# Patient Record
Sex: MALE | Race: WHITE | NOT HISPANIC OR LATINO | URBAN - METROPOLITAN AREA
[De-identification: names, ages, dates, MRNs, and addresses within clinical notes are randomized per-mention and may not be internally consistent; named-entity substitution may affect disease eponyms.]

---

## 2017-01-04 ENCOUNTER — OUTPATIENT (OUTPATIENT)
Dept: OUTPATIENT SERVICES | Facility: HOSPITAL | Age: 77
LOS: 1 days | Discharge: HOME | End: 2017-01-04

## 2017-02-03 ENCOUNTER — APPOINTMENT (OUTPATIENT)
Dept: CARDIOLOGY | Facility: CLINIC | Age: 77
End: 2017-02-03

## 2017-02-03 VITALS — BODY MASS INDEX: 27.2 KG/M2 | HEIGHT: 70 IN | WEIGHT: 190 LBS

## 2017-02-03 VITALS — HEART RATE: 59 BPM | DIASTOLIC BLOOD PRESSURE: 80 MMHG | SYSTOLIC BLOOD PRESSURE: 120 MMHG

## 2017-02-10 ENCOUNTER — MEDICATION RENEWAL (OUTPATIENT)
Age: 77
End: 2017-02-10

## 2017-03-06 ENCOUNTER — APPOINTMENT (OUTPATIENT)
Dept: CARDIOLOGY | Facility: CLINIC | Age: 77
End: 2017-03-06

## 2017-05-25 ENCOUNTER — OUTPATIENT (OUTPATIENT)
Dept: OUTPATIENT SERVICES | Facility: HOSPITAL | Age: 77
LOS: 1 days | Discharge: HOME | End: 2017-05-25

## 2017-06-22 ENCOUNTER — OUTPATIENT (OUTPATIENT)
Dept: OUTPATIENT SERVICES | Facility: HOSPITAL | Age: 77
LOS: 1 days | Discharge: HOME | End: 2017-06-22

## 2017-06-22 ENCOUNTER — APPOINTMENT (OUTPATIENT)
Dept: CARDIOLOGY | Facility: CLINIC | Age: 77
End: 2017-06-22

## 2017-06-22 VITALS — SYSTOLIC BLOOD PRESSURE: 140 MMHG | HEART RATE: 54 BPM | DIASTOLIC BLOOD PRESSURE: 80 MMHG

## 2017-06-22 VITALS — WEIGHT: 184 LBS | BODY MASS INDEX: 26.34 KG/M2 | HEIGHT: 70 IN

## 2017-06-28 DIAGNOSIS — Z79.01 LONG TERM (CURRENT) USE OF ANTICOAGULANTS: ICD-10-CM

## 2017-06-28 DIAGNOSIS — Z95.2 PRESENCE OF PROSTHETIC HEART VALVE: ICD-10-CM

## 2017-07-10 ENCOUNTER — MEDICATION RENEWAL (OUTPATIENT)
Age: 77
End: 2017-07-10

## 2017-07-26 ENCOUNTER — APPOINTMENT (OUTPATIENT)
Dept: UROLOGY | Facility: CLINIC | Age: 77
End: 2017-07-26

## 2017-07-26 VITALS
DIASTOLIC BLOOD PRESSURE: 67 MMHG | HEIGHT: 70 IN | BODY MASS INDEX: 26.34 KG/M2 | SYSTOLIC BLOOD PRESSURE: 136 MMHG | HEART RATE: 66 BPM | WEIGHT: 184 LBS

## 2017-07-27 ENCOUNTER — OUTPATIENT (OUTPATIENT)
Dept: OUTPATIENT SERVICES | Facility: HOSPITAL | Age: 77
LOS: 1 days | Discharge: HOME | End: 2017-07-27

## 2017-07-27 DIAGNOSIS — Z95.2 PRESENCE OF PROSTHETIC HEART VALVE: ICD-10-CM

## 2017-07-27 DIAGNOSIS — Z79.01 LONG TERM (CURRENT) USE OF ANTICOAGULANTS: ICD-10-CM

## 2017-08-09 ENCOUNTER — OUTPATIENT (OUTPATIENT)
Dept: OUTPATIENT SERVICES | Facility: HOSPITAL | Age: 77
LOS: 1 days | Discharge: HOME | End: 2017-08-09

## 2017-08-09 DIAGNOSIS — N41.3 PROSTATOCYSTITIS: ICD-10-CM

## 2017-08-10 ENCOUNTER — APPOINTMENT (OUTPATIENT)
Dept: CARDIOLOGY | Facility: CLINIC | Age: 77
End: 2017-08-10

## 2017-08-10 VITALS — WEIGHT: 183 LBS | BODY MASS INDEX: 26.26 KG/M2

## 2017-08-10 VITALS — SYSTOLIC BLOOD PRESSURE: 128 MMHG | DIASTOLIC BLOOD PRESSURE: 60 MMHG

## 2017-08-14 ENCOUNTER — OUTPATIENT (OUTPATIENT)
Dept: OUTPATIENT SERVICES | Facility: HOSPITAL | Age: 77
LOS: 1 days | Discharge: HOME | End: 2017-08-14

## 2017-08-14 DIAGNOSIS — Z95.2 PRESENCE OF PROSTHETIC HEART VALVE: ICD-10-CM

## 2017-08-14 DIAGNOSIS — Z79.01 LONG TERM (CURRENT) USE OF ANTICOAGULANTS: ICD-10-CM

## 2017-08-31 ENCOUNTER — APPOINTMENT (OUTPATIENT)
Dept: UROLOGY | Facility: CLINIC | Age: 77
End: 2017-08-31
Payer: MEDICARE

## 2017-08-31 VITALS
SYSTOLIC BLOOD PRESSURE: 128 MMHG | HEART RATE: 54 BPM | WEIGHT: 183 LBS | BODY MASS INDEX: 26.2 KG/M2 | HEIGHT: 70 IN | DIASTOLIC BLOOD PRESSURE: 68 MMHG

## 2017-08-31 LAB
BILIRUB UR QL STRIP: NORMAL
CLARITY UR: CLEAR
COLLECTION METHOD: NORMAL
GLUCOSE UR-MCNC: NORMAL
HCG UR QL: NORMAL EU/DL
HGB UR QL STRIP.AUTO: NORMAL
KETONES UR-MCNC: NORMAL
LEUKOCYTE ESTERASE UR QL STRIP: NORMAL
NITRITE UR QL STRIP: NORMAL
PH UR STRIP: 7
PROT UR STRIP-MCNC: NORMAL
SP GR UR STRIP: 1.01

## 2017-08-31 PROCEDURE — 99215 OFFICE O/P EST HI 40 MIN: CPT

## 2017-09-06 ENCOUNTER — OUTPATIENT (OUTPATIENT)
Dept: OUTPATIENT SERVICES | Facility: HOSPITAL | Age: 77
LOS: 1 days | Discharge: HOME | End: 2017-09-06

## 2017-09-06 DIAGNOSIS — C61 MALIGNANT NEOPLASM OF PROSTATE: ICD-10-CM

## 2017-09-06 DIAGNOSIS — M81.0 AGE-RELATED OSTEOPOROSIS WITHOUT CURRENT PATHOLOGICAL FRACTURE: ICD-10-CM

## 2017-09-06 DIAGNOSIS — Q78.2 OSTEOPETROSIS: ICD-10-CM

## 2017-09-06 DIAGNOSIS — M54.5 LOW BACK PAIN: ICD-10-CM

## 2017-09-07 DIAGNOSIS — Z79.01 LONG TERM (CURRENT) USE OF ANTICOAGULANTS: ICD-10-CM

## 2017-09-07 DIAGNOSIS — Z95.2 PRESENCE OF PROSTHETIC HEART VALVE: ICD-10-CM

## 2017-09-08 DIAGNOSIS — R97.20 ELEVATED PROSTATE SPECIFIC ANTIGEN [PSA]: ICD-10-CM

## 2017-09-08 DIAGNOSIS — Z13.820 ENCOUNTER FOR SCREENING FOR OSTEOPOROSIS: ICD-10-CM

## 2017-09-08 DIAGNOSIS — Z02.9 ENCOUNTER FOR ADMINISTRATIVE EXAMINATIONS, UNSPECIFIED: ICD-10-CM

## 2017-09-08 DIAGNOSIS — N40.1 BENIGN PROSTATIC HYPERPLASIA WITH LOWER URINARY TRACT SYMPTOMS: ICD-10-CM

## 2017-09-13 ENCOUNTER — OUTPATIENT (OUTPATIENT)
Dept: OUTPATIENT SERVICES | Facility: HOSPITAL | Age: 77
LOS: 1 days | Discharge: HOME | End: 2017-09-13

## 2017-09-13 DIAGNOSIS — Z79.01 LONG TERM (CURRENT) USE OF ANTICOAGULANTS: ICD-10-CM

## 2017-09-13 DIAGNOSIS — N41.3 PROSTATOCYSTITIS: ICD-10-CM

## 2017-09-13 DIAGNOSIS — Z95.2 PRESENCE OF PROSTHETIC HEART VALVE: ICD-10-CM

## 2017-09-14 ENCOUNTER — APPOINTMENT (OUTPATIENT)
Dept: CARDIOLOGY | Facility: CLINIC | Age: 77
End: 2017-09-14

## 2017-09-15 ENCOUNTER — RX RENEWAL (OUTPATIENT)
Age: 77
End: 2017-09-15

## 2017-09-15 ENCOUNTER — MESSAGE (OUTPATIENT)
Age: 77
End: 2017-09-15

## 2017-09-15 DIAGNOSIS — N41.3 PROSTATOCYSTITIS: ICD-10-CM

## 2017-09-15 DIAGNOSIS — Z02.9 ENCOUNTER FOR ADMINISTRATIVE EXAMINATIONS, UNSPECIFIED: ICD-10-CM

## 2017-09-20 ENCOUNTER — RESULT REVIEW (OUTPATIENT)
Age: 77
End: 2017-09-20

## 2017-09-21 ENCOUNTER — RESULT REVIEW (OUTPATIENT)
Age: 77
End: 2017-09-21

## 2017-09-21 ENCOUNTER — MESSAGE (OUTPATIENT)
Age: 77
End: 2017-09-21

## 2017-09-21 ENCOUNTER — APPOINTMENT (OUTPATIENT)
Dept: UROLOGY | Facility: CLINIC | Age: 77
End: 2017-09-21

## 2017-09-21 ENCOUNTER — OUTPATIENT (OUTPATIENT)
Dept: ADMINISTRATIVE | Facility: HOSPITAL | Age: 77
LOS: 1 days | Discharge: HOME | End: 2017-09-21

## 2017-09-25 ENCOUNTER — RESULT REVIEW (OUTPATIENT)
Age: 77
End: 2017-09-25

## 2017-09-26 LAB
APPEARANCE UR: CLEAR
BACTERIA UR CULT: NORMAL
BILIRUB UR QL STRIP: NEGATIVE
COLOR UR: YELLOW
GLUCOSE UR STRIP-MCNC: NEGATIVE MG/DL
HGB UR QL STRIP: ABNORMAL
KETONES UR STRIP-MCNC: NEGATIVE MG/DL
NITRITE UR QL STRIP: NEGATIVE
PH UR STRIP: 6
PROT UR STRIP-MCNC: NEGATIVE MG/DL
RBC #/AREA URNS HPF: ABNORMAL P/HPF
SP GR UR STRIP: 1.01
URINE COMP/EPITH (NORTH): ABNORMAL
UROBILINOGEN UR STRIP-MCNC: 0.2 MG/DL
WBC URNS QL MICRO: ABNORMAL
WBC URNS QL MICRO: ABNORMAL P/HPF

## 2017-10-03 DIAGNOSIS — N40.1 BENIGN PROSTATIC HYPERPLASIA WITH LOWER URINARY TRACT SYMPTOMS: ICD-10-CM

## 2017-10-05 ENCOUNTER — APPOINTMENT (OUTPATIENT)
Dept: UROLOGY | Facility: CLINIC | Age: 77
End: 2017-10-05
Payer: MEDICARE

## 2017-10-05 PROCEDURE — 99213 OFFICE O/P EST LOW 20 MIN: CPT

## 2017-10-12 ENCOUNTER — OUTPATIENT (OUTPATIENT)
Dept: OUTPATIENT SERVICES | Facility: HOSPITAL | Age: 77
LOS: 1 days | Discharge: HOME | End: 2017-10-12

## 2017-10-12 DIAGNOSIS — Z79.01 LONG TERM (CURRENT) USE OF ANTICOAGULANTS: ICD-10-CM

## 2017-10-12 DIAGNOSIS — Z95.2 PRESENCE OF PROSTHETIC HEART VALVE: ICD-10-CM

## 2017-10-17 ENCOUNTER — OUTPATIENT (OUTPATIENT)
Dept: OUTPATIENT SERVICES | Facility: HOSPITAL | Age: 77
LOS: 1 days | Discharge: HOME | End: 2017-10-17

## 2017-10-17 DIAGNOSIS — Z79.01 LONG TERM (CURRENT) USE OF ANTICOAGULANTS: ICD-10-CM

## 2017-10-17 DIAGNOSIS — Z95.2 PRESENCE OF PROSTHETIC HEART VALVE: ICD-10-CM

## 2017-10-18 ENCOUNTER — RX RENEWAL (OUTPATIENT)
Age: 77
End: 2017-10-18

## 2017-11-02 ENCOUNTER — OUTPATIENT (OUTPATIENT)
Dept: OUTPATIENT SERVICES | Facility: HOSPITAL | Age: 77
LOS: 1 days | Discharge: HOME | End: 2017-11-02

## 2017-11-02 DIAGNOSIS — Z95.2 PRESENCE OF PROSTHETIC HEART VALVE: ICD-10-CM

## 2017-11-02 DIAGNOSIS — Z79.01 LONG TERM (CURRENT) USE OF ANTICOAGULANTS: ICD-10-CM

## 2017-11-07 ENCOUNTER — APPOINTMENT (OUTPATIENT)
Dept: UROLOGY | Facility: CLINIC | Age: 77
End: 2017-11-07
Payer: MEDICARE

## 2017-11-07 VITALS — SYSTOLIC BLOOD PRESSURE: 133 MMHG | HEIGHT: 70 IN | DIASTOLIC BLOOD PRESSURE: 78 MMHG | HEART RATE: 52 BPM

## 2017-11-07 DIAGNOSIS — Z80.42 FAMILY HISTORY OF MALIGNANT NEOPLASM OF PROSTATE: ICD-10-CM

## 2017-11-07 LAB
BILIRUB UR QL STRIP: NORMAL
CLARITY UR: CLEAR
COLLECTION METHOD: NORMAL
GLUCOSE UR-MCNC: NORMAL
HCG UR QL: 2 EU/DL
HGB UR QL STRIP.AUTO: NORMAL
KETONES UR-MCNC: NORMAL
LEUKOCYTE ESTERASE UR QL STRIP: 25
NITRITE UR QL STRIP: NORMAL
PH UR STRIP: 6
PROT UR STRIP-MCNC: NORMAL
SP GR UR STRIP: 1.01

## 2017-11-07 PROCEDURE — 96402 CHEMO HORMON ANTINEOPL SQ/IM: CPT

## 2017-11-07 PROCEDURE — 81003 URINALYSIS AUTO W/O SCOPE: CPT | Mod: QW

## 2017-11-07 PROCEDURE — 99213 OFFICE O/P EST LOW 20 MIN: CPT | Mod: 25

## 2017-11-09 ENCOUNTER — APPOINTMENT (OUTPATIENT)
Dept: CARDIOLOGY | Facility: CLINIC | Age: 77
End: 2017-11-09

## 2017-11-09 VITALS — DIASTOLIC BLOOD PRESSURE: 68 MMHG | HEART RATE: 59 BPM | SYSTOLIC BLOOD PRESSURE: 140 MMHG

## 2017-11-09 VITALS — BODY MASS INDEX: 26.92 KG/M2 | HEIGHT: 70 IN | WEIGHT: 188 LBS

## 2017-11-14 ENCOUNTER — RX RENEWAL (OUTPATIENT)
Age: 77
End: 2017-11-14

## 2017-11-30 ENCOUNTER — OUTPATIENT (OUTPATIENT)
Dept: OUTPATIENT SERVICES | Facility: HOSPITAL | Age: 77
LOS: 1 days | Discharge: HOME | End: 2017-11-30

## 2017-11-30 DIAGNOSIS — Z95.2 PRESENCE OF PROSTHETIC HEART VALVE: ICD-10-CM

## 2017-11-30 DIAGNOSIS — Z79.01 LONG TERM (CURRENT) USE OF ANTICOAGULANTS: ICD-10-CM

## 2017-12-06 ENCOUNTER — RX RENEWAL (OUTPATIENT)
Age: 77
End: 2017-12-06

## 2017-12-08 ENCOUNTER — RX RENEWAL (OUTPATIENT)
Age: 77
End: 2017-12-08

## 2017-12-28 ENCOUNTER — OUTPATIENT (OUTPATIENT)
Dept: OUTPATIENT SERVICES | Facility: HOSPITAL | Age: 77
LOS: 1 days | Discharge: HOME | End: 2017-12-28

## 2017-12-28 DIAGNOSIS — Z95.2 PRESENCE OF PROSTHETIC HEART VALVE: ICD-10-CM

## 2017-12-28 DIAGNOSIS — Z79.01 LONG TERM (CURRENT) USE OF ANTICOAGULANTS: ICD-10-CM

## 2018-01-18 ENCOUNTER — OUTPATIENT (OUTPATIENT)
Dept: OUTPATIENT SERVICES | Facility: HOSPITAL | Age: 78
LOS: 1 days | Discharge: HOME | End: 2018-01-18

## 2018-01-18 DIAGNOSIS — Z95.2 PRESENCE OF PROSTHETIC HEART VALVE: ICD-10-CM

## 2018-01-18 DIAGNOSIS — Z79.01 LONG TERM (CURRENT) USE OF ANTICOAGULANTS: ICD-10-CM

## 2018-01-25 ENCOUNTER — OUTPATIENT (OUTPATIENT)
Dept: OUTPATIENT SERVICES | Facility: HOSPITAL | Age: 78
LOS: 1 days | Discharge: HOME | End: 2018-01-25

## 2018-01-25 DIAGNOSIS — Z95.2 PRESENCE OF PROSTHETIC HEART VALVE: ICD-10-CM

## 2018-01-25 DIAGNOSIS — Z79.01 LONG TERM (CURRENT) USE OF ANTICOAGULANTS: ICD-10-CM

## 2018-02-01 ENCOUNTER — OUTPATIENT (OUTPATIENT)
Dept: OUTPATIENT SERVICES | Facility: HOSPITAL | Age: 78
LOS: 1 days | Discharge: HOME | End: 2018-02-01

## 2018-02-01 DIAGNOSIS — Z79.01 LONG TERM (CURRENT) USE OF ANTICOAGULANTS: ICD-10-CM

## 2018-02-01 DIAGNOSIS — I48.91 UNSPECIFIED ATRIAL FIBRILLATION: ICD-10-CM

## 2018-02-20 ENCOUNTER — APPOINTMENT (OUTPATIENT)
Dept: UROLOGY | Facility: CLINIC | Age: 78
End: 2018-02-20
Payer: MEDICARE

## 2018-02-20 ENCOUNTER — RX RENEWAL (OUTPATIENT)
Age: 78
End: 2018-02-20

## 2018-02-20 VITALS
HEIGHT: 70 IN | DIASTOLIC BLOOD PRESSURE: 82 MMHG | HEART RATE: 78 BPM | SYSTOLIC BLOOD PRESSURE: 127 MMHG | WEIGHT: 188 LBS | BODY MASS INDEX: 26.92 KG/M2

## 2018-02-20 PROCEDURE — 99213 OFFICE O/P EST LOW 20 MIN: CPT | Mod: 25

## 2018-02-20 PROCEDURE — 96402 CHEMO HORMON ANTINEOPL SQ/IM: CPT

## 2018-02-21 ENCOUNTER — RX RENEWAL (OUTPATIENT)
Age: 78
End: 2018-02-21

## 2018-03-01 ENCOUNTER — OUTPATIENT (OUTPATIENT)
Dept: OUTPATIENT SERVICES | Facility: HOSPITAL | Age: 78
LOS: 1 days | Discharge: HOME | End: 2018-03-01

## 2018-03-01 DIAGNOSIS — Z95.2 PRESENCE OF PROSTHETIC HEART VALVE: ICD-10-CM

## 2018-03-01 DIAGNOSIS — Z79.01 LONG TERM (CURRENT) USE OF ANTICOAGULANTS: ICD-10-CM

## 2018-03-08 ENCOUNTER — RX RENEWAL (OUTPATIENT)
Age: 78
End: 2018-03-08

## 2018-03-16 ENCOUNTER — APPOINTMENT (OUTPATIENT)
Dept: CARDIOLOGY | Facility: CLINIC | Age: 78
End: 2018-03-16

## 2018-03-16 VITALS — BODY MASS INDEX: 26.92 KG/M2 | WEIGHT: 188 LBS | HEIGHT: 70 IN

## 2018-03-16 VITALS — DIASTOLIC BLOOD PRESSURE: 70 MMHG | SYSTOLIC BLOOD PRESSURE: 110 MMHG | HEART RATE: 97 BPM

## 2018-03-16 RX ORDER — BICALUTAMIDE 50 MG/1
50 TABLET ORAL
Qty: 30 | Refills: 0 | Status: DISCONTINUED | COMMUNITY
Start: 2017-10-18 | End: 2018-03-16

## 2018-03-29 ENCOUNTER — APPOINTMENT (OUTPATIENT)
Dept: CARDIOLOGY | Facility: CLINIC | Age: 78
End: 2018-03-29

## 2018-03-29 ENCOUNTER — OUTPATIENT (OUTPATIENT)
Dept: OUTPATIENT SERVICES | Facility: HOSPITAL | Age: 78
LOS: 1 days | Discharge: HOME | End: 2018-03-29

## 2018-03-29 DIAGNOSIS — Z95.2 PRESENCE OF PROSTHETIC HEART VALVE: ICD-10-CM

## 2018-03-29 DIAGNOSIS — Z79.01 LONG TERM (CURRENT) USE OF ANTICOAGULANTS: ICD-10-CM

## 2018-04-02 ENCOUNTER — OUTPATIENT (OUTPATIENT)
Dept: OUTPATIENT SERVICES | Facility: HOSPITAL | Age: 78
LOS: 1 days | Discharge: HOME | End: 2018-04-02

## 2018-04-02 DIAGNOSIS — Z95.2 PRESENCE OF PROSTHETIC HEART VALVE: ICD-10-CM

## 2018-04-02 DIAGNOSIS — Z79.01 LONG TERM (CURRENT) USE OF ANTICOAGULANTS: ICD-10-CM

## 2018-04-16 ENCOUNTER — OUTPATIENT (OUTPATIENT)
Dept: OUTPATIENT SERVICES | Facility: HOSPITAL | Age: 78
LOS: 1 days | Discharge: HOME | End: 2018-04-16

## 2018-04-16 DIAGNOSIS — Z95.2 PRESENCE OF PROSTHETIC HEART VALVE: ICD-10-CM

## 2018-04-16 DIAGNOSIS — Z79.01 LONG TERM (CURRENT) USE OF ANTICOAGULANTS: ICD-10-CM

## 2018-04-17 ENCOUNTER — RX RENEWAL (OUTPATIENT)
Age: 78
End: 2018-04-17

## 2018-04-23 ENCOUNTER — OUTPATIENT (OUTPATIENT)
Dept: OUTPATIENT SERVICES | Facility: HOSPITAL | Age: 78
LOS: 1 days | Discharge: HOME | End: 2018-04-23

## 2018-04-23 DIAGNOSIS — Z95.3 PRESENCE OF XENOGENIC HEART VALVE: ICD-10-CM

## 2018-04-23 DIAGNOSIS — Z79.01 LONG TERM (CURRENT) USE OF ANTICOAGULANTS: ICD-10-CM

## 2018-05-07 ENCOUNTER — OUTPATIENT (OUTPATIENT)
Dept: OUTPATIENT SERVICES | Facility: HOSPITAL | Age: 78
LOS: 1 days | Discharge: HOME | End: 2018-05-07

## 2018-05-07 DIAGNOSIS — Z79.1 LONG TERM (CURRENT) USE OF NON-STEROIDAL ANTI-INFLAMMATORIES (NSAID): ICD-10-CM

## 2018-05-07 DIAGNOSIS — Z95.2 PRESENCE OF PROSTHETIC HEART VALVE: ICD-10-CM

## 2018-05-08 LAB
BILIRUB UR QL STRIP: NORMAL
CLARITY UR: CLEAR
COLLECTION METHOD: NORMAL
GLUCOSE UR-MCNC: NORMAL
HCG UR QL: 2 EU/DL
HGB UR QL STRIP.AUTO: NORMAL
KETONES UR-MCNC: NORMAL
LEUKOCYTE ESTERASE UR QL STRIP: NORMAL
NITRITE UR QL STRIP: NORMAL
PH UR STRIP: 5
PROT UR STRIP-MCNC: NORMAL
SP GR UR STRIP: 1.01

## 2018-05-09 ENCOUNTER — APPOINTMENT (OUTPATIENT)
Dept: CARDIOLOGY | Facility: CLINIC | Age: 78
End: 2018-05-09

## 2018-05-09 VITALS — DIASTOLIC BLOOD PRESSURE: 80 MMHG | HEART RATE: 56 BPM | SYSTOLIC BLOOD PRESSURE: 134 MMHG

## 2018-05-09 VITALS
HEIGHT: 70 IN | WEIGHT: 181 LBS | HEART RATE: 56 BPM | SYSTOLIC BLOOD PRESSURE: 128 MMHG | DIASTOLIC BLOOD PRESSURE: 70 MMHG | BODY MASS INDEX: 25.91 KG/M2

## 2018-05-15 ENCOUNTER — APPOINTMENT (OUTPATIENT)
Dept: UROLOGY | Facility: CLINIC | Age: 78
End: 2018-05-15
Payer: MEDICARE

## 2018-05-15 VITALS
HEART RATE: 64 BPM | HEIGHT: 70 IN | WEIGHT: 181 LBS | SYSTOLIC BLOOD PRESSURE: 136 MMHG | BODY MASS INDEX: 25.91 KG/M2 | DIASTOLIC BLOOD PRESSURE: 70 MMHG

## 2018-05-15 LAB
BILIRUB UR QL STRIP: NORMAL
CLARITY UR: CLEAR
COLLECTION METHOD: NORMAL
GLUCOSE UR-MCNC: NORMAL
HCG UR QL: NORMAL EU/DL
HGB UR QL STRIP.AUTO: NORMAL
KETONES UR-MCNC: NORMAL
LEUKOCYTE ESTERASE UR QL STRIP: NORMAL
NITRITE UR QL STRIP: NORMAL
PH UR STRIP: 6
PROT UR STRIP-MCNC: NORMAL
SP GR UR STRIP: 1020

## 2018-05-15 PROCEDURE — 81003 URINALYSIS AUTO W/O SCOPE: CPT | Mod: QW

## 2018-05-15 PROCEDURE — 96402 CHEMO HORMON ANTINEOPL SQ/IM: CPT

## 2018-05-15 PROCEDURE — 99213 OFFICE O/P EST LOW 20 MIN: CPT | Mod: 25

## 2018-05-21 ENCOUNTER — OUTPATIENT (OUTPATIENT)
Dept: OUTPATIENT SERVICES | Facility: HOSPITAL | Age: 78
LOS: 1 days | Discharge: HOME | End: 2018-05-21

## 2018-05-21 DIAGNOSIS — Z79.01 LONG TERM (CURRENT) USE OF ANTICOAGULANTS: ICD-10-CM

## 2018-05-21 DIAGNOSIS — Z95.2 PRESENCE OF PROSTHETIC HEART VALVE: ICD-10-CM

## 2018-06-18 ENCOUNTER — OUTPATIENT (OUTPATIENT)
Dept: OUTPATIENT SERVICES | Facility: HOSPITAL | Age: 78
LOS: 1 days | Discharge: HOME | End: 2018-06-18

## 2018-06-18 DIAGNOSIS — Z95.2 PRESENCE OF PROSTHETIC HEART VALVE: ICD-10-CM

## 2018-06-18 DIAGNOSIS — Z79.01 LONG TERM (CURRENT) USE OF ANTICOAGULANTS: ICD-10-CM

## 2018-07-16 ENCOUNTER — OUTPATIENT (OUTPATIENT)
Dept: OUTPATIENT SERVICES | Facility: HOSPITAL | Age: 78
LOS: 1 days | Discharge: HOME | End: 2018-07-16

## 2018-07-16 DIAGNOSIS — I48.92 UNSPECIFIED ATRIAL FLUTTER: ICD-10-CM

## 2018-07-16 DIAGNOSIS — I48.91 UNSPECIFIED ATRIAL FIBRILLATION: ICD-10-CM

## 2018-07-16 DIAGNOSIS — Z79.01 LONG TERM (CURRENT) USE OF ANTICOAGULANTS: ICD-10-CM

## 2018-07-16 DIAGNOSIS — Z95.2 PRESENCE OF PROSTHETIC HEART VALVE: ICD-10-CM

## 2018-07-23 ENCOUNTER — OUTPATIENT (OUTPATIENT)
Dept: OUTPATIENT SERVICES | Facility: HOSPITAL | Age: 78
LOS: 1 days | Discharge: HOME | End: 2018-07-23

## 2018-07-23 DIAGNOSIS — Z02.9 ENCOUNTER FOR ADMINISTRATIVE EXAMINATIONS, UNSPECIFIED: ICD-10-CM

## 2018-07-23 DIAGNOSIS — Z95.2 PRESENCE OF PROSTHETIC HEART VALVE: ICD-10-CM

## 2018-07-23 DIAGNOSIS — Z79.01 LONG TERM (CURRENT) USE OF ANTICOAGULANTS: ICD-10-CM

## 2018-07-23 DIAGNOSIS — I48.92 UNSPECIFIED ATRIAL FLUTTER: ICD-10-CM

## 2018-07-30 DIAGNOSIS — Z13.6 ENCOUNTER FOR SCREENING FOR CARDIOVASCULAR DISORDERS: ICD-10-CM

## 2018-08-06 ENCOUNTER — OUTPATIENT (OUTPATIENT)
Dept: OUTPATIENT SERVICES | Facility: HOSPITAL | Age: 78
LOS: 1 days | Discharge: HOME | End: 2018-08-06

## 2018-08-06 DIAGNOSIS — Z79.01 LONG TERM (CURRENT) USE OF ANTICOAGULANTS: ICD-10-CM

## 2018-08-06 DIAGNOSIS — Z95.2 PRESENCE OF PROSTHETIC HEART VALVE: ICD-10-CM

## 2018-08-07 ENCOUNTER — OUTPATIENT (OUTPATIENT)
Dept: OUTPATIENT SERVICES | Facility: HOSPITAL | Age: 78
LOS: 1 days | Discharge: HOME | End: 2018-08-07

## 2018-08-07 DIAGNOSIS — M54.5 LOW BACK PAIN: ICD-10-CM

## 2018-08-20 ENCOUNTER — OUTPATIENT (OUTPATIENT)
Dept: OUTPATIENT SERVICES | Facility: HOSPITAL | Age: 78
LOS: 1 days | Discharge: HOME | End: 2018-08-20

## 2018-08-20 DIAGNOSIS — Z79.01 LONG TERM (CURRENT) USE OF ANTICOAGULANTS: ICD-10-CM

## 2018-08-20 DIAGNOSIS — Z95.2 PRESENCE OF PROSTHETIC HEART VALVE: ICD-10-CM

## 2018-08-21 ENCOUNTER — RESULT CHARGE (OUTPATIENT)
Age: 78
End: 2018-08-21

## 2018-08-21 ENCOUNTER — APPOINTMENT (OUTPATIENT)
Dept: UROLOGY | Facility: CLINIC | Age: 78
End: 2018-08-21
Payer: MEDICARE

## 2018-08-21 VITALS
WEIGHT: 182 LBS | BODY MASS INDEX: 26.05 KG/M2 | HEART RATE: 54 BPM | SYSTOLIC BLOOD PRESSURE: 146 MMHG | HEIGHT: 70 IN | DIASTOLIC BLOOD PRESSURE: 82 MMHG

## 2018-08-21 LAB
BILIRUB UR QL STRIP: NORMAL
CLARITY UR: CLEAR
COLLECTION METHOD: NORMAL
GLUCOSE UR-MCNC: NORMAL
HCG UR QL: NORMAL EU/DL
HGB UR QL STRIP.AUTO: 50
KETONES UR-MCNC: NORMAL
LEUKOCYTE ESTERASE UR QL STRIP: NORMAL
NITRITE UR QL STRIP: NORMAL
PH UR STRIP: 5
PROT UR STRIP-MCNC: NORMAL
SP GR UR STRIP: 1020

## 2018-08-21 PROCEDURE — 99212 OFFICE O/P EST SF 10 MIN: CPT | Mod: 25

## 2018-08-21 PROCEDURE — 96402 CHEMO HORMON ANTINEOPL SQ/IM: CPT

## 2018-08-27 ENCOUNTER — OUTPATIENT (OUTPATIENT)
Dept: OUTPATIENT SERVICES | Facility: HOSPITAL | Age: 78
LOS: 1 days | Discharge: HOME | End: 2018-08-27

## 2018-08-27 DIAGNOSIS — Z95.2 PRESENCE OF PROSTHETIC HEART VALVE: ICD-10-CM

## 2018-08-27 DIAGNOSIS — Z79.01 LONG TERM (CURRENT) USE OF ANTICOAGULANTS: ICD-10-CM

## 2018-08-27 LAB
POCT INR: 3.6 RATIO — HIGH (ref 0.9–1.2)
POCT PT: 42.8 SEC — HIGH (ref 10–13.4)

## 2018-09-10 ENCOUNTER — OUTPATIENT (OUTPATIENT)
Dept: OUTPATIENT SERVICES | Facility: HOSPITAL | Age: 78
LOS: 1 days | Discharge: HOME | End: 2018-09-10

## 2018-09-10 DIAGNOSIS — Z95.2 PRESENCE OF PROSTHETIC HEART VALVE: ICD-10-CM

## 2018-09-10 DIAGNOSIS — Z79.01 LONG TERM (CURRENT) USE OF ANTICOAGULANTS: ICD-10-CM

## 2018-09-10 LAB
POCT INR: 2.7 RATIO — HIGH (ref 0.9–1.2)
POCT PT: 32.7 SEC — HIGH (ref 10–13.4)

## 2018-09-20 ENCOUNTER — APPOINTMENT (OUTPATIENT)
Dept: UROLOGY | Facility: CLINIC | Age: 78
End: 2018-09-20
Payer: MEDICARE

## 2018-09-20 VITALS
HEART RATE: 58 BPM | HEIGHT: 70 IN | DIASTOLIC BLOOD PRESSURE: 76 MMHG | WEIGHT: 180 LBS | BODY MASS INDEX: 25.77 KG/M2 | SYSTOLIC BLOOD PRESSURE: 136 MMHG

## 2018-09-20 PROCEDURE — 96402 CHEMO HORMON ANTINEOPL SQ/IM: CPT

## 2018-09-20 PROCEDURE — 99213 OFFICE O/P EST LOW 20 MIN: CPT | Mod: 25

## 2018-09-25 ENCOUNTER — APPOINTMENT (OUTPATIENT)
Dept: SURGERY | Facility: CLINIC | Age: 78
End: 2018-09-25
Payer: MEDICARE

## 2018-09-25 VITALS — HEIGHT: 70 IN | BODY MASS INDEX: 25.77 KG/M2 | WEIGHT: 180 LBS

## 2018-09-25 PROCEDURE — 99203 OFFICE O/P NEW LOW 30 MIN: CPT

## 2018-10-01 ENCOUNTER — APPOINTMENT (OUTPATIENT)
Dept: CARDIOLOGY | Facility: CLINIC | Age: 78
End: 2018-10-01

## 2018-10-01 ENCOUNTER — OUTPATIENT (OUTPATIENT)
Dept: OUTPATIENT SERVICES | Facility: HOSPITAL | Age: 78
LOS: 1 days | Discharge: HOME | End: 2018-10-01

## 2018-10-01 VITALS
HEART RATE: 110 BPM | WEIGHT: 182 LBS | SYSTOLIC BLOOD PRESSURE: 104 MMHG | DIASTOLIC BLOOD PRESSURE: 80 MMHG | HEIGHT: 70 IN | BODY MASS INDEX: 26.05 KG/M2

## 2018-10-01 DIAGNOSIS — Z95.2 PRESENCE OF PROSTHETIC HEART VALVE: ICD-10-CM

## 2018-10-01 DIAGNOSIS — Z79.01 LONG TERM (CURRENT) USE OF ANTICOAGULANTS: ICD-10-CM

## 2018-10-01 LAB
POCT INR: 2.9 RATIO — HIGH (ref 0.9–1.2)
POCT PT: 34.6 SEC — HIGH (ref 10–13.4)

## 2018-10-03 ENCOUNTER — APPOINTMENT (OUTPATIENT)
Dept: CARDIOLOGY | Facility: CLINIC | Age: 78
End: 2018-10-03

## 2018-10-03 VITALS — SYSTOLIC BLOOD PRESSURE: 120 MMHG | DIASTOLIC BLOOD PRESSURE: 78 MMHG

## 2018-10-03 DIAGNOSIS — Z87.442 PERSONAL HISTORY OF URINARY CALCULI: ICD-10-CM

## 2018-10-29 ENCOUNTER — OUTPATIENT (OUTPATIENT)
Dept: OUTPATIENT SERVICES | Facility: HOSPITAL | Age: 78
LOS: 1 days | Discharge: HOME | End: 2018-10-29

## 2018-10-29 DIAGNOSIS — Z79.01 LONG TERM (CURRENT) USE OF ANTICOAGULANTS: ICD-10-CM

## 2018-10-29 DIAGNOSIS — Z95.2 PRESENCE OF PROSTHETIC HEART VALVE: ICD-10-CM

## 2018-10-29 LAB
POCT INR: 4.4 RATIO — HIGH (ref 0.9–1.2)
POCT PT: 53.1 SEC — HIGH (ref 10–13.4)

## 2018-11-01 ENCOUNTER — APPOINTMENT (OUTPATIENT)
Dept: HEMATOLOGY ONCOLOGY | Facility: CLINIC | Age: 78
End: 2018-11-01

## 2018-11-01 ENCOUNTER — LABORATORY RESULT (OUTPATIENT)
Age: 78
End: 2018-11-01

## 2018-11-01 VITALS
HEIGHT: 70 IN | RESPIRATION RATE: 14 BRPM | DIASTOLIC BLOOD PRESSURE: 71 MMHG | SYSTOLIC BLOOD PRESSURE: 85 MMHG | WEIGHT: 181 LBS | HEART RATE: 68 BPM | TEMPERATURE: 97.3 F | BODY MASS INDEX: 25.91 KG/M2

## 2018-11-01 NOTE — PHYSICAL EXAM
[Normal] : no peripheral adenopathy appreciated [de-identified] : well-preserved pleasant male in no acute distress. [de-identified] : healed scar  of lymph node biopsy on the left, no residual or  recurrent adenopathy. [de-identified] : prominent aortic valve  click. [de-identified] : no hepatosplenomegaly [de-identified] : no peripheral adenopathy.

## 2018-11-01 NOTE — ASSESSMENT
[FreeTextEntry1] : this is a 76-year-old white man with a remote history of non-Hodgkin's lymphoma, CLL trisomy 12 , diagnosed 2 years ago , lymphocyte doubling time around 2 years , mild anemia in part secondary to androgen deprivation .\par Mild thrombocytopenia ( 94 )  , asymptomatic . possibly immune mediated . There is no indication for CLL directed therapy at this time . He can proceed with cardiac ablation and continue on warfarin and antiplatelets , monitor CBC in 1 to 2 months. Natural history , prognosis and treatment for CLL was discussed at length with the patient and his wife , all their questions were addressed to their satisfaction .

## 2018-11-01 NOTE — HISTORY OF PRESENT ILLNESS
[de-identified] : this is a 76-year-old white man with multiple medical problems including coronary artery disease status post angioplasty valvular heart disease status post metallic aortic valve replacement. He is referred for abnormal hemogram noted recently, WBC is 10.6 hemoglobin 14.2 platelets 131 absolute lymphocytes 5268 chemistry is unremarkable. He denies any constitutional symptoms  specifically no fever ,weight loss ,night sweats, fatigue or  pruritus. he feels fantastic with good energy level. 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 He is scheduled for ablation for atrial fibrillation , he continues on coumadin for mechanical valve. He reports minor bruising on coumadin and plavix. He feels slight fatigue . He denies fever , weight loss or night sweats . He is also on androgen deprivation for elevated PSA , , He had previously on surveillance and had multiple prostate biopsies ( unclear if it showed cancer ) . Bone scan is allegedly negative . Last PSA is less than 1 and patient denies obstructive symptoms.

## 2018-11-08 ENCOUNTER — OUTPATIENT (OUTPATIENT)
Dept: OUTPATIENT SERVICES | Facility: HOSPITAL | Age: 78
LOS: 1 days | Discharge: HOME | End: 2018-11-08

## 2018-11-08 DIAGNOSIS — Z95.2 PRESENCE OF PROSTHETIC HEART VALVE: ICD-10-CM

## 2018-11-08 DIAGNOSIS — Z79.01 LONG TERM (CURRENT) USE OF ANTICOAGULANTS: ICD-10-CM

## 2018-11-08 LAB
POCT INR: 2.8 RATIO — HIGH (ref 0.9–1.2)
POCT PT: 34.1 SEC — HIGH (ref 10–13.4)

## 2018-11-12 ENCOUNTER — LABORATORY RESULT (OUTPATIENT)
Age: 78
End: 2018-11-12

## 2018-11-12 ENCOUNTER — OUTPATIENT (OUTPATIENT)
Dept: OUTPATIENT SERVICES | Facility: HOSPITAL | Age: 78
LOS: 1 days | Discharge: HOME | End: 2018-11-12

## 2018-11-12 ENCOUNTER — CLINICAL ADVICE (OUTPATIENT)
Age: 78
End: 2018-11-12

## 2018-11-12 DIAGNOSIS — I48.91 UNSPECIFIED ATRIAL FIBRILLATION: ICD-10-CM

## 2018-11-12 DIAGNOSIS — I48.92 UNSPECIFIED ATRIAL FLUTTER: ICD-10-CM

## 2018-11-14 LAB
ANION GAP SERPL CALC-SCNC: 12 MMOL/L
BUN SERPL-MCNC: 19 MG/DL
CALCIUM SERPL-MCNC: 9.2 MG/DL
CHLORIDE SERPL-SCNC: 105 MMOL/L
CO2 SERPL-SCNC: 27 MMOL/L
CREAT SERPL-MCNC: 0.9 MG/DL
GLUCOSE SERPL-MCNC: 108 MG/DL
POTASSIUM SERPL-SCNC: 4.3 MMOL/L
SODIUM SERPL-SCNC: 144 MMOL/L

## 2018-12-03 ENCOUNTER — RX RENEWAL (OUTPATIENT)
Age: 78
End: 2018-12-03

## 2018-12-06 ENCOUNTER — OUTPATIENT (OUTPATIENT)
Dept: OUTPATIENT SERVICES | Facility: HOSPITAL | Age: 78
LOS: 1 days | Discharge: HOME | End: 2018-12-06

## 2018-12-06 VITALS
OXYGEN SATURATION: 97 % | RESPIRATION RATE: 16 BRPM | TEMPERATURE: 97 F | HEART RATE: 104 BPM | DIASTOLIC BLOOD PRESSURE: 82 MMHG | SYSTOLIC BLOOD PRESSURE: 119 MMHG | HEIGHT: 70 IN | WEIGHT: 182.1 LBS

## 2018-12-06 DIAGNOSIS — I48.92 UNSPECIFIED ATRIAL FLUTTER: ICD-10-CM

## 2018-12-06 DIAGNOSIS — Z79.01 LONG TERM (CURRENT) USE OF ANTICOAGULANTS: ICD-10-CM

## 2018-12-06 DIAGNOSIS — I48.0 PAROXYSMAL ATRIAL FIBRILLATION: ICD-10-CM

## 2018-12-06 DIAGNOSIS — Z01.818 ENCOUNTER FOR OTHER PREPROCEDURAL EXAMINATION: ICD-10-CM

## 2018-12-06 DIAGNOSIS — Z95.2 PRESENCE OF PROSTHETIC HEART VALVE: Chronic | ICD-10-CM

## 2018-12-06 DIAGNOSIS — Z95.2 PRESENCE OF PROSTHETIC HEART VALVE: ICD-10-CM

## 2018-12-06 LAB
ALBUMIN SERPL ELPH-MCNC: 4.9 G/DL — SIGNIFICANT CHANGE UP (ref 3.5–5.2)
ALP SERPL-CCNC: 60 U/L — SIGNIFICANT CHANGE UP (ref 30–115)
ALT FLD-CCNC: 26 U/L — SIGNIFICANT CHANGE UP (ref 0–41)
ANION GAP SERPL CALC-SCNC: 15 MMOL/L — HIGH (ref 7–14)
APTT BLD: 47.3 SEC — HIGH (ref 27–39.2)
AST SERPL-CCNC: 28 U/L — SIGNIFICANT CHANGE UP (ref 0–41)
BASOPHILS # BLD AUTO: 0.07 K/UL — SIGNIFICANT CHANGE UP (ref 0–0.2)
BASOPHILS NFR BLD AUTO: 0.4 % — SIGNIFICANT CHANGE UP (ref 0–1)
BILIRUB SERPL-MCNC: 0.7 MG/DL — SIGNIFICANT CHANGE UP (ref 0.2–1.2)
BUN SERPL-MCNC: 15 MG/DL — SIGNIFICANT CHANGE UP (ref 10–20)
CALCIUM SERPL-MCNC: 9.9 MG/DL — SIGNIFICANT CHANGE UP (ref 8.5–10.1)
CHLORIDE SERPL-SCNC: 100 MMOL/L — SIGNIFICANT CHANGE UP (ref 98–110)
CO2 SERPL-SCNC: 28 MMOL/L — SIGNIFICANT CHANGE UP (ref 17–32)
CREAT SERPL-MCNC: 1 MG/DL — SIGNIFICANT CHANGE UP (ref 0.7–1.5)
EOSINOPHIL # BLD AUTO: 0.37 K/UL — SIGNIFICANT CHANGE UP (ref 0–0.7)
EOSINOPHIL NFR BLD AUTO: 2 % — SIGNIFICANT CHANGE UP (ref 0–8)
GLUCOSE SERPL-MCNC: 127 MG/DL — HIGH (ref 70–99)
HCT VFR BLD CALC: 38.6 % — LOW (ref 42–52)
HGB BLD-MCNC: 12.6 G/DL — LOW (ref 14–18)
IMM GRANULOCYTES NFR BLD AUTO: 0.2 % — SIGNIFICANT CHANGE UP (ref 0.1–0.3)
INR BLD: 3.37 RATIO — HIGH (ref 0.65–1.3)
LYMPHOCYTES # BLD AUTO: 10.28 K/UL — HIGH (ref 1.2–3.4)
LYMPHOCYTES # BLD AUTO: 55.7 % — HIGH (ref 20.5–51.1)
MCHC RBC-ENTMCNC: 32.6 G/DL — SIGNIFICANT CHANGE UP (ref 32–37)
MCHC RBC-ENTMCNC: 32.6 PG — HIGH (ref 27–31)
MCV RBC AUTO: 99.7 FL — HIGH (ref 80–94)
MONOCYTES # BLD AUTO: 2.08 K/UL — HIGH (ref 0.1–0.6)
MONOCYTES NFR BLD AUTO: 11.3 % — HIGH (ref 1.7–9.3)
NEUTROPHILS # BLD AUTO: 5.61 K/UL — SIGNIFICANT CHANGE UP (ref 1.4–6.5)
NEUTROPHILS NFR BLD AUTO: 30.4 % — LOW (ref 42.2–75.2)
NRBC # BLD: 0 /100 WBCS — SIGNIFICANT CHANGE UP (ref 0–0)
PLATELET # BLD AUTO: 110 K/UL — LOW (ref 130–400)
POCT INR: 3.8 RATIO — HIGH (ref 0.9–1.2)
POCT PT: 45.2 SEC — HIGH (ref 10–13.4)
POTASSIUM SERPL-MCNC: 4.6 MMOL/L — SIGNIFICANT CHANGE UP (ref 3.5–5)
POTASSIUM SERPL-SCNC: 4.6 MMOL/L — SIGNIFICANT CHANGE UP (ref 3.5–5)
PROT SERPL-MCNC: 6.8 G/DL — SIGNIFICANT CHANGE UP (ref 6–8)
PROTHROM AB SERPL-ACNC: 38.3 SEC — HIGH (ref 9.95–12.87)
RBC # BLD: 3.87 M/UL — LOW (ref 4.7–6.1)
RBC # FLD: 13.9 % — SIGNIFICANT CHANGE UP (ref 11.5–14.5)
SODIUM SERPL-SCNC: 143 MMOL/L — SIGNIFICANT CHANGE UP (ref 135–146)
WBC # BLD: 18.45 K/UL — HIGH (ref 4.8–10.8)
WBC # FLD AUTO: 18.45 K/UL — HIGH (ref 4.8–10.8)

## 2018-12-06 NOTE — H&P PST ADULT - REASON FOR ADMISSION
79 yo male presents w/ hx afib, "I am beating too fast, I am feeling a little weak";  denies chest pain, palpitations, shortness of breath, dyspnea, or dysuria. exercise tolerance: 2 blocks/ flights of stairs w/o sob

## 2018-12-06 NOTE — H&P PST ADULT - PMH
Aortic valve stenosis  s/p replacement 2009  BPH (benign prostatic hyperplasia)    CAD (coronary artery disease)  2004 1 stent  Lymphoma  1996, s/p chemo& radiation

## 2018-12-10 DIAGNOSIS — I48.92 UNSPECIFIED ATRIAL FLUTTER: ICD-10-CM

## 2018-12-10 DIAGNOSIS — Z01.818 ENCOUNTER FOR OTHER PREPROCEDURAL EXAMINATION: ICD-10-CM

## 2018-12-10 DIAGNOSIS — I48.0 PAROXYSMAL ATRIAL FIBRILLATION: ICD-10-CM

## 2018-12-10 DIAGNOSIS — Z02.9 ENCOUNTER FOR ADMINISTRATIVE EXAMINATIONS, UNSPECIFIED: ICD-10-CM

## 2018-12-11 LAB
HCT VFR BLD CALC: 38.3 %
HGB BLD-MCNC: 12.7 G/DL
MCHC RBC-ENTMCNC: 32.7 PG
MCHC RBC-ENTMCNC: 33.2 G/DL
MCV RBC AUTO: 98.7 FL
PLATELET # BLD AUTO: 94 K/UL
PMV BLD: 10 FL
RBC # BLD: 3.88 M/UL
RBC # FLD: 13.7 %
WBC # FLD AUTO: 20.29 K/UL

## 2018-12-12 ENCOUNTER — APPOINTMENT (OUTPATIENT)
Dept: CARDIOLOGY | Facility: CLINIC | Age: 78
End: 2018-12-12

## 2018-12-12 VITALS
BODY MASS INDEX: 26.34 KG/M2 | HEART RATE: 105 BPM | SYSTOLIC BLOOD PRESSURE: 102 MMHG | DIASTOLIC BLOOD PRESSURE: 70 MMHG | HEIGHT: 70 IN | WEIGHT: 184 LBS

## 2018-12-12 PROBLEM — C85.90 NON-HODGKIN LYMPHOMA, UNSPECIFIED, UNSPECIFIED SITE: Chronic | Status: ACTIVE | Noted: 2018-12-06

## 2018-12-12 PROBLEM — I25.10 ATHEROSCLEROTIC HEART DISEASE OF NATIVE CORONARY ARTERY WITHOUT ANGINA PECTORIS: Chronic | Status: ACTIVE | Noted: 2018-12-06

## 2018-12-12 PROBLEM — N40.0 BENIGN PROSTATIC HYPERPLASIA WITHOUT LOWER URINARY TRACT SYMPTOMS: Chronic | Status: ACTIVE | Noted: 2018-12-06

## 2018-12-12 PROBLEM — I35.0 NONRHEUMATIC AORTIC (VALVE) STENOSIS: Chronic | Status: ACTIVE | Noted: 2018-12-06

## 2018-12-12 NOTE — REASON FOR VISIT
[Follow-Up - Clinic] : a clinic follow-up of [Aortic Stenosis] : aortic stenosis [Coronary Artery Disease] : coronary artery disease [Hyperlipidemia] : hyperlipidemia [Hypertension] : hypertension

## 2018-12-12 NOTE — PHYSICAL EXAM
[General Appearance - Well Developed] : well developed [Normal Appearance] : normal appearance [Well Groomed] : well groomed [General Appearance - Well Nourished] : well nourished [No Deformities] : no deformities [General Appearance - In No Acute Distress] : no acute distress [Normal Conjunctiva] : the conjunctiva exhibited no abnormalities [Eyelids - No Xanthelasma] : the eyelids demonstrated no xanthelasmas [Normal Oral Mucosa] : normal oral mucosa [No Oral Pallor] : no oral pallor [No Oral Cyanosis] : no oral cyanosis [Respiration, Rhythm And Depth] : normal respiratory rhythm and effort [Exaggerated Use Of Accessory Muscles For Inspiration] : no accessory muscle use [Auscultation Breath Sounds / Voice Sounds] : lungs were clear to auscultation bilaterally [Abdomen Soft] : soft [Abdomen Tenderness] : non-tender [Abdomen Mass (___ Cm)] : no abdominal mass palpated [Abnormal Walk] : normal gait [Gait - Sufficient For Exercise Testing] : the gait was sufficient for exercise testing [Nail Clubbing] : no clubbing of the fingernails [Cyanosis, Localized] : no localized cyanosis [Petechial Hemorrhages (___cm)] : no petechial hemorrhages [Skin Color & Pigmentation] : normal skin color and pigmentation [] : no rash [No Venous Stasis] : no venous stasis [Skin Lesions] : no skin lesions [No Skin Ulcers] : no skin ulcer [No Xanthoma] : no  xanthoma was observed [Irregularly Irregular] : irregularly irregular [Prosthetic Mitral Valve] : prosthetic mitral valve heard [II] : a grade 2 [1+] : left 1+ [No Pitting Edema] : no pitting edema present [FreeTextEntry1] : No JVD  [Rt] : no varicose veins of the right leg

## 2018-12-12 NOTE — HISTORY OF PRESENT ILLNESS
[FreeTextEntry1] : The patient has had AF which is persisent. He is going for AF ablation. No chest pain . He has had increased low back pain. 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

## 2018-12-12 NOTE — ASSESSMENT
[FreeTextEntry1] : The patient has persisent AF . He is going for ablation. He has had back pain. The patient has degenerative disease of the spine in the past. . He also has an iliac artery aneusym noted incidently on a CT scan odered by Urology .

## 2018-12-13 ENCOUNTER — FORM ENCOUNTER (OUTPATIENT)
Age: 78
End: 2018-12-13

## 2018-12-14 ENCOUNTER — OUTPATIENT (OUTPATIENT)
Dept: OUTPATIENT SERVICES | Facility: HOSPITAL | Age: 78
LOS: 1 days | Discharge: HOME | End: 2018-12-14

## 2018-12-14 DIAGNOSIS — Z95.2 PRESENCE OF PROSTHETIC HEART VALVE: Chronic | ICD-10-CM

## 2018-12-14 DIAGNOSIS — Z95.2 PRESENCE OF PROSTHETIC HEART VALVE: ICD-10-CM

## 2018-12-14 DIAGNOSIS — R97.20 ELEVATED PROSTATE SPECIFIC ANTIGEN [PSA]: ICD-10-CM

## 2018-12-14 DIAGNOSIS — Z79.01 LONG TERM (CURRENT) USE OF ANTICOAGULANTS: ICD-10-CM

## 2018-12-14 LAB
POCT INR: 2.9 RATIO — HIGH (ref 0.9–1.2)
POCT PT: 34.5 SEC — HIGH (ref 10–13.4)

## 2018-12-17 ENCOUNTER — INPATIENT (INPATIENT)
Facility: HOSPITAL | Age: 78
LOS: 0 days | Discharge: HOME | End: 2018-12-18
Attending: STUDENT IN AN ORGANIZED HEALTH CARE EDUCATION/TRAINING PROGRAM | Admitting: STUDENT IN AN ORGANIZED HEALTH CARE EDUCATION/TRAINING PROGRAM
Payer: MEDICARE

## 2018-12-17 ENCOUNTER — OUTPATIENT (OUTPATIENT)
Dept: OUTPATIENT SERVICES | Facility: HOSPITAL | Age: 78
LOS: 1 days | Discharge: HOME | End: 2018-12-17

## 2018-12-17 VITALS
HEART RATE: 111 BPM | HEIGHT: 69.69 IN | TEMPERATURE: 98 F | SYSTOLIC BLOOD PRESSURE: 117 MMHG | WEIGHT: 180.78 LBS | DIASTOLIC BLOOD PRESSURE: 80 MMHG | OXYGEN SATURATION: 97 % | RESPIRATION RATE: 14 BRPM

## 2018-12-17 DIAGNOSIS — I48.0 PAROXYSMAL ATRIAL FIBRILLATION: ICD-10-CM

## 2018-12-17 DIAGNOSIS — Z95.2 PRESENCE OF PROSTHETIC HEART VALVE: Chronic | ICD-10-CM

## 2018-12-17 LAB
APTT BLD: 36.2 SEC — SIGNIFICANT CHANGE UP (ref 27–39.2)
INR BLD: 2.41 RATIO — HIGH (ref 0.65–1.3)
PROTHROM AB SERPL-ACNC: 27.5 SEC — HIGH (ref 9.95–12.87)

## 2018-12-17 RX ORDER — WARFARIN SODIUM 2.5 MG/1
3 TABLET ORAL ONCE
Qty: 0 | Refills: 0 | Status: COMPLETED | OUTPATIENT
Start: 2018-12-17 | End: 2018-12-17

## 2018-12-17 RX ORDER — METOPROLOL TARTRATE 50 MG
50 TABLET ORAL
Qty: 0 | Refills: 0 | Status: DISCONTINUED | OUTPATIENT
Start: 2018-12-17 | End: 2018-12-18

## 2018-12-17 RX ORDER — PANTOPRAZOLE SODIUM 20 MG/1
40 TABLET, DELAYED RELEASE ORAL
Qty: 0 | Refills: 0 | Status: DISCONTINUED | OUTPATIENT
Start: 2018-12-17 | End: 2018-12-18

## 2018-12-17 RX ORDER — WARFARIN SODIUM 2.5 MG/1
3 TABLET ORAL DAILY
Qty: 0 | Refills: 0 | Status: DISCONTINUED | OUTPATIENT
Start: 2018-12-17 | End: 2018-12-17

## 2018-12-17 RX ORDER — ACETAMINOPHEN 500 MG
650 TABLET ORAL EVERY 6 HOURS
Qty: 0 | Refills: 0 | Status: DISCONTINUED | OUTPATIENT
Start: 2018-12-17 | End: 2018-12-18

## 2018-12-17 RX ORDER — INFLUENZA VIRUS VACCINE 15; 15; 15; 15 UG/.5ML; UG/.5ML; UG/.5ML; UG/.5ML
0.5 SUSPENSION INTRAMUSCULAR ONCE
Qty: 0 | Refills: 0 | Status: COMPLETED | OUTPATIENT
Start: 2018-12-17 | End: 2018-12-17

## 2018-12-17 RX ORDER — LISINOPRIL 2.5 MG/1
10 TABLET ORAL DAILY
Qty: 0 | Refills: 0 | Status: DISCONTINUED | OUTPATIENT
Start: 2018-12-17 | End: 2018-12-18

## 2018-12-17 RX ORDER — ACETAMINOPHEN 500 MG
500 TABLET ORAL EVERY 6 HOURS
Qty: 0 | Refills: 0 | Status: DISCONTINUED | OUTPATIENT
Start: 2018-12-17 | End: 2018-12-17

## 2018-12-17 RX ORDER — SIMVASTATIN 20 MG/1
10 TABLET, FILM COATED ORAL AT BEDTIME
Qty: 0 | Refills: 0 | Status: DISCONTINUED | OUTPATIENT
Start: 2018-12-17 | End: 2018-12-18

## 2018-12-17 RX ORDER — CLOPIDOGREL BISULFATE 75 MG/1
75 TABLET, FILM COATED ORAL DAILY
Qty: 0 | Refills: 0 | Status: DISCONTINUED | OUTPATIENT
Start: 2018-12-17 | End: 2018-12-18

## 2018-12-17 RX ADMIN — WARFARIN SODIUM 3 MILLIGRAM(S): 2.5 TABLET ORAL at 22:06

## 2018-12-17 RX ADMIN — Medication 50 MILLIGRAM(S): at 18:13

## 2018-12-17 RX ADMIN — Medication 650 MILLIGRAM(S): at 18:41

## 2018-12-17 RX ADMIN — SIMVASTATIN 10 MILLIGRAM(S): 20 TABLET, FILM COATED ORAL at 22:06

## 2018-12-17 NOTE — PROGRESS NOTE ADULT - SUBJECTIVE AND OBJECTIVE BOX
Electrophysiology Brief Post-Op Note      I have personally seen and examined the patient.  I agree with the history and physical which I have reviewed and noted any changes below.  12-17-18 @ 8 AM    PRE-OP DIAGNOSIS: Paroxysmal AFib    POST-OP DIAGNOSIS: Paroxysmal AFib    PROCEDURE:   - CONCHA   - AFib Ablation   - AFlutter Ablation     VASCULAR ACCESS (with ultrasound guidance)   - Right femoral vein: 8.5 Fr, 8.5 Fr   - Left femoral vein: 7 Fr, 11 Fr   - Right femoral artery: none    Physician: Manuela Salazar MD  Assistant: None    ANESTHESIA TYPE:  [ x ]General Anesthesia  [  ] Sedation  [ x ] Local/Regional    ESTIMATED BLOOD LOSS:     20  mL    CONDITION  [  ] Critical  [  ] Serious  [  ]Fair  [ x ]Good      SPECIMENS REMOVED (IF APPLICABLE): N/A    IMPLANTS (IF APPLICABLE): N/A      FINDINGS  PLAN OF CARE  -	Continue warfarin  -	Start protonix 40 mg daily tonight  -	Bed rest till am  -	Admit to telemetry    Manuela Salazar MD  Electrophysiology

## 2018-12-17 NOTE — PRE-ANESTHESIA EVALUATION ADULT - NSANTHOSAYNRD_GEN_A_CORE
No. CONTRERAS screening performed.  STOP BANG Legend: 0-2 = LOW Risk; 3-4 = INTERMEDIATE Risk; 5-8 = HIGH Risk/never tested, see screening tool

## 2018-12-18 ENCOUNTER — TRANSCRIPTION ENCOUNTER (OUTPATIENT)
Age: 78
End: 2018-12-18

## 2018-12-18 VITALS
HEART RATE: 72 BPM | SYSTOLIC BLOOD PRESSURE: 122 MMHG | RESPIRATION RATE: 18 BRPM | TEMPERATURE: 98 F | OXYGEN SATURATION: 92 % | DIASTOLIC BLOOD PRESSURE: 69 MMHG

## 2018-12-18 DIAGNOSIS — Z02.9 ENCOUNTER FOR ADMINISTRATIVE EXAMINATIONS, UNSPECIFIED: ICD-10-CM

## 2018-12-18 LAB
ALBUMIN SERPL ELPH-MCNC: 3.8 G/DL — SIGNIFICANT CHANGE UP (ref 3.5–5.2)
ALP SERPL-CCNC: 44 U/L — SIGNIFICANT CHANGE UP (ref 30–115)
ALT FLD-CCNC: 14 U/L — SIGNIFICANT CHANGE UP (ref 0–41)
ANION GAP SERPL CALC-SCNC: 15 MMOL/L — HIGH (ref 7–14)
APTT BLD: 31.2 SEC — SIGNIFICANT CHANGE UP (ref 27–39.2)
AST SERPL-CCNC: 34 U/L — SIGNIFICANT CHANGE UP (ref 0–41)
BILIRUB SERPL-MCNC: 0.6 MG/DL — SIGNIFICANT CHANGE UP (ref 0.2–1.2)
BUN SERPL-MCNC: 22 MG/DL — HIGH (ref 10–20)
CALCIUM SERPL-MCNC: 8.9 MG/DL — SIGNIFICANT CHANGE UP (ref 8.5–10.1)
CHLORIDE SERPL-SCNC: 106 MMOL/L — SIGNIFICANT CHANGE UP (ref 98–110)
CO2 SERPL-SCNC: 23 MMOL/L — SIGNIFICANT CHANGE UP (ref 17–32)
CREAT SERPL-MCNC: 1 MG/DL — SIGNIFICANT CHANGE UP (ref 0.7–1.5)
GLUCOSE SERPL-MCNC: 108 MG/DL — HIGH (ref 70–99)
HCT VFR BLD CALC: 34.4 % — LOW (ref 42–52)
HGB BLD-MCNC: 11 G/DL — LOW (ref 14–18)
INR BLD: 2.22 RATIO — HIGH (ref 0.65–1.3)
MCHC RBC-ENTMCNC: 32 G/DL — SIGNIFICANT CHANGE UP (ref 32–37)
MCHC RBC-ENTMCNC: 32.1 PG — HIGH (ref 27–31)
MCV RBC AUTO: 100.3 FL — HIGH (ref 80–94)
NRBC # BLD: 0 /100 WBCS — SIGNIFICANT CHANGE UP (ref 0–0)
PLATELET # BLD AUTO: 85 K/UL — LOW (ref 130–400)
POTASSIUM SERPL-MCNC: 4.9 MMOL/L — SIGNIFICANT CHANGE UP (ref 3.5–5)
POTASSIUM SERPL-SCNC: 4.9 MMOL/L — SIGNIFICANT CHANGE UP (ref 3.5–5)
PROT SERPL-MCNC: 5.7 G/DL — LOW (ref 6–8)
PROTHROM AB SERPL-ACNC: 25.3 SEC — HIGH (ref 9.95–12.87)
RBC # BLD: 3.43 M/UL — LOW (ref 4.7–6.1)
RBC # FLD: 14 % — SIGNIFICANT CHANGE UP (ref 11.5–14.5)
SODIUM SERPL-SCNC: 144 MMOL/L — SIGNIFICANT CHANGE UP (ref 135–146)
WBC # BLD: 19.23 K/UL — HIGH (ref 4.8–10.8)
WBC # FLD AUTO: 19.23 K/UL — HIGH (ref 4.8–10.8)

## 2018-12-18 PROCEDURE — 93010 ELECTROCARDIOGRAM REPORT: CPT

## 2018-12-18 RX ORDER — PANTOPRAZOLE SODIUM 20 MG/1
1 TABLET, DELAYED RELEASE ORAL
Qty: 0 | Refills: 0 | COMMUNITY
Start: 2018-12-18

## 2018-12-18 RX ADMIN — Medication 50 MILLIGRAM(S): at 06:11

## 2018-12-18 RX ADMIN — LISINOPRIL 10 MILLIGRAM(S): 2.5 TABLET ORAL at 06:12

## 2018-12-18 RX ADMIN — Medication 650 MILLIGRAM(S): at 04:27

## 2018-12-18 RX ADMIN — PANTOPRAZOLE SODIUM 40 MILLIGRAM(S): 20 TABLET, DELAYED RELEASE ORAL at 06:11

## 2018-12-18 NOTE — DISCHARGE NOTE ADULT - MEDICATION SUMMARY - MEDICATIONS TO TAKE
I will START or STAY ON the medications listed below when I get home from the hospital:    Tylenol 500 mg oral tablet  -- 2 tab(s) by mouth every 6 hours  -- Indication: For Pain medication    lisinopril 10 mg oral tablet  -- 1 tab(s) by mouth once a day  -- Indication: For blood pressure    alfuzosin 10 mg oral tablet, extended release  -- 1 tab(s) by mouth once a day  -- Indication: For I48.0 / CPT 41624 / 01461 / 67743 / 63406 / 88287 / 45617    warfarin 3 mg oral tablet  -- 1 tab(s) by mouth once a day  -- Indication: For blood thinner    simvastatin 10 mg oral tablet  -- 1 tab(s) by mouth once a day (at bedtime)  -- Indication: For cholesterol    clopidogrel 75 mg oral tablet  -- 1 tab(s) by mouth once a day  -- Indication: For blood thinner    Metoprolol Tartrate 50 mg oral tablet  -- 1 tab(s) by mouth 2 times a day  -- Indication: For blood pressure    pantoprazole 40 mg oral delayed release tablet  -- 1 tab(s) by mouth once a day (before a meal)  -- Indication: For acid reflux    Calcium 500+D oral tablet, chewable  -- 1 tab(s) by mouth 2 times a day  -- Indication: For supplement    Vitamin B12 500 mcg oral tablet  -- 1 tab(s) by mouth once a day  -- Indication: For supplement

## 2018-12-18 NOTE — DISCHARGE NOTE ADULT - PATIENT PORTAL LINK FT
You can access the [facility] Patient Portal, offered by [facility], by registering with the following website: [email or web address]

## 2018-12-18 NOTE — PROGRESS NOTE ADULT - ATTENDING COMMENTS
79 yo M with hx of CAD, MVR, and paroxysmal AFib on Coumadin s/p afib and aflutter ablation.    Plan:  - Continue Coumadin at home  - Protonix 40 mg daily for 1 month  - Continue current medical therapy  - Follow up with me in 2-3 weeks  - May shower today  - No heavy lifting or squats for 2 weeks

## 2018-12-18 NOTE — DISCHARGE NOTE ADULT - ADDITIONAL INSTRUCTIONS
Call the office to confirm your appointment on 1/2/19. Continue taking your Coumadin and take Protonix once daily for 1 month.

## 2018-12-18 NOTE — PROGRESS NOTE ADULT - SUBJECTIVE AND OBJECTIVE BOX
INTERVAL HPI/OVERNIGHT EVENTS: no tele events overnight. patient feeling well.    MEDICATIONS  (STANDING):  clopidogrel Tablet 75 milliGRAM(s) Oral daily  influenza   Vaccine 0.5 milliLiter(s) IntraMuscular once  lisinopril 10 milliGRAM(s) Oral daily  metoprolol tartrate 50 milliGRAM(s) Oral two times a day  pantoprazole    Tablet 40 milliGRAM(s) Oral before breakfast  simvastatin 10 milliGRAM(s) Oral at bedtime    MEDICATIONS  (PRN):  acetaminophen   Tablet .. 650 milliGRAM(s) Oral every 6 hours PRN Mild Pain (1 - 3)      Allergies    No Known Allergies    REVIEW OF SYSTEMS: Denies chest pain, palpitations, SOB, dizziness, fever, chills, abdominal pain.    Vital Signs Last 24 Hrs  T(C): 36.9 (18 Dec 2018 06:24), Max: 36.9 (18 Dec 2018 06:24)  T(F): 98.4 (18 Dec 2018 06:24), Max: 98.4 (18 Dec 2018 06:24)  HR: 72 (18 Dec 2018 06:24) (72 - 86)  BP: 122/69 (18 Dec 2018 06:24) (122/69 - 134/68)  BP(mean): --  RR: 18 (18 Dec 2018 06:24) (18 - 18)  SpO2: 92% (18 Dec 2018 06:24) (92% - 100%)      Physical Exam    GENERAL: In no apparent distress, well nourished, and hydrated.  EYES: EOMI, PERRLA, conjunctiva and sclera clear  NECK: Supple and normal thyroid.  No JVD or carotid bruit.  Carotid pulse is 2+ bilaterally.  HEART: Regular rate and rhythm; No murmurs, rubs, or gallops.  PULMONARY: Clear to auscultation and perfusion.  No rales, wheezing, or rhonchi bilaterally.  ABDOMEN: Soft, Nontender, Nondistended; Bowel sounds present  EXTREMITIES:  2+ Peripheral Pulses, No clubbing, cyanosis, or edema. Ecchymosis to BL groins around puncture sites, no sign of infection. No active bleeding. No hematoma.    LABS:                        11.0   19.23 )-----------( 85       ( 18 Dec 2018 06:07 )             34.4     12-18    144  |  106  |  22<H>  ----------------------------<  108<H>  4.9   |  23  |  1.0    Ca    8.9      18 Dec 2018 06:07    TPro  5.7<L>  /  Alb  3.8  /  TBili  0.6  /  DBili  x   /  AST  34  /  ALT  14  /  AlkPhos  44  12-18    PT/INR - ( 18 Dec 2018 06:07 )   PT: 25.30 sec;   INR: 2.22 ratio         PTT - ( 18 Dec 2018 06:07 )  PTT:31.2 sec INTERVAL HPI/OVERNIGHT EVENTS: no tele events overnight. patient feeling well.    MEDICATIONS  (STANDING):  clopidogrel Tablet 75 milliGRAM(s) Oral daily  influenza   Vaccine 0.5 milliLiter(s) IntraMuscular once  lisinopril 10 milliGRAM(s) Oral daily  metoprolol tartrate 50 milliGRAM(s) Oral two times a day  pantoprazole    Tablet 40 milliGRAM(s) Oral before breakfast  simvastatin 10 milliGRAM(s) Oral at bedtime    MEDICATIONS  (PRN):  acetaminophen   Tablet .. 650 milliGRAM(s) Oral every 6 hours PRN Mild Pain (1 - 3)      Allergies    No Known Allergies    REVIEW OF SYSTEMS: Denies chest pain, palpitations, SOB, dizziness, fever, chills, abdominal pain.    Vital Signs Last 24 Hrs  T(C): 36.9 (18 Dec 2018 06:24), Max: 36.9 (18 Dec 2018 06:24)  T(F): 98.4 (18 Dec 2018 06:24), Max: 98.4 (18 Dec 2018 06:24)  HR: 72 (18 Dec 2018 06:24) (72 - 86)  BP: 122/69 (18 Dec 2018 06:24) (122/69 - 134/68)  BP(mean): --  RR: 18 (18 Dec 2018 06:24) (18 - 18)  SpO2: 92% (18 Dec 2018 06:24) (92% - 100%)      Physical Exam    GENERAL: In no apparent distress, well nourished, and hydrated.  EYES: EOMI, PERRLA, conjunctiva and sclera clear  NECK: Supple and normal thyroid.  No JVD or carotid bruit.  Carotid pulse is 2+ bilaterally.  HEART: Regular rate and rhythm; No murmurs, rubs, or gallops.  PULMONARY: Clear to auscultation and perfusion.  No rales, wheezing, or rhonchi bilaterally.  ABDOMEN: Soft, Nontender, Nondistended; Bowel sounds present  EXTREMITIES:  2+ Peripheral Pulses, No clubbing, cyanosis, or edema. Suture in right groin removed.  Ecchymosis on groins around puncture sites, no sign of infection. No active bleeding. No hematoma.    LABS:                        11.0   19.23 )-----------( 85       ( 18 Dec 2018 06:07 )             34.4     12-18    144  |  106  |  22<H>  ----------------------------<  108<H>  4.9   |  23  |  1.0    Ca    8.9      18 Dec 2018 06:07    TPro  5.7<L>  /  Alb  3.8  /  TBili  0.6  /  DBili  x   /  AST  34  /  ALT  14  /  AlkPhos  44  12-18    PT/INR - ( 18 Dec 2018 06:07 )   PT: 25.30 sec;   INR: 2.22 ratio         PTT - ( 18 Dec 2018 06:07 )  PTT:31.2 sec

## 2018-12-18 NOTE — DISCHARGE NOTE ADULT - CARE PLAN
Principal Discharge DX:	Atrial fibrillation  Goal:	Remain asymptomatic  Assessment and plan of treatment:	-Continue current medical therapies  -Follow up in office with Dr Salazar in 2-3 weeks

## 2018-12-18 NOTE — PROGRESS NOTE ADULT - ASSESSMENT
Assessment: 79 yo M with hx of CAD s/p afib ablation.    Plan:  - Continue Coumadin at home  - Protonix 40 mg daily for 1 month  - Continue current medical therapy  - Follow up with Dr Salazar in 2-3 weeks Assessment: 77 yo M with hx of CAD, AVR, and paroxysmal AFib on Coumadin s/p afib ablation.    Plan:  - Continue Coumadin at home  - Protonix 40 mg daily for 1 month  - Continue current medical therapy  - Follow up with Dr Salazar in 2-3 weeks  - May shower today  - No heavy lifting or squats for 2 weeks Assessment: 79 yo M with hx of CAD, MVR, and paroxysmal AFib on Coumadin s/p afib ablation.    Plan:  - Continue Coumadin at home  - Protonix 40 mg daily for 1 month  - Continue current medical therapy  - Follow up with Dr Salazar in 2-3 weeks

## 2018-12-18 NOTE — DISCHARGE NOTE ADULT - PLAN OF CARE
Remain asymptomatic -Continue current medical therapies  -Follow up in office with Dr Salazar in 2-3 weeks

## 2018-12-18 NOTE — DISCHARGE NOTE ADULT - CARE PROVIDER_API CALL
Manuela Salazar), Medicine  Physicians  [address] [place] [address]  Phone: [phone]  Fax: [phone]

## 2018-12-18 NOTE — DISCHARGE NOTE ADULT - HOSPITAL COURSE
Patient with hx of AFib presented for ablation. Procedure went well and patient feeling well today. Will D/C home.

## 2018-12-20 ENCOUNTER — APPOINTMENT (OUTPATIENT)
Dept: UROLOGY | Facility: CLINIC | Age: 78
End: 2018-12-20

## 2018-12-24 ENCOUNTER — OUTPATIENT (OUTPATIENT)
Dept: OUTPATIENT SERVICES | Facility: HOSPITAL | Age: 78
LOS: 1 days | Discharge: HOME | End: 2018-12-24

## 2018-12-24 DIAGNOSIS — Z95.2 PRESENCE OF PROSTHETIC HEART VALVE: ICD-10-CM

## 2018-12-24 DIAGNOSIS — Z95.2 PRESENCE OF PROSTHETIC HEART VALVE: Chronic | ICD-10-CM

## 2018-12-24 DIAGNOSIS — Z79.01 LONG TERM (CURRENT) USE OF ANTICOAGULANTS: ICD-10-CM

## 2018-12-24 LAB
POCT INR: 3.1 RATIO — HIGH (ref 0.9–1.2)
POCT PT: 36.8 SEC — HIGH (ref 10–13.4)

## 2018-12-26 DIAGNOSIS — I25.10 ATHEROSCLEROTIC HEART DISEASE OF NATIVE CORONARY ARTERY WITHOUT ANGINA PECTORIS: ICD-10-CM

## 2018-12-26 DIAGNOSIS — I34.0 NONRHEUMATIC MITRAL (VALVE) INSUFFICIENCY: ICD-10-CM

## 2018-12-31 ENCOUNTER — INBOUND DOCUMENT (OUTPATIENT)
Age: 78
End: 2018-12-31

## 2019-01-02 ENCOUNTER — APPOINTMENT (OUTPATIENT)
Dept: CARDIOLOGY | Facility: CLINIC | Age: 79
End: 2019-01-02

## 2019-01-02 VITALS
HEIGHT: 70 IN | BODY MASS INDEX: 25.77 KG/M2 | WEIGHT: 180 LBS | SYSTOLIC BLOOD PRESSURE: 98 MMHG | DIASTOLIC BLOOD PRESSURE: 67 MMHG | HEART RATE: 105 BPM | RESPIRATION RATE: 85 BRPM

## 2019-01-02 DIAGNOSIS — Z87.898 PERSONAL HISTORY OF OTHER SPECIFIED CONDITIONS: ICD-10-CM

## 2019-01-02 DIAGNOSIS — Z86.39 PERSONAL HISTORY OF OTHER ENDOCRINE, NUTRITIONAL AND METABOLIC DISEASE: ICD-10-CM

## 2019-01-02 DIAGNOSIS — S76.212A STRAIN OF ADDUCTOR MUSCLE, FASCIA AND TENDON OF LEFT THIGH, INITIAL ENCOUNTER: ICD-10-CM

## 2019-01-02 NOTE — PHYSICAL EXAM
[General Appearance - Well Developed] : well developed [Normal Appearance] : normal appearance [Well Groomed] : well groomed [General Appearance - Well Nourished] : well nourished [No Deformities] : no deformities [General Appearance - In No Acute Distress] : no acute distress [Normal Conjunctiva] : the conjunctiva exhibited no abnormalities [Normal Oral Mucosa] : normal oral mucosa [Respiration, Rhythm And Depth] : normal respiratory rhythm and effort [Exaggerated Use Of Accessory Muscles For Inspiration] : no accessory muscle use [Auscultation Breath Sounds / Voice Sounds] : lungs were clear to auscultation bilaterally [Edema] : no peripheral edema present [FreeTextEntry1] : tachycardic, irregularly, irregular, 2/6 systolic murmur [Bowel Sounds] : normal bowel sounds [Abdomen Soft] : soft [Abdomen Tenderness] : non-tender [Abnormal Walk] : normal gait [Nail Clubbing] : no clubbing of the fingernails [Cyanosis, Localized] : no localized cyanosis [Skin Color & Pigmentation] : normal skin color and pigmentation [] : no rash [Oriented To Time, Place, And Person] : oriented to person, place, and time [Impaired Insight] : insight and judgment were intact [Affect] : the affect was normal [Mood] : the mood was normal [No Anxiety] : not feeling anxious

## 2019-01-02 NOTE — REASON FOR VISIT
[Follow-Up - Clinic] : a clinic follow-up of [Spouse] : spouse [FreeTextEntry1] : Atrial fibrillation

## 2019-01-02 NOTE — HISTORY OF PRESENT ILLNESS
[FreeTextEntry1] : Cardiologist: Dr. Torres\par \par 79 yo M with history of CAD, HTN, HL, MVR, paroxysmal atrial fibrillation and paroxysmal atrial flutter on coumadin for 40 years (s/p DCCV x 2 and ablation on 12/17/18 - PVI, CTI) presenting for follow up after his recent ablation. He has been doing well. He denies any cardiovascular complaints. He states he felt great the first 4 days after his ablation and noted on day 5 that his heart rate was beating fast and irregular. He was previously on Amiodarone but did not tolerate it due to bradycardia. He has hesitations about a pacemaker due to his brother having issues with lymphedema post op. \par \par He denies any episodes of chest pain, dyspnea, dizziness, lightheadedness, presyncope or syncope. No bleeding issues on coumadin. His biggest complaint at this time is back pain and he is seeing a pain specialist in the next few weeks.

## 2019-01-02 NOTE — DISCUSSION/SUMMARY
[FreeTextEntry1] : Mr. Balderas presents today for follow up of long standing history of paroxysmal AFib and Atrial flutter on coumadin and BB. He had bradycardia on Amiodarone and is now s/p ablation (RFA - PVI and CTI in Dec). He is currently in atrial fibrillation. I have informed him that we will need to monitor him for the next two months during the blanking period to see if he may require a repeat ablation in the future if he is in AF after the blanking period. The patient is unsure if he can lay on the table for as long a second time around. \par \par We discussed the possibility that he may have sick sinus syndrome and may end up requiring a pacemaker especially if we need to start antiarrhythmics. Due to a complication after a pacemaker in his brother, the patient has reservations about a pacemaker. \par \par In addition, the potential risk of thromboembolic events and assessment of that risk were discussed. Given his CHADS VASc score of 4 (HTN, Age, Vascular disease), I agree with continuing coumadin and have reiterated the importance of strict adherence to coumadin. I answered all questions to their satisfaction.\par \par I have asked the patient to increase his BB to TID for better rate control and I have provided him with a script for an echo to evaluate for cardiomyopathy secondary to AFib. I have also provided a script for pulm referral to eval for sleep apnea as he has never had a sleep study. \par \par I have asked him to continue the remaining meds at this time and to follow up with me in about 2 months. I have also advised the patient to go to the nearest emergency room if he experiences any chest pain, dyspnea, syncope, or has any other compelling symptoms.

## 2019-01-10 ENCOUNTER — APPOINTMENT (OUTPATIENT)
Dept: HEMATOLOGY ONCOLOGY | Facility: CLINIC | Age: 79
End: 2019-01-10

## 2019-01-10 ENCOUNTER — OUTPATIENT (OUTPATIENT)
Dept: OUTPATIENT SERVICES | Facility: HOSPITAL | Age: 79
LOS: 1 days | Discharge: HOME | End: 2019-01-10

## 2019-01-10 ENCOUNTER — LABORATORY RESULT (OUTPATIENT)
Age: 79
End: 2019-01-10

## 2019-01-10 VITALS
WEIGHT: 180 LBS | DIASTOLIC BLOOD PRESSURE: 68 MMHG | RESPIRATION RATE: 14 BRPM | BODY MASS INDEX: 25.77 KG/M2 | HEIGHT: 70 IN | SYSTOLIC BLOOD PRESSURE: 97 MMHG | HEART RATE: 142 BPM | TEMPERATURE: 96.1 F

## 2019-01-10 DIAGNOSIS — D69.6 THROMBOCYTOPENIA, UNSPECIFIED: ICD-10-CM

## 2019-01-10 DIAGNOSIS — Z95.2 PRESENCE OF PROSTHETIC HEART VALVE: Chronic | ICD-10-CM

## 2019-01-10 DIAGNOSIS — Z79.01 LONG TERM (CURRENT) USE OF ANTICOAGULANTS: ICD-10-CM

## 2019-01-10 DIAGNOSIS — Z95.2 PRESENCE OF PROSTHETIC HEART VALVE: ICD-10-CM

## 2019-01-10 LAB
POCT INR: 3.1 RATIO — HIGH (ref 0.9–1.2)
POCT PT: 37.2 SEC — HIGH (ref 10–13.4)

## 2019-01-10 NOTE — ASSESSMENT
[FreeTextEntry1] : this is a 76-year-old white man with a remote history of non-Hodgkin's lymphoma, CLL trisomy 12 , diagnosed 2 years ago , lymphocyte doubling time around 2 years , mild anemia in part secondary to androgen deprivation .\par Mild thrombocytopenia ( 94 )  , asymptomatic . possibly immune mediated . \par CBC today shows slight further decrease in Hb , will check B12 , ferritin . \par \par \par Chronic fatigue likely related to uncontrolled  atrial fibrillation with rapid apical rate ( 140/min) , hypotension .\par Will continue to monitor CBC , follow up with cardiology

## 2019-01-10 NOTE — HISTORY OF PRESENT ILLNESS
[de-identified] : this is a 76-year-old white man with multiple medical problems including coronary artery disease status post angioplasty valvular heart disease status post metallic aortic valve replacement. He is referred for abnormal hemogram noted recently, WBC is 10.6 hemoglobin 14.2 platelets 131 absolute lymphocytes 5268 chemistry is unremarkable. He denies any constitutional symptoms  specifically no fever ,weight loss ,night sweats, fatigue or  pruritus. he feels fantastic with good energy level. 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 He is scheduled for ablation for atrial fibrillation , he continues on coumadin for mechanical valve. He reports minor bruising on coumadin and plavix. He feels slight fatigue . He denies fever , weight loss or night sweats . He is also on androgen deprivation for elevated PSA , , He had previously on surveillance and had multiple prostate biopsies ( unclear if it showed cancer ) . Bone scan is allegedly negative . Last PSA is less than 1 and patient denies obstructive symptoms. \par "\par 01/10/2019 Patient returns for follow up for CLL on watchful waiting , he " feels tired all the time " , He had unsuccessful ablation for atrial fibrillation and was told to increase metoprolol . He denies B symptoms .

## 2019-01-10 NOTE — PHYSICAL EXAM
[Normal] : no peripheral adenopathy appreciated [de-identified] : Pale chrinicall ill in NAD .  [de-identified] : healed scar  of lymph node biopsy on the left, no residual or  recurrent adenopathy. [de-identified] : prominent aortic valve  click. [de-identified] : no hepatosplenomegaly [de-identified] : no peripheral adenopathy.

## 2019-01-11 LAB
ALBUMIN MFR SERPL ELPH: 63 %
ALBUMIN SERPL ELPH-MCNC: 4.4 G/DL
ALBUMIN SERPL-MCNC: 4 G/DL
ALBUMIN/GLOB SERPL: 1.7 RATIO
ALP BLD-CCNC: 54 U/L
ALPHA1 GLOB MFR SERPL ELPH: 5.4 %
ALPHA1 GLOB SERPL ELPH-MCNC: 0.3 G/DL
ALPHA2 GLOB MFR SERPL ELPH: 8.8 %
ALPHA2 GLOB SERPL ELPH-MCNC: 0.6 G/DL
ALT SERPL-CCNC: 21 U/L
ANION GAP SERPL CALC-SCNC: 15 MMOL/L
AST SERPL-CCNC: 22 U/L
B-GLOBULIN MFR SERPL ELPH: 11 %
B-GLOBULIN SERPL ELPH-MCNC: 0.7 G/DL
BILIRUB SERPL-MCNC: 0.7 MG/DL
BUN SERPL-MCNC: 18 MG/DL
CALCIUM SERPL-MCNC: 10.2 MG/DL
CHLORIDE SERPL-SCNC: 104 MMOL/L
CO2 SERPL-SCNC: 25 MMOL/L
CREAT SERPL-MCNC: 1 MG/DL
DIRECT COOMBS: NORMAL
GAMMA GLOB FLD ELPH-MCNC: 0.8 G/DL
GAMMA GLOB MFR SERPL ELPH: 11.8 %
GLUCOSE SERPL-MCNC: 96 MG/DL
HCT VFR BLD CALC: 37.8 %
HGB BLD-MCNC: 11.8 G/DL
INTERPRETATION SERPL IEP-IMP: NORMAL
MCHC RBC-ENTMCNC: 31.2 G/DL
MCHC RBC-ENTMCNC: 32 PG
MCV RBC AUTO: 102.4 FL
PLATELET # BLD AUTO: 100 K/UL
PMV BLD: 9.8 FL
POTASSIUM SERPL-SCNC: 5.8 MMOL/L
PROT SERPL-MCNC: 6.4 G/DL
PROT SERPL-MCNC: 6.4 G/DL
PROT SERPL-MCNC: 6.5 G/DL
RBC # BLD: 3.69 M/UL
RBC # FLD: 13.8 %
RETICS # AUTO: 1.8 %
RETICS AGGREG/RBC NFR: 65.7 K/UL
SODIUM SERPL-SCNC: 144 MMOL/L
VIT B12 SERPL-MCNC: 1907 PG/ML
WBC # FLD AUTO: 16.77 K/UL

## 2019-01-14 LAB
LDH SERPL-CCNC: 338 U/L
LDH1 CFR SERPL ELPH: 32 %
LDH2 CFR SERPL ELPH: 38 %
LDH3 CFR SERPL ELPH: 20 %
LDH4 CFR SERPL ELPH: 5 %
LDH5 CFR SERPL ELPH: 4 %

## 2019-01-15 ENCOUNTER — APPOINTMENT (OUTPATIENT)
Dept: CARDIOLOGY | Facility: CLINIC | Age: 79
End: 2019-01-15

## 2019-01-15 DIAGNOSIS — C91.10 CHRONIC LYMPHOCYTIC LEUKEMIA OF B-CELL TYPE NOT HAVING ACHIEVED REMISSION: ICD-10-CM

## 2019-01-17 ENCOUNTER — INPATIENT (INPATIENT)
Facility: HOSPITAL | Age: 79
LOS: 1 days | Discharge: HOME | End: 2019-01-19
Attending: HOSPITALIST | Admitting: HOSPITALIST

## 2019-01-17 ENCOUNTER — APPOINTMENT (OUTPATIENT)
Dept: CARDIOLOGY | Facility: CLINIC | Age: 79
End: 2019-01-17

## 2019-01-17 VITALS
RESPIRATION RATE: 18 BRPM | DIASTOLIC BLOOD PRESSURE: 84 MMHG | OXYGEN SATURATION: 98 % | HEART RATE: 159 BPM | SYSTOLIC BLOOD PRESSURE: 115 MMHG

## 2019-01-17 VITALS
DIASTOLIC BLOOD PRESSURE: 82 MMHG | HEIGHT: 70 IN | SYSTOLIC BLOOD PRESSURE: 128 MMHG | HEART RATE: 136 BPM | BODY MASS INDEX: 26.05 KG/M2 | WEIGHT: 182 LBS

## 2019-01-17 DIAGNOSIS — Z95.2 PRESENCE OF PROSTHETIC HEART VALVE: Chronic | ICD-10-CM

## 2019-01-17 LAB
ALBUMIN SERPL ELPH-MCNC: 4.9 G/DL — SIGNIFICANT CHANGE UP (ref 3.5–5.2)
ALP SERPL-CCNC: 60 U/L — SIGNIFICANT CHANGE UP (ref 30–115)
ALT FLD-CCNC: 16 U/L — SIGNIFICANT CHANGE UP (ref 0–41)
ANION GAP SERPL CALC-SCNC: 15 MMOL/L — HIGH (ref 7–14)
APTT BLD: 40.7 SEC — HIGH (ref 27–39.2)
AST SERPL-CCNC: 19 U/L — SIGNIFICANT CHANGE UP (ref 0–41)
BASOPHILS # BLD AUTO: 0.06 K/UL — SIGNIFICANT CHANGE UP (ref 0–0.2)
BASOPHILS NFR BLD AUTO: 0.4 % — SIGNIFICANT CHANGE UP (ref 0–1)
BILIRUB SERPL-MCNC: 0.6 MG/DL — SIGNIFICANT CHANGE UP (ref 0.2–1.2)
BUN SERPL-MCNC: 22 MG/DL — HIGH (ref 10–20)
CALCIUM SERPL-MCNC: 10 MG/DL — SIGNIFICANT CHANGE UP (ref 8.5–10.1)
CHLORIDE SERPL-SCNC: 103 MMOL/L — SIGNIFICANT CHANGE UP (ref 98–110)
CK MB CFR SERPL CALC: 1.3 NG/ML — SIGNIFICANT CHANGE UP (ref 0.6–6.3)
CK SERPL-CCNC: 37 U/L — SIGNIFICANT CHANGE UP (ref 0–225)
CO2 SERPL-SCNC: 26 MMOL/L — SIGNIFICANT CHANGE UP (ref 17–32)
CREAT SERPL-MCNC: 1 MG/DL — SIGNIFICANT CHANGE UP (ref 0.7–1.5)
EOSINOPHIL # BLD AUTO: 0.21 K/UL — SIGNIFICANT CHANGE UP (ref 0–0.7)
EOSINOPHIL NFR BLD AUTO: 1.3 % — SIGNIFICANT CHANGE UP (ref 0–8)
GLUCOSE SERPL-MCNC: 100 MG/DL — HIGH (ref 70–99)
HCT VFR BLD CALC: 37.8 % — LOW (ref 42–52)
HGB BLD-MCNC: 11.9 G/DL — LOW (ref 14–18)
IMM GRANULOCYTES NFR BLD AUTO: 0.2 % — SIGNIFICANT CHANGE UP (ref 0.1–0.3)
INR BLD: 3.34 RATIO — HIGH (ref 0.65–1.3)
LYMPHOCYTES # BLD AUTO: 10.09 K/UL — HIGH (ref 1.2–3.4)
LYMPHOCYTES # BLD AUTO: 63.9 % — HIGH (ref 20.5–51.1)
MCHC RBC-ENTMCNC: 31.5 G/DL — LOW (ref 32–37)
MCHC RBC-ENTMCNC: 31.8 PG — HIGH (ref 27–31)
MCV RBC AUTO: 101.1 FL — HIGH (ref 80–94)
MONOCYTES # BLD AUTO: 1.59 K/UL — HIGH (ref 0.1–0.6)
MONOCYTES NFR BLD AUTO: 10.1 % — HIGH (ref 1.7–9.3)
NEUTROPHILS # BLD AUTO: 3.82 K/UL — SIGNIFICANT CHANGE UP (ref 1.4–6.5)
NEUTROPHILS NFR BLD AUTO: 24.1 % — LOW (ref 42.2–75.2)
NT-PROBNP SERPL-SCNC: 7739 PG/ML — HIGH (ref 0–300)
PLATELET # BLD AUTO: 85 K/UL — LOW (ref 130–400)
POTASSIUM SERPL-MCNC: 4 MMOL/L — SIGNIFICANT CHANGE UP (ref 3.5–5)
POTASSIUM SERPL-SCNC: 4 MMOL/L — SIGNIFICANT CHANGE UP (ref 3.5–5)
PROT SERPL-MCNC: 7 G/DL — SIGNIFICANT CHANGE UP (ref 6–8)
PROTHROM AB SERPL-ACNC: 38 SEC — HIGH (ref 9.95–12.87)
RBC # BLD: 3.74 M/UL — LOW (ref 4.7–6.1)
RBC # FLD: 13.7 % — SIGNIFICANT CHANGE UP (ref 11.5–14.5)
SODIUM SERPL-SCNC: 144 MMOL/L — SIGNIFICANT CHANGE UP (ref 135–146)
TROPONIN T SERPL-MCNC: <0.01 NG/ML — SIGNIFICANT CHANGE UP
TROPONIN T SERPL-MCNC: <0.01 NG/ML — SIGNIFICANT CHANGE UP
WBC # BLD: 15.8 K/UL — HIGH (ref 4.8–10.8)
WBC # FLD AUTO: 15.8 K/UL — HIGH (ref 4.8–10.8)

## 2019-01-17 RX ORDER — ACETAMINOPHEN 500 MG
650 TABLET ORAL EVERY 6 HOURS
Qty: 0 | Refills: 0 | Status: DISCONTINUED | OUTPATIENT
Start: 2019-01-17 | End: 2019-01-19

## 2019-01-17 RX ORDER — CLOPIDOGREL BISULFATE 75 MG/1
75 TABLET, FILM COATED ORAL DAILY
Qty: 0 | Refills: 0 | Status: DISCONTINUED | OUTPATIENT
Start: 2019-01-17 | End: 2019-01-19

## 2019-01-17 RX ORDER — PREGABALIN 225 MG/1
1 CAPSULE ORAL
Qty: 0 | Refills: 0 | COMMUNITY

## 2019-01-17 RX ORDER — AMIODARONE HYDROCHLORIDE 400 MG/1
150 TABLET ORAL ONCE
Qty: 0 | Refills: 0 | Status: COMPLETED | OUTPATIENT
Start: 2019-01-17 | End: 2019-01-17

## 2019-01-17 RX ORDER — METOPROLOL TARTRATE 50 MG
50 TABLET ORAL
Qty: 0 | Refills: 0 | Status: DISCONTINUED | OUTPATIENT
Start: 2019-01-17 | End: 2019-01-18

## 2019-01-17 RX ORDER — AMIODARONE HYDROCHLORIDE 400 MG/1
1 TABLET ORAL
Qty: 900 | Refills: 0 | Status: DISCONTINUED | OUTPATIENT
Start: 2019-01-17 | End: 2019-01-19

## 2019-01-17 RX ORDER — PANTOPRAZOLE 40 MG/1
40 TABLET, DELAYED RELEASE ORAL DAILY
Qty: 30 | Refills: 0 | Status: DISCONTINUED | COMMUNITY
Start: 2018-12-19 | End: 2019-01-17

## 2019-01-17 RX ORDER — SIMVASTATIN 20 MG/1
10 TABLET, FILM COATED ORAL AT BEDTIME
Qty: 0 | Refills: 0 | Status: DISCONTINUED | OUTPATIENT
Start: 2019-01-17 | End: 2019-01-19

## 2019-01-17 RX ORDER — LISINOPRIL 2.5 MG/1
10 TABLET ORAL DAILY
Qty: 0 | Refills: 0 | Status: DISCONTINUED | OUTPATIENT
Start: 2019-01-17 | End: 2019-01-19

## 2019-01-17 RX ORDER — AMIODARONE HYDROCHLORIDE 400 MG/1
0.5 TABLET ORAL
Qty: 900 | Refills: 0 | Status: DISCONTINUED | OUTPATIENT
Start: 2019-01-17 | End: 2019-01-19

## 2019-01-17 RX ADMIN — AMIODARONE HYDROCHLORIDE 33.33 MG/MIN: 400 TABLET ORAL at 14:39

## 2019-01-17 RX ADMIN — SIMVASTATIN 10 MILLIGRAM(S): 20 TABLET, FILM COATED ORAL at 22:04

## 2019-01-17 RX ADMIN — Medication 50 MILLIGRAM(S): at 18:24

## 2019-01-17 RX ADMIN — AMIODARONE HYDROCHLORIDE 618 MILLIGRAM(S): 400 TABLET ORAL at 14:09

## 2019-01-17 RX ADMIN — AMIODARONE HYDROCHLORIDE 16.67 MG/MIN: 400 TABLET ORAL at 20:37

## 2019-01-17 NOTE — H&P ADULT - ATTENDING COMMENTS
Patient seen and examined independently. I agree with the resident's note, physical exam, and plan except as below.  discussed with family and Dr Torres   Vital Signs Last 24 Hrs  T(C): 35.6 (18 Jan 2019 08:09), Max: 36 (17 Jan 2019 16:32)  T(F): 96 (18 Jan 2019 08:09), Max: 96.8 (17 Jan 2019 16:32)  HR: 63 (18 Jan 2019 08:09) (63 - 159)  BP: 124/83 (18 Jan 2019 08:09) (106/86 - 125/82)  BP(mean): --  RR: 18 (18 Jan 2019 08:09) (18 - 18)  SpO2: 97% (18 Jan 2019 08:09) (96% - 99%)  PE:  nad  [id number] click  ctabl  soft ntnd+ns  no cce  ambulating well     #P.Afib sp Cv/ablation in past - was planned for CONCHA/CV today but converted to NSR on IV amio  finish IV amio then switch to po 200mg q12 after to titrate down in Dr Sharif office  cont coumadin INR 2-3   monitor LFT and TFT  recent ischemic workup in July    #Cardiomyopathy - low EF on echo in Dr Sharif office - chronic systolic CHF - no decompensation   cont ACEI  likely arrythmia related   repeat echo as outpt    # Mechanical AVR - cont coumadin     #CAD - cont medical therapy - CE negative    possible dc tomorrow if stable Patient seen and examined independently. I agree with the resident's note, physical exam, and plan except as below.  discussed with family and Dr Torres   Vital Signs Last 24 Hrs  T(C): 35.6 (18 Jan 2019 08:09), Max: 36 (17 Jan 2019 16:32)  T(F): 96 (18 Jan 2019 08:09), Max: 96.8 (17 Jan 2019 16:32)  HR: 63 (18 Jan 2019 08:09) (63 - 159)  BP: 124/83 (18 Jan 2019 08:09) (106/86 - 125/82)  BP(mean): --  RR: 18 (18 Jan 2019 08:09) (18 - 18)  SpO2: 97% (18 Jan 2019 08:09) (96% - 99%)  PE:  nad  [id number] click  ctabl  soft ntnd+ns  no cce  ambulating well     #P.Afib sp Cv/ablation in past - was planned for CONCHA/CV today but converted to NSR on IV amio  finish IV amio then switch to po 200mg q12 after to titrate down in Dr Sharif office  cont coumadin INR 2-3   monitor LFT and TFT  recent ischemic workup in July    #Cardiomyopathy - low EF on echo in Dr Sharif office - chronic systolic CHF - no decompensation   cont ACEI  likely arrythmia related   repeat echo as outpt    # Mechanical AVR - cont coumadin     #CAD - cont medical therapy - CE negative    #leukocytosis - CLL hx    possible dc tomorrow if stable

## 2019-01-17 NOTE — H&P ADULT - NSHPLABSRESULTS_GEN_ALL_CORE
11.9   15.80 )-----------( 85       ( 17 Jan 2019 13:47 )             37.8       01-17    144  |  103  |  22<H>  ----------------------------<  100<H>  4.0   |  26  |  1.0    Ca    10.0      17 Jan 2019 13:47    TPro  7.0  /  Alb  4.9  /  TBili  0.6  /  DBili  x   /  AST  19  /  ALT  16  /  AlkPhos  60  01-17                  PT/INR - ( 17 Jan 2019 13:47 )   PT: 38.00 sec;   INR: 3.34 ratio         PTT - ( 17 Jan 2019 13:47 )  PTT:40.7 sec    Lactate Trend      CARDIAC MARKERS ( 17 Jan 2019 13:47 )  x     / <0.01 ng/mL / x     / x     / x            CAPILLARY BLOOD GLUCOSE        < from: Xray Chest 1 View-PORTABLE IMMEDIATE (01.17.19 @ 14:33) >    Elevation of the left hemidiaphragm, similar to prior exam.    No focal consolidation seen.    < end of copied text >    < from: 12 Lead ECG (01.17.19 @ 13:29) >     Atrial fibrillation with rapid ventricular response with premature ventricular  or aberrantly conducted complexes  Left anterior fascicular block  Left ventricular hypertrophy with QRS widening  ST & T wave abnormality, consider lateral ischemia    < end of copied text >

## 2019-01-17 NOTE — ED ADULT NURSE NOTE - NSIMPLEMENTINTERV_GEN_ALL_ED
Implemented All Fall with Harm Risk Interventions:  Golden to call system. Call bell, personal items and telephone within reach. Instruct patient to call for assistance. Room bathroom lighting operational. Non-slip footwear when patient is off stretcher. Physically safe environment: no spills, clutter or unnecessary equipment. Stretcher in lowest position, wheels locked, appropriate side rails in place. Provide visual cue, wrist band, yellow gown, etc. Monitor gait and stability. Monitor for mental status changes and reorient to person, place, and time. Review medications for side effects contributing to fall risk. Reinforce activity limits and safety measures with patient and family. Provide visual clues: red socks.

## 2019-01-17 NOTE — CONSULT NOTE ADULT - SUBJECTIVE AND OBJECTIVE BOX
Patient is a 78y old  Male who presents with a chief complaint of afib with rvr (2019 17:55)    HPI: Pt is a 78 y M with hx of mech avr 10years ago on coumadin, afib, cad s/p stent, cll, bph, back pain was sent in for afib with rvr from Dr Torres's office. Pt had recent echo done and went to his cardiologist for results and was found to have low ef and was in afib with rvr and was sent to ED. He was started on amio as per  in ED. Pt had ablation for AFib one month ago and had been feeling well for several days after that. Now c/o worsening SOB with exertion. Pt denies any palpitations, cp,cough, leg swelling, fever ,chills     PAST MEDICAL & SURGICAL HISTORY:  Afib  BPH (benign prostatic hyperplasia)  Lymphoma: , s/p chemo&amp; radiation  Aortic valve stenosis: s/p replacement   CAD (coronary artery disease):  1 stent  H/O aortic valve replacement    PREVIOUS DIAGNOSTIC TESTING:      ECHO  FINDINGS: No test results      STRESS  FINDINGS:  NM Nuclear Stress Pharmacologic Multiple (. @ 11:30)  EXAM:  NM NUCLEAR STRESS MULTI PHARM            PROCEDURE DATE:  2018        INTERPRETATION:  Clinical History / Reason for exam: REST/STRESS DUAL   ISOTOPE SPECT IMAGING WITH PHARMACOLOGIC STRESS AND GATED SPECT IMAGING  Indication: Chest pain, hypertension, hyperlipidemia, status post aortic   valve replacement, status post angioplasty and stenting of coronary   artery disease, coronary artery disease, hyperlipidemia  Procedure:   Pharmacologic stress testing was performed with adenosine with a dose of   50 mg.  Blood pressure response was appropriate during the infusion of   the adenosine.  The patient tolerated the procedure without any   significant symptoms.  The resting electrocardiogram demonstrated sinus   bradycardia T wave flattening inferolaterally its prominent are wave V2   and did not show ST-segment changes consistent with myocardial ischemia.  Myocardial perfusion imaging was performed at rest (15 minutes following   the injection of 3.5 mCi of Thallium).  At peak pharmacologic effect, the   patient was injected with 30 mCi of Cardiolite.  Gating post-stress   tomographic imaging was performed 45 minutes after stress.    Findings:  The overall quality of the study is fair  Rotational cine display reveals diaphragmatic attenuation and moderate   motion artifact  SPECT images demonstrate a fixed inferior wall defect.    Entire lateral wall anterior wall septum well-preserved.    No pulmonary uptake no dilatation left ventricle.    Of interest the right ventricle appears prominent at both stress and rest.    Bull's-eye imaging similar findings  Gated SPECT imaging demonstrates septal motion consistent with an   interventricular conduction defect with ejection fraction borderline low.    The left ventricular ejection fraction was calculated to be 50  %.  Impression:  1. IV Adenosine Dual Isotope Study which was negative with respect to   symptoms and EKG changes.  2. Myocardial perfusion imaging reveals a fixed inferior wall defect   consistent with diaphragmatic attenuation similar to previous findings     3. Gated imaging reveals septal motion consistent with an   interventricular conduction defect with ejection fraction borderline low   at 50% mildly decreased since previous findings 2016  Recommendation:  Medical therapy.    Risk factor modification.    Compare to previous coronary arteriography as well as operative reports      AKI JUNIOR M.D., ATTENDING CARDIOLOGIST  This document has been electronically signed. 2018  6:09AM    CHEST CT PULMONARY ANGIO with IV Contrast:  FINDINGS:  CT Angio Chest w/ IV Cont (18 @ 12:12)  EXAM:  CT ANGIO CHEST (W)AW IC            PROCEDURE DATE:  2018        INTERPRETATION:  History: Pre-ablation, for evaluation of pulmonary veins.    Technique: Prospectively triggered, test bolus,64-slice CTA of the   pulmonary veins was obtained per departmental protocol.  A full field of   view was also reconstructed.      Contrast:  An 18-Gauge angiocath was placed in the Radiology department.   CTA was performed using 100 cc of OptiRay 320 at 5.5 cc/sec.      Findings:    This study was not tailored for evaluation of the coronary arteries.    Pulmonary venous anatomy as follows:    Right Superior Pulmonary Vein (RSPV) = 22 mm x 17 mm    Right Inferior Pulmonary vein (RIPV) = 23 mm x 23 mm. Right middle   pulmonary vein arises from the right inferior pulmonary vein.    Left superior pulmonary vein (LSPV) = 22 mm x 24 mm    Left inferior pulmonary vein (LIPV) = 17 mm x 14 mm    There is no filling defect within the left atrial appendage to suggest   thrombus. There is slow flow within the left atrial appendage.     ADDITIONAL FINDINGS:     *  Main pulmonary artery measures 3.2 cm, top normal.  *  Status post median sternotomy  *  Biatrial enlargement  *  Status post aortic valve replacement. There is a stent in proximal   left anterior descending artery.  *  Elevated left hemidiaphragm. Left lower lobe linear atelectasis.    IMPRESSION:    Pulmonary Venous Anatomy as mentioned above with measurements.   No evidence of left atrial appendage thrombus.       MARGARET ZAVALA M.D., ATTENDING RADIOLOGIST  This document has been electronically signed. Dec  6 2018  2:50PM    MEDICATIONS  (STANDING):  amiodarone Infusion 1 mG/Min (33.333 mL/Hr) IV Continuous <Continuous>  amiodarone Infusion 0.5 mG/Min (16.667 mL/Hr) IV Continuous <Continuous>  clopidogrel Tablet 75 milliGRAM(s) Oral daily  lisinopril 10 milliGRAM(s) Oral daily  metoprolol tartrate 50 milliGRAM(s) Oral two times a day  simvastatin 10 milliGRAM(s) Oral at bedtime    MEDICATIONS  (PRN):  acetaminophen   Tablet .. 650 milliGRAM(s) Oral every 6 hours PRN Moderate Pain (4 - 6)      FAMILY HISTORY:  No pertinent family history in first degree relatives      SOCIAL HISTORY:    CIGARETTES: No    ALCOHOL: No    Past Surgical History:    Allergies:    No Known Allergies      REVIEW OF SYSTEMS:  CONSTITUTIONAL: No fever, weight loss, chills, shakes, or fatigue  RESPIRATORY: +SOB; No cough, wheezing, hemoptysis  CARDIOVASCULAR: No chest pain, palpitations, dizziness, syncope  GASTROINTESTINAL: No abdominal  or epigastric pain, nausea, vomiting, hematemesis, diarrhea, constipation, melena or bright red blood.  MUSCULOSKELETAL: No joint pain or swelling, muscle, back, or extremity pain      Vital Signs Last 24 Hrs  T(C): 36 (2019 16:32), Max: 36 (2019 16:32)  T(F): 96.8 (2019 16:32), Max: 96.8 (2019 16:32)  HR: 125 (2019 18:05) (109 - 159)  BP: 113/87 (2019 18:05) (106/86 - 125/82)  BP(mean): --  RR: 18 (2019 18:05) (18 - 18)  SpO2: 98% (2019 18:05) (98% - 99%)    PHYSICAL EXAM:  GENERAL: In no apparent distress, well nourished, and hydrated.  HEART: Irregular rate and rhythm  PULMONARY: Clear to auscultation and perfusion.  No rales, wheezing, or rhonchi bilaterally.  ABDOMEN: Soft, Nontender, Nondistended; Bowel sounds present  EXTREMITIES:  2+ Peripheral Pulses, No clubbing, cyanosis, or edema      INTERPRETATION OF TELEMETRY: AFib with RVR    EC Lead ECG (19 @ 13:29)  Ventricular Rate 150 BPM    Atrial Rate 75 BPM    QRS Duration 116 ms    Q-T Interval 330 ms    QTC Calculation(Bezet) 521 ms    R Axis -48 degrees    T Axis 121 degrees    Diagnosis Line Atrial fibrillation with rapid ventricular response with premature ventricular  or aberrantly conducted complexes  Left anterior fascicular block  Left ventricular hypertrophy with QRS widening  ST & T wave abnormality, consider lateral ischemia  Abnormal ECG    Confirmed by Amol Anders ([id number]) on 2019 5:10:35 PM      I&O's Detail      LABS:                        11.9   15.80 )-----------( 85       ( 2019 13:47 )             37.8         144  |  103  |  22<H>  ----------------------------<  100<H>  4.0   |  26  |  1.0    Ca    10.0      2019 13:47    TPro  7.0  /  Alb  4.9  /  TBili  0.6  /  DBili  x   /  AST  19  /  ALT  16  /  AlkPhos  60      CARDIAC MARKERS ( 2019 13:47 )  x     / <0.01 ng/mL / x     / x     / x          PT/INR - ( 2019 13:47 )   PT: 38.00 sec;   INR: 3.34 ratio         PTT - ( 2019 13:47 )  PTT:40.7 sec    BNPSerum Pro-Brain Natriuretic Peptide: 7739 pg/mL ( @ 13:47)    I&O's Detail    Daily     Daily     RADIOLOGY & ADDITIONAL STUDIES:  Xray Chest 1 View-PORTABLE IMMEDIATE (19 @ 14:33)  EXAM:  XR CHEST PORTABLE IMMED 1V            PROCEDURE DATE:  2019            INTERPRETATION:  Clinical History / Reason for exam: Shortness of breath.    Comparison : Chest radiograph 2009.    Technique/Positioning: Satisfactory.    Findings:    Support devices: None.    Cardiac/mediastinum/hilum: Cardiomegaly poststernotomy.    Lung parenchyma/Pleura: There is left hemidiaphragm elevation. No focal   consolidation, pleural effusion or pneumothorax.    Skeleton/soft tissues: Unremarkable.    Impression:      Elevation of the left hemidiaphragm, similar to prior exam.    No focal consolidation seen.      SILVIA JOHNNY M.D., ATTENDING RADIOLOGIST  This document has been electronically signed. 2019  3:00PM

## 2019-01-17 NOTE — H&P ADULT - HISTORY OF PRESENT ILLNESS
78 y m with pmh of [facility] avr 10years ago on coumadin, afib, cad s/p stent, cll, bph, back pain was sent in for afib with rvr he mentions he recently had echo done and went to his cardiologist and found to have low ef and was in afib with rvr and was sent to ed. he denies any palpitations, cp, sob ,cough, leg swelling, fever ,chills but c/o VASQUEZ. he was started on amio as per  in ed and was seen by ep who wants to cardioversion tomorrow

## 2019-01-17 NOTE — CONSULT NOTE ADULT - ATTENDING COMMENTS
77 yo M with AFib s/p ablation in Dec presenting with AFib with RVR and cardiomyopathy    Recommend  NPO after midnight for CONCHA/DCCV  Continue coumadin  Check baseline TSH, LFTs  Continue amio load 77 yo M with AFib s/p ablation in Dec presenting with AFib with RVR and cardiomyopathy    Recommend  NPO after midnight for CONCHA/DCCV. Risks/benefits/alternatives reviewed and discussed with patient and family.  Continue coumadin  Check baseline TSH, LFTs  Continue amio load  Monitor electrolytes and keep K>4 and Mg>2

## 2019-01-17 NOTE — H&P ADULT - NSHPPHYSICALEXAM_GEN_ALL_CORE
T(C): 36 (01-17-19 @ 16:32), Max: 36 (01-17-19 @ 16:32)  HR: 129 (01-17-19 @ 16:32) (109 - 159)  BP: 125/82 (01-17-19 @ 16:32) (106/86 - 125/82)  RR: 18 (01-17-19 @ 16:32) (18 - 18)  SpO2: 99% (01-17-19 @ 16:32) (98% - 99%)    VITAL SIGNS: I have reviewed nursing notes and confirm.  CONSTITUTIONAL: Well-developed; well-nourished; in no acute distress.  SKIN: Skin exam is warm and dry, no acute rash.  HEAD: Normocephalic; atraumatic.  NECK: Supple; non tender.  No lymphadenopathy.  CARD: S1, S2 normal; .irregular rate and rhythm.  RESP: No wheezes, rales or rhonchi.  ABD: Normal bowel sounds; soft; non-distended; non-tender; no hepatosplenomegaly.  EXT: Normal ROM. No clubbing, cyanosis or edema.  NEURO: Alert, oriented. Grossly unremarkable. No focal deficits.  PSYCH: Cooperative, appropriate.

## 2019-01-17 NOTE — ASSESSMENT
[FreeTextEntry1] : The patient has had AFL and AF ablation. Now with AF with RVR . Now with significant SOB Class III symptoms and severe LV dysfunction which is new n echo done this week. . His LV dysfunction may be arrhythmia related.

## 2019-01-17 NOTE — ED PROVIDER NOTE - PHYSICAL EXAMINATION
VITAL SIGNS: noted  CONSTITUTIONAL: Well-developed; well-nourished; in no acute distress  HEAD: Normocephalic; atraumatic  EYES: conjunctiva and sclera clear  ENT: No nasal discharge; airway clear. MMM  NECK: Supple; non tender. No anterior cervical lymphadenopathy noted  CARD: tachycardic, irregular.   RESP: CTAB/L, no wheezes, rales or rhonchi  ABD: Normal bowel sounds; soft; non-distended; non-tender; No CVAT  EXT: Normal ROM. Distal pulses intact  NEURO: Alert, oriented. Grossly unremarkable. No focal deficits  SKIN: Skin exam is warm and dry, no acute rash  MS: No midline spinal tenderness

## 2019-01-17 NOTE — HISTORY OF PRESENT ILLNESS
[FreeTextEntry1] : The patient had AFL and AF ablation. He was noted to be back in NSR . Echo from  this week showed severe LV dysfunction EF 20-25% which his new. Metoprolol ws increased recently by EP

## 2019-01-17 NOTE — ED PROVIDER NOTE - OBJECTIVE STATEMENT
sp avr x 10 years not on a/c, HFrEF, failed ablation for afib, seen at cardiologists office today for palpitations, found to be in afib rvr and referred to ED. Pt seen and evaluated on arrival. Found to be in afib rvr, HD stable. Presented with paperwork form MD recommended amiodarone drip.  Pt asymptomatic on arrival.

## 2019-01-17 NOTE — CONSULT NOTE ADULT - ASSESSMENT
Assessment: Pt is a 78 y M with hx of mech avr 10years ago on coumadin, afib, cad s/p stent, cll, bph, back pain was sent in for afib with rvr from Dr Torres's office. Pt c/o SOB; No CP, palpitations or dizziness.    AFib with RVR  Plan:  - CBC, CMP, coags  - CXR PA/Lateral  - NPO after midnight  - Continue Coumadin and other medical therapy  - Plan for CONCHA/cardioversion tomorrow

## 2019-01-17 NOTE — ED ADULT TRIAGE NOTE - CHIEF COMPLAINT QUOTE
c/o feeling like his heart is racing and sob on exertion. , s/p ablation 12/2018., denies chest pain.

## 2019-01-17 NOTE — ED PROVIDER NOTE - ATTENDING CONTRIBUTION TO CARE
sp avr x 10 years not on a/c, HFrEF, failed ablation for afib, seen at cardiologists office today for palpitations, found to be in afib rvr and referred to ED. Pt seen and evaluated on arrival. Found to be in afib rvr, HD stable. Presented with paperwork form MD recommended amiodarone drip.  Pt asymptomatic on arrival.     VITAL SIGNS: noted  CONSTITUTIONAL: Well-developed; well-nourished; in no acute distress  HEAD: Normocephalic; atraumatic  EYES: conjunctiva and sclera clear  ENT: No nasal discharge; airway clear. MMM  NECK: Supple; non tender. No anterior cervical lymphadenopathy noted  CARD: tachycardic, irregular.   RESP: CTAB/L, no wheezes, rales or rhonchi  ABD: Normal bowel sounds; soft; non-distended; non-tender; No CVAT  EXT: Normal ROM. Distal pulses intact  NEURO: Alert, oriented. Grossly unremarkable. No focal deficits  SKIN: Skin exam is warm and dry, no acute rash  MS: No midline spinal tenderness     #afib rvr with known afib. low suspicion for underlying infection vs acs. inconsistent with PE 1) serial EKGs/CXR/ASA/O2 if hypoxic/cardiac monitor/LABS 2) amiodarone 3) Admit to telemetry

## 2019-01-17 NOTE — H&P ADULT - ASSESSMENT
78 y m with pmh of [facility] avr 10years ago on coumadin, afib, cad s/p stent, cll, bph, back pain was sent in for afib with rvr    1) afib with rvr  c/w metoprolol and amiodarone infusion  npo after MN for cardioversion in morning as per ep  patient took his coumadin today, monitor inr and dose couamdin  get echo results from outside or repeat echo    2) [facility] avr - c/w coumadin, maintatin inr b/w 2.5-3.5    3) cad s/p stent- c/w statin and plavix    4) cll- op f/u    5) bph -c/w meds    6) back pain -c/w tylenol prn    7) dvt ppx on coumadin  diet- dash  dispo- home 78 y m with pmh of [facility] avr 10years ago on coumadin, afib, cad s/p stent, cll, bph, back pain was sent in for afib with rvr    1) afib with rvr  c/w metoprolol and amiodarone infusion  npo after MN for cardioversion in morning as per ep  patient took his coumadin today, monitor inr and dose couamdin  get echo results from outside or repeat echo  keep mag >2 and k >4  check tsh    2) [facility] avr - c/w coumadin, maintatin inr b/w 2.5-3.5    3) cad s/p stent- c/w statin and plavix    4) cll- op f/u    5) bph -c/w meds    6) back pain -c/w tylenol prn    7) dvt ppx on coumadin  diet- dash  dispo- home

## 2019-01-17 NOTE — ED ADULT NURSE NOTE - OBJECTIVE STATEMENT
patient sent in for evaluation of AFIB. recently had an ablation. Echo indicates severe LV dysfunction.

## 2019-01-17 NOTE — H&P ADULT - PMH
Afib    Aortic valve stenosis  s/p replacement 2009  BPH (benign prostatic hyperplasia)    CAD (coronary artery disease)  2004 1 stent  Lymphoma  1996, s/p chemo& radiation

## 2019-01-17 NOTE — ED PROVIDER NOTE - MEDICAL DECISION MAKING DETAILS
presenting for afib. responding to amiodarone. HD stable. trp negative. XR negative for acute congestion.  suitable for admission to telemetry.

## 2019-01-18 ENCOUNTER — TRANSCRIPTION ENCOUNTER (OUTPATIENT)
Age: 79
End: 2019-01-18

## 2019-01-18 LAB
ANION GAP SERPL CALC-SCNC: 16 MMOL/L — HIGH (ref 7–14)
BASOPHILS # BLD AUTO: 0.05 K/UL — SIGNIFICANT CHANGE UP (ref 0–0.2)
BASOPHILS NFR BLD AUTO: 0.4 % — SIGNIFICANT CHANGE UP (ref 0–1)
BUN SERPL-MCNC: 24 MG/DL — HIGH (ref 10–20)
CALCIUM SERPL-MCNC: 9.9 MG/DL — SIGNIFICANT CHANGE UP (ref 8.5–10.1)
CHLORIDE SERPL-SCNC: 105 MMOL/L — SIGNIFICANT CHANGE UP (ref 98–110)
CO2 SERPL-SCNC: 24 MMOL/L — SIGNIFICANT CHANGE UP (ref 17–32)
CREAT SERPL-MCNC: 1.1 MG/DL — SIGNIFICANT CHANGE UP (ref 0.7–1.5)
EOSINOPHIL # BLD AUTO: 0.2 K/UL — SIGNIFICANT CHANGE UP (ref 0–0.7)
EOSINOPHIL NFR BLD AUTO: 1.4 % — SIGNIFICANT CHANGE UP (ref 0–8)
GLUCOSE SERPL-MCNC: 116 MG/DL — HIGH (ref 70–99)
HCT VFR BLD CALC: 36.9 % — LOW (ref 42–52)
HGB BLD-MCNC: 11.3 G/DL — LOW (ref 14–18)
IMM GRANULOCYTES NFR BLD AUTO: 0.2 % — SIGNIFICANT CHANGE UP (ref 0.1–0.3)
INR BLD: 3.51 RATIO — HIGH (ref 0.65–1.3)
LYMPHOCYTES # BLD AUTO: 66.5 % — HIGH (ref 20.5–51.1)
LYMPHOCYTES # BLD AUTO: 9.38 K/UL — HIGH (ref 1.2–3.4)
MAGNESIUM SERPL-MCNC: 2 MG/DL — SIGNIFICANT CHANGE UP (ref 1.8–2.4)
MCHC RBC-ENTMCNC: 30.6 G/DL — LOW (ref 32–37)
MCHC RBC-ENTMCNC: 31.4 PG — HIGH (ref 27–31)
MCV RBC AUTO: 102.5 FL — HIGH (ref 80–94)
MONOCYTES # BLD AUTO: 0.67 K/UL — HIGH (ref 0.1–0.6)
MONOCYTES NFR BLD AUTO: 4.8 % — SIGNIFICANT CHANGE UP (ref 1.7–9.3)
NEUTROPHILS # BLD AUTO: 3.77 K/UL — SIGNIFICANT CHANGE UP (ref 1.4–6.5)
NEUTROPHILS NFR BLD AUTO: 26.7 % — LOW (ref 42.2–75.2)
PLATELET # BLD AUTO: 75 K/UL — LOW (ref 130–400)
POTASSIUM SERPL-MCNC: 4.8 MMOL/L — SIGNIFICANT CHANGE UP (ref 3.5–5)
POTASSIUM SERPL-SCNC: 4.8 MMOL/L — SIGNIFICANT CHANGE UP (ref 3.5–5)
PROTHROM AB SERPL-ACNC: 39.9 SEC — HIGH (ref 9.95–12.87)
RBC # BLD: 3.6 M/UL — LOW (ref 4.7–6.1)
RBC # FLD: 13.8 % — SIGNIFICANT CHANGE UP (ref 11.5–14.5)
SODIUM SERPL-SCNC: 145 MMOL/L — SIGNIFICANT CHANGE UP (ref 135–146)
TSH SERPL-MCNC: 1.94 UIU/ML — SIGNIFICANT CHANGE UP (ref 0.27–4.2)
WBC # BLD: 14.1 K/UL — HIGH (ref 4.8–10.8)
WBC # FLD AUTO: 14.1 K/UL — HIGH (ref 4.8–10.8)

## 2019-01-18 RX ORDER — AMIODARONE HYDROCHLORIDE 400 MG/1
200 TABLET ORAL
Qty: 0 | Refills: 0 | Status: DISCONTINUED | OUTPATIENT
Start: 2019-01-19 | End: 2019-01-19

## 2019-01-18 RX ORDER — METOPROLOL TARTRATE 50 MG
25 TABLET ORAL
Qty: 0 | Refills: 0 | Status: DISCONTINUED | OUTPATIENT
Start: 2019-01-18 | End: 2019-01-19

## 2019-01-18 RX ORDER — METOPROLOL TARTRATE 50 MG
1 TABLET ORAL
Qty: 60 | Refills: 0 | OUTPATIENT
Start: 2019-01-18 | End: 2019-02-16

## 2019-01-18 RX ORDER — METOPROLOL TARTRATE 50 MG
1 TABLET ORAL
Qty: 0 | Refills: 0 | COMMUNITY

## 2019-01-18 RX ORDER — AMIODARONE HYDROCHLORIDE 400 MG/1
200 TABLET ORAL ONCE
Qty: 0 | Refills: 0 | Status: COMPLETED | OUTPATIENT
Start: 2019-01-18 | End: 2019-01-18

## 2019-01-18 RX ADMIN — AMIODARONE HYDROCHLORIDE 200 MILLIGRAM(S): 400 TABLET ORAL at 15:48

## 2019-01-18 RX ADMIN — CLOPIDOGREL BISULFATE 75 MILLIGRAM(S): 75 TABLET, FILM COATED ORAL at 11:35

## 2019-01-18 RX ADMIN — SIMVASTATIN 10 MILLIGRAM(S): 20 TABLET, FILM COATED ORAL at 22:20

## 2019-01-18 RX ADMIN — Medication 25 MILLIGRAM(S): at 18:10

## 2019-01-18 RX ADMIN — Medication 50 MILLIGRAM(S): at 05:43

## 2019-01-18 RX ADMIN — LISINOPRIL 10 MILLIGRAM(S): 2.5 TABLET ORAL at 05:43

## 2019-01-18 NOTE — CONSULT NOTE ADULT - SUBJECTIVE AND OBJECTIVE BOX
Patient is a 78y old  Male who presents with a chief complaint of afib with rvr (17 Jan 2019 18:28)      HPI:  78 y m with pmh of [facility] avr 10years ago on coumadin, afib, cad s/p stent in LAD in 2004 , cll, bph, back pain was sent in for afib with rvr he mentions he recently had echo done and went to his cardiologist and found to have low ef and was in afib with rvr and was sent to ed. He denies any palpitations, cp, sob ,cough, leg swelling, fever ,chills but c/o VASQUEZ. he was started on amio as per  in ed and was seen by ep who wants to cardioversion tomorrow (17 Jan 2019 17:55)      PAST MEDICAL & SURGICAL HISTORY:  Afib  BPH (benign prostatic hyperplasia)  Lymphoma: 1996, s/p chemo&amp; radiation  Aortic valve stenosis: s/p replacement 2009  CAD (coronary artery disease): 2004 1 stent  H/O aortic valve replacement      PREVIOUS DIAGNOSTIC TESTING:      ECHO  FINDINGS:    STRESS  FINDINGS:    CATHETERIZATION  FINDINGS:    MEDICATIONS  (STANDING):  amiodarone Infusion 1 mG/Min (33.333 mL/Hr) IV Continuous <Continuous>  amiodarone Infusion 0.5 mG/Min (16.667 mL/Hr) IV Continuous <Continuous>  clopidogrel Tablet 75 milliGRAM(s) Oral daily  lisinopril 10 milliGRAM(s) Oral daily  metoprolol tartrate 50 milliGRAM(s) Oral two times a day  simvastatin 10 milliGRAM(s) Oral at bedtime    MEDICATIONS  (PRN):  acetaminophen   Tablet .. 650 milliGRAM(s) Oral every 6 hours PRN Moderate Pain (4 - 6)      FAMILY HISTORY:  No pertinent family history in first degree relatives      SOCIAL HISTORY:  CIGARETTES:    ALCOHOL:    Allergies    No Known Allergies    Intolerances        REVIEW OF SYSTEMS:  CONSTITUTIONAL: No fever, weight loss, or fatigue  EYES: No eye pain, visual disturbances, or discharge  ENMT:  No difficulty hearing, tinnitus, vertigo; No sinus or throat pain  NECK: No pain or stiffness  BREASTS: No pain, masses, or nipple discharge  RESPIRATORY: No cough, wheezing, chills or hemoptysis; No shortness of breath  CARDIOVASCULAR: No chest pain, palpitations, dizziness, or leg swelling  GASTROINTESTINAL: No abdominal or epigastric pain. No nausea, vomiting, or hematemesis; No diarrhea or constipation. No melena or hematochezia.  GENITOURINARY: No dysuria, frequency, hematuria, or incontinence  NEUROLOGICAL: No headaches, memory loss, loss of strength, numbness, or tremors  SKIN: No itching, burning, rashes, or lesions   LYMPH NODES: No enlarged glands  ENDOCRINE: No heat or cold intolerance; No hair loss  MUSCULOSKELETAL: No joint pain or swelling; No muscle, back, or extremity pain  PSYCHIATRIC: No depression, anxiety, mood swings, or difficulty sleeping  HEME/LYMPH: No easy bruising, or bleeding gums  ALLERY AND IMMUNOLOGIC: No hives or eczema              < end of copied text >  ), Max: 36 (17 Jan 2019 16:32)  T(F): 96 (18 Jan 2019 08:09), Max: 96.8 (17 Jan 2019 16:32)  HR: 63 (18 Jan 2019 08:09) (63 - 159)  BP: 124/83 (18 Jan 2019 08:09) (106/86 - 125/82)  BP(mean): --  RR: 18 (18 Jan 2019 08:09) (18 - 18)  SpO2: 97% (18 Jan 2019 08:09) (96% - 99%)        PHYSICAL EXAM:  GENERAL: NAD, well-groomed, well-developed  HEAD:  Atraumatic, Normocephalic  NECK: Supple, No JVD, Normal thyroid  NERVOUS SYSTEM:  Alert & Oriented X3  CHEST/LUNG: Clear   HEART: Regular rate and rhythm mechanical HS   ABDOMEN: Soft, Nontender, Nondistended; Bowel sounds present  EXTREMITIES:  No edema   LYMPH: No lymphadenopathy noted  SKIN: No rashes or lesions    INTERPRETATION OF TELEMETRY:    ECG: (18 Jan 2019 08:09< from: 12 Lead ECG (01.17.19 @ 13:36) >  Diagnosis Line Atrial fibrillation with rapid ventricular response with premature ventricular  or aberrantly conducted complexes  Left anterior fascicular block  Moderate voltage criteria for LVH, may be normal variant  ST & T wave abnormality, consider lateral ischemia  Abnormal ECG    I&O's Detail      LABS:                        11.3   14.10 )-----------( 75       ( 18 Jan 2019 07:33 )             36.9     01-18    145  |  105  |  24<H>  ----------------------------<  116<H>  4.8   |  24  |  1.1    Ca    9.9      18 Jan 2019 07:33  Mg     2.0     01-18    TPro  7.0  /  Alb  4.9  /  TBili  0.6  /  DBili  x   /  AST  19  /  ALT  16  /  AlkPhos  60  01-17    CARDIAC MARKERS ( 17 Jan 2019 21:00 )  x     / <0.01 ng/mL / 37 U/L / x     / 1.3 ng/mL  CARDIAC MARKERS ( 17 Jan 2019 13:47 )  x     / <0.01 ng/mL / x     / x     / x          PT/INR - ( 17 Jan 2019 13:47 )   PT: 38.00 sec;   INR: 3.34 ratio         PTT - ( 17 Jan 2019 13:47 )  PTT:40.7 sec    I&O's Summary      RADIOLOGY & ADDITIONAL STUDIES:

## 2019-01-18 NOTE — DISCHARGE NOTE ADULT - CARE PROVIDERS DIRECT ADDRESSES
,[email or web address]

## 2019-01-18 NOTE — PROGRESS NOTE ADULT - SUBJECTIVE AND OBJECTIVE BOX
INTERVAL HPI/OVERNIGHT EVENTS:  Pt converted to NSR on Amiodarone gtt    MEDICATIONS  (STANDING):  amiodarone Infusion 1 mG/Min (33.333 mL/Hr) IV Continuous <Continuous>  amiodarone Infusion 0.5 mG/Min (16.667 mL/Hr) IV Continuous <Continuous>  clopidogrel Tablet 75 milliGRAM(s) Oral daily  lisinopril 10 milliGRAM(s) Oral daily  metoprolol tartrate 25 milliGRAM(s) Oral two times a day  simvastatin 10 milliGRAM(s) Oral at bedtime    MEDICATIONS  (PRN):  acetaminophen   Tablet .. 650 milliGRAM(s) Oral every 6 hours PRN Moderate Pain (4 - 6)      Allergies    No Known Allergies    Intolerances        REVIEW OF SYSTEMS  12 point ROS is negative    Vital Signs Last 24 Hrs  T(C): 35.6 (18 Jan 2019 08:09), Max: 36 (17 Jan 2019 16:32)  T(F): 96 (18 Jan 2019 08:09), Max: 96.8 (17 Jan 2019 16:32)  HR: 63 (18 Jan 2019 08:09) (63 - 159)  BP: 124/83 (18 Jan 2019 08:09) (106/86 - 125/82)  BP(mean): --  RR: 18 (18 Jan 2019 08:09) (18 - 18)  SpO2: 97% (18 Jan 2019 08:09) (96% - 99%)      Physical Exam    GENERAL: In no apparent distress, well nourished, and hydrated.  HEAD:  Atraumatic, Normocephalic  EYES: EOMI, PERRLA, conjunctiva and sclera clear  ENMT: No tonsillar erythema, exudates, or enlargements; ist mucous membranes, Good dentition, No lesions  NECK: Supple and normal thyroid.  No JVD or carotid bruit.  Carotid pulse is 2+ bilaterally.  HEART: Regular rate and rhythm; No murmurs, rubs, or gallops.  PULMONARY: Clear to auscultation and perfusion.  No rales, wheezing, or rhonchi bilaterally.  ABDOMEN: Soft, Nontender, Nondistended; Bowel sounds present  EXTREMITIES:  2+ Peripheral Pulses, No clubbing, cyanosis, or edema  LYMPH: No lymphadenopathy noted  NEUROLOGICAL: Grossly nonfocal    LABS:                        11.3   14.10 )-----------( 75       ( 18 Jan 2019 07:33 )             36.9     01-18    145  |  105  |  24<H>  ----------------------------<  116<H>  4.8   |  24  |  1.1    Ca    9.9      18 Jan 2019 07:33  Mg     2.0     01-18    TPro  7.0  /  Alb  4.9  /  TBili  0.6  /  DBili  x   /  AST  19  /  ALT  16  /  AlkPhos  60  01-17    PT/INR - ( 17 Jan 2019 13:47 )   PT: 38.00 sec;   INR: 3.34 ratio         PTT - ( 17 Jan 2019 13:47 )  PTT:40.7 sec      RADIOLOGY & ADDITIONAL TESTS: INTERVAL HPI/OVERNIGHT EVENTS:  Pt converted to NSR on Amiodarone gtt overnight. He denies any complaints.     MEDICATIONS  (STANDING):  amiodarone Infusion 1 mG/Min (33.333 mL/Hr) IV Continuous <Continuous>  amiodarone Infusion 0.5 mG/Min (16.667 mL/Hr) IV Continuous <Continuous>  clopidogrel Tablet 75 milliGRAM(s) Oral daily  lisinopril 10 milliGRAM(s) Oral daily  metoprolol tartrate 25 milliGRAM(s) Oral two times a day  simvastatin 10 milliGRAM(s) Oral at bedtime    MEDICATIONS  (PRN):  acetaminophen   Tablet .. 650 milliGRAM(s) Oral every 6 hours PRN Moderate Pain (4 - 6)    Allergies  No Known Allergies    Intolerances    REVIEW OF SYSTEMS  12 point ROS is negative    Vital Signs Last 24 Hrs  T(C): 35.6 (18 Jan 2019 08:09), Max: 36 (17 Jan 2019 16:32)  T(F): 96 (18 Jan 2019 08:09), Max: 96.8 (17 Jan 2019 16:32)  HR: 63 (18 Jan 2019 08:09) (63 - 159)  BP: 124/83 (18 Jan 2019 08:09) (106/86 - 125/82)  BP(mean): --  RR: 18 (18 Jan 2019 08:09) (18 - 18)  SpO2: 97% (18 Jan 2019 08:09) (96% - 99%)      Physical Exam  GENERAL: In no apparent distress, well nourished, and hydrated.  HEAD:  Atraumatic, Normocephalic  EYES: EOMI, PERRLA, conjunctiva and sclera clear  ENMT: MMM  NECK: No JVD  HEART: Regular rate and rhythm; No murmurs, rubs, or gallops.  PULMONARY: Clear to auscultation and perfusion.  No rales, wheezing, or rhonchi bilaterally.  ABDOMEN: Soft, Nontender, Nondistended; Bowel sounds present  EXTREMITIES:  2+ Peripheral Pulses, No clubbing, cyanosis, or edema  NEUROLOGICAL: Grossly nonfocal      LABS:                        11.3   14.10 )-----------( 75       ( 18 Jan 2019 07:33 )             36.9     01-18    145  |  105  |  24<H>  ----------------------------<  116<H>  4.8   |  24  |  1.1    Ca    9.9      18 Jan 2019 07:33  Mg     2.0     01-18    TPro  7.0  /  Alb  4.9  /  TBili  0.6  /  DBili  x   /  AST  19  /  ALT  16  /  AlkPhos  60  01-17    PT/INR - ( 17 Jan 2019 13:47 )   PT: 38.00 sec;   INR: 3.34 ratio       PTT - ( 17 Jan 2019 13:47 )  PTT:40.7 sec      RADIOLOGY & ADDITIONAL TESTS:  < from: Xray Chest 1 View-PORTABLE IMMEDIATE (01.17.19 @ 14:33) >  Elevation of the left hemidiaphragm, similar to prior exam.  No focal consolidation seen.    Echo (done in Dr. Torres's office)  EF 25%

## 2019-01-18 NOTE — DISCHARGE NOTE ADULT - PLAN OF CARE
clinical stability with rapid ventricular response  continue amiodarone 200mg twice daily  metoprolol dose is now 25mg twice daily  continue other home medications as previous prescribed  follow up with Dr. Torres within 1-2 weeks with rapid ventricular response  continue amiodarone 200mg twice daily  metoprolol dose is now 25mg twice daily  now back in NSR  continue other home medications as previous prescribed  follow up with Dr. Torres within 1-2 weeks

## 2019-01-18 NOTE — DISCHARGE NOTE ADULT - CARE PLAN
Principal Discharge DX:	Atrial fibrillation  Goal:	clinical stability  Assessment and plan of treatment:	with rapid ventricular response  continue amiodarone 200mg twice daily  metoprolol dose is now 25mg twice daily  continue other home medications as previous prescribed  follow up with Dr. Torres within 1-2 weeks Principal Discharge DX:	Atrial fibrillation  Goal:	clinical stability  Assessment and plan of treatment:	with rapid ventricular response  continue amiodarone 200mg twice daily  metoprolol dose is now 25mg twice daily  now back in NSR  continue other home medications as previous prescribed  follow up with Dr. Torres within 1-2 weeks

## 2019-01-18 NOTE — PROGRESS NOTE ADULT - ASSESSMENT
Pt is a 78 y M with hx of mech avr 10 years ago on coumadin, afib, cad s/p stent, cll, bph, back pain was sent in for afib with rvr     AFib with RVR ->NSR  - Pt converted to NSR on IV amiodarone  - check EKG now that he is in sinus rhythm  - Once IV amio finishes convert to po amiodarone  - await baseline TSH (pending).  LFT's WNL  - INR yesterday was 3.3.  Check INR today.  Adjust coumadin dose to take into account amiodarone effect.

## 2019-01-18 NOTE — PROGRESS NOTE ADULT - ASSESSMENT
78 y m with pmh of [facility] avr 10years ago on coumadin, afib, cad s/p stent, cll, bph, back pain was sent in for afib with rvr    # A Fib with RVR  currently in sinus rhythm  c/w amiodarone infusion, when drip finishes, start oral amiodarone 200mg bid  metoprolol 25mg bid as per Dr. Torres  f/u TSH     # [facility] avr - c/w coumadin, maintatin inr b/w 2.5-3.5  f/u INR and dose warfarin    # cad s/p stent- c/w statin and plavix    # leukocytosis (hx of CLL)    # bph -c/w meds    6) back pain -c/w tylenol prn    7) dvt ppx on coumadin  diet- dash  dispo- home, possible d/c tomorrow 78 y m with pmh of [facility] avr 10years ago on coumadin, afib, cad s/p stent, cll, bph, back pain was sent in for afib with rvr    # A Fib with RVR  was being considered for cardioversion but converted to sinus rhythm with amiodarone drip    c/w amiodarone infusion, when drip finishes, start oral amiodarone 200mg bid  metoprolol 25mg bid as per Dr. Torres  f/u TSH     # [facility] avr - c/w coumadin, maintatin inr b/w 2.5-3.5  f/u INR and dose warfarin    # cad s/p stent- c/w statin and plavix    # leukocytosis (hx of CLL)    # bph -c/w meds    6) back pain -c/w tylenol prn    7) dvt ppx on coumadin  diet- dash  dispo- home, possible d/c tomorrow

## 2019-01-18 NOTE — DISCHARGE NOTE ADULT - CARE PROVIDER_API CALL
Juan Torres), Cardiovascular Disease; Internal Medicine  [address] [place] [address]  Phone: [phone]  Fax: [phone] Juan Torres), Cardiovascular Disease; Internal Medicine  [address] [place] [address]  Phone: [phone]  Fax: [phone]    Ramona Pastrana), Cardiology  [address] [place] [address]  Phone: [phone]  Fax: [phone]

## 2019-01-18 NOTE — DISCHARGE NOTE ADULT - PATIENT PORTAL LINK FT
You can access the [facility] Patient Portal, offered by [facility], by registering with the following website: [email or web address]

## 2019-01-18 NOTE — PROGRESS NOTE ADULT - SUBJECTIVE AND OBJECTIVE BOX
Patient is a 78y old  Male who presents with a chief complaint of afib with rvr (18 Jan 2019 11:19)      PAST MEDICAL & SURGICAL HISTORY:  Afib  BPH (benign prostatic hyperplasia)  Lymphoma: 1996, s/p chemo&amp; radiation  Aortic valve stenosis: s/p replacement 2009  CAD (coronary artery disease): 2004 1 stent  H/O aortic valve replacement      MEDICATIONS  (STANDING):  amiodarone Infusion 1 mG/Min (33.333 mL/Hr) IV Continuous <Continuous>  amiodarone Infusion 0.5 mG/Min (16.667 mL/Hr) IV Continuous <Continuous>  clopidogrel Tablet 75 milliGRAM(s) Oral daily  lisinopril 10 milliGRAM(s) Oral daily  metoprolol tartrate 25 milliGRAM(s) Oral two times a day  simvastatin 10 milliGRAM(s) Oral at bedtime    MEDICATIONS  (PRN):  acetaminophen   Tablet .. 650 milliGRAM(s) Oral every 6 hours PRN Moderate Pain (4 - 6)      Overnight events:    Vital Signs Last 24 Hrs  T(C): 35.6 (18 Jan 2019 08:09), Max: 36 (17 Jan 2019 16:32)  T(F): 96 (18 Jan 2019 08:09), Max: 96.8 (17 Jan 2019 16:32)  HR: 63 (18 Jan 2019 08:09) (63 - 159)  BP: 124/83 (18 Jan 2019 08:09) (106/86 - 125/82)  BP(mean): --  RR: 18 (18 Jan 2019 08:09) (18 - 18)  SpO2: 97% (18 Jan 2019 08:09) (96% - 99%)  CAPILLARY BLOOD GLUCOSE        I&O's Summary      Physical Exam:    GEN: NAD, lying in bed comfortably  HEAD: Normocephalic; atraumatic.  NECK: Supple; non tender.  CARD: S1, S2 + systolic murmur (mechanical heart valve), regular rate this morning  RESP: cta b/l   ABD: soft, NT, +BS  EXT: Normal ROM. No clubbing, cyanosis or edema.  NEURO: Alert, oriented. Grossly unremarkable. No focal deficits.          Labs:                        11.3   14.10 )-----------( 75       ( 18 Jan 2019 07:33 )             36.9             01-18    145  |  105  |  24<H>  ----------------------------<  116<H>  4.8   |  24  |  1.1    Ca    9.9      18 Jan 2019 07:33  Mg     2.0     01-18    TPro  7.0  /  Alb  4.9  /  TBili  0.6  /  DBili  x   /  AST  19  /  ALT  16  /  AlkPhos  60  01-17    LIVER FUNCTIONS - ( 17 Jan 2019 13:47 )  Alb: 4.9 g/dL / Pro: 7.0 g/dL / ALK PHOS: 60 U/L / ALT: 16 U/L / AST: 19 U/L / GGT: x                 PT/INR - ( 17 Jan 2019 13:47 )   PT: 38.00 sec;   INR: 3.34 ratio         PTT - ( 17 Jan 2019 13:47 )  PTT:40.7 sec  CARDIAC MARKERS ( 17 Jan 2019 21:00 )  x     / <0.01 ng/mL / 37 U/L / x     / 1.3 ng/mL  CARDIAC MARKERS ( 17 Jan 2019 13:47 )  x     / <0.01 ng/mL / x     / x     / x

## 2019-01-18 NOTE — DISCHARGE NOTE ADULT - MEDICATION SUMMARY - MEDICATIONS TO TAKE
I will START or STAY ON the medications listed below when I get home from the hospital:    Tylenol 500 mg oral tablet  -- 2 tab(s) by mouth every 6 hours  -- Indication: For Pain    lisinopril 10 mg oral tablet  -- 1 tab(s) by mouth once a day  -- Indication: For Hypertension    alfuzosin 10 mg oral tablet, extended release  -- 1 tab(s) by mouth once a day  -- Indication: For Hypertension    amiodarone 200 mg oral tablet  -- 1 tab(s) by mouth 2 times a day  -- Indication: For AFIB    Coumadin 2 mg oral tablet  -- 1 tab(s) by mouth once a day   -- Do not take this drug if you are pregnant.  It is very important that you take or use this exactly as directed.  Do not skip doses or discontinue unless directed by your doctor.  Obtain medical advice before taking any non-prescription drugs as some may affect the action of this medication.    -- Indication: For AFIB    simvastatin 10 mg oral tablet  -- 1 tab(s) by mouth once a day (at bedtime)  -- Indication: For Hyperlipidemia     clopidogrel 75 mg oral tablet  -- 1 tab(s) by mouth once a day  -- Indication: For CAD    metoprolol tartrate 25 mg oral tablet  -- 1 tab(s) by mouth 2 times a day  -- Indication: For AFIB    Atrovent HFA 17 mcg/inh inhalation aerosol  -- 2 puff(s) by metered dose inhaler 4 times a day, As Needed -for bronchospasm   -- For inhalation only.  It is very important that you take or use this exactly as directed.  Do not skip doses or discontinue unless directed by your doctor.    -- Indication: For Bronchospasm     Calcium 500+D oral tablet, chewable  -- 1 tab(s) by mouth 2 times a day  -- Indication: For Calcium deficiency

## 2019-01-18 NOTE — DISCHARGE NOTE ADULT - HOSPITAL COURSE
77 yo m with pmh of [facility] avr 10years ago on coumadin, afib, cad s/p stent, cll, bph, back pain   He was admitted with palpitations and on EKG was sent in for atrial fibrillation with rapid ventricular response.    He was started on amiodarone drip and was then converted to oral amiodarone.  Initially was being considered for cardioversion but converted to sinus rhythm on drip.    Metoprolol was decreased to 25mg twice daily   Cardiology advised to repeat echo as outpatient

## 2019-01-19 VITALS
RESPIRATION RATE: 18 BRPM | TEMPERATURE: 99 F | HEART RATE: 77 BPM | SYSTOLIC BLOOD PRESSURE: 134 MMHG | DIASTOLIC BLOOD PRESSURE: 75 MMHG

## 2019-01-19 LAB
ALBUMIN SERPL ELPH-MCNC: 4.2 G/DL — SIGNIFICANT CHANGE UP (ref 3.5–5.2)
ALP SERPL-CCNC: 52 U/L — SIGNIFICANT CHANGE UP (ref 30–115)
ALT FLD-CCNC: 17 U/L — SIGNIFICANT CHANGE UP (ref 0–41)
ANION GAP SERPL CALC-SCNC: 15 MMOL/L — HIGH (ref 7–14)
AST SERPL-CCNC: 21 U/L — SIGNIFICANT CHANGE UP (ref 0–41)
BILIRUB SERPL-MCNC: 0.5 MG/DL — SIGNIFICANT CHANGE UP (ref 0.2–1.2)
BUN SERPL-MCNC: 26 MG/DL — HIGH (ref 10–20)
CALCIUM SERPL-MCNC: 9.3 MG/DL — SIGNIFICANT CHANGE UP (ref 8.5–10.1)
CHLORIDE SERPL-SCNC: 106 MMOL/L — SIGNIFICANT CHANGE UP (ref 98–110)
CO2 SERPL-SCNC: 25 MMOL/L — SIGNIFICANT CHANGE UP (ref 17–32)
CREAT SERPL-MCNC: 1 MG/DL — SIGNIFICANT CHANGE UP (ref 0.7–1.5)
FLU A RESULT: NEGATIVE — SIGNIFICANT CHANGE UP
FLU A RESULT: NEGATIVE — SIGNIFICANT CHANGE UP
FLUAV AG NPH QL: NEGATIVE — SIGNIFICANT CHANGE UP
FLUBV AG NPH QL: NEGATIVE — SIGNIFICANT CHANGE UP
GLUCOSE SERPL-MCNC: 115 MG/DL — HIGH (ref 70–99)
HCT VFR BLD CALC: 33.7 % — LOW (ref 42–52)
HGB BLD-MCNC: 10.5 G/DL — LOW (ref 14–18)
INR BLD: 3.17 RATIO — HIGH (ref 0.65–1.3)
MCHC RBC-ENTMCNC: 31.2 G/DL — LOW (ref 32–37)
MCHC RBC-ENTMCNC: 32.2 PG — HIGH (ref 27–31)
MCV RBC AUTO: 103.4 FL — HIGH (ref 80–94)
NRBC # BLD: 0 /100 WBCS — SIGNIFICANT CHANGE UP (ref 0–0)
PLATELET # BLD AUTO: 77 K/UL — LOW (ref 130–400)
POTASSIUM SERPL-MCNC: 4.8 MMOL/L — SIGNIFICANT CHANGE UP (ref 3.5–5)
POTASSIUM SERPL-SCNC: 4.8 MMOL/L — SIGNIFICANT CHANGE UP (ref 3.5–5)
PROT SERPL-MCNC: 6.2 G/DL — SIGNIFICANT CHANGE UP (ref 6–8)
PROTHROM AB SERPL-ACNC: 36 SEC — HIGH (ref 9.95–12.87)
RBC # BLD: 3.26 M/UL — LOW (ref 4.7–6.1)
RBC # FLD: 13.9 % — SIGNIFICANT CHANGE UP (ref 11.5–14.5)
RSV RESULT: NEGATIVE — SIGNIFICANT CHANGE UP
RSV RNA RESP QL NAA+PROBE: NEGATIVE — SIGNIFICANT CHANGE UP
SODIUM SERPL-SCNC: 146 MMOL/L — SIGNIFICANT CHANGE UP (ref 135–146)
WBC # BLD: 12.49 K/UL — HIGH (ref 4.8–10.8)
WBC # FLD AUTO: 12.49 K/UL — HIGH (ref 4.8–10.8)

## 2019-01-19 RX ORDER — IPRATROPIUM BROMIDE 0.2 MG/ML
500 SOLUTION, NON-ORAL INHALATION ONCE
Qty: 0 | Refills: 0 | Status: DISCONTINUED | OUTPATIENT
Start: 2019-01-19 | End: 2019-01-19

## 2019-01-19 RX ORDER — WARFARIN SODIUM 2.5 MG/1
1 TABLET ORAL
Qty: 0 | Refills: 0 | COMMUNITY

## 2019-01-19 RX ORDER — METOPROLOL TARTRATE 50 MG
1 TABLET ORAL
Qty: 60 | Refills: 0
Start: 2019-01-19 | End: 2019-02-17

## 2019-01-19 RX ORDER — AMIODARONE HYDROCHLORIDE 400 MG/1
1 TABLET ORAL
Qty: 0 | Refills: 0 | DISCHARGE
Start: 2019-01-19

## 2019-01-19 RX ORDER — WARFARIN SODIUM 2.5 MG/1
1 TABLET ORAL
Qty: 30 | Refills: 0 | OUTPATIENT
Start: 2019-01-19

## 2019-01-19 RX ORDER — BENZOCAINE AND MENTHOL 5; 1 G/100ML; G/100ML
1 LIQUID ORAL EVERY 4 HOURS
Qty: 0 | Refills: 0 | Status: DISCONTINUED | OUTPATIENT
Start: 2019-01-19 | End: 2019-01-19

## 2019-01-19 RX ORDER — IPRATROPIUM BROMIDE 0.2 MG/ML
2 SOLUTION, NON-ORAL INHALATION
Qty: 1 | Refills: 0 | OUTPATIENT
Start: 2019-01-19

## 2019-01-19 RX ORDER — AMIODARONE HYDROCHLORIDE 400 MG/1
1 TABLET ORAL
Qty: 60 | Refills: 0
Start: 2019-01-19

## 2019-01-19 RX ORDER — IPRATROPIUM BROMIDE 0.2 MG/ML
1 SOLUTION, NON-ORAL INHALATION EVERY 6 HOURS
Qty: 0 | Refills: 0 | Status: DISCONTINUED | OUTPATIENT
Start: 2019-01-19 | End: 2019-01-19

## 2019-01-19 RX ADMIN — CLOPIDOGREL BISULFATE 75 MILLIGRAM(S): 75 TABLET, FILM COATED ORAL at 12:20

## 2019-01-19 RX ADMIN — LISINOPRIL 10 MILLIGRAM(S): 2.5 TABLET ORAL at 05:43

## 2019-01-19 RX ADMIN — BENZOCAINE AND MENTHOL 1 LOZENGE: 5; 1 LIQUID ORAL at 09:36

## 2019-01-19 RX ADMIN — AMIODARONE HYDROCHLORIDE 200 MILLIGRAM(S): 400 TABLET ORAL at 05:44

## 2019-01-19 RX ADMIN — Medication 25 MILLIGRAM(S): at 05:43

## 2019-01-19 NOTE — PROGRESS NOTE ADULT - ATTENDING COMMENTS
AFib with RVR   Macrocytic anemia  Diffuse wheezing and rhonchi    Recommend  - Stat CXR to eval for signs of fluid overload or developing infection. If signs of volume overload, give IV Lasix.   - Monitor electrolytes and keep K>4 and Mg>2  - Continue amiodarone 200 mg BID x 2 weeks and then 200 mg daily until seen by Dr. Torres in the office (should have follow up within 2 weeks)  - Baseline TSH and LFTs normal  - Continue anticoagulation (INR today is 3.17). Decrease Warfarin dose given the fact that Amio interacts with it   - Check B12 and Folate  - Continue BB
Patient seen and examined.     Converted to sinus rhythm overnight on Amio. Pt states dyspnea has improved.     CAD (PCI to LAD in 2004)  paroxysmal AFib with RVR - now in sinus   Mechanical AVR on coumadin (beware interaction with Amio)    Recommend  - Baseline TSH and LFTs. TSH pending  - Amio 200mg BID after IV load has been completed  - Monitor INR  - BB and ACEi for cardiomyopathy (likely 2/2 tachycardia)   - Follow up with me in the office in 1 month (follow up with Dr. Torres in 2 weeks)  - Outpatient repeat echo

## 2019-01-19 NOTE — PROGRESS NOTE ADULT - SUBJECTIVE AND OBJECTIVE BOX
INTERVAL HPI/OVERNIGHT EVENTS: Pt complains of hoarse voice and sore throat. Dry cough. No fevers or chills. No chest pain, palpitations, dyspnea or other cardiovascular complaints.     MEDICATIONS  (STANDING):  amiodarone    Tablet 200 milliGRAM(s) Oral two times a day  amiodarone Infusion 1 mG/Min (33.333 mL/Hr) IV Continuous <Continuous>  amiodarone Infusion 0.5 mG/Min (16.667 mL/Hr) IV Continuous <Continuous>  clopidogrel Tablet 75 milliGRAM(s) Oral daily  lisinopril 10 milliGRAM(s) Oral daily  metoprolol tartrate 25 milliGRAM(s) Oral two times a day  simvastatin 10 milliGRAM(s) Oral at bedtime    MEDICATIONS  (PRN):  acetaminophen   Tablet .. 650 milliGRAM(s) Oral every 6 hours PRN Moderate Pain (4 - 6)  benzocaine 15 mG/menthol 3.6 mG Lozenge 1 Lozenge Oral every 4 hours PRN Sore Throat      Allergies  No Known Allergies    Intolerances    Vital Signs Last 24 Hrs  T(C): 36.1 (19 Jan 2019 05:27), Max: 36.3 (18 Jan 2019 15:11)  T(F): 97 (19 Jan 2019 05:27), Max: 97.3 (18 Jan 2019 15:11)  HR: 67 (19 Jan 2019 05:27) (66 - 78)  BP: 132/62 (19 Jan 2019 05:27) (107/68 - 132/62)  BP(mean): --  RR: 18 (19 Jan 2019 05:27) (18 - 18)  SpO2: 97% (19 Jan 2019 08:55) (97% - 97%)    01-18-19 @ 07:01  -  01-19-19 @ 07:00  --------------------------------------------------------  IN: 240 mL / OUT: 0 mL / NET: 240 mL      TELE: NSR 60-80    Physical Exam    GENERAL: In no apparent distress, well nourished, and hydrated.  HEAD:  Atraumatic, Normocephalic  EYES: EOMI, PERRLA, conjunctiva and sclera clear  ENMT: Moist mucous membranes  NECK: Supple and normal thyroid.  No JVD or carotid bruit.    HEART: Regular rate and rhythm; No murmurs, rubs, or gallops.  PULMONARY: Diffuse bilateral wheezing and rhonchi appreciated anteriorly and posteriorly  ABDOMEN: Normoactive bowel sounds. Soft, Nontender, Nondistended.  EXTREMITIES:  2+ Peripheral Pulses. No clubbing, cyanosis, or edema  NEUROLOGICAL: Grossly nonfocal    LABS:                        10.5   12.49 )-----------( 77       ( 19 Jan 2019 07:20 )             33.7     01-19    146  |  106  |  26<H>  ----------------------------<  115<H>  4.8   |  25  |  1.0    Ca    9.3      19 Jan 2019 07:20  Mg     2.0     01-18    TPro  6.2  /  Alb  4.2  /  TBili  0.5  /  DBili  x   /  AST  21  /  ALT  17  /  AlkPhos  52  01-19    PT/INR - ( 19 Jan 2019 07:20 )   PT: 36.00 sec;   INR: 3.17 ratio    PTT - ( 17 Jan 2019 13:47 )  PTT:40.7 sec      RADIOLOGY & ADDITIONAL TESTS:  < from: Xray Chest 1 View-PORTABLE IMMEDIATE (01.17.19 @ 14:33) >  Impression:    Elevation of the left hemidiaphragm, similar to prior exam.  No focal consolidation seen.  < end of copied text >

## 2019-01-19 NOTE — PROGRESS NOTE ADULT - SUBJECTIVE AND OBJECTIVE BOX
HOSPITALIST ATTENDING NOTE    TERRELL ATKINSON  78y Male  [id number]    INTERVAL HPI/OVERNIGHT EVENTS: overnight developed cough and wheeze    T(C): 36.1 (01-19-19 @ 05:27), Max: 36.3 (01-18-19 @ 15:11)  HR: 67 (01-19-19 @ 05:27) (66 - 78)  BP: 132/62 (01-19-19 @ 05:27) (107/68 - 132/62)  RR: 18 (01-19-19 @ 05:27) (18 - 18)  SpO2: 97% (01-19-19 @ 08:55) (97% - 97%)  Wt(kg): --    01-18-19 @ 07:01  -  01-19-19 @ 07:00  --------------------------------------------------------  IN: 240 mL / OUT: 0 mL / NET: 240 mL        PHYSICAL EXAM:  GENERAL: NAD  HEAD:  Atraumatic, Normocephalic  EYES: EOMI, PERRLA, conjunctiva and sclera clear  ENMT: No tonsillar erythema, exudates, or enlargement  NECK: Supple, No JVD, Normal thyroid  NERVOUS SYSTEM:  Alert & Oriented X3, Good concentration; Non-focal- Motor Strength 5/5 B/L upper and lower extremities;  CHEST/LUNG: bilateral mild exp wheeze   HEART: Regular rate and rhythm;+ click   ABDOMEN: Soft, Nontender, Nondistended; Bowel sounds present  EXTREMITIES: No clubbing, cyanosis, or edema  LYMPH: No lymphadenopathy noted  SKIN: No rashes or lesions  PSYCH: No suicidal/homicial ideation/hallucinations    Consultant(s) Notes Reviewed:  [x ] YES  [ ] NO  Care Discussed with Consultants/Other Providers/ Housestaff [ x] YES  [ ] NO    LABS:                        10.5   12.49 )-----------( 77       ( 19 Jan 2019 07:20 )             33.7     01-19    146  |  106  |  26<H>  ----------------------------<  115<H>  4.8   |  25  |  1.0    Ca    9.3      19 Jan 2019 07:20  Mg     2.0     01-18    TPro  6.2  /  Alb  4.2  /  TBili  0.5  /  DBili  x   /  AST  21  /  ALT  17  /  AlkPhos  52  01-19          RADIOLOGY & ADDITIONAL TESTS:    Imaging or report Personally Reviewed:  [ ] YES  [ ] NO    Case discussed with resident    Care discussed with pt/family      HEALTH ISSUES - PROBLEM Dx:

## 2019-01-23 PROBLEM — I48.91 UNSPECIFIED ATRIAL FIBRILLATION: Chronic | Status: ACTIVE | Noted: 2019-01-17

## 2019-01-24 DIAGNOSIS — I44.4 LEFT ANTERIOR FASCICULAR BLOCK: ICD-10-CM

## 2019-01-24 DIAGNOSIS — D72.829 ELEVATED WHITE BLOOD CELL COUNT, UNSPECIFIED: ICD-10-CM

## 2019-01-24 DIAGNOSIS — Z92.3 PERSONAL HISTORY OF IRRADIATION: ICD-10-CM

## 2019-01-24 DIAGNOSIS — Z79.01 LONG TERM (CURRENT) USE OF ANTICOAGULANTS: ICD-10-CM

## 2019-01-24 DIAGNOSIS — Z92.21 PERSONAL HISTORY OF ANTINEOPLASTIC CHEMOTHERAPY: ICD-10-CM

## 2019-01-24 DIAGNOSIS — Z95.5 PRESENCE OF CORONARY ANGIOPLASTY IMPLANT AND GRAFT: ICD-10-CM

## 2019-01-24 DIAGNOSIS — N40.0 BENIGN PROSTATIC HYPERPLASIA WITHOUT LOWER URINARY TRACT SYMPTOMS: ICD-10-CM

## 2019-01-24 DIAGNOSIS — D53.9 NUTRITIONAL ANEMIA, UNSPECIFIED: ICD-10-CM

## 2019-01-24 DIAGNOSIS — R00.2 PALPITATIONS: ICD-10-CM

## 2019-01-24 DIAGNOSIS — I48.91 UNSPECIFIED ATRIAL FIBRILLATION: ICD-10-CM

## 2019-01-24 DIAGNOSIS — Z87.891 PERSONAL HISTORY OF NICOTINE DEPENDENCE: ICD-10-CM

## 2019-01-24 DIAGNOSIS — I25.10 ATHEROSCLEROTIC HEART DISEASE OF NATIVE CORONARY ARTERY WITHOUT ANGINA PECTORIS: ICD-10-CM

## 2019-01-24 DIAGNOSIS — I42.0 DILATED CARDIOMYOPATHY: ICD-10-CM

## 2019-01-24 DIAGNOSIS — I11.0 HYPERTENSIVE HEART DISEASE WITH HEART FAILURE: ICD-10-CM

## 2019-01-24 DIAGNOSIS — Z95.2 PRESENCE OF PROSTHETIC HEART VALVE: ICD-10-CM

## 2019-01-24 DIAGNOSIS — C91.11 CHRONIC LYMPHOCYTIC LEUKEMIA OF B-CELL TYPE IN REMISSION: ICD-10-CM

## 2019-01-24 DIAGNOSIS — I50.22 CHRONIC SYSTOLIC (CONGESTIVE) HEART FAILURE: ICD-10-CM

## 2019-01-24 DIAGNOSIS — J06.9 ACUTE UPPER RESPIRATORY INFECTION, UNSPECIFIED: ICD-10-CM

## 2019-01-24 DIAGNOSIS — Z79.02 LONG TERM (CURRENT) USE OF ANTITHROMBOTICS/ANTIPLATELETS: ICD-10-CM

## 2019-01-28 ENCOUNTER — OUTPATIENT (OUTPATIENT)
Dept: OUTPATIENT SERVICES | Facility: HOSPITAL | Age: 79
LOS: 1 days | Discharge: HOME | End: 2019-01-28

## 2019-01-28 DIAGNOSIS — Z95.2 PRESENCE OF PROSTHETIC HEART VALVE: ICD-10-CM

## 2019-01-28 DIAGNOSIS — Z95.2 PRESENCE OF PROSTHETIC HEART VALVE: Chronic | ICD-10-CM

## 2019-01-28 DIAGNOSIS — Z79.01 LONG TERM (CURRENT) USE OF ANTICOAGULANTS: ICD-10-CM

## 2019-01-28 LAB
POCT INR: 3.8 RATIO — HIGH (ref 0.9–1.2)
POCT PT: 45.3 SEC — HIGH (ref 10–13.4)

## 2019-01-30 ENCOUNTER — APPOINTMENT (OUTPATIENT)
Dept: CARDIOLOGY | Facility: CLINIC | Age: 79
End: 2019-01-30

## 2019-01-30 ENCOUNTER — INBOUND DOCUMENT (OUTPATIENT)
Age: 79
End: 2019-01-30

## 2019-01-30 VITALS — BODY MASS INDEX: 25.77 KG/M2 | HEIGHT: 70 IN | WEIGHT: 180 LBS

## 2019-01-30 VITALS — DIASTOLIC BLOOD PRESSURE: 70 MMHG | HEART RATE: 55 BPM | SYSTOLIC BLOOD PRESSURE: 120 MMHG | HEIGHT: 70 IN

## 2019-01-30 RX ORDER — MENTHOL 5.8 MG/1
2000 LOZENGE ORAL
Refills: 0 | Status: DISCONTINUED | COMMUNITY
End: 2019-01-30

## 2019-01-30 NOTE — HISTORY OF PRESENT ILLNESS
[FreeTextEntry1] : The patient was admitted to [facility] with AF with RVR . The pateint was started on IV Amiodarone and he had converted to NSR . He had developed bronchitis and URI . This is improving. . SOB has improved and his energy is back to normal. EF decreased which was thought to be secondary to his atrial arrythmias.

## 2019-01-30 NOTE — ASSESSMENT
[FreeTextEntry1] : The patient has AF with RVR . He was loaded with Amio and converted to NSR . He has remained in NSR since that time. He has had no SOB since discharge. He has had a URI and bronchitis which is improving. . He has had a recent AF AFL ablation .

## 2019-01-30 NOTE — PHYSICAL EXAM
[General Appearance - Well Developed] : well developed [Normal Appearance] : normal appearance [Well Groomed] : well groomed [General Appearance - Well Nourished] : well nourished [No Deformities] : no deformities [General Appearance - In No Acute Distress] : no acute distress [Normal Conjunctiva] : the conjunctiva exhibited no abnormalities [Eyelids - No Xanthelasma] : the eyelids demonstrated no xanthelasmas [Normal Oral Mucosa] : normal oral mucosa [No Oral Pallor] : no oral pallor [No Oral Cyanosis] : no oral cyanosis [Respiration, Rhythm And Depth] : normal respiratory rhythm and effort [Exaggerated Use Of Accessory Muscles For Inspiration] : no accessory muscle use [Abdomen Soft] : soft [Abdomen Tenderness] : non-tender [Abdomen Mass (___ Cm)] : no abdominal mass palpated [Abnormal Walk] : normal gait [Gait - Sufficient For Exercise Testing] : the gait was sufficient for exercise testing [Nail Clubbing] : no clubbing of the fingernails [Cyanosis, Localized] : no localized cyanosis [Petechial Hemorrhages (___cm)] : no petechial hemorrhages [Skin Color & Pigmentation] : normal skin color and pigmentation [] : no rash [No Venous Stasis] : no venous stasis [Skin Lesions] : no skin lesions [No Skin Ulcers] : no skin ulcer [No Xanthoma] : no  xanthoma was observed [Irregularly Irregular] : irregularly irregular [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  [Rt] : no varicose veins of the right leg

## 2019-02-04 ENCOUNTER — OUTPATIENT (OUTPATIENT)
Dept: OUTPATIENT SERVICES | Facility: HOSPITAL | Age: 79
LOS: 1 days | Discharge: HOME | End: 2019-02-04

## 2019-02-04 DIAGNOSIS — Z95.2 PRESENCE OF PROSTHETIC HEART VALVE: Chronic | ICD-10-CM

## 2019-02-04 DIAGNOSIS — Z95.2 PRESENCE OF PROSTHETIC HEART VALVE: ICD-10-CM

## 2019-02-04 DIAGNOSIS — Z79.01 LONG TERM (CURRENT) USE OF ANTICOAGULANTS: ICD-10-CM

## 2019-02-04 LAB
POCT INR: 2.2 RATIO — HIGH (ref 0.9–1.2)
POCT PT: 26.1 SEC — HIGH (ref 10–13.4)

## 2019-02-11 ENCOUNTER — OUTPATIENT (OUTPATIENT)
Dept: OUTPATIENT SERVICES | Facility: HOSPITAL | Age: 79
LOS: 1 days | Discharge: HOME | End: 2019-02-11

## 2019-02-11 DIAGNOSIS — Z95.2 PRESENCE OF PROSTHETIC HEART VALVE: ICD-10-CM

## 2019-02-11 DIAGNOSIS — Z95.2 PRESENCE OF PROSTHETIC HEART VALVE: Chronic | ICD-10-CM

## 2019-02-11 DIAGNOSIS — Z79.01 LONG TERM (CURRENT) USE OF ANTICOAGULANTS: ICD-10-CM

## 2019-02-11 LAB
POCT INR: 2.6 RATIO — HIGH (ref 0.9–1.2)
POCT PT: 31.1 SEC — HIGH (ref 10–13.4)

## 2019-02-14 ENCOUNTER — APPOINTMENT (OUTPATIENT)
Dept: CARDIOLOGY | Facility: CLINIC | Age: 79
End: 2019-02-14

## 2019-02-18 ENCOUNTER — RX RENEWAL (OUTPATIENT)
Age: 79
End: 2019-02-18

## 2019-02-19 DIAGNOSIS — Z79.01 LONG TERM (CURRENT) USE OF ANTICOAGULANTS: ICD-10-CM

## 2019-02-19 DIAGNOSIS — I25.10 ATHEROSCLEROTIC HEART DISEASE OF NATIVE CORONARY ARTERY WITHOUT ANGINA PECTORIS: ICD-10-CM

## 2019-02-19 DIAGNOSIS — I48.91 UNSPECIFIED ATRIAL FIBRILLATION: ICD-10-CM

## 2019-02-19 DIAGNOSIS — Z87.891 PERSONAL HISTORY OF NICOTINE DEPENDENCE: ICD-10-CM

## 2019-02-21 ENCOUNTER — OUTPATIENT (OUTPATIENT)
Dept: OUTPATIENT SERVICES | Facility: HOSPITAL | Age: 79
LOS: 1 days | Discharge: HOME | End: 2019-02-21

## 2019-02-21 DIAGNOSIS — Z95.2 PRESENCE OF PROSTHETIC HEART VALVE: Chronic | ICD-10-CM

## 2019-02-21 DIAGNOSIS — Z95.2 PRESENCE OF PROSTHETIC HEART VALVE: ICD-10-CM

## 2019-02-21 DIAGNOSIS — Z79.01 LONG TERM (CURRENT) USE OF ANTICOAGULANTS: ICD-10-CM

## 2019-02-21 LAB
POCT INR: 2.2 RATIO — HIGH (ref 0.9–1.2)
POCT PT: 27 SEC — HIGH (ref 10–13.4)

## 2019-02-28 ENCOUNTER — OUTPATIENT (OUTPATIENT)
Dept: OUTPATIENT SERVICES | Facility: HOSPITAL | Age: 79
LOS: 1 days | Discharge: HOME | End: 2019-02-28

## 2019-02-28 DIAGNOSIS — Z95.2 PRESENCE OF PROSTHETIC HEART VALVE: ICD-10-CM

## 2019-02-28 DIAGNOSIS — Z95.2 PRESENCE OF PROSTHETIC HEART VALVE: Chronic | ICD-10-CM

## 2019-02-28 DIAGNOSIS — Z79.01 LONG TERM (CURRENT) USE OF ANTICOAGULANTS: ICD-10-CM

## 2019-02-28 LAB
POCT INR: 2.6 RATIO — HIGH (ref 0.9–1.2)
POCT PT: 31.6 SEC — HIGH (ref 10–13.4)

## 2019-03-03 ENCOUNTER — RX RENEWAL (OUTPATIENT)
Age: 79
End: 2019-03-03

## 2019-03-05 ENCOUNTER — RX RENEWAL (OUTPATIENT)
Age: 79
End: 2019-03-05

## 2019-03-06 ENCOUNTER — APPOINTMENT (OUTPATIENT)
Dept: CARDIOLOGY | Facility: CLINIC | Age: 79
End: 2019-03-06

## 2019-03-06 VITALS
SYSTOLIC BLOOD PRESSURE: 149 MMHG | HEIGHT: 70 IN | WEIGHT: 180 LBS | BODY MASS INDEX: 25.77 KG/M2 | HEART RATE: 59 BPM | DIASTOLIC BLOOD PRESSURE: 83 MMHG | OXYGEN SATURATION: 96 %

## 2019-03-06 DIAGNOSIS — Z87.891 PERSONAL HISTORY OF NICOTINE DEPENDENCE: ICD-10-CM

## 2019-03-06 DIAGNOSIS — Z95.2 PRESENCE OF PROSTHETIC HEART VALVE: ICD-10-CM

## 2019-03-07 ENCOUNTER — APPOINTMENT (OUTPATIENT)
Dept: HEMATOLOGY ONCOLOGY | Facility: CLINIC | Age: 79
End: 2019-03-07

## 2019-03-07 ENCOUNTER — OUTPATIENT (OUTPATIENT)
Dept: OUTPATIENT SERVICES | Facility: HOSPITAL | Age: 79
LOS: 1 days | Discharge: HOME | End: 2019-03-07

## 2019-03-07 ENCOUNTER — LABORATORY RESULT (OUTPATIENT)
Age: 79
End: 2019-03-07

## 2019-03-07 VITALS
SYSTOLIC BLOOD PRESSURE: 143 MMHG | RESPIRATION RATE: 14 BRPM | BODY MASS INDEX: 26.48 KG/M2 | DIASTOLIC BLOOD PRESSURE: 72 MMHG | HEART RATE: 66 BPM | WEIGHT: 185 LBS | TEMPERATURE: 95.1 F | HEIGHT: 70 IN

## 2019-03-07 DIAGNOSIS — Z95.2 PRESENCE OF PROSTHETIC HEART VALVE: Chronic | ICD-10-CM

## 2019-03-07 DIAGNOSIS — Z79.01 LONG TERM (CURRENT) USE OF ANTICOAGULANTS: ICD-10-CM

## 2019-03-07 DIAGNOSIS — Z95.2 PRESENCE OF PROSTHETIC HEART VALVE: ICD-10-CM

## 2019-03-07 PROBLEM — Z87.891 FORMER SMOKER: Status: ACTIVE | Noted: 2019-03-06

## 2019-03-07 LAB
HCT VFR BLD CALC: 36.6 %
HGB BLD-MCNC: 11.7 G/DL
MCHC RBC-ENTMCNC: 31.5 PG
MCHC RBC-ENTMCNC: 32 G/DL
MCV RBC AUTO: 98.7 FL
PLATELET # BLD AUTO: 86 K/UL
PMV BLD: 9.5 FL
POCT INR: 2.7 RATIO — HIGH (ref 0.9–1.2)
POCT PT: 32.3 SEC — HIGH (ref 10–13.4)
RBC # BLD: 3.71 M/UL
RBC # FLD: 14 %
WBC # FLD AUTO: 19.99 K/UL

## 2019-03-07 NOTE — PHYSICAL EXAM
[General Appearance - Well Developed] : well developed [Normal Appearance] : normal appearance [Well Groomed] : well groomed [General Appearance - Well Nourished] : well nourished [No Deformities] : no deformities [General Appearance - In No Acute Distress] : no acute distress [Normal Conjunctiva] : the conjunctiva exhibited no abnormalities [Normal Oral Mucosa] : normal oral mucosa [Respiration, Rhythm And Depth] : normal respiratory rhythm and effort [Exaggerated Use Of Accessory Muscles For Inspiration] : no accessory muscle use [Auscultation Breath Sounds / Voice Sounds] : lungs were clear to auscultation bilaterally [Edema] : no peripheral edema present [Bowel Sounds] : normal bowel sounds [Abdomen Soft] : soft [Abdomen Tenderness] : non-tender [Abnormal Walk] : normal gait [Nail Clubbing] : no clubbing of the fingernails [Cyanosis, Localized] : no localized cyanosis [Skin Color & Pigmentation] : normal skin color and pigmentation [] : no rash [Oriented To Time, Place, And Person] : oriented to person, place, and time [Impaired Insight] : insight and judgment were intact [Affect] : the affect was normal [Mood] : the mood was normal [No Anxiety] : not feeling anxious [Heart Rate And Rhythm] : heart rate and rhythm were normal [Heart Sounds] : normal S1 and S2 [FreeTextEntry1] : 2/6 systolic murmur

## 2019-03-07 NOTE — HISTORY OF PRESENT ILLNESS
[de-identified] : this is a 76-year-old white man with multiple medical problems including coronary artery disease status post angioplasty valvular heart disease status post metallic aortic valve replacement. He is referred for abnormal hemogram noted recently, WBC is 10.6 hemoglobin 14.2 platelets 131 absolute lymphocytes 5268 chemistry is unremarkable. He denies any constitutional symptoms  specifically no fever ,weight loss ,night sweats, fatigue or  pruritus. he feels fantastic with good energy level. 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 He is scheduled for ablation for atrial fibrillation , he continues on coumadin for mechanical valve. He reports minor bruising on coumadin and plavix. He feels slight fatigue . He denies fever , weight loss or night sweats . He is also on androgen deprivation for elevated PSA , , He had previously on surveillance and had multiple prostate biopsies ( unclear if it showed cancer ) . Bone scan is allegedly negative . Last PSA is less than 1 and patient denies obstructive symptoms. \par "\par 01/10/2019 Patient returns for follow up for CLL on watchful waiting , he " feels tired all the time " , He had unsuccessful ablation for atrial fibrillation and was told to increase metoprolol . He denies B symptoms . \par \par 02/07/2019 Patient returns for follow up , he had unsuccessful ablation for paroxysmal atrial fibrillation and was placed on amiodarone , he continues on coumadin without ASA and denies abnormal bleeding , he reports occasional LUQ pain . no  B symptoms.

## 2019-03-07 NOTE — REASON FOR VISIT
[Follow-Up - Clinic] : a clinic follow-up of [Spouse] : spouse [FreeTextEntry1] : Cardiologist: Dr. Torres

## 2019-03-07 NOTE — ASSESSMENT
[FreeTextEntry1] : This is a 76-year-old white man with a remote history of non-Hodgkin's lymphoma, CLL trisomy 12 , diagnosed 2 years ago , lymphocyte doubling time around 2 years , mild anemia in part secondary to androgen deprivation .\par Mild thrombocytopenia   , asymptomatic . possibly immune mediated . \par CBC today shows slight further decrease in plat . asymptomatic . \par paroxysmal atrial fibrillation on amiodarone .\par continue to monitor cbc every 2 months . \par LUQ pain ? consider repeat CT scan .

## 2019-03-07 NOTE — DISCUSSION/SUMMARY
[FreeTextEntry1] : Mr. Balderas presents today for follow up of long standing history of paroxysmal AFib and Atrial flutter on coumadin and BB. He is s/p ablation (RFA 12/17/18 - PVI and CTI) and feels great from a cardiovascular standpoint. He is currently in NSR with PACs. Given his CHADS VASc score of 4 (HTN, Age, CAD), I agree with continuing coumadin and have reiterated the importance of strict adherence to coumadin. He denies any bleeding issues. \par \par Patient's more recent echo shows mod to severely impaired LVSF (EF 30-35%). It is possible that this is all secondary to tachycardia induced AFib. I have discussed this with Dr. Torres and we will repeat the echo in a few months to see if his cardiomyopathy improves now that he is in sinus rhythm. \par \par The patient saw Dr. Murguia pulmonologist, but there was no sleep study done (pulmonologist did not feel patient had sleep apnea). PFTs were done and showed mild COPD (2/25/19).\par \par I have asked the patient to continue his present medication regimen and follow up with me in 2 months. I have also advised the patient to go to the nearest emergency room if he experiences any chest pain, dyspnea, syncope, or has any other compelling symptoms.

## 2019-03-07 NOTE — REVIEW OF SYSTEMS
[see HPI] : see HPI [Negative] : Heme/Lymph [Fever] : no fever [Headache] : no headache [Shortness Of Breath] : no shortness of breath [Dyspnea on exertion] : not dyspnea during exertion [Chest  Pressure] : no chest pressure [Chest Pain] : no chest pain [Cough] : no cough [Abdominal Pain] : no abdominal pain [Urinary Frequency] : no change in urinary frequency [Joint Pain] : no joint pain [Skin: A Rash] : no rash: [Dizziness] : no dizziness [Memory Lapses Or Loss] : no memory lapses or loss [Excessive Thirst] : no polydipsia [Easy Bleeding] : no tendency for easy bleeding [Easy Bruising] : no tendency for easy bruising

## 2019-03-07 NOTE — PHYSICAL EXAM
[Normal] : no peripheral adenopathy appreciated [de-identified] : Pale chrinicall ill in NAD .  [de-identified] : healed scar  of lymph node biopsy on the left, no residual or  recurrent adenopathy. [de-identified] : prominent aortic valve  click. [de-identified] : no hepatosplenomegaly [de-identified] : no peripheral adenopathy.

## 2019-03-07 NOTE — HISTORY OF PRESENT ILLNESS
[FreeTextEntry1] : 78 yo M with history of CAD, HTN, HL, MVR, paroxysmal atrial fibrillation and flutter on coumadin for 40 years (s/p DCCV x 2 and ablation on 12/17/18 - PVI, CTI) presenting for follow up after his recent ablation. He has been doing well. He report feeling great and has more energy since the ablation. He states that prior to the ablation he could only climb 3 steps but now he can climb 33 steps.\par \par He denies any episodes of chest pain, dyspnea, dizziness, lightheadedness, presyncope or syncope. No bleeding issues with coumadin.

## 2019-03-11 ENCOUNTER — RX RENEWAL (OUTPATIENT)
Age: 79
End: 2019-03-11

## 2019-03-12 ENCOUNTER — APPOINTMENT (OUTPATIENT)
Dept: CARDIOLOGY | Facility: CLINIC | Age: 79
End: 2019-03-12

## 2019-03-12 VITALS
DIASTOLIC BLOOD PRESSURE: 80 MMHG | WEIGHT: 182 LBS | BODY MASS INDEX: 26.05 KG/M2 | SYSTOLIC BLOOD PRESSURE: 144 MMHG | HEIGHT: 70 IN | HEART RATE: 57 BPM

## 2019-03-12 NOTE — ASSESSMENT
[FreeTextEntry1] : The patient has been felling very well. No chest pain . His VASQUEZ has signficantly improved TSH is increased with normal Free T4 .  The patient has a cardiomyopathy which still may be arrythmias induced. . If no improvement  in NSR after 3 months , he may need upgrade to ICD .

## 2019-03-12 NOTE — HISTORY OF PRESENT ILLNESS
[FreeTextEntry1] : The patient has been feeling well.  He is able to walk up a light of stairs without issues and feels he can do more. .He had seen pulmonary . No chest pain. .

## 2019-03-21 ENCOUNTER — OUTPATIENT (OUTPATIENT)
Dept: OUTPATIENT SERVICES | Facility: HOSPITAL | Age: 79
LOS: 1 days | Discharge: HOME | End: 2019-03-21

## 2019-03-21 DIAGNOSIS — Z95.2 PRESENCE OF PROSTHETIC HEART VALVE: ICD-10-CM

## 2019-03-21 DIAGNOSIS — Z79.01 LONG TERM (CURRENT) USE OF ANTICOAGULANTS: ICD-10-CM

## 2019-03-21 DIAGNOSIS — Z95.2 PRESENCE OF PROSTHETIC HEART VALVE: Chronic | ICD-10-CM

## 2019-03-21 LAB
POCT INR: 3.3 RATIO — HIGH (ref 0.9–1.2)
POCT PT: 40.1 SEC — HIGH (ref 10–13.4)

## 2019-04-11 ENCOUNTER — OUTPATIENT (OUTPATIENT)
Dept: OUTPATIENT SERVICES | Facility: HOSPITAL | Age: 79
LOS: 1 days | Discharge: HOME | End: 2019-04-11

## 2019-04-11 DIAGNOSIS — Z95.2 PRESENCE OF PROSTHETIC HEART VALVE: ICD-10-CM

## 2019-04-11 DIAGNOSIS — Z79.01 LONG TERM (CURRENT) USE OF ANTICOAGULANTS: ICD-10-CM

## 2019-04-11 DIAGNOSIS — Z95.2 PRESENCE OF PROSTHETIC HEART VALVE: Chronic | ICD-10-CM

## 2019-04-11 LAB
POCT INR: 4 RATIO — HIGH (ref 0.9–1.2)
POCT PT: 47.5 SEC — HIGH (ref 10–13.4)

## 2019-04-14 ENCOUNTER — INPATIENT (INPATIENT)
Facility: HOSPITAL | Age: 79
LOS: 1 days | Discharge: LEFT BEFORE DISCHARGE | End: 2019-04-16
Attending: HOSPITALIST | Admitting: HOSPITALIST
Payer: MEDICARE

## 2019-04-14 VITALS
HEART RATE: 57 BPM | SYSTOLIC BLOOD PRESSURE: 174 MMHG | DIASTOLIC BLOOD PRESSURE: 79 MMHG | RESPIRATION RATE: 18 BRPM | OXYGEN SATURATION: 99 % | TEMPERATURE: 96 F

## 2019-04-14 DIAGNOSIS — Z95.2 PRESENCE OF PROSTHETIC HEART VALVE: Chronic | ICD-10-CM

## 2019-04-14 LAB
ALBUMIN SERPL ELPH-MCNC: 4.3 G/DL — SIGNIFICANT CHANGE UP (ref 3.5–5.2)
ALP SERPL-CCNC: 75 U/L — SIGNIFICANT CHANGE UP (ref 30–115)
ALT FLD-CCNC: 16 U/L — SIGNIFICANT CHANGE UP (ref 0–41)
ANION GAP SERPL CALC-SCNC: 11 MMOL/L — SIGNIFICANT CHANGE UP (ref 7–14)
APPEARANCE UR: CLEAR — SIGNIFICANT CHANGE UP
APTT BLD: 41.7 SEC — HIGH (ref 27–39.2)
AST SERPL-CCNC: 25 U/L — SIGNIFICANT CHANGE UP (ref 0–41)
BILIRUB SERPL-MCNC: 0.5 MG/DL — SIGNIFICANT CHANGE UP (ref 0.2–1.2)
BILIRUB UR-MCNC: NEGATIVE — SIGNIFICANT CHANGE UP
BUN SERPL-MCNC: 17 MG/DL — SIGNIFICANT CHANGE UP (ref 10–20)
CALCIUM SERPL-MCNC: 9.3 MG/DL — SIGNIFICANT CHANGE UP (ref 8.5–10.1)
CHLORIDE SERPL-SCNC: 105 MMOL/L — SIGNIFICANT CHANGE UP (ref 98–110)
CO2 SERPL-SCNC: 28 MMOL/L — SIGNIFICANT CHANGE UP (ref 17–32)
COLOR SPEC: YELLOW — SIGNIFICANT CHANGE UP
CREAT SERPL-MCNC: 1 MG/DL — SIGNIFICANT CHANGE UP (ref 0.7–1.5)
DIFF PNL FLD: ABNORMAL
GLUCOSE SERPL-MCNC: 122 MG/DL — HIGH (ref 70–99)
GLUCOSE UR QL: NEGATIVE MG/DL — SIGNIFICANT CHANGE UP
HCT VFR BLD CALC: 37.3 % — LOW (ref 42–52)
HGB BLD-MCNC: 12 G/DL — LOW (ref 14–18)
INR BLD: 2.85 RATIO — HIGH (ref 0.65–1.3)
KETONES UR-MCNC: NEGATIVE — SIGNIFICANT CHANGE UP
LEUKOCYTE ESTERASE UR-ACNC: NEGATIVE — SIGNIFICANT CHANGE UP
MCHC RBC-ENTMCNC: 31.9 PG — HIGH (ref 27–31)
MCHC RBC-ENTMCNC: 32.2 G/DL — SIGNIFICANT CHANGE UP (ref 32–37)
MCV RBC AUTO: 99.2 FL — HIGH (ref 80–94)
NITRITE UR-MCNC: NEGATIVE — SIGNIFICANT CHANGE UP
NRBC # BLD: 0 /100 WBCS — SIGNIFICANT CHANGE UP (ref 0–0)
PH UR: 6 — SIGNIFICANT CHANGE UP (ref 5–8)
PLATELET # BLD AUTO: 96 K/UL — LOW (ref 130–400)
POTASSIUM SERPL-MCNC: 4.7 MMOL/L — SIGNIFICANT CHANGE UP (ref 3.5–5)
POTASSIUM SERPL-SCNC: 4.7 MMOL/L — SIGNIFICANT CHANGE UP (ref 3.5–5)
PROT SERPL-MCNC: 6.7 G/DL — SIGNIFICANT CHANGE UP (ref 6–8)
PROT UR-MCNC: ABNORMAL MG/DL
PROTHROM AB SERPL-ACNC: 32.4 SEC — HIGH (ref 9.95–12.87)
RBC # BLD: 3.76 M/UL — LOW (ref 4.7–6.1)
RBC # FLD: 14.6 % — HIGH (ref 11.5–14.5)
RBC CASTS # UR COMP ASSIST: ABNORMAL /HPF
SODIUM SERPL-SCNC: 144 MMOL/L — SIGNIFICANT CHANGE UP (ref 135–146)
SP GR SPEC: 1.02 — SIGNIFICANT CHANGE UP (ref 1.01–1.03)
TROPONIN T SERPL-MCNC: <0.01 NG/ML — SIGNIFICANT CHANGE UP
UROBILINOGEN FLD QL: 0.2 MG/DL — SIGNIFICANT CHANGE UP (ref 0.2–0.2)
WBC # BLD: 24.99 K/UL — HIGH (ref 4.8–10.8)
WBC # FLD AUTO: 24.99 K/UL — HIGH (ref 4.8–10.8)

## 2019-04-14 PROCEDURE — 99285 EMERGENCY DEPT VISIT HI MDM: CPT

## 2019-04-14 PROCEDURE — 93010 ELECTROCARDIOGRAM REPORT: CPT

## 2019-04-14 PROCEDURE — 71045 X-RAY EXAM CHEST 1 VIEW: CPT | Mod: 26

## 2019-04-14 PROCEDURE — 70450 CT HEAD/BRAIN W/O DYE: CPT | Mod: 26

## 2019-04-14 RX ORDER — LISINOPRIL 2.5 MG/1
20 TABLET ORAL AT BEDTIME
Qty: 0 | Refills: 0 | Status: DISCONTINUED | OUTPATIENT
Start: 2019-04-14 | End: 2019-04-16

## 2019-04-14 RX ORDER — CHLORHEXIDINE GLUCONATE 213 G/1000ML
1 SOLUTION TOPICAL
Qty: 0 | Refills: 0 | Status: DISCONTINUED | OUTPATIENT
Start: 2019-04-14 | End: 2019-04-16

## 2019-04-14 RX ORDER — MECLIZINE HCL 12.5 MG
25 TABLET ORAL ONCE
Qty: 0 | Refills: 0 | Status: COMPLETED | OUTPATIENT
Start: 2019-04-14 | End: 2019-04-14

## 2019-04-14 RX ORDER — WARFARIN SODIUM 2.5 MG/1
3 TABLET ORAL AT BEDTIME
Qty: 0 | Refills: 0 | Status: COMPLETED | OUTPATIENT
Start: 2019-04-14 | End: 2019-04-14

## 2019-04-14 RX ORDER — SODIUM CHLORIDE 9 MG/ML
1000 INJECTION INTRAMUSCULAR; INTRAVENOUS; SUBCUTANEOUS ONCE
Qty: 0 | Refills: 0 | Status: COMPLETED | OUTPATIENT
Start: 2019-04-14 | End: 2019-04-14

## 2019-04-14 RX ORDER — LISINOPRIL 2.5 MG/1
10 TABLET ORAL AT BEDTIME
Qty: 0 | Refills: 0 | Status: DISCONTINUED | OUTPATIENT
Start: 2019-04-14 | End: 2019-04-14

## 2019-04-14 RX ORDER — METOPROLOL TARTRATE 50 MG
25 TABLET ORAL
Qty: 0 | Refills: 0 | Status: DISCONTINUED | OUTPATIENT
Start: 2019-04-14 | End: 2019-04-16

## 2019-04-14 RX ORDER — ACETAMINOPHEN 500 MG
2 TABLET ORAL
Qty: 0 | Refills: 0 | COMMUNITY

## 2019-04-14 RX ORDER — LISINOPRIL 2.5 MG/1
1 TABLET ORAL
Qty: 0 | Refills: 0 | COMMUNITY

## 2019-04-14 RX ORDER — AMIODARONE HYDROCHLORIDE 400 MG/1
200 TABLET ORAL DAILY
Qty: 0 | Refills: 0 | Status: DISCONTINUED | OUTPATIENT
Start: 2019-04-14 | End: 2019-04-16

## 2019-04-14 RX ORDER — SIMVASTATIN 20 MG/1
10 TABLET, FILM COATED ORAL AT BEDTIME
Qty: 0 | Refills: 0 | Status: DISCONTINUED | OUTPATIENT
Start: 2019-04-14 | End: 2019-04-16

## 2019-04-14 RX ORDER — ONDANSETRON 8 MG/1
4 TABLET, FILM COATED ORAL ONCE
Qty: 0 | Refills: 0 | Status: COMPLETED | OUTPATIENT
Start: 2019-04-14 | End: 2019-04-14

## 2019-04-14 RX ORDER — DOXAZOSIN MESYLATE 4 MG
1 TABLET ORAL AT BEDTIME
Qty: 0 | Refills: 0 | Status: DISCONTINUED | OUTPATIENT
Start: 2019-04-14 | End: 2019-04-16

## 2019-04-14 RX ORDER — CLOPIDOGREL BISULFATE 75 MG/1
75 TABLET, FILM COATED ORAL DAILY
Qty: 0 | Refills: 0 | Status: DISCONTINUED | OUTPATIENT
Start: 2019-04-14 | End: 2019-04-16

## 2019-04-14 RX ADMIN — Medication 25 MILLIGRAM(S): at 17:35

## 2019-04-14 RX ADMIN — SODIUM CHLORIDE 2400 MILLILITER(S): 9 INJECTION INTRAMUSCULAR; INTRAVENOUS; SUBCUTANEOUS at 17:14

## 2019-04-14 RX ADMIN — LISINOPRIL 20 MILLIGRAM(S): 2.5 TABLET ORAL at 22:39

## 2019-04-14 RX ADMIN — WARFARIN SODIUM 3 MILLIGRAM(S): 2.5 TABLET ORAL at 22:39

## 2019-04-14 RX ADMIN — Medication 1 MILLIGRAM(S): at 22:39

## 2019-04-14 RX ADMIN — SIMVASTATIN 10 MILLIGRAM(S): 20 TABLET, FILM COATED ORAL at 22:38

## 2019-04-14 RX ADMIN — ONDANSETRON 4 MILLIGRAM(S): 8 TABLET, FILM COATED ORAL at 17:19

## 2019-04-14 NOTE — H&P ADULT - ATTENDING COMMENTS
Patient seen and examined independently. I agree with the resident's note, physical exam, and plan except as below.  pt seen in ED 16 earlier today at 324pm  Vital Signs Last 24 Hrs  T(C): 36.3 (15 Apr 2019 16:13), Max: 36.3 (15 Apr 2019 16:13)  T(F): 97.4 (15 Apr 2019 16:13), Max: 97.4 (15 Apr 2019 16:13)  HR: 68 (15 Apr 2019 16:13) (56 - 68)  BP: 142/66 (15 Apr 2019 18:25) (119/75 - 142/66)  BP(mean): --  RR: 18 (15 Apr 2019 16:13) (18 - 18)  SpO2: 95% (15 Apr 2019 16:13) (95% - 99%)  PEl  nad  aaox3  nonfocal  [id number]  ctabl  soft ntn+bs  nocce  no nystagmus,sandra    #dizziness - likely peripheral vertigo - needs outpt ENT eval - meclizine prn /zoffran prn  CTH negative  no signs of cardiac etiology 0 seen by dr man     #hx of CLL followed by dr olivo- baseline WBC 12 - now 30 with blast cells - call HEmonc eval Patient seen and examined independently. I agree with the resident's note, physical exam, and plan except as below.  pt seen in ED 16 earlier today at 324pm  Vital Signs Last 24 Hrs  T(C): 36.3 (15 Apr 2019 16:13), Max: 36.3 (15 Apr 2019 16:13)  T(F): 97.4 (15 Apr 2019 16:13), Max: 97.4 (15 Apr 2019 16:13)  HR: 68 (15 Apr 2019 16:13) (56 - 68)  BP: 142/66 (15 Apr 2019 18:25) (119/75 - 142/66)  BP(mean): --  RR: 18 (15 Apr 2019 16:13) (18 - 18)  SpO2: 95% (15 Apr 2019 16:13) (95% - 99%)  PEl  nad  aaox3  nonfocal  p2n0ftg  ctabl  soft ntn+bs  nocce  no nystagmus,sandra    #dizziness - likely peripheral vertigo - needs outpt ENT eval - meclizine prn /zoffran prn  CTH negative  no signs of cardiac etiology 0 seen by dr man     #hx of CLL followed by dr olivo- baseline WBC 12 - now 30 with blast cells - call HEmonc eval    #afib/mvr - cont coumadin and dose daily

## 2019-04-14 NOTE — H&P ADULT - HISTORY OF PRESENT ILLNESS
The patient is a 79-year-old male with a PMH of CAD s/p PCI, HFrEF, mechanical AVR (on Coumadin), paroxysmal A-Fib, CLL, DLD, and BPH who presented with. The patient is a 79-year-old male with a PMH of HTN, CAD s/p PCI, HFrEF, mechanical AVR (on Coumadin), paroxysmal A-Fib, CLL, DLD, and BPH who presented with dizziness.  He reports doing some work around the house when he developed dizziness described as a room spinning sensation.  He also experienced sweating and nausea with episodes of non-bloody vomiting.  He has been experiencing dizziness for several months which lasted for several hours before resolving on its own.  However this current episode lasting longer which alarmed him to come to the ED.  During the episode he also experienced some chest discomfort but states that it was very brief.  Otherwise, he denied having fever, palpitations, bowel or urinary symptoms.

## 2019-04-14 NOTE — ED PROVIDER NOTE - NS ED ROS FT
Constitutional: See HPI. No fever/chills.  Eyes: No visual changes, eye pain or discharge.  ENT: No hearing changes, pain, discharge or infections.  Neck: No neck pain or stiffness.  Cardiac: No chest pain, SOB or edema. No chest pain with exertion.  Respiratory: No cough or respiratory distress. No hemoptysis.   GI: No vomiting, diarrhea or abdominal pain (+) nausea   : No dysuria, frequency or burning.   MS: No myalgia, muscle weakness, joint pain or back pain.  Neuro: No headache or weakness. No LOC. (+) dizziness   Skin: No rash.   Except as documented in the HPI, all other systems are negative.

## 2019-04-14 NOTE — H&P ADULT - NSHPPHYSICALEXAM_GEN_ALL_CORE
Vital Signs Last 24 Hrs  T(C): 36.2 (14 Apr 2019 16:44), Max: 36.2 (14 Apr 2019 16:44)  T(F): 97.2 (14 Apr 2019 16:44), Max: 97.2 (14 Apr 2019 16:44)  HR: 61 (14 Apr 2019 16:44) (57 - 61)  BP: 189/84 (14 Apr 2019 16:44) (174/79 - 189/84)  BP(mean): --  RR: 18 (14 Apr 2019 16:44) (18 - 18)  SpO2: 95% (14 Apr 2019 16:44) (95% - 99%)    Physical Exam:    -     General :     -      HEENT:    -      Cardiac:    -      Pulm:    -      GI:    -      Musculoskeletal:    -      Neuro: Vital Signs Last 24 Hrs  T(C): 36.2 (14 Apr 2019 16:44), Max: 36.2 (14 Apr 2019 16:44)  T(F): 97.2 (14 Apr 2019 16:44), Max: 97.2 (14 Apr 2019 16:44)  HR: 61 (14 Apr 2019 16:44) (57 - 61)  BP: 189/84 (14 Apr 2019 16:44) (174/79 - 189/84)  BP(mean): --  RR: 18 (14 Apr 2019 16:44) (18 - 18)  SpO2: 95% (14 Apr 2019 16:44) (95% - 99%)    Physical Exam:    -     General : Alert, awake and in no acute distress    -      HEENT: normocephalic    -      Cardiac: RRR    -      Pulm: CTA B/L    -      GI: abdomen soft    -      Musculoskeletal: no lower extremity edema    -      Neuro: AAO x3

## 2019-04-14 NOTE — ED ADULT NURSE NOTE - OBJECTIVE STATEMENT
Patient present to ED with complains of dizziness at work with nausea, denies syncope, did not fall, no LOC, denies fever, chills, diarrhea, chest pain, and SOB.

## 2019-04-14 NOTE — ED ADULT TRIAGE NOTE - CHIEF COMPLAINT QUOTE
Pt presents with c/o dizziness and chest pain that began approx a half hour PTA, chest pain has since resolved Pt presents with c/o dizziness, nausea, vomiting and chest pain that began approx a half hour PTA, chest pain has since resolved

## 2019-04-14 NOTE — H&P ADULT - NSICDXPASTMEDICALHX_GEN_ALL_CORE_FT
PAST MEDICAL HISTORY:  Afib     Aortic valve stenosis s/p replacement 2009    BPH (benign prostatic hyperplasia)     CAD (coronary artery disease) 2004 1 stent    Lymphoma 1996, s/p chemo& radiation

## 2019-04-14 NOTE — H&P ADULT - NSHPLABSRESULTS_GEN_ALL_CORE
Labs:                        12.0   24.99 )-----------( 96       ( 14 Apr 2019 14:54 )             37.3             04-14    144  |  105  |  17  ----------------------------<  122<H>  4.7   |  28  |  1.0    Ca    9.3      14 Apr 2019 14:54    TPro  6.7  /  Alb  4.3  /  TBili  0.5  /  DBili  x   /  AST  25  /  ALT  16  /  AlkPhos  75  04-14    LIVER FUNCTIONS - ( 14 Apr 2019 14:54 )  Alb: 4.3 g/dL / Pro: 6.7 g/dL / ALK PHOS: 75 U/L / ALT: 16 U/L / AST: 25 U/L / GGT: x         PT/INR - ( 14 Apr 2019 14:54 )   PT: 32.40 sec;   INR: 2.85 ratio    PTT - ( 14 Apr 2019 14:54 )  PTT:41.7 sec  CARDIAC MARKERS ( 14 Apr 2019 14:54 )  x     / <0.01 ng/mL / x     / x     / x

## 2019-04-14 NOTE — ED PROVIDER NOTE - CLINICAL SUMMARY MEDICAL DECISION MAKING FREE TEXT BOX
mult comborbidities with dizzyness and chest pain. ct head and cardiac workup negative. leukocytosis noted. pt has hx of cancer admit for further eval

## 2019-04-14 NOTE — H&P ADULT - ASSESSMENT
The patient is a 79-year-old male with a PMH of HTN, CAD s/p PCI, HFrEF, mechanical AVR (on Coumadin), paroxysmal A-Fib, CLL, DLD, and BPH who presented with dizziness and diaphoresis.    1. Dizziness  - DDx includes BPPV vs. posterior circulation pathology vs. cardiogenic etiology  - troponin <0.01  - CT head: no evidence of acute intracranial pathology  - cardiology consult pending  - consider ENT consult    2. HTN  - uncontrolled  - increase Lisinopril to 20 mg at bedtime  - continue Metoprolol tartrate 25 mg BID    3. CAD s/p PCI  - continue Plavix, statin, beta-blocker, and ACEI  - nuclear stress test from 7/2018: fixed inferior wall defect consistent with diaphragmatic attenuation    4. History of paroxysmal A-Fib and mechanical AVR  - continue Coumadin (patient reports taking 3 mg on Sundays; please obtain daily coumadin schedule)  - monitor INR (goal 2-3)  - continue rate control with Metoprolol tartrate    5. Chronic HFrEF  - cardiology consult pending    6. BPH  - continue alpha-1 blocker    7. History of CLL  - leukocytosis and thrombocytopenia noted  - f/u with oncology as outpatient    8. DVT prophylaxis  - Coumadin    9. Disposition  - anticipate discharge home once medically stable The patient is a 79-year-old male with a PMH of HTN, CAD s/p PCI, HFrEF, mechanical AVR (on Coumadin), paroxysmal A-Fib, CLL, DLD, and BPH who presented with dizziness and diaphoresis.    1. Dizziness  - DDx includes BPPV vs. posterior circulation pathology vs. cardiogenic etiology  - troponin <0.01  - CT head: no evidence of acute intracranial pathology  - cardiology consult pending  - consider ENT consult    2. HTN  - uncontrolled  - increase Lisinopril to 20 mg at bedtime  - continue Metoprolol tartrate 25 mg BID    3. CAD s/p PCI  - continue Plavix, statin, beta-blocker, and ACEI  - nuclear stress test from 7/2018: fixed inferior wall defect consistent with diaphragmatic attenuation    4. History of paroxysmal A-Fib and mechanical AVR  - continue Coumadin (patient reports taking 3 mg on Sundays; please obtain daily coumadin schedule)  - monitor INR (goal 2-3)  - continue rate control with Metoprolol tartrate    5. Chronic HFrEF  - last EF was reportedly 30-35%  - cardiology consult pending  - check 2D echo    6. BPH  - continue alpha-1 blocker    7. History of CLL  - leukocytosis and thrombocytopenia noted  - f/u with oncology as outpatient    8. DVT prophylaxis  - Coumadin    9. Disposition  - anticipate discharge home once medically stable

## 2019-04-14 NOTE — ED PROVIDER NOTE - PHYSICAL EXAMINATION
Exam: CON: ao x 3, HENMT: clear oropharynx,  neck supple,  CV: rrr, equal pulses b/l, RESP: cta b/l, GI:  soft, nontender, no rebound, no guarding, SKIN: no rash, MSK: no deformities, NEURO: awake alert, sitting up in bed smiling upon approach, neck supple, perrla eomi, cn 2-12 intact, motor 5/5 X4, sensation grossly intact. normal finger to nose, normal rapid alt mvt, pt did not want to walk due to feeling off balance. Psychiatric: appropriate mood, appropriate affect.

## 2019-04-14 NOTE — ED ADULT NURSE NOTE - CHIEF COMPLAINT QUOTE
Pt presents with c/o dizziness, nausea, vomiting and chest pain that began approx a half hour PTA, chest pain has since resolved

## 2019-04-15 ENCOUNTER — TRANSCRIPTION ENCOUNTER (OUTPATIENT)
Age: 79
End: 2019-04-15

## 2019-04-15 ENCOUNTER — RX RENEWAL (OUTPATIENT)
Age: 79
End: 2019-04-15

## 2019-04-15 DIAGNOSIS — Z02.9 ENCOUNTER FOR ADMINISTRATIVE EXAMINATIONS, UNSPECIFIED: ICD-10-CM

## 2019-04-15 LAB
ANION GAP SERPL CALC-SCNC: 13 MMOL/L — SIGNIFICANT CHANGE UP (ref 7–14)
BASOPHILS # BLD AUTO: 2.49 K/UL — HIGH (ref 0–0.2)
BASOPHILS NFR BLD AUTO: 8 % — HIGH (ref 0–1)
BLASTS # FLD: 3 % — CRITICAL HIGH (ref 0–0)
BUN SERPL-MCNC: 16 MG/DL — SIGNIFICANT CHANGE UP (ref 10–20)
CALCIUM SERPL-MCNC: 9.3 MG/DL — SIGNIFICANT CHANGE UP (ref 8.5–10.1)
CHLORIDE SERPL-SCNC: 106 MMOL/L — SIGNIFICANT CHANGE UP (ref 98–110)
CO2 SERPL-SCNC: 27 MMOL/L — SIGNIFICANT CHANGE UP (ref 17–32)
CREAT SERPL-MCNC: 0.9 MG/DL — SIGNIFICANT CHANGE UP (ref 0.7–1.5)
EOSINOPHIL # BLD AUTO: 0 K/UL — SIGNIFICANT CHANGE UP (ref 0–0.7)
EOSINOPHIL NFR BLD AUTO: 0 % — SIGNIFICANT CHANGE UP (ref 0–8)
GLUCOSE SERPL-MCNC: 89 MG/DL — SIGNIFICANT CHANGE UP (ref 70–99)
HCT VFR BLD CALC: 35.4 % — LOW (ref 42–52)
HCT VFR BLD CALC: 36 % — LOW (ref 42–52)
HGB BLD-MCNC: 11.3 G/DL — LOW (ref 14–18)
HGB BLD-MCNC: 11.7 G/DL — LOW (ref 14–18)
LYMPHOCYTES # BLD AUTO: 30 % — SIGNIFICANT CHANGE UP (ref 20.5–51.1)
LYMPHOCYTES # BLD AUTO: 9.35 K/UL — HIGH (ref 1.2–3.4)
MAGNESIUM SERPL-MCNC: 2.1 MG/DL — SIGNIFICANT CHANGE UP (ref 1.8–2.4)
MANUAL SMEAR VERIFICATION: SIGNIFICANT CHANGE UP
MCHC RBC-ENTMCNC: 31.9 G/DL — LOW (ref 32–37)
MCHC RBC-ENTMCNC: 32.3 PG — HIGH (ref 27–31)
MCHC RBC-ENTMCNC: 32.4 PG — HIGH (ref 27–31)
MCHC RBC-ENTMCNC: 32.5 G/DL — SIGNIFICANT CHANGE UP (ref 32–37)
MCV RBC AUTO: 101.1 FL — HIGH (ref 80–94)
MCV RBC AUTO: 99.7 FL — HIGH (ref 80–94)
MONOCYTES # BLD AUTO: 0.94 K/UL — HIGH (ref 0.1–0.6)
MONOCYTES NFR BLD AUTO: 3 % — SIGNIFICANT CHANGE UP (ref 1.7–9.3)
NEUTROPHILS # BLD AUTO: 11.22 K/UL — HIGH (ref 1.4–6.5)
NEUTROPHILS NFR BLD AUTO: 36 % — LOW (ref 42.2–75.2)
NRBC # BLD: 0 /100 WBCS — SIGNIFICANT CHANGE UP (ref 0–0)
NRBC # BLD: 0 /100 — SIGNIFICANT CHANGE UP (ref 0–0)
NRBC # BLD: SIGNIFICANT CHANGE UP /100 WBCS (ref 0–0)
PLAT MORPH BLD: NORMAL — SIGNIFICANT CHANGE UP
PLATELET # BLD AUTO: 105 K/UL — LOW (ref 130–400)
PLATELET # BLD AUTO: 93 K/UL — LOW (ref 130–400)
POTASSIUM SERPL-MCNC: 4.9 MMOL/L — SIGNIFICANT CHANGE UP (ref 3.5–5)
POTASSIUM SERPL-SCNC: 4.9 MMOL/L — SIGNIFICANT CHANGE UP (ref 3.5–5)
RBC # BLD: 3.5 M/UL — LOW (ref 4.7–6.1)
RBC # BLD: 3.61 M/UL — LOW (ref 4.7–6.1)
RBC # FLD: 14.6 % — HIGH (ref 11.5–14.5)
RBC # FLD: 14.7 % — HIGH (ref 11.5–14.5)
RBC BLD AUTO: NORMAL — SIGNIFICANT CHANGE UP
SODIUM SERPL-SCNC: 146 MMOL/L — SIGNIFICANT CHANGE UP (ref 135–146)
TROPONIN T SERPL-MCNC: <0.01 NG/ML — SIGNIFICANT CHANGE UP
VARIANT LYMPHS # BLD: 20 % — HIGH (ref 0–5)
WBC # BLD: 29.09 K/UL — HIGH (ref 4.8–10.8)
WBC # BLD: 31.17 K/UL — HIGH (ref 4.8–10.8)
WBC # FLD AUTO: 29.09 K/UL — HIGH (ref 4.8–10.8)
WBC # FLD AUTO: 31.17 K/UL — HIGH (ref 4.8–10.8)

## 2019-04-15 PROCEDURE — 99223 1ST HOSP IP/OBS HIGH 75: CPT

## 2019-04-15 RX ORDER — LISINOPRIL 2.5 MG/1
0.5 TABLET ORAL
Qty: 0 | Refills: 3 | DISCHARGE
Start: 2019-04-15 | End: 2019-08-12

## 2019-04-15 RX ORDER — WARFARIN SODIUM 2.5 MG/1
3 TABLET ORAL ONCE
Qty: 0 | Refills: 0 | Status: COMPLETED | OUTPATIENT
Start: 2019-04-15 | End: 2019-04-15

## 2019-04-15 RX ORDER — LISINOPRIL 2.5 MG/1
1 TABLET ORAL
Qty: 0 | Refills: 0 | COMMUNITY

## 2019-04-15 RX ORDER — LISINOPRIL 2.5 MG/1
1 TABLET ORAL
Qty: 30 | Refills: 3
Start: 2019-04-15 | End: 2019-08-12

## 2019-04-15 RX ADMIN — LISINOPRIL 20 MILLIGRAM(S): 2.5 TABLET ORAL at 22:12

## 2019-04-15 RX ADMIN — Medication 1 MILLIGRAM(S): at 22:12

## 2019-04-15 RX ADMIN — SIMVASTATIN 10 MILLIGRAM(S): 20 TABLET, FILM COATED ORAL at 22:12

## 2019-04-15 RX ADMIN — Medication 25 MILLIGRAM(S): at 06:07

## 2019-04-15 RX ADMIN — Medication 25 MILLIGRAM(S): at 18:29

## 2019-04-15 RX ADMIN — CLOPIDOGREL BISULFATE 75 MILLIGRAM(S): 75 TABLET, FILM COATED ORAL at 12:41

## 2019-04-15 RX ADMIN — AMIODARONE HYDROCHLORIDE 200 MILLIGRAM(S): 400 TABLET ORAL at 06:08

## 2019-04-15 RX ADMIN — WARFARIN SODIUM 3 MILLIGRAM(S): 2.5 TABLET ORAL at 22:12

## 2019-04-15 NOTE — DISCHARGE NOTE PROVIDER - CARE PROVIDER_API CALL
Mahad Carey)  Internal Medicine; Medical Oncology  [address] [place] [address]  Phone: [phone]  Fax: [phone]  Follow Up Time:     Juan Torres)  Cardiovascular Disease; Internal Medicine  [address] [place] [address]  Phone: [phone]  Fax: [phone]  Follow Up Time:

## 2019-04-15 NOTE — DISCHARGE NOTE PROVIDER - CARE PROVIDERS DIRECT ADDRESSES
[email or web address]

## 2019-04-15 NOTE — DISCHARGE NOTE PROVIDER - HOSPITAL COURSE
The patient is a 79-year-old male with a PMH of HTN, CAD s/p PCI, HFrEF, mechanical AVR (on Coumadin), paroxysmal A-Fib, CLL, DLD, and BPH who presented with dizziness. The patient is a 79-year-old male with a PMH of HTN, CAD s/p PCI, HFrEF, mechanical AVR (on Coumadin), paroxysmal A-Fib, CLL, DLD, and BPH who presented with dizziness. He was admitted to medicine for further management. He was put on tele monitoring with no recorded events. His symptoms resolved with supportive management. His stay was complication with  Leukocytosis with 3% blast cells. He was seen by oncology who recommended outpt follow up. On discharge, patient's vitals were stable, no complaints. tolerating PO diet and self ambulating. He was advised to follow up with oncology and take his meds regularly.

## 2019-04-15 NOTE — PROGRESS NOTE ADULT - ASSESSMENT
79-year-old male with a PMH of HTN, CAD s/p PCI, HFrEF, mechanical AVR (on Coumadin), paroxysmal A-Fib, CLL, DLD, and BPH who presented with dizziness and diaphoresis.    # Dizziness likely secondary to BPPV  - resolved  - troponin <0.01  - CT head: no evidence of acute intracranial pathology  - cardiology consult appreciated: cardiac cause unlikely     # History of CLL  - leukocytosis and thrombocytopenia noted  - oncology consult placed     # CAD s/p PCI  - continue Plavix, statin, beta-blocker, and ACEI  - nuclear stress test from 7/2018: fixed inferior wall defect consistent with diaphragmatic attenuation    # History of paroxysmal A-Fib and mechanical AVR  - rate controlled   - c/w metoprolol and coumadin   - monitor INR (goal 2-3)    # Chronic HFrEF  - stable   - c/w metoprolol, lisinopril and atorvastatin   - last EF was reportedly 30-35%  - check 2D echo    #  HTN  - stable   - c/w Lisinopril and Metoprolol     # BPH  - c/w doxazosin     # DVT prophylaxis  - Coumadin    # Disposition  - anticipate discharge home once medically stable    # Full code 79-year-old male with a PMH of HTN, CAD s/p PCI, HFrEF, mechanical AVR (on Coumadin), paroxysmal A-Fib, CLL, DLD, and BPH who presented with dizziness and diaphoresis.    # Dizziness likely secondary to BPPV  - resolved  - troponin <0.01  - CT head: no evidence of acute intracranial pathology  - cardiology consult appreciated: cardiac cause unlikely     # History of CLL  - leukocytosis and thrombocytopenia noted  - oncology consult placed     # CAD s/p PCI  - continue Plavix, statin, beta-blocker, and ACEI  - nuclear stress test from 7/2018: fixed inferior wall defect consistent with diaphragmatic attenuation    # History of paroxysmal A-Fib and mechanical AVR  - rate controlled   - c/w metoprolol and coumadin   - monitor INR (goal 2-3)    # Chronic HFrEF  - stable   - c/w metoprolol, lisinopril and atorvastatin   - last EF was reportedly 30-35%  - check 2D echo    #  HTN  - stable   - c/w Lisinopril and Metoprolol     # BPH  - c/w doxazosin     # DVT prophylaxis  - Coumadin    # Disposition  - will anticipate for discharge to home     # Full code 79-year-old male with a PMH of HTN, CAD s/p PCI, HFrEF, mechanical AVR (on Coumadin), paroxysmal A-Fib, CLL, DLD, and BPH who presented with dizziness and diaphoresis.    # Dizziness likely secondary to BPPV  - resolved  - troponin <0.01  - CT head: no evidence of acute intracranial pathology  - cardiology consult appreciated: cardiac cause unlikely     # History of CLL  - leukocytosis and thrombocytopenia noted  - will place oncology consult (Dr. Carey)    # CAD s/p PCI  - continue Plavix, statin, beta-blocker, and ACEI  - nuclear stress test from 7/2018: fixed inferior wall defect consistent with diaphragmatic attenuation    # History of paroxysmal A-Fib and mechanical AVR  - rate controlled   - c/w metoprolol and coumadin (3mg 3 days/wk and 1.5mg 4 days/wk)  - monitor INR (goal 2-3)    # Chronic HFrEF  - stable   - c/w metoprolol, lisinopril and atorvastatin   - last EF was reportedly 30-35%  - check 2D echo    #  HTN  - stable   - c/w Lisinopril and Metoprolol     # BPH  - c/w doxazosin     # DVT prophylaxis  - Coumadin    # Disposition  - will anticipate for discharge to home     # Full code

## 2019-04-15 NOTE — CONSULT NOTE ADULT - ASSESSMENT
Pt with mechanical aortic valve, PAF on coumadin and ASA, has h/o CAD PCI to LAD in 2004, LV dysfunction here with c/o dizzness, vomitting had ear wax removed few days ago, hemodynamically stable, EKG no acute ST/T changes, denies any chest pain, SOB    -Dizziness, likely non cardiac dizzi at rest, neuro eval, likely peripheral cause  -h/o mechanical AVR, PAF mainatain coumadin with INR 2-3, and amiodarone, in SR currently  -h/o CAD PCI to LAD, c/w ASA, statin, ACEI  -h/o lymphoma now with leukocytosis, obtain differential cbc, may need heam/onc eval  will d/w attending Pt with mechanical aortic valve, PAF on coumadin and ASA, has h/o CAD PCI to LAD in 2004, LV dysfunction here with c/o dizzness, vomitting had ear wax removed few days ago, hemodynamically stable, EKG no acute ST/T changes, denies any chest pain, SOB    -Dizziness, likely non cardiac in origin .  at rest, neuro eval, likely peripheral cause, re acute vertigo. Pateint was dizzy with normal BP and HR .   -h/o mechanical AVR, PAF maintain coumadin with INR 2-3, and amiodarone, in SR currently  -h/o CAD PCI to LAD, c/w ASA, statin, ACEI  -h/o lymphoma now with leukocytosis, obtain differential cbc, may need heme/onc eval

## 2019-04-15 NOTE — CONSULT NOTE ADULT - SUBJECTIVE AND OBJECTIVE BOX
Chief complaint:  Dizziness    HPI:  The patient is a 79-year-old male with a PMH of HTN, CAD s/p PCI, HFrEF, mechanical AVR (on Coumadin), paroxysmal A-Fib, CLL, DLD, and BPH who presented with dizziness.  He reports doing some work around the house when he developed dizziness described as a room spinning sensation.  He also experienced sweating and nausea with episodes of non-bloody vomiting.  He has been experiencing dizziness for several months which lasted for several hours before resolving on its own.  However this current episode lasting longer which alarmed him to come to the ED.  During the episode he also experienced some chest discomfort but states that it was very brief.  Otherwise, he denied having fever, palpitations, bowel or urinary symptoms. (14 Apr 2019 20:48)      ROS:  Constitutional: No fever, chill, sweats  Eye: No recent visual problem  ENMT: No ear pain, nasal congestion, throat pain  Respiratoty: No SOB, cough  Cardiovascular: No chest pain, palpitaion, syncope  Gastrointestinal: No nausea, vomitting, diarhea  Genitourinary: No dysuria, hematuria  Heam/Lymp: No brusing tendency, no swollen glands  Endocrine: Negative for excessive hunger, thirst  Musculoskeletal: No neck pain, back pain, joint pain  Intergumentory: No rash, skin lesions  Neurologic: alert and oriented    PAST MEDICAL & SURGICAL HISTORY  Afib  BPH (benign prostatic hyperplasia)  Lymphoma: 1996, s/p chemo&amp; radiation  Aortic valve stenosis: s/p replacement 2009  CAD (coronary artery disease): 2004 1 stent  H/O aortic valve replacement      FAMILY HISTORY:  FAMILY HISTORY:  No pertinent family history in first degree relatives      SOCIAL HISTORY:  Denies smoking, alcohol    ALLERGIES:  No Known Allergies      MEDICATIONS:  MEDICATIONS  (STANDING):  amiodarone    Tablet 200 milliGRAM(s) Oral daily  chlorhexidine 4% Liquid 1 Application(s) Topical <User Schedule>  clopidogrel Tablet 75 milliGRAM(s) Oral daily  doxazosin 1 milliGRAM(s) Oral at bedtime  lisinopril 20 milliGRAM(s) Oral at bedtime  metoprolol tartrate 25 milliGRAM(s) Oral two times a day  simvastatin 10 milliGRAM(s) Oral at bedtime    MEDICATIONS  (PRN):      HOME MEDICATIONS:  Home Medications:  alfuzosin 10 mg oral tablet, extended release: 1 tab(s) orally once a day (14 Apr 2019 21:17)  amiodarone 200 mg oral tablet: 1 tab(s) orally once a day (14 Apr 2019 21:17)  Calcium 500+D oral tablet, chewable: 1 tab(s) orally 2 times a day (14 Apr 2019 21:17)  clopidogrel 75 mg oral tablet: 1 tab(s) orally once a day (14 Apr 2019 21:17)  Coumadin:  (14 Apr 2019 21:17)  lisinopril 10 mg oral tablet: 1 tab(s) orally once a day (at bedtime) (14 Apr 2019 21:17)  simvastatin 10 mg oral tablet: 1 tab(s) orally once a day (at bedtime) (14 Apr 2019 21:17)      VITALS:   T(F): 96.3 (04-14 @ 23:46), Max: 97.2 (04-14 @ 16:44)  HR: 59 (04-14 @ 23:46) (57 - 61)  BP: 131/62 (04-14 @ 23:46) (131/62 - 189/84)  BP(mean): --  RR: 18 (04-14 @ 23:46) (18 - 18)  SpO2: 98% (04-14 @ 23:46) (95% - 99%)    I&O's Summary      PHYSICAL EXAM:  GEN: Alert and oriented X 3, Well nourished, No acute distress  NECK: Supple, non tender, NO JVD, No carotid bruit,   LUNGS: Clear to auscultation bilaterally, non labored respiration  CARDIOVASCULAR: S1/S2 present, RRR , no murmus or rubs, + PP bilaterally  ABD: Soft, non-tender, non-distended,   EXT: No Lower extreimity edema, no tenderness  NEURO: Non focal  SKIN: Intact    LABS:                        12.0   24.99 )-----------( 96       ( 14 Apr 2019 14:54 )             37.3     04-14    144  |  105  |  17  ----------------------------<  122<H>  4.7   |  28  |  1.0    Ca    9.3      14 Apr 2019 14:54    TPro  6.7  /  Alb  4.3  /  TBili  0.5  /  DBili  x   /  AST  25  /  ALT  16  /  AlkPhos  75  04-14    PT/INR - ( 14 Apr 2019 14:54 )   PT: 32.40 sec;   INR: 2.85 ratio         PTT - ( 14 Apr 2019 14:54 )  PTT:41.7 sec  Troponin T, Serum: <0.01 ng/mL (04-14-19 @ 14:54)    CARDIAC MARKERS ( 14 Apr 2019 14:54 )  x     / <0.01 ng/mL / x     / x     / x          RADIOLOGY:  -CXR:    < from: Xray Chest 1 View AP/PA (04.14.19 @ 15:43) >  Impression:      Stable left hemidiaphragm elevation. No focal consolidation.    < end of copied text >    Stress Test: 3-16-16 Adenosine Thallium No ischemia. 7-16-18 Adenosine THallium Fixed inferior defect.   Echo: 10-1-15 EF 50-55 % AVR mechanical Mild AI RVSP was 39 mmhg Trace PI 3-6-17 EF 50% Mechanical AVR peak gradient of 13 mmhg Mild TR RVSP <35 mmhg  3-29-18 EF 50% AV is mechanical there is a peak and mean gradient of 13 mmhg and 8 mmhg mild MR mild TR RVSP was <35 mmhg .  2-14-19 EF 30-35%   Stent: 2004 PCI LAD     ECG:  NSR, LVH, LAD, T wave inversion anterolateral leads

## 2019-04-15 NOTE — DISCHARGE NOTE PROVIDER - NSDCCPCAREPLAN_GEN_ALL_CORE_FT
PRINCIPAL DISCHARGE DIAGNOSIS  Diagnosis: BPPV (benign paroxysmal positional vertigo)  Assessment and Plan of Treatment: resolved      SECONDARY DISCHARGE DIAGNOSES  Diagnosis: Chronic HFrEF (heart failure with reduced ejection fraction)  Assessment and Plan of Treatment: - stable   - c/w metoprolol, lisinopril and atorvastatin   - last EF was reportedly 30-35%      Diagnosis: S/P AVR  Assessment and Plan of Treatment: - c/w coumadin (3mg 3 days/wk and 1.5mg 4 days/wk)  - monitor INR (goal 2-3)    Diagnosis: Afib  Assessment and Plan of Treatment: - rate controlled   - c/w metoprolol and coumadin (3mg 3 days/wk and 1.5mg 4 days/wk)  - monitor INR (goal 2-3)    Diagnosis: CAD S/P percutaneous coronary angioplasty  Assessment and Plan of Treatment: - continue Plavix, statin, beta-blocker, and ACEI  - nuclear stress test from 7/2018: fixed inferior wall defect consistent with diaphragmatic attenuation    Diagnosis: HTN (hypertension)  Assessment and Plan of Treatment: c/w lisinopril    Diagnosis: Leukocytosis  Assessment and Plan of Treatment: secondary to CLL  follow up with outpt oncology (dr. nunez)

## 2019-04-16 VITALS
TEMPERATURE: 96 F | RESPIRATION RATE: 18 BRPM | HEART RATE: 58 BPM | SYSTOLIC BLOOD PRESSURE: 124 MMHG | DIASTOLIC BLOOD PRESSURE: 58 MMHG | OXYGEN SATURATION: 97 %

## 2019-04-16 LAB
ANION GAP SERPL CALC-SCNC: 11 MMOL/L — SIGNIFICANT CHANGE UP (ref 7–14)
APTT BLD: 39.1 SEC — SIGNIFICANT CHANGE UP (ref 27–39.2)
BASOPHILS # BLD AUTO: 0.11 K/UL — SIGNIFICANT CHANGE UP (ref 0–0.2)
BASOPHILS NFR BLD AUTO: 0.3 % — SIGNIFICANT CHANGE UP (ref 0–1)
BUN SERPL-MCNC: 19 MG/DL — SIGNIFICANT CHANGE UP (ref 10–20)
CALCIUM SERPL-MCNC: 9 MG/DL — SIGNIFICANT CHANGE UP (ref 8.5–10.1)
CHLORIDE SERPL-SCNC: 103 MMOL/L — SIGNIFICANT CHANGE UP (ref 98–110)
CO2 SERPL-SCNC: 29 MMOL/L — SIGNIFICANT CHANGE UP (ref 17–32)
CREAT SERPL-MCNC: 1 MG/DL — SIGNIFICANT CHANGE UP (ref 0.7–1.5)
CULTURE RESULTS: SIGNIFICANT CHANGE UP
EOSINOPHIL # BLD AUTO: 0.26 K/UL — SIGNIFICANT CHANGE UP (ref 0–0.7)
EOSINOPHIL NFR BLD AUTO: 0.8 % — SIGNIFICANT CHANGE UP (ref 0–8)
GLUCOSE SERPL-MCNC: 92 MG/DL — SIGNIFICANT CHANGE UP (ref 70–99)
HCT VFR BLD CALC: 39.5 % — LOW (ref 42–52)
HGB BLD-MCNC: 12.4 G/DL — LOW (ref 14–18)
IMM GRANULOCYTES NFR BLD AUTO: 0.3 % — SIGNIFICANT CHANGE UP (ref 0.1–0.3)
INR BLD: 3.06 RATIO — HIGH (ref 0.65–1.3)
LYMPHOCYTES # BLD AUTO: 21.64 K/UL — HIGH (ref 1.2–3.4)
LYMPHOCYTES # BLD AUTO: 68.3 % — HIGH (ref 20.5–51.1)
MCHC RBC-ENTMCNC: 31.4 G/DL — LOW (ref 32–37)
MCHC RBC-ENTMCNC: 31.6 PG — HIGH (ref 27–31)
MCV RBC AUTO: 100.5 FL — HIGH (ref 80–94)
MONOCYTES # BLD AUTO: 4.35 K/UL — HIGH (ref 0.1–0.6)
MONOCYTES NFR BLD AUTO: 13.7 % — HIGH (ref 1.7–9.3)
NEUTROPHILS # BLD AUTO: 5.21 K/UL — SIGNIFICANT CHANGE UP (ref 1.4–6.5)
NEUTROPHILS NFR BLD AUTO: 16.6 % — LOW (ref 42.2–75.2)
NRBC # BLD: 0 /100 WBCS — SIGNIFICANT CHANGE UP (ref 0–0)
PLATELET # BLD AUTO: 94 K/UL — LOW (ref 130–400)
POTASSIUM SERPL-MCNC: 4.5 MMOL/L — SIGNIFICANT CHANGE UP (ref 3.5–5)
POTASSIUM SERPL-SCNC: 4.5 MMOL/L — SIGNIFICANT CHANGE UP (ref 3.5–5)
PROTHROM AB SERPL-ACNC: 34.8 SEC — HIGH (ref 9.95–12.87)
RBC # BLD: 3.93 M/UL — LOW (ref 4.7–6.1)
RBC # FLD: 14.5 % — SIGNIFICANT CHANGE UP (ref 11.5–14.5)
SODIUM SERPL-SCNC: 143 MMOL/L — SIGNIFICANT CHANGE UP (ref 135–146)
SPECIMEN SOURCE: SIGNIFICANT CHANGE UP
WBC # BLD: 31.67 K/UL — HIGH (ref 4.8–10.8)
WBC # FLD AUTO: 31.67 K/UL — HIGH (ref 4.8–10.8)

## 2019-04-16 RX ADMIN — Medication 25 MILLIGRAM(S): at 05:15

## 2019-04-16 RX ADMIN — CLOPIDOGREL BISULFATE 75 MILLIGRAM(S): 75 TABLET, FILM COATED ORAL at 11:09

## 2019-04-16 RX ADMIN — AMIODARONE HYDROCHLORIDE 200 MILLIGRAM(S): 400 TABLET ORAL at 05:15

## 2019-04-16 NOTE — PROGRESS NOTE ADULT - ASSESSMENT
79-year-old male with a PMH of HTN, CAD s/p PCI, HFrEF, mechanical AVR (on Coumadin), paroxysmal A-Fib, CLL, DLD, and BPH who presented with dizziness and diaphoresis.    # History of CLL  - leukocytosis with blast cells   - oncology consult placed (Dr. Carey)    # Dizziness likely secondary to BPPV  - resolved  - troponin <0.01  - CT head: no evidence of acute intracranial pathology  - ardiology consult appreciated: cardiac cause unlikely --> tele d/leyda    # CAD s/p PCI  - continue Plavix, statin, beta-blocker, and ACEI  - nuclear stress test from 7/2018: fixed inferior wall defect consistent with diaphragmatic attenuation    # History of paroxysmal A-Fib and mechanical AVR  - rate controlled   - c/w metoprolol and coumadin (3mg 3 days/wk and 1.5mg 4 days/wk)  - monitor INR (goal 2-3)    # Chronic HFrEF  - stable   - c/w metoprolol, lisinopril and atorvastatin   - last EF was reportedly 30-35%  - check 2D echo    #  HTN  - stable   - c/w Lisinopril and Metoprolol     # BPH  - c/w doxazosin     # DVT prophylaxis  - Coumadin    # Disposition  - will anticipate for discharge to home     # Full code 79-year-old male with a PMH of HTN, CAD s/p PCI, HFrEF, mechanical AVR (on Coumadin), paroxysmal A-Fib, CLL, DLD, and BPH who presented with dizziness and diaphoresis.    # History of CLL  - leukocytosis with blast cells   - oncology consult placed (Dr. Carey)    # Dizziness likely secondary to BPPV  - resolved  - troponin <0.01  - CT head: no evidence of acute intracranial pathology  - ardiology consult appreciated: cardiac cause unlikely --> tele d/leyda    # CAD s/p PCI  - continue Plavix, statin, beta-blocker, and ACEI  - nuclear stress test from 7/2018: fixed inferior wall defect consistent with diaphragmatic attenuation    # History of paroxysmal A-Fib and mechanical AVR  - rate controlled   - c/w metoprolol and coumadin (3mg 3 days/wk and 1.5mg 4 days/wk)  - monitor INR (goal 2-3)    # Chronic HFrEF  - stable   - c/w metoprolol, lisinopril and atorvastatin   - last EF was reportedly 30-35%  - check 2D echo    #  HTN  - stable   - c/w Lisinopril and Metoprolol     # BPH  - c/w doxazosin     # DVT prophylaxis  - Coumadin    # Disposition  - discharge to home once cleared by oncology     # Full code

## 2019-04-16 NOTE — PROGRESS NOTE ADULT - ATTENDING COMMENTS
Patient seen and examined independently. Agree with resident note/ history / physical exam and plan of care with following exceptions/additions/updates. Case discussed with house-staff, nursing and patient/pt decision maker.     pt stated that his dizziness is better, advised him to stay and see ENT, but he decided to leave ama, did not want to wait anymore.  wife at the bedside. she wanted him to go home and neither one wanted to stay.     pt left AMA. he and his wife both understand the risks and benefits.   he is going to follow up with Dr Carey and Dr Pisano. and he is seeing ENT as outpt  time spent 35 min

## 2019-04-16 NOTE — CONSULT NOTE ADULT - ASSESSMENT
79-year-old male with a PMH of Hodgkin's lymphoma, CLL trisomy 21 ( diagnosed 2 yrs ago), HTN, CAD s/p PCI, HFrEF, mechanical AVR (on Coumadin), paroxysmal A-Fib who presented with dizziness and diaphoresis. Hematology called for an elevated WBC of 31 with 3% blasts reported on automated peripheral smear    # Trisomy 21 CLL, with lymphocyte doubling time around 2 yrs, mild anemia in par to androgen deprivation , and asymptomatic mild thrombocytopenia   - Labs reviewed from 3/7/2019 , his wbc was 19.9 with hb 11.7 and PLT 86.    Now his wbc is 31K with hb 12.7 and plt 96.    WBC increasing slowly since admission , no doubling since 3/7/2019 - hemoglobin and platelets stable -   - We will review peripheral smear to evaluate for blasts    Doubt acute leukemia, could be progression of his CLL   Patient has an appointment with Dr Carey on 5/10/2019 at 1:45 pm    # Dizziness  - Likely non cardiac in origin .  at rest, neuro eval, likely peripheral cause, re acute vertigo. Pateint was dizzy with normal BP and HR .     # Mechanical aortic valve, PAF on coumadin and ASA / CAD PCI to LAD in 2004 - on coumadin , and amio 79-year-old male with a PMH of Hodgkin's lymphoma, CLL trisomy 21 ( diagnosed 2 yrs ago), HTN, CAD s/p PCI, HFrEF, mechanical AVR (on Coumadin), paroxysmal A-Fib who presented with dizziness and diaphoresis. Hematology called for an elevated WBC of 31 with 3% blasts reported on automated peripheral smear    # Trisomy 21 CLL, with lymphocyte doubling time around 2 yrs, mild anemia in part to androgen deprivation , and asymptomatic mild thrombocytopenia   - Labs reviewed from 3/7/2019 , his wbc was 19.9 with hb 11.7 and PLT 86.    Now his wbc is 31K with hb 12.7 and plt 96.    WBC increasing slowly since admission , no doubling since 3/7/2019 - hemoglobin and platelets stable -   - We will review peripheral smear to evaluate for blasts    Doubt acute leukemia, could be progression of his CLL    His last CT scan from 12/2018 showed mild splenomegaly of 13.3 cm     Will follow with Dr Carey  Patient has an appointment with Dr Carey on 5/10/2019 at 1:45 pm    # Dizziness  - Likely non cardiac in origin .  at rest, neuro eval, likely peripheral cause, re acute vertigo. Pateint was dizzy with normal BP and HR .     # Mechanical aortic valve, PAF on coumadin and ASA / CAD PCI to LAD in 2004 - on coumadin , and amio 79-year-old male with a PMH of Hodgkin's lymphoma, CLL trisomy 21 ( diagnosed 2 yrs ago), HTN, CAD s/p PCI, HFrEF, mechanical AVR (on Coumadin), paroxysmal A-Fib who presented with dizziness and diaphoresis. Hematology called for an elevated WBC of 31 with 3% blasts reported on automated peripheral smear    # Trisomy 21 CLL, with lymphocyte doubling time around 2 yrs, mild anemia in part to androgen deprivation , and asymptomatic mild thrombocytopenia   - Labs reviewed from 3/7/2019 , his wbc was 19.9 with hb 11.7 and PLT 86.    Now his wbc is 31K with hb 12.7 and plt 96.    WBC increasing slowly since admission , no doubling since 3/7/2019 - hemoglobin and platelets stable -   - Peripheral smear reviewed : No blasts , + few prolymphocytes , small lymphocytes    No evidence of progression of CLL , no concerns of russell transformation    His last CT scan from 12/2018 showed mild splenomegaly of 13.3 cm        # Dizziness  - Likely non cardiac in origin .  at rest, neuro eval, likely peripheral cause, re acute vertigo. Pateint was dizzy with normal BP and HR .     # Mechanical aortic valve, PAF on coumadin and ASA / CAD PCI to LAD in 2004 - on coumadin , and amio    Case discussed with Dr nunez  Patient can be discharged from hematology standspoint  Follow up as OP on 5/10/2019 at 1:45 pm

## 2019-04-16 NOTE — CONSULT NOTE ADULT - SUBJECTIVE AND OBJECTIVE BOX
Patient is a 79y old  Male who presents with a chief complaint of Chest pain (2019 09:07)      HPI:  The patient is a 79-year-old male with a PMH of HTN, CAD s/p PCI, HFrEF, mechanical AVR (on Coumadin), paroxysmal A-Fib, CLL, DLD, and BPH who presented with dizziness.  He reports doing some work around the house when he developed dizziness described as a room spinning sensation.  He also experienced sweating and nausea with episodes of non-bloody vomiting.  He has been experiencing dizziness for several months which lasted for several hours before resolving on its own.  However this current episode lasting longer which alarmed him to come to the ED.  During the episode he also experienced some chest discomfort but states that it was very brief.  Otherwise, he denied having fever, palpitations, bowel or urinary symptoms. (2019 20:48)       Heme/onc:  76-year-old white man with multiple medical problems including coronary artery disease status post angioplasty valvular heart disease status post metallic aortic valve replacement, he was diagnosed 2 years ago with CLL ,trisomy 12 on watchful waiting .  He is also on androgen deprivation for elevated PSA (He had previously on surveillance and had multiple prostate biopsies - unclear if it showed cancer ). Bone scan is allegedly negative.       PAST MEDICAL & SURGICAL HISTORY:  Afib  BPH (benign prostatic hyperplasia)  Lymphoma: , s/p chemo&amp; radiation  Aortic valve stenosis: s/p replacement   CAD (coronary artery disease):  1 stent  H/O aortic valve replacement        FAMILY HISTORY:  No pertinent family history in first degree relatives    Allergies    No Known Allergies    Intolerances    HOME MEDICATIONS:  alfuzosin 10 mg oral tablet, extended release: 1 tab(s) orally once a day (2019 21:17)  amiodarone 200 mg oral tablet: 1 tab(s) orally once a day (2019 21:17)  Calcium 500+D oral tablet, chewable: 1 tab(s) orally 2 times a day (2019 21:17)  clopidogrel 75 mg oral tablet: 1 tab(s) orally once a day (2019 21:17)  Coumadin:  (2019 21:17)  simvastatin 10 mg oral tablet: 1 tab(s) orally once a day (at bedtime) (2019 21:17)      Vital Signs Last 24 Hrs  T(C): 35.8 (2019 07:15), Max: 36.3 (15 Apr 2019 16:13)  T(F): 96.5 (2019 07:15), Max: 97.4 (15 Apr 2019 16:13)  HR: 58 (2019 07:15) (55 - 68)  BP: 124/58 (2019 07:15) (119/75 - 142/66)  BP(mean): --  RR: 18 (2019 07:15) (18 - 18)  SpO2: 97% (2019 07:15) (95% - 97%)    PHYSICAL EXAM  General: adult in NAD  HEENT: clear oropharynx, anicteric sclera, pink conjunctiva  Neck: supple  CV: normal S1/S2 with no murmur rubs or gallops  Lungs: positive air movement b/l ant lungs,clear to auscultation, no wheezes, no rales  Abdomen: soft non-tender non-distended, no hepatosplenomegaly  Ext: no clubbing cyanosis or edema  Skin: no rashes and no petechiae  Neuro: alert and oriented X 4, no focal deficits    MEDICATIONS  (STANDING):  amiodarone    Tablet 200 milliGRAM(s) Oral daily  chlorhexidine 4% Liquid 1 Application(s) Topical <User Schedule>  clopidogrel Tablet 75 milliGRAM(s) Oral daily  doxazosin 1 milliGRAM(s) Oral at bedtime  lisinopril 20 milliGRAM(s) Oral at bedtime  metoprolol tartrate 25 milliGRAM(s) Oral two times a day  simvastatin 10 milliGRAM(s) Oral at bedtime    MEDICATIONS  (PRN):      LABS:                          12.4   31.67 )-----------( 94       ( 2019 08:48 )             39.5         Mean Cell Volume : 100.5 fL  Mean Cell Hemoglobin : 31.6 pg  Mean Cell Hemoglobin Concentration : 31.4 g/dL  Auto Neutrophil # : 5.21 K/uL  Auto Lymphocyte # : 21.64 K/uL  Auto Monocyte # : 4.35 K/uL  Auto Eosinophil # : 0.26 K/uL  Auto Basophil # : 0.11 K/uL  Auto Neutrophil % : 16.6 %  Auto Lymphocyte % : 68.3 %  Auto Monocyte % : 13.7 %  Auto Eosinophil % : 0.8 %  Auto Basophil % : 0.3 %      Serial CBC's   @ 08:48  Hct-39.5 / Hgb-12.4 / Plat-94 / RBC-3.93 / WBC-31.67  Serial CBC's  04-15 @ 14:10  Hct-36.0 / Hgb-11.7 / Plat-105 / RBC-3.61 / WBC-31.17  Serial CBC's  04-15 @ 08:01  Hct-35.4 / Hgb-11.3 / Plat-93 / RBC-3.50 / WBC-29.09  Serial CBC's   @ 14:54  Hct-37.3 / Hgb-12.0 / Plat-96 / RBC-3.76 / WBC-24.99          143  |  103  |  19  ----------------------------<  92  4.5   |  29  |  1.0    Ca    9.0      2019 08:48  Mg     2.1     04-15    TPro  6.7  /  Alb  4.3  /  TBili  0.5  /  DBili  x   /  AST  25  /  ALT  16  /  AlkPhos  75        PT/INR - ( 2019 08:48 )   PT: 34.80 sec;   INR: 3.06 ratio         PTT - ( 2019 08:48 )  PTT:39.1 sec                Urinalysis Basic - ( 2019 22:30 )    Color: Yellow / Appearance: Clear / S.025 / pH: x  Gluc: x / Ketone: Negative  / Bili: Negative / Urobili: 0.2 mg/dL   Blood: x / Protein: Trace mg/dL / Nitrite: Negative   Leuk Esterase: Negative / RBC: 3-5 /HPF / WBC x   Sq Epi: x / Non Sq Epi: x / Bacteria: x          Culture - Urine (collected 2019 22:30)  Source: .Urine Clean Catch (Midstream)  Final Report (2019 00:23):    <10,000 CFU/mL Normal Urogenital Mercedez    RADIOLOGY & ADDITIONAL STUDIES:  < from: CT Head No Cont (19 @ 16:18) >  IMPRESSION:    No CT evidence for acute intracranial pathology.        < end of copied text >    < from: Xray Chest 1 View AP/PA (19 @ 15:43) >  Impression:      Stable left hemidiaphragm elevation. No focal consolidation.    < end of copied text > Patient is a 79y old  Male who presents with a chief complaint of Chest pain (2019 09:07)      HPI:  The patient is a 79-year-old male with a PMH of HTN, CAD s/p PCI, HFrEF, mechanical AVR (on Coumadin), paroxysmal A-Fib, CLL, DLD, and BPH who presented with dizziness.  He reports doing some work around the house when he developed dizziness described as a room spinning sensation.  He also experienced sweating and nausea with episodes of non-bloody vomiting.  He has been experiencing dizziness for several months which lasted for several hours before resolving on its own.  However this current episode lasting longer which alarmed him to come to the ED.  During the episode he also experienced some chest discomfort but states that it was very brief.  Otherwise, he denied having fever, palpitations, bowel or urinary symptoms. (2019 20:48)       Heme/onc:  76-year-old white man with multiple medical problems including coronary artery disease status post angioplasty, valvular heart disease status post metallic aortic valve replacement, he was diagnosed 2 years ago with CLL ,trisomy 12 on watchful waiting .  He is also on androgen deprivation for elevated PSA (He had previously on surveillance and had multiple prostate biopsies - unclear if it showed cancer ). He is admitted for vertigo at minimal exertion , which is less likely cardiac in origin, believed to be secondary to medications vs BPPV. He denies recurrent infections,  no easy bruising or bleeding , now weight loss , no poor appetite      PAST MEDICAL & SURGICAL HISTORY:  Afib  BPH (benign prostatic hyperplasia)  Lymphoma: , s/p chemo&amp; radiation  Aortic valve stenosis: s/p replacement   CAD (coronary artery disease):  1 stent  H/O aortic valve replacement        FAMILY HISTORY:  No pertinent family history in first degree relatives    Allergies    No Known Allergies    Intolerances    HOME MEDICATIONS:  alfuzosin 10 mg oral tablet, extended release: 1 tab(s) orally once a day (2019 21:17)  amiodarone 200 mg oral tablet: 1 tab(s) orally once a day (2019 21:17)  Calcium 500+D oral tablet, chewable: 1 tab(s) orally 2 times a day (2019 21:17)  clopidogrel 75 mg oral tablet: 1 tab(s) orally once a day (2019 21:17)  Coumadin:  (2019 21:17)  simvastatin 10 mg oral tablet: 1 tab(s) orally once a day (at bedtime) (2019 21:17)      Vital Signs Last 24 Hrs  T(C): 35.8 (2019 07:15), Max: 36.3 (15 Apr 2019 16:13)  T(F): 96.5 (2019 07:15), Max: 97.4 (15 Apr 2019 16:13)  HR: 58 (2019 07:15) (55 - 68)  BP: 124/58 (2019 07:15) (119/75 - 142/66)  BP(mean): --  RR: 18 (2019 07:15) (18 - 18)  SpO2: 97% (2019 07:15) (95% - 97%)    PHYSICAL EXAM  General: adult in NAD  HEENT: clear oropharynx, anicteric sclera, pink conjunctiva  Neck: supple, no palpable LN  CV: irregular rate and rhythm   Lungs: positive air movement b/l   Abdomen: soft non-tender non-distended, no palpable HSM  Ext: no clubbing cyanosis or edema  Skin: no rashes and no petechiae  Neuro: alert and oriented X 4, no focal deficits    MEDICATIONS  (STANDING):  amiodarone    Tablet 200 milliGRAM(s) Oral daily  chlorhexidine 4% Liquid 1 Application(s) Topical <User Schedule>  clopidogrel Tablet 75 milliGRAM(s) Oral daily  doxazosin 1 milliGRAM(s) Oral at bedtime  lisinopril 20 milliGRAM(s) Oral at bedtime  metoprolol tartrate 25 milliGRAM(s) Oral two times a day  simvastatin 10 milliGRAM(s) Oral at bedtime    MEDICATIONS  (PRN):      LABS:                          12.4   31.67 )-----------( 94       ( 2019 08:48 )             39.5         Mean Cell Volume : 100.5 fL  Mean Cell Hemoglobin : 31.6 pg  Mean Cell Hemoglobin Concentration : 31.4 g/dL  Auto Neutrophil # : 5.21 K/uL  Auto Lymphocyte # : 21.64 K/uL  Auto Monocyte # : 4.35 K/uL  Auto Eosinophil # : 0.26 K/uL  Auto Basophil # : 0.11 K/uL  Auto Neutrophil % : 16.6 %  Auto Lymphocyte % : 68.3 %  Auto Monocyte % : 13.7 %  Auto Eosinophil % : 0.8 %  Auto Basophil % : 0.3 %      Serial CBC's   @ 08:48  Hct-39.5 / Hgb-12.4 / Plat-94 / RBC-3.93 / WBC-31.67  Serial CBC's  04-15 @ 14:10  Hct-36.0 / Hgb-11.7 / Plat-105 / RBC-3.61 / WBC-31.17  Serial CBC's  04-15 @ 08:01  Hct-35.4 / Hgb-11.3 / Plat-93 / RBC-3.50 / WBC-29.09  Serial CBC's   @ 14:54  Hct-37.3 / Hgb-12.0 / Plat-96 / RBC-3.76 / WBC-24.99          143  |  103  |  19  ----------------------------<  92  4.5   |  29  |  1.0    Ca    9.0      2019 08:48  Mg     2.1     04-15    TPro  6.7  /  Alb  4.3  /  TBili  0.5  /  DBili  x   /  AST  25  /  ALT  16  /  AlkPhos  75        PT/INR - ( 2019 08:48 )   PT: 34.80 sec;   INR: 3.06 ratio         PTT - ( 2019 08:48 )  PTT:39.1 sec                Urinalysis Basic - ( 2019 22:30 )    Color: Yellow / Appearance: Clear / S.025 / pH: x  Gluc: x / Ketone: Negative  / Bili: Negative / Urobili: 0.2 mg/dL   Blood: x / Protein: Trace mg/dL / Nitrite: Negative   Leuk Esterase: Negative / RBC: 3-5 /HPF / WBC x   Sq Epi: x / Non Sq Epi: x / Bacteria: x          Culture - Urine (collected 2019 22:30)  Source: .Urine Clean Catch (Midstream)  Final Report (2019 00:23):    <10,000 CFU/mL Normal Urogenital Mercedez    RADIOLOGY & ADDITIONAL STUDIES:  < from: CT Head No Cont (19 @ 16:18) >  IMPRESSION:    No CT evidence for acute intracranial pathology.        < end of copied text >    < from: Xray Chest 1 View AP/PA (19 @ 15:43) >  Impression:      Stable left hemidiaphragm elevation. No focal consolidation.    < end of copied text > Patient is a 79y old  Male who presents with a chief complaint of Chest pain (2019 09:07)      HPI:  The patient is a 79-year-old male with a PMH of HTN, CAD s/p PCI, HFrEF, mechanical AVR (on Coumadin), paroxysmal A-Fib, CLL, DLD, and BPH who presented with dizziness.  He reports doing some work around the house when he developed dizziness described as a room spinning sensation.  He also experienced sweating and nausea with episodes of non-bloody vomiting.  He has been experiencing dizziness for several months which lasted for several hours before resolving on its own.  However this current episode lasting longer which alarmed him to come to the ED.  During the episode he also experienced some chest discomfort but states that it was very brief.  Otherwise, he denied having fever, palpitations, bowel or urinary symptoms. (2019 20:48)       Heme/onc:  76-year-old white man with multiple medical problems including coronary artery disease status post angioplasty, valvular heart disease status post metallic aortic valve replacement, he was diagnosed 2 years ago with CLL ,trisomy 12 on watchful waiting .  He is also on androgen deprivation for elevated PSA (He had previously on surveillance and had multiple prostate biopsies - unclear if it showed cancer ). He is admitted for vertigo at minimal exertion , which is less likely cardiac in origin, believed to be secondary to medications vs BPPV. He denies recurrent infections,  no easy bruising or bleeding , now weight loss , no poor appetite      PAST MEDICAL & SURGICAL HISTORY:  Afib  BPH (benign prostatic hyperplasia)  Lymphoma: , s/p chemo&amp; radiation  Aortic valve stenosis: s/p replacement   CAD (coronary artery disease):  1 stent  H/O aortic valve replacement        FAMILY HISTORY:  No pertinent family history in first degree relatives    Allergies    No Known Allergies    Intolerances    HOME MEDICATIONS:  alfuzosin 10 mg oral tablet, extended release: 1 tab(s) orally once a day (2019 21:17)  amiodarone 200 mg oral tablet: 1 tab(s) orally once a day (2019 21:17)  Calcium 500+D oral tablet, chewable: 1 tab(s) orally 2 times a day (2019 21:17)  clopidogrel 75 mg oral tablet: 1 tab(s) orally once a day (2019 21:17)  Coumadin:  (2019 21:17)  simvastatin 10 mg oral tablet: 1 tab(s) orally once a day (at bedtime) (2019 21:17)      Vital Signs Last 24 Hrs  T(C): 35.8 (2019 07:15), Max: 36.3 (15 Apr 2019 16:13)  T(F): 96.5 (2019 07:15), Max: 97.4 (15 Apr 2019 16:13)  HR: 58 (2019 07:15) (55 - 68)  BP: 124/58 (2019 07:15) (119/75 - 142/66)  BP(mean): --  RR: 18 (2019 07:15) (18 - 18)  SpO2: 97% (2019 07:15) (95% - 97%)    PHYSICAL EXAM  General: adult in NAD  HEENT: clear oropharynx, anicteric sclera, pink conjunctiva  Neck: supple, no palpable LN  CV: irregular rate and rhythm   Lungs: positive air movement b/l   Abdomen: soft non-tender non-distended, no palpable HSM  Ext: no clubbing cyanosis or edema  Skin: no rashes and no petechiae  Neuro: alert and oriented X 4, no focal deficits    MEDICATIONS  (STANDING):  amiodarone    Tablet 200 milliGRAM(s) Oral daily  chlorhexidine 4% Liquid 1 Application(s) Topical <User Schedule>  clopidogrel Tablet 75 milliGRAM(s) Oral daily  doxazosin 1 milliGRAM(s) Oral at bedtime  lisinopril 20 milliGRAM(s) Oral at bedtime  metoprolol tartrate 25 milliGRAM(s) Oral two times a day  simvastatin 10 milliGRAM(s) Oral at bedtime    MEDICATIONS  (PRN):      LABS:                          12.4   31.67 )-----------( 94       ( 2019 08:48 )             39.5         Mean Cell Volume : 100.5 fL  Mean Cell Hemoglobin : 31.6 pg  Mean Cell Hemoglobin Concentration : 31.4 g/dL  Auto Neutrophil # : 5.21 K/uL  Auto Lymphocyte # : 21.64 K/uL  Auto Monocyte # : 4.35 K/uL  Auto Eosinophil # : 0.26 K/uL  Auto Basophil # : 0.11 K/uL  Auto Neutrophil % : 16.6 %  Auto Lymphocyte % : 68.3 %  Auto Monocyte % : 13.7 %  Auto Eosinophil % : 0.8 %  Auto Basophil % : 0.3 %      Serial CBC's   @ 08:48  Hct-39.5 / Hgb-12.4 / Plat-94 / RBC-3.93 / WBC-31.67  Serial CBC's  04-15 @ 14:10  Hct-36.0 / Hgb-11.7 / Plat-105 / RBC-3.61 / WBC-31.17  Serial CBC's  04-15 @ 08:01  Hct-35.4 / Hgb-11.3 / Plat-93 / RBC-3.50 / WBC-29.09  Serial CBC's   @ 14:54  Hct-37.3 / Hgb-12.0 / Plat-96 / RBC-3.76 / WBC-24.99          143  |  103  |  19  ----------------------------<  92  4.5   |  29  |  1.0    Ca    9.0      2019 08:48  Mg     2.1     04-15    TPro  6.7  /  Alb  4.3  /  TBili  0.5  /  DBili  x   /  AST  25  /  ALT  16  /  AlkPhos  75        PT/INR - ( 2019 08:48 )   PT: 34.80 sec;   INR: 3.06 ratio         PTT - ( 2019 08:48 )  PTT:39.1 sec                Urinalysis Basic - ( 2019 22:30 )    Color: Yellow / Appearance: Clear / S.025 / pH: x  Gluc: x / Ketone: Negative  / Bili: Negative / Urobili: 0.2 mg/dL   Blood: x / Protein: Trace mg/dL / Nitrite: Negative   Leuk Esterase: Negative / RBC: 3-5 /HPF / WBC x   Sq Epi: x / Non Sq Epi: x / Bacteria: x          Culture - Urine (collected 2019 22:30)  Source: .Urine Clean Catch (Midstream)  Final Report (2019 00:23):    <10,000 CFU/mL Normal Urogenital Mercedez    RADIOLOGY & ADDITIONAL STUDIES:  < from: CT Head No Cont (19 @ 16:18) >  IMPRESSION:    No CT evidence for acute intracranial pathology.        < end of copied text >    < from: Xray Chest 1 View AP/PA (19 @ 15:43) >  Impression:      Stable left hemidiaphragm elevation. No focal consolidation.    < end of copied text >    < from: Transthoracic Echocardiogram (04.15.19 @ 17:08) >  Summary:   1. Left ventricular ejection fraction, by visual estimation, is 40 to   45%.   2. Technically good study.   3. Mildly decreased global left ventricular systolic function.   4. LV Ejection Fraction by Nation's Method with a biplane EF of 46 %.   5. Normal left ventricular internal cavity size.   6. Mild mitral annular calcification.   7. Structurally normal mitral valve, with normal leaflet excursion.   8. Mild-moderate tricuspidregurgitation.    < end of copied text >

## 2019-04-18 DIAGNOSIS — I11.0 HYPERTENSIVE HEART DISEASE WITH HEART FAILURE: ICD-10-CM

## 2019-04-18 DIAGNOSIS — Z79.01 LONG TERM (CURRENT) USE OF ANTICOAGULANTS: ICD-10-CM

## 2019-04-18 DIAGNOSIS — R07.9 CHEST PAIN, UNSPECIFIED: ICD-10-CM

## 2019-04-18 DIAGNOSIS — I48.0 PAROXYSMAL ATRIAL FIBRILLATION: ICD-10-CM

## 2019-04-18 DIAGNOSIS — I50.22 CHRONIC SYSTOLIC (CONGESTIVE) HEART FAILURE: ICD-10-CM

## 2019-04-18 DIAGNOSIS — E78.5 HYPERLIPIDEMIA, UNSPECIFIED: ICD-10-CM

## 2019-04-18 DIAGNOSIS — D64.9 ANEMIA, UNSPECIFIED: ICD-10-CM

## 2019-04-18 DIAGNOSIS — Z95.5 PRESENCE OF CORONARY ANGIOPLASTY IMPLANT AND GRAFT: ICD-10-CM

## 2019-04-18 DIAGNOSIS — Z95.4 PRESENCE OF OTHER HEART-VALVE REPLACEMENT: ICD-10-CM

## 2019-04-18 DIAGNOSIS — H81.10 BENIGN PAROXYSMAL VERTIGO, UNSPECIFIED EAR: ICD-10-CM

## 2019-04-18 DIAGNOSIS — I25.10 ATHEROSCLEROTIC HEART DISEASE OF NATIVE CORONARY ARTERY WITHOUT ANGINA PECTORIS: ICD-10-CM

## 2019-04-18 DIAGNOSIS — N40.0 BENIGN PROSTATIC HYPERPLASIA WITHOUT LOWER URINARY TRACT SYMPTOMS: ICD-10-CM

## 2019-04-18 DIAGNOSIS — C91.10 CHRONIC LYMPHOCYTIC LEUKEMIA OF B-CELL TYPE NOT HAVING ACHIEVED REMISSION: ICD-10-CM

## 2019-04-18 DIAGNOSIS — Z53.21 PROCEDURE AND TREATMENT NOT CARRIED OUT DUE TO PATIENT LEAVING PRIOR TO BEING SEEN BY HEALTH CARE PROVIDER: ICD-10-CM

## 2019-04-18 DIAGNOSIS — D69.6 THROMBOCYTOPENIA, UNSPECIFIED: ICD-10-CM

## 2019-04-24 ENCOUNTER — APPOINTMENT (OUTPATIENT)
Dept: CARDIOLOGY | Facility: CLINIC | Age: 79
End: 2019-04-24

## 2019-04-24 ENCOUNTER — OUTPATIENT (OUTPATIENT)
Dept: OUTPATIENT SERVICES | Facility: HOSPITAL | Age: 79
LOS: 1 days | Discharge: HOME | End: 2019-04-24

## 2019-04-24 DIAGNOSIS — Z95.2 PRESENCE OF PROSTHETIC HEART VALVE: ICD-10-CM

## 2019-04-24 DIAGNOSIS — Z95.2 PRESENCE OF PROSTHETIC HEART VALVE: Chronic | ICD-10-CM

## 2019-04-24 DIAGNOSIS — Z79.01 LONG TERM (CURRENT) USE OF ANTICOAGULANTS: ICD-10-CM

## 2019-04-24 LAB
POCT INR: 3 RATIO — HIGH (ref 0.9–1.2)
POCT PT: 35.8 SEC — HIGH (ref 10–13.4)

## 2019-05-01 ENCOUNTER — APPOINTMENT (OUTPATIENT)
Dept: CARDIOLOGY | Facility: CLINIC | Age: 79
End: 2019-05-01
Payer: MEDICARE

## 2019-05-01 ENCOUNTER — OUTPATIENT (OUTPATIENT)
Dept: OUTPATIENT SERVICES | Facility: HOSPITAL | Age: 79
LOS: 1 days | Discharge: HOME | End: 2019-05-01

## 2019-05-01 VITALS
HEART RATE: 63 BPM | WEIGHT: 182 LBS | SYSTOLIC BLOOD PRESSURE: 118 MMHG | BODY MASS INDEX: 26.05 KG/M2 | HEIGHT: 70 IN | DIASTOLIC BLOOD PRESSURE: 74 MMHG | OXYGEN SATURATION: 97 %

## 2019-05-01 DIAGNOSIS — Z95.2 PRESENCE OF PROSTHETIC HEART VALVE: ICD-10-CM

## 2019-05-01 DIAGNOSIS — Z95.2 PRESENCE OF PROSTHETIC HEART VALVE: Chronic | ICD-10-CM

## 2019-05-01 DIAGNOSIS — Z85.46 PERSONAL HISTORY OF MALIGNANT NEOPLASM OF PROSTATE: ICD-10-CM

## 2019-05-01 DIAGNOSIS — Z79.01 LONG TERM (CURRENT) USE OF ANTICOAGULANTS: ICD-10-CM

## 2019-05-01 DIAGNOSIS — Z87.39 PERSONAL HISTORY OF OTHER DISEASES OF THE MUSCULOSKELETAL SYSTEM AND CONNECTIVE TISSUE: ICD-10-CM

## 2019-05-01 LAB
POCT INR: 3.7 RATIO — HIGH (ref 0.9–1.2)
POCT PT: 44.5 SEC — HIGH (ref 10–13.4)

## 2019-05-01 PROCEDURE — 93228 REMOTE 30 DAY ECG REV/REPORT: CPT

## 2019-05-01 PROCEDURE — 99215 OFFICE O/P EST HI 40 MIN: CPT

## 2019-05-01 PROCEDURE — 93000 ELECTROCARDIOGRAM COMPLETE: CPT | Mod: 59

## 2019-05-01 RX ORDER — AMIODARONE HYDROCHLORIDE 200 MG/1
200 TABLET ORAL TWICE DAILY
Qty: 90 | Refills: 3 | Status: DISCONTINUED | COMMUNITY
Start: 2019-03-03 | End: 2019-05-01

## 2019-05-01 NOTE — END OF VISIT
[FreeTextEntry3] : I was present with the nurse practitioner during the history and exam of the patient. I discussed patient's management with the NP in detail. I reviewed the NP’s note and agree with the documented findings and plan of care. I would like to take this opportunity to thank you for involving me in this patient’s care. Please do not hesitate to contact me if you have any further questions at [phone].\par

## 2019-05-01 NOTE — DISCUSSION/SUMMARY
[FreeTextEntry1] : Mr. Balderas presents today for follow up of paroxysmal AFib, AFlutter on coumadin. He is s/p ablation (RFA 12/17/18 - PVI and CTI) of Paroxysmal AFib and Atrial flutter.  Since he is in sinus rhythm more than 3 months after ablation and he had elevated TSH and describes a feeling of fatigue, we will discontinue his amiodarone and see how he feels. \par \par He is on Coumadin , hx of AVR / mechanical valve , INRs monitored by the Coumadin clinic.  No s/s bleeding reported .  \par \par Patient's  recent echo (4/15/2019)  showed improved LVF from 30-35% to 40-45% .\par He reports c/w prescribed meds .  \par \par Patient was seen and examined with Dr. Salazar today, we discussed possible etiologies of recurrent episodes of  dizziness,  he has no known  or documented reoccurrence of  Afib /Aflutter since the Afib/Aflutter ablation. we recommended to stop Amiodarone and patient was equipped with a 30 day event monitor to r/o abdirizak/tachy arrhythmias as a possible etiology of his dizziness. He was instructed to activate the monitor if recurrent dizziness noted. \par \par He will continue Metoprolol 25 mg BID, Warfarin  and his present medications as prescribed and return for follow up in 6 weeks for re-assessment /discuss the event monitor results . Endocrinology referral given for follow up, recent lab work with elevated TSH and normal T4.  \par \par We have also advised the patient to go to the nearest emergency room if he experiences any chest pain, dyspnea, syncope, or has any other compelling symptoms.

## 2019-05-01 NOTE — REVIEW OF SYSTEMS
[Dizziness] : dizziness [Negative] : Cardiovascular [Feeling Fatigued] : feeling fatigued [Headache] : no headache [Fever] : no fever [Dyspnea on exertion] : not dyspnea during exertion [Shortness Of Breath] : no shortness of breath [Cough] : no cough [Chest Pain] : no chest pain [Chest  Pressure] : no chest pressure [Urinary Frequency] : no change in urinary frequency [Abdominal Pain] : no abdominal pain [Joint Pain] : no joint pain [Memory Lapses Or Loss] : no memory lapses or loss [Skin: A Rash] : no rash: [Excessive Thirst] : no polydipsia [Easy Bleeding] : no tendency for easy bleeding [Easy Bruising] : no tendency for easy bruising

## 2019-05-01 NOTE — PHYSICAL EXAM
[Normal Appearance] : normal appearance [Well Groomed] : well groomed [General Appearance - Well Developed] : well developed [General Appearance - Well Nourished] : well nourished [No Deformities] : no deformities [General Appearance - In No Acute Distress] : no acute distress [Normal Oral Mucosa] : normal oral mucosa [Normal Conjunctiva] : the conjunctiva exhibited no abnormalities [Exaggerated Use Of Accessory Muscles For Inspiration] : no accessory muscle use [Respiration, Rhythm And Depth] : normal respiratory rhythm and effort [Auscultation Breath Sounds / Voice Sounds] : lungs were clear to auscultation bilaterally [Edema] : no peripheral edema present [Heart Rate And Rhythm] : heart rate and rhythm were normal [Heart Sounds] : normal S1 and S2 [Abdomen Soft] : soft [Bowel Sounds] : normal bowel sounds [Abdomen Tenderness] : non-tender [Abnormal Walk] : normal gait [Skin Color & Pigmentation] : normal skin color and pigmentation [Nail Clubbing] : no clubbing of the fingernails [Cyanosis, Localized] : no localized cyanosis [Oriented To Time, Place, And Person] : oriented to person, place, and time [] : no rash [Mood] : the mood was normal [Impaired Insight] : insight and judgment were intact [Affect] : the affect was normal [No Anxiety] : not feeling anxious [FreeTextEntry1] : 2/6 systolic murmur

## 2019-05-01 NOTE — HISTORY OF PRESENT ILLNESS
[FreeTextEntry1] : 80 yo M with history of CAD, HTN, HL, S/P AVR ( mechanical)  , Paroxysmal atrial fibrillation and flutter on Coumadin for 40 years (s/p DCCV x 2 and ablation on 12/17/18 - PVI, CTI) presenting for follow up. \par \par Reports 3 episodes of intermittent  dizziness for the past 2-3 weeks . 2 episodes occurred upon exertion followed by nausea and vomiting  and 1 episode as he was driving his car . He denies syncope or falls . Reports feeling "tired" all the time . No CP, SOB or palpitations .   \par \par ECG ( 5/1/2019)- SR at 69 bpm, APC,  LAFB, LVH, QTc 425 ms , no changes c/w prior \par

## 2019-05-03 ENCOUNTER — APPOINTMENT (OUTPATIENT)
Dept: CARDIOLOGY | Facility: CLINIC | Age: 79
End: 2019-05-03
Payer: MEDICARE

## 2019-05-03 VITALS
HEART RATE: 65 BPM | HEIGHT: 70 IN | WEIGHT: 179 LBS | DIASTOLIC BLOOD PRESSURE: 76 MMHG | SYSTOLIC BLOOD PRESSURE: 130 MMHG | BODY MASS INDEX: 25.62 KG/M2

## 2019-05-03 PROCEDURE — 99214 OFFICE O/P EST MOD 30 MIN: CPT

## 2019-05-03 PROCEDURE — 93000 ELECTROCARDIOGRAM COMPLETE: CPT

## 2019-05-03 NOTE — ASSESSMENT
[FreeTextEntry1] : The patient has had unsteadiness which comes as paroxysms  . Etiology is uncertain  possible ataxia from Amio . It does not appear to be secondary to rhythm issues although he has an event monitor now . Possible psoterior circulation issues .

## 2019-05-03 NOTE — HISTORY OF PRESENT ILLNESS
[FreeTextEntry1] : The patient has been having issues with intermittent dizziness . The patiet has had paroxysmal episodes of dizziness. He was seen in the ER for this and his heart rhythm was normal. The patient has had a long term event monitor placed.

## 2019-05-03 NOTE — PHYSICAL EXAM
[General Appearance - Well Developed] : well developed [Normal Appearance] : normal appearance [Well Groomed] : well groomed [General Appearance - Well Nourished] : well nourished [No Deformities] : no deformities [Normal Conjunctiva] : the conjunctiva exhibited no abnormalities [General Appearance - In No Acute Distress] : no acute distress [No Oral Pallor] : no oral pallor [Eyelids - No Xanthelasma] : the eyelids demonstrated no xanthelasmas [Normal Oral Mucosa] : normal oral mucosa [No Oral Cyanosis] : no oral cyanosis [Respiration, Rhythm And Depth] : normal respiratory rhythm and effort [Exaggerated Use Of Accessory Muscles For Inspiration] : no accessory muscle use [Abdomen Soft] : soft [Abdomen Tenderness] : non-tender [Abdomen Mass (___ Cm)] : no abdominal mass palpated [Gait - Sufficient For Exercise Testing] : the gait was sufficient for exercise testing [Abnormal Walk] : normal gait [Cyanosis, Localized] : no localized cyanosis [Nail Clubbing] : no clubbing of the fingernails [Petechial Hemorrhages (___cm)] : no petechial hemorrhages [Skin Color & Pigmentation] : normal skin color and pigmentation [Skin Lesions] : no skin lesions [] : no rash [No Venous Stasis] : no venous stasis [No Xanthoma] : no  xanthoma was observed [No Skin Ulcers] : no skin ulcer [Irregularly Irregular] : irregularly irregular [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  [Rt] : no varicose veins of the right leg

## 2019-05-03 NOTE — REASON FOR VISIT
[Aortic Stenosis] : aortic stenosis [Follow-Up - Clinic] : a clinic follow-up of [Coronary Artery Disease] : coronary artery disease [Hyperlipidemia] : hyperlipidemia [Hypertension] : hypertension

## 2019-05-08 ENCOUNTER — OUTPATIENT (OUTPATIENT)
Dept: OUTPATIENT SERVICES | Facility: HOSPITAL | Age: 79
LOS: 1 days | Discharge: HOME | End: 2019-05-08

## 2019-05-08 DIAGNOSIS — Z79.01 LONG TERM (CURRENT) USE OF ANTICOAGULANTS: ICD-10-CM

## 2019-05-08 DIAGNOSIS — Z95.2 PRESENCE OF PROSTHETIC HEART VALVE: Chronic | ICD-10-CM

## 2019-05-08 DIAGNOSIS — Z95.2 PRESENCE OF PROSTHETIC HEART VALVE: ICD-10-CM

## 2019-05-08 LAB
POCT INR: 3.2 RATIO — HIGH (ref 0.9–1.2)
POCT PT: 38.6 SEC — HIGH (ref 10–13.4)

## 2019-05-14 ENCOUNTER — APPOINTMENT (OUTPATIENT)
Dept: CARDIOLOGY | Facility: CLINIC | Age: 79
End: 2019-05-14

## 2019-05-15 ENCOUNTER — OUTPATIENT (OUTPATIENT)
Dept: OUTPATIENT SERVICES | Facility: HOSPITAL | Age: 79
LOS: 1 days | Discharge: HOME | End: 2019-05-15

## 2019-05-15 DIAGNOSIS — Z95.2 PRESENCE OF PROSTHETIC HEART VALVE: Chronic | ICD-10-CM

## 2019-05-15 DIAGNOSIS — Z95.2 PRESENCE OF PROSTHETIC HEART VALVE: ICD-10-CM

## 2019-05-15 DIAGNOSIS — Z79.01 LONG TERM (CURRENT) USE OF ANTICOAGULANTS: ICD-10-CM

## 2019-05-15 LAB
POCT INR: 2.2 RATIO — HIGH (ref 0.9–1.2)
POCT PT: 26.5 SEC — HIGH (ref 10–13.4)

## 2019-05-16 ENCOUNTER — LABORATORY RESULT (OUTPATIENT)
Age: 79
End: 2019-05-16

## 2019-05-16 ENCOUNTER — APPOINTMENT (OUTPATIENT)
Dept: HEMATOLOGY ONCOLOGY | Facility: CLINIC | Age: 79
End: 2019-05-16

## 2019-05-16 VITALS
HEIGHT: 70 IN | SYSTOLIC BLOOD PRESSURE: 126 MMHG | RESPIRATION RATE: 14 BRPM | WEIGHT: 180 LBS | BODY MASS INDEX: 25.77 KG/M2 | DIASTOLIC BLOOD PRESSURE: 58 MMHG | HEART RATE: 70 BPM | TEMPERATURE: 97.6 F

## 2019-05-17 LAB
HCT VFR BLD CALC: 34.8 %
HGB BLD-MCNC: 11.3 G/DL
MCHC RBC-ENTMCNC: 32.5 G/DL
MCHC RBC-ENTMCNC: 32.7 PG
MCV RBC AUTO: 100.6 FL
PLATELET # BLD AUTO: 85 K/UL
PMV BLD: 9.2 FL
RBC # BLD: 3.46 M/UL
RBC # FLD: 14.7 %
WBC # FLD AUTO: 29.62 K/UL

## 2019-05-17 NOTE — PHYSICAL EXAM
[Normal] : no peripheral adenopathy appreciated [de-identified] : Pale chrinicall ill in NAD .  [de-identified] : prominent aortic valve  click. [de-identified] : healed scar  of lymph node biopsy on the left, no residual or  recurrent adenopathy. [de-identified] : no peripheral adenopathy. [de-identified] : spleen palpable ?

## 2019-05-17 NOTE — ASSESSMENT
[FreeTextEntry1] : This is a 76-year-old white man with a remote history of non-Hodgkin's lymphoma, CLL trisomy 12 , diagnosed 2 years ago , lymphocyte doubling time around 2 years , mild anemia in part secondary to androgen deprivation .\par Mild thrombocytopenia   , asymptomatic . possibly immune mediated v/s secondary to splenomegaly  ( LUQ pain ) \par CBC today shows slight further decrease in plat and Hb  asymptomatic . \par History of paroxysmal atrial fibrillation , recurrent dizziness , awaiting cardiac work up , amiodarone discontinued .\par will check abdominal sonogram , follow up in 1 month .

## 2019-05-17 NOTE — HISTORY OF PRESENT ILLNESS
[de-identified] : this is a 76-year-old white man with multiple medical problems including coronary artery disease status post angioplasty valvular heart disease status post metallic aortic valve replacement. He is referred for abnormal hemogram noted recently, WBC is 10.6 hemoglobin 14.2 platelets 131 absolute lymphocytes 5268 chemistry is unremarkable. He denies any constitutional symptoms  specifically no fever ,weight loss ,night sweats, fatigue or  pruritus. he feels fantastic with good energy level. 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 He is scheduled for ablation for atrial fibrillation , he continues on coumadin for mechanical valve. He reports minor bruising on coumadin and plavix. He feels slight fatigue . He denies fever , weight loss or night sweats . He is also on androgen deprivation for elevated PSA , , He had previously on surveillance and had multiple prostate biopsies ( unclear if it showed cancer ) . Bone scan is allegedly negative . Last PSA is less than 1 and patient denies obstructive symptoms. \par "\par 01/10/2019 Patient returns for follow up for CLL on watchful waiting , he " feels tired all the time " , He had unsuccessful ablation for atrial fibrillation and was told to increase metoprolol . He denies B symptoms . \par \par 02/07/2019 Patient returns for follow up , he had unsuccessful ablation for paroxysmal atrial fibrillation and was placed on amiodarone , he continues on coumadin without ASA and denies abnormal bleeding , he reports occasional LUQ pain . no  B symptoms.\par \par 05/16/2019 Patient returns for follow up for CLL/SLL he reports few episodes of dizziness associated with nausea lasting for 1 to 2 hours . CT head ( non-contrast ) was negative , he had negative ENT evaluation and had long term event monitor placed 2 weeks ago without recurrence ( also discontinued amiodarone ) , CBC shows slight decrease in Hb and platelets from baseline . He continues on coumadin and plavix and denies abnormal bleeding . He continues with mild LUQ and back pain .

## 2019-05-21 ENCOUNTER — FORM ENCOUNTER (OUTPATIENT)
Age: 79
End: 2019-05-21

## 2019-05-22 ENCOUNTER — OUTPATIENT (OUTPATIENT)
Dept: OUTPATIENT SERVICES | Facility: HOSPITAL | Age: 79
LOS: 1 days | Discharge: HOME | End: 2019-05-22

## 2019-05-22 ENCOUNTER — OUTPATIENT (OUTPATIENT)
Dept: OUTPATIENT SERVICES | Facility: HOSPITAL | Age: 79
LOS: 1 days | Discharge: HOME | End: 2019-05-22
Payer: COMMERCIAL

## 2019-05-22 DIAGNOSIS — Z95.2 PRESENCE OF PROSTHETIC HEART VALVE: Chronic | ICD-10-CM

## 2019-05-22 DIAGNOSIS — Z79.01 LONG TERM (CURRENT) USE OF ANTICOAGULANTS: ICD-10-CM

## 2019-05-22 DIAGNOSIS — Z95.2 PRESENCE OF PROSTHETIC HEART VALVE: ICD-10-CM

## 2019-05-22 DIAGNOSIS — C91.90 LYMPHOID LEUKEMIA, UNSPECIFIED NOT HAVING ACHIEVED REMISSION: ICD-10-CM

## 2019-05-22 LAB
POCT INR: 2.9 RATIO — HIGH (ref 0.9–1.2)
POCT PT: 35.2 SEC — HIGH (ref 10–13.4)

## 2019-05-22 PROCEDURE — 76700 US EXAM ABDOM COMPLETE: CPT | Mod: 26

## 2019-05-29 DIAGNOSIS — Z02.9 ENCOUNTER FOR ADMINISTRATIVE EXAMINATIONS, UNSPECIFIED: ICD-10-CM

## 2019-05-29 DIAGNOSIS — C91.90 LYMPHOID LEUKEMIA, UNSPECIFIED NOT HAVING ACHIEVED REMISSION: ICD-10-CM

## 2019-05-30 ENCOUNTER — OUTPATIENT (OUTPATIENT)
Dept: OUTPATIENT SERVICES | Facility: HOSPITAL | Age: 79
LOS: 1 days | Discharge: HOME | End: 2019-05-30

## 2019-05-30 DIAGNOSIS — Z79.01 LONG TERM (CURRENT) USE OF ANTICOAGULANTS: ICD-10-CM

## 2019-05-30 DIAGNOSIS — Z95.2 PRESENCE OF PROSTHETIC HEART VALVE: Chronic | ICD-10-CM

## 2019-05-30 DIAGNOSIS — Z95.2 PRESENCE OF PROSTHETIC HEART VALVE: ICD-10-CM

## 2019-05-30 LAB
POCT INR: 3.8 RATIO — HIGH (ref 0.9–1.2)
POCT PT: 45.9 SEC — HIGH (ref 10–13.4)

## 2019-06-12 ENCOUNTER — APPOINTMENT (OUTPATIENT)
Dept: CARDIOLOGY | Facility: CLINIC | Age: 79
End: 2019-06-12
Payer: MEDICARE

## 2019-06-12 ENCOUNTER — LABORATORY RESULT (OUTPATIENT)
Age: 79
End: 2019-06-12

## 2019-06-12 ENCOUNTER — OUTPATIENT (OUTPATIENT)
Dept: OUTPATIENT SERVICES | Facility: HOSPITAL | Age: 79
LOS: 1 days | Discharge: HOME | End: 2019-06-12

## 2019-06-12 VITALS
HEART RATE: 66 BPM | DIASTOLIC BLOOD PRESSURE: 94 MMHG | SYSTOLIC BLOOD PRESSURE: 153 MMHG | HEIGHT: 70 IN | WEIGHT: 180 LBS | BODY MASS INDEX: 25.77 KG/M2

## 2019-06-12 DIAGNOSIS — Z98.890 OTHER SPECIFIED POSTPROCEDURAL STATES: ICD-10-CM

## 2019-06-12 DIAGNOSIS — Z87.898 PERSONAL HISTORY OF OTHER SPECIFIED CONDITIONS: ICD-10-CM

## 2019-06-12 DIAGNOSIS — Z95.2 PRESENCE OF PROSTHETIC HEART VALVE: Chronic | ICD-10-CM

## 2019-06-12 PROCEDURE — 93000 ELECTROCARDIOGRAM COMPLETE: CPT

## 2019-06-12 PROCEDURE — 99213 OFFICE O/P EST LOW 20 MIN: CPT

## 2019-06-12 NOTE — DISCUSSION/SUMMARY
[FreeTextEntry1] : Mr. Balderas presents today for follow up of paroxysmal AFib, AFlutter s/p ablation (RFA 12/17/18 with PVI and CTI) on coumadin. We discontinued his amiodarone about 6 weeks ago due to elevated TSH levels and complains of fatigue. He has been doing well since then and has no cardiovascular complaints. He did not follow up with endocrinology referral. I have given him a script to recheck his TSH and free T4 today. \par \par He is on coumadin for mechanical AVR with INRs being monitored by the Coumadin clinic. No s/s bleeding reported. \par \par We have also advised the patient to go to the nearest emergency room if he experiences any chest pain, dyspnea, syncope, or has any other compelling symptoms. \par \par Follow up in 6 months. \par

## 2019-06-12 NOTE — HISTORY OF PRESENT ILLNESS
[FreeTextEntry1] : \par 78 yo M with history of CAD, HTN, HL, mechanical AVR (on coumadin) paroxysmal atrial fibrillation and flutter (s/p DCCV x 2 and ablation on 12/17/18 - PVI, CTI) presenting for routine follow up. During his last visit he mentioned intermittent dizziness. He had a 4 week event monitor that showed no significant arrhythmias but occasional PACs. The patient states that he actually feels better now and even the dizziness has resolved. He denies any other cardiovascular complaints including chest pain, dyspnea, palpitations, lightheadedness, presyncope or syncope.\par \par He fell off the bed a few nights ago when he was dreaming and he hurt his left eye. Large ecchymotic area around his left eye socket. \par \par Of note, pt states he did not yet see endocrinology as we had referred him due to elevated TSH levels. He has now been off of Amio for almost 6 weeks. He also complains of back pain and is awaiting surgery next week.

## 2019-06-12 NOTE — PHYSICAL EXAM
[General Appearance - Well Developed] : well developed [Normal Appearance] : normal appearance [Well Groomed] : well groomed [General Appearance - Well Nourished] : well nourished [No Deformities] : no deformities [Normal Oral Mucosa] : normal oral mucosa [General Appearance - In No Acute Distress] : no acute distress [Normal Conjunctiva] : the conjunctiva exhibited no abnormalities [Respiration, Rhythm And Depth] : normal respiratory rhythm and effort [Exaggerated Use Of Accessory Muscles For Inspiration] : no accessory muscle use [Auscultation Breath Sounds / Voice Sounds] : lungs were clear to auscultation bilaterally [Heart Rate And Rhythm] : heart rate and rhythm were normal [Edema] : no peripheral edema present [Abdomen Tenderness] : non-tender [Abdomen Soft] : soft [Bowel Sounds] : normal bowel sounds [Nail Clubbing] : no clubbing of the fingernails [Abnormal Walk] : normal gait [Cyanosis, Localized] : no localized cyanosis [Skin Color & Pigmentation] : normal skin color and pigmentation [FreeTextEntry1] : ecchymosis around left eye [] : no rash [Oriented To Time, Place, And Person] : oriented to person, place, and time [Impaired Insight] : insight and judgment were intact [Affect] : the affect was normal [Mood] : the mood was normal [No Anxiety] : not feeling anxious

## 2019-06-12 NOTE — REASON FOR VISIT
[Follow-Up - Clinic] : a clinic follow-up of [FreeTextEntry2] : paroxysmal AFib s/p ablation [FreeTextEntry1] : \par Cardiologist: Dr. Torres

## 2019-06-13 ENCOUNTER — APPOINTMENT (OUTPATIENT)
Dept: CARDIOLOGY | Facility: CLINIC | Age: 79
End: 2019-06-13
Payer: MEDICARE

## 2019-06-13 ENCOUNTER — LABORATORY RESULT (OUTPATIENT)
Age: 79
End: 2019-06-13

## 2019-06-13 ENCOUNTER — OUTPATIENT (OUTPATIENT)
Dept: OUTPATIENT SERVICES | Facility: HOSPITAL | Age: 79
LOS: 1 days | Discharge: HOME | End: 2019-06-13

## 2019-06-13 ENCOUNTER — APPOINTMENT (OUTPATIENT)
Dept: HEMATOLOGY ONCOLOGY | Facility: CLINIC | Age: 79
End: 2019-06-13
Payer: MEDICARE

## 2019-06-13 VITALS
RESPIRATION RATE: 14 BRPM | DIASTOLIC BLOOD PRESSURE: 67 MMHG | WEIGHT: 181 LBS | SYSTOLIC BLOOD PRESSURE: 138 MMHG | HEART RATE: 70 BPM | BODY MASS INDEX: 25.91 KG/M2 | HEIGHT: 70 IN | TEMPERATURE: 98.7 F

## 2019-06-13 VITALS
HEART RATE: 69 BPM | SYSTOLIC BLOOD PRESSURE: 140 MMHG | HEIGHT: 70 IN | WEIGHT: 181 LBS | BODY MASS INDEX: 25.91 KG/M2 | DIASTOLIC BLOOD PRESSURE: 60 MMHG

## 2019-06-13 DIAGNOSIS — Z95.2 PRESENCE OF PROSTHETIC HEART VALVE: Chronic | ICD-10-CM

## 2019-06-13 DIAGNOSIS — C91.10 CHRONIC LYMPHOCYTIC LEUKEMIA OF B-CELL TYPE NOT HAVING ACHIEVED REMISSION: ICD-10-CM

## 2019-06-13 DIAGNOSIS — Z79.01 LONG TERM (CURRENT) USE OF ANTICOAGULANTS: ICD-10-CM

## 2019-06-13 DIAGNOSIS — Z95.2 PRESENCE OF PROSTHETIC HEART VALVE: ICD-10-CM

## 2019-06-13 LAB
HCT VFR BLD CALC: 34.6 %
HGB BLD-MCNC: 11.1 G/DL
MCHC RBC-ENTMCNC: 32.1 G/DL
MCHC RBC-ENTMCNC: 33.2 PG
MCV RBC AUTO: 103.6 FL
PLATELET # BLD AUTO: 75 K/UL
PMV BLD: 9.1 FL
POCT INR: 2.3 RATIO — HIGH (ref 0.9–1.2)
POCT PT: 27.4 SEC — HIGH (ref 10–13.4)
RBC # BLD: 3.34 M/UL
RBC # FLD: 14.9 %
WBC # FLD AUTO: 24.03 K/UL

## 2019-06-13 PROCEDURE — 99214 OFFICE O/P EST MOD 30 MIN: CPT

## 2019-06-13 PROCEDURE — 93000 ELECTROCARDIOGRAM COMPLETE: CPT

## 2019-06-13 NOTE — HISTORY OF PRESENT ILLNESS
[de-identified] : this is a 76-year-old white man with multiple medical problems including coronary artery disease status post angioplasty valvular heart disease status post metallic aortic valve replacement. He is referred for abnormal hemogram noted recently, WBC is 10.6 hemoglobin 14.2 platelets 131 absolute lymphocytes 5268 chemistry is unremarkable. He denies any constitutional symptoms  specifically no fever ,weight loss ,night sweats, fatigue or  pruritus. he feels fantastic with good energy level. 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 He is scheduled for ablation for atrial fibrillation , he continues on coumadin for mechanical valve. He reports minor bruising on coumadin and plavix. He feels slight fatigue . He denies fever , weight loss or night sweats . He is also on androgen deprivation for elevated PSA , , He had previously on surveillance and had multiple prostate biopsies ( unclear if it showed cancer ) . Bone scan is allegedly negative . Last PSA is less than 1 and patient denies obstructive symptoms. \par "\par 01/10/2019 Patient returns for follow up for CLL on watchful waiting , he " feels tired all the time " , He had unsuccessful ablation for atrial fibrillation and was told to increase metoprolol . He denies B symptoms . \par \par 02/07/2019 Patient returns for follow up , he had unsuccessful ablation for paroxysmal atrial fibrillation and was placed on amiodarone , he continues on coumadin without ASA and denies abnormal bleeding , he reports occasional LUQ pain . no  B symptoms.\par \par 05/16/2019 Patient returns for follow up for CLL/SLL he reports few episodes of dizziness associated with nausea lasting for 1 to 2 hours . CT head ( non-contrast ) was negative , he had negative ENT evaluation and had long term event monitor placed 2 weeks ago without recurrence ( also discontinued amiodarone ) , CBC shows slight decrease in Hb and platelets from baseline . He continues on coumadin and plavix and denies abnormal bleeding . He continues with mild LUQ and back pain . \par \par 06/13/2019 Patient returns for follow up for SLL/CLL with anemia, thrombocytopenia and marked splenomegaly ( 19cm ) . He complains of left flank and back pain . he had a trial of epidural anethesia and is scheduled for nerve block/ ablation / epidural injection . he continues on coumadin and denies any abnormal bleeding .

## 2019-06-13 NOTE — PHYSICAL EXAM
[Normal] : no peripheral adenopathy appreciated [de-identified] : Pale chronically ill in NAD .  [de-identified] : healed scar  of lymph node biopsy on the left, no residual or  recurrent adenopathy. [de-identified] : prominent aortic valve  click. [de-identified] : spleen palpable , slightly tender.  [de-identified] : no peripheral adenopathy.

## 2019-06-13 NOTE — ASSESSMENT
[FreeTextEntry1] : This is a 76-year-old white man with a remote history of non-Hodgkin's lymphoma, CLL trisomy 12 , diagnosed 2 years ago , lymphocyte doubling time around 2 years , mild anemia in part secondary to androgen deprivation .\par Mild thrombocytopenia   likely  secondary to massive splenomegaly  \par CBC today shows slight further decrease in plat and Hb  asymptomatic . \par History of paroxysmal atrial fibrillation , recurrent dizziness , awaiting cardiac work up , amiodarone discontinued .\par Mild hypothyroidism , followed by cardiology ( amiodarone induced )  \par He will return in one month . discussed CLL therapy with progressive cytopenia  possibly with leukeran given his significant comorbidities , not candidate for ibrutinib or chemo-immunotherapy .

## 2019-06-13 NOTE — ASSESSMENT
[FreeTextEntry1] : The patient is going for epidural injections for low back pain  . The patient is an intermediate cardiac risk undergoing a minor risk procedure. He has had a remote stent in his LAD in the past. . He has had a mechanical AVR . He is on Coumadin . the patent is stable from the cardaic standpoint for this procedure. If possible would do the procedure on Couadin . It there is an unacceptable bleeding risk with Coumadin  he would then need to be bridged with LMWH. He also has a platelet count of 85,000.Please take this in mind with this procedure .   He has been referred to neurology for unsteadiness .

## 2019-06-13 NOTE — PHYSICAL EXAM
[Normal Appearance] : normal appearance [General Appearance - Well Developed] : well developed [General Appearance - Well Nourished] : well nourished [Well Groomed] : well groomed [No Deformities] : no deformities [Normal Conjunctiva] : the conjunctiva exhibited no abnormalities [General Appearance - In No Acute Distress] : no acute distress [Normal Oral Mucosa] : normal oral mucosa [Eyelids - No Xanthelasma] : the eyelids demonstrated no xanthelasmas [No Oral Cyanosis] : no oral cyanosis [No Oral Pallor] : no oral pallor [Respiration, Rhythm And Depth] : normal respiratory rhythm and effort [Exaggerated Use Of Accessory Muscles For Inspiration] : no accessory muscle use [Abdomen Tenderness] : non-tender [Abdomen Soft] : soft [Abnormal Walk] : normal gait [Abdomen Mass (___ Cm)] : no abdominal mass palpated [Gait - Sufficient For Exercise Testing] : the gait was sufficient for exercise testing [Nail Clubbing] : no clubbing of the fingernails [Cyanosis, Localized] : no localized cyanosis [Petechial Hemorrhages (___cm)] : no petechial hemorrhages [] : no rash [Skin Color & Pigmentation] : normal skin color and pigmentation [Skin Lesions] : no skin lesions [No Venous Stasis] : no venous stasis [No Skin Ulcers] : no skin ulcer [Irregularly Irregular] : irregularly irregular [No Xanthoma] : no  xanthoma was observed [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  [Rt] : no varicose veins of the right leg

## 2019-06-13 NOTE — HISTORY OF PRESENT ILLNESS
[FreeTextEntry1] : The patient has had no further dizziness. He is going for low back injections. The patien thas nothad SOB . He has maintain NSR .

## 2019-06-20 ENCOUNTER — OUTPATIENT (OUTPATIENT)
Dept: OUTPATIENT SERVICES | Facility: HOSPITAL | Age: 79
LOS: 1 days | Discharge: HOME | End: 2019-06-20

## 2019-06-20 DIAGNOSIS — Z95.2 PRESENCE OF PROSTHETIC HEART VALVE: Chronic | ICD-10-CM

## 2019-06-20 DIAGNOSIS — Z95.2 PRESENCE OF PROSTHETIC HEART VALVE: ICD-10-CM

## 2019-06-20 DIAGNOSIS — Z79.01 LONG TERM (CURRENT) USE OF ANTICOAGULANTS: ICD-10-CM

## 2019-06-20 LAB
BASOPHILS # BLD AUTO: 0.2 K/UL
BASOPHILS NFR BLD AUTO: 1 %
EOSINOPHIL # BLD AUTO: 0.61 K/UL
EOSINOPHIL NFR BLD AUTO: 3 %
HCT VFR BLD CALC: 39.8 %
HGB BLD-MCNC: 12.8 G/DL
LYMPHOCYTES # BLD AUTO: 12.9 K/UL
LYMPHOCYTES NFR BLD AUTO: 63 %
MAN DIFF?: NORMAL
MCHC RBC-ENTMCNC: 32.2 G/DL
MCHC RBC-ENTMCNC: 33.2 PG
MCV RBC AUTO: 103.1 FL
MONOCYTES # BLD AUTO: 0.2 K/UL
MONOCYTES NFR BLD AUTO: 1 %
NEUTROPHILS # BLD AUTO: 5.12 K/UL
NEUTROPHILS NFR BLD AUTO: 25 %
PLATELET # BLD AUTO: 116 K/UL
POCT INR: 2.1 RATIO — HIGH (ref 0.9–1.2)
POCT PT: 25 SEC — HIGH (ref 10–13.4)
RBC # BLD: 3.86 M/UL
RBC # FLD: 14 %
WBC # FLD AUTO: 20.47 K/UL

## 2019-06-26 ENCOUNTER — OUTPATIENT (OUTPATIENT)
Dept: OUTPATIENT SERVICES | Facility: HOSPITAL | Age: 79
LOS: 1 days | Discharge: HOME | End: 2019-06-26

## 2019-06-26 DIAGNOSIS — Z79.01 LONG TERM (CURRENT) USE OF ANTICOAGULANTS: ICD-10-CM

## 2019-06-26 DIAGNOSIS — Z95.2 PRESENCE OF PROSTHETIC HEART VALVE: ICD-10-CM

## 2019-06-26 DIAGNOSIS — Z95.2 PRESENCE OF PROSTHETIC HEART VALVE: Chronic | ICD-10-CM

## 2019-06-26 LAB
POCT INR: 3 RATIO — HIGH (ref 0.9–1.2)
POCT PT: 35.8 SEC — HIGH (ref 10–13.4)

## 2019-07-11 ENCOUNTER — APPOINTMENT (OUTPATIENT)
Dept: HEMATOLOGY ONCOLOGY | Facility: CLINIC | Age: 79
End: 2019-07-11
Payer: MEDICARE

## 2019-07-11 ENCOUNTER — OUTPATIENT (OUTPATIENT)
Dept: OUTPATIENT SERVICES | Facility: HOSPITAL | Age: 79
LOS: 1 days | Discharge: HOME | End: 2019-07-11

## 2019-07-11 ENCOUNTER — LABORATORY RESULT (OUTPATIENT)
Age: 79
End: 2019-07-11

## 2019-07-11 DIAGNOSIS — Z79.01 LONG TERM (CURRENT) USE OF ANTICOAGULANTS: ICD-10-CM

## 2019-07-11 DIAGNOSIS — Z95.2 PRESENCE OF PROSTHETIC HEART VALVE: ICD-10-CM

## 2019-07-11 DIAGNOSIS — Z95.2 PRESENCE OF PROSTHETIC HEART VALVE: Chronic | ICD-10-CM

## 2019-07-11 LAB
POCT INR: 3.3 RATIO — HIGH (ref 0.9–1.2)
POCT PT: 39.5 SEC — HIGH (ref 10–13.4)

## 2019-07-11 PROCEDURE — 99214 OFFICE O/P EST MOD 30 MIN: CPT

## 2019-07-11 NOTE — PHYSICAL EXAM
[Normal] : no peripheral adenopathy appreciated [de-identified] : Pale chronically ill in NAD .  [de-identified] : healed scar  of lymph node biopsy on the left, no residual or  recurrent adenopathy. [de-identified] : prominent aortic valve  click. [de-identified] : spleen palpable , slightly tender.  [de-identified] : no peripheral adenopathy.

## 2019-07-11 NOTE — HISTORY OF PRESENT ILLNESS
[de-identified] : this is a 76-year-old white man with multiple medical problems including coronary artery disease status post angioplasty valvular heart disease status post metallic aortic valve replacement. He is referred for abnormal hemogram noted recently, WBC is 10.6 hemoglobin 14.2 platelets 131 absolute lymphocytes 5268 chemistry is unremarkable. He denies any constitutional symptoms  specifically no fever ,weight loss ,night sweats, fatigue or  pruritus. he feels fantastic with good energy level. 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 He is scheduled for ablation for atrial fibrillation , he continues on coumadin for mechanical valve. He reports minor bruising on coumadin and plavix. He feels slight fatigue . He denies fever , weight loss or night sweats . He is also on androgen deprivation for elevated PSA , , He had previously on surveillance and had multiple prostate biopsies ( unclear if it showed cancer ) . Bone scan is allegedly negative . Last PSA is less than 1 and patient denies obstructive symptoms. \par "\par 01/10/2019 Patient returns for follow up for CLL on watchful waiting , he " feels tired all the time " , He had unsuccessful ablation for atrial fibrillation and was told to increase metoprolol . He denies B symptoms . \par \par 02/07/2019 Patient returns for follow up , he had unsuccessful ablation for paroxysmal atrial fibrillation and was placed on amiodarone , he continues on coumadin without ASA and denies abnormal bleeding , he reports occasional LUQ pain . no  B symptoms.\par \par 05/16/2019 Patient returns for follow up for CLL/SLL he reports few episodes of dizziness associated with nausea lasting for 1 to 2 hours . CT head ( non-contrast ) was negative , he had negative ENT evaluation and had long term event monitor placed 2 weeks ago without recurrence ( also discontinued amiodarone ) , CBC shows slight decrease in Hb and platelets from baseline . He continues on coumadin and plavix and denies abnormal bleeding . He continues with mild LUQ and back pain . \par \par 06/13/2019 Patient returns for follow up for SLL/CLL with anemia, thrombocytopenia and marked splenomegaly ( 19cm ) . He complains of left flank and back pain . he had a trial of epidural anethesia and is scheduled for nerve block/ ablation / epidural injection . he continues on coumadin and denies any abnormal bleeding . \par \par 07/11/2019 Patient returns with persistent LUQ discomfort , back pain despite epidural injections . no bleeding , fever or night sweats .

## 2019-07-11 NOTE — ASSESSMENT
[FreeTextEntry1] : This is a 76-year-old white man with a remote history of non-Hodgkin's lymphoma, CLL trisomy 12 ,\par  massive splenomegaly  \par progressive anemia and thrombocytopenia .\par History of paroxysmal atrial fibrillation , recurrent dizziness , awaiting cardiac work up , amiodarone discontinued .\par Mild hypothyroidism , followed by cardiology ( amiodarone induced )  \par \par plan : repeat FISH , IGVH , start treatment ASAP with venetoclax or leukeran +/- antibody ,

## 2019-07-15 LAB
ALBUMIN SERPL ELPH-MCNC: 4 G/DL
ALP BLD-CCNC: 59 U/L
ALT SERPL-CCNC: 11 U/L
ANION GAP SERPL CALC-SCNC: 11 MMOL/L
AST SERPL-CCNC: 22 U/L
BILIRUB SERPL-MCNC: 0.4 MG/DL
BUN SERPL-MCNC: 15 MG/DL
CALCIUM SERPL-MCNC: 9.5 MG/DL
CHLORIDE SERPL-SCNC: 107 MMOL/L
CO2 SERPL-SCNC: 26 MMOL/L
CREAT SERPL-MCNC: 0.9 MG/DL
GLUCOSE SERPL-MCNC: 88 MG/DL
HCT VFR BLD CALC: 33.7 %
HGB BLD-MCNC: 10.7 G/DL
LDH SERPL-CCNC: 474 U/L
MCHC RBC-ENTMCNC: 31.8 G/DL
MCHC RBC-ENTMCNC: 33.8 PG
MCV RBC AUTO: 106.3 FL
PLATELET # BLD AUTO: 61 K/UL
PMV BLD: 9.1 FL
POTASSIUM SERPL-SCNC: 4.5 MMOL/L
PROT SERPL-MCNC: 6.6 G/DL
RBC # BLD: 3.17 M/UL
RBC # FLD: 14.4 %
SODIUM SERPL-SCNC: 144 MMOL/L
TSH SERPL-ACNC: 7.42 UIU/ML
WBC # FLD AUTO: 21.66 K/UL

## 2019-07-19 ENCOUNTER — APPOINTMENT (OUTPATIENT)
Dept: HEMATOLOGY ONCOLOGY | Facility: CLINIC | Age: 79
End: 2019-07-19
Payer: MEDICARE

## 2019-07-19 VITALS
WEIGHT: 181 LBS | BODY MASS INDEX: 25.91 KG/M2 | HEART RATE: 67 BPM | DIASTOLIC BLOOD PRESSURE: 69 MMHG | HEIGHT: 70 IN | RESPIRATION RATE: 14 BRPM | SYSTOLIC BLOOD PRESSURE: 160 MMHG | TEMPERATURE: 97.1 F

## 2019-07-19 PROCEDURE — 99215 OFFICE O/P EST HI 40 MIN: CPT

## 2019-07-22 NOTE — ASSESSMENT
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 \par He is recommended to start on obinotuzumab and venetoclax . We discussed the regimen in detail , potential adverse effects including tumor lysis ( low risk ) , bleeding , infusion reaction , infections due to B cell depletion , bleeding due to low platelets , coumadin and plavix . \par Antibody loading dose on day 1,2  8 and 15 followed by monthly infusion 1000mg X 6 . venetoclax ramp up starting on day 22 . monitor for tumor lysis , adequate po hydration . allopurinol. \par close INR monitoring on coumadin.\par \par major drug interaction include : AZOLES ( fluconazole...) seizure med , cipro , amoxil , erythromycin , clarithromycin ,quinidine , amiodarone ,diltiazem, omeprazole , dexamethasone , warfarin .\par

## 2019-07-22 NOTE — HISTORY OF PRESENT ILLNESS
[de-identified] : this is a 76-year-old white man with multiple medical problems including coronary artery disease status post angioplasty valvular heart disease status post metallic aortic valve replacement. He is referred for abnormal hemogram noted recently, WBC is 10.6 hemoglobin 14.2 platelets 131 absolute lymphocytes 5268 chemistry is unremarkable. He denies any constitutional symptoms  specifically no fever ,weight loss ,night sweats, fatigue or  pruritus. he feels fantastic with good energy level. 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 He is scheduled for ablation for atrial fibrillation , he continues on coumadin for mechanical valve. He reports minor bruising on coumadin and plavix. He feels slight fatigue . He denies fever , weight loss or night sweats . He is also on androgen deprivation for elevated PSA , , He had previously on surveillance and had multiple prostate biopsies ( unclear if it showed cancer ) . Bone scan is allegedly negative . Last PSA is less than 1 and patient denies obstructive symptoms. \par "\par 01/10/2019 Patient returns for follow up for CLL on watchful waiting , he " feels tired all the time " , He had unsuccessful ablation for atrial fibrillation and was told to increase metoprolol . He denies B symptoms . \par \par 02/07/2019 Patient returns for follow up , he had unsuccessful ablation for paroxysmal atrial fibrillation and was placed on amiodarone , he continues on coumadin without ASA and denies abnormal bleeding , he reports occasional LUQ pain . no  B symptoms.\par \par 05/16/2019 Patient returns for follow up for CLL/SLL he reports few episodes of dizziness associated with nausea lasting for 1 to 2 hours . CT head ( non-contrast ) was negative , he had negative ENT evaluation and had long term event monitor placed 2 weeks ago without recurrence ( also discontinued amiodarone ) , CBC shows slight decrease in Hb and platelets from baseline . He continues on coumadin and plavix and denies abnormal bleeding . He continues with mild LUQ and back pain . \par \par 06/13/2019 Patient returns for follow up for SLL/CLL with anemia, thrombocytopenia and marked splenomegaly ( 19cm ) . He complains of left flank and back pain . he had a trial of epidural anethesia and is scheduled for nerve block/ ablation / epidural injection . he continues on coumadin and denies any abnormal bleeding . \par \par 07/11/2019 Patient returns with persistent LUQ discomfort , back pain despite epidural injections . 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 He is relatively asymptomatic except for mild left flank pain . no significant bleeding on coumadin and plavix . Given his age , multiple co-morbidities and high risk features , the regimen of choice is combination of venetoclax and obinotuzumab .

## 2019-07-22 NOTE — PHYSICAL EXAM
[Normal] : no peripheral adenopathy appreciated [de-identified] : Pale chronically ill in NAD .  [de-identified] : healed scar  of lymph node biopsy on the left, no residual or  recurrent adenopathy. [de-identified] : spleen palpable , slightly tender.  [de-identified] : prominent aortic valve  click. [de-identified] : no peripheral adenopathy.

## 2019-07-23 ENCOUNTER — RX RENEWAL (OUTPATIENT)
Age: 79
End: 2019-07-23

## 2019-07-23 ENCOUNTER — APPOINTMENT (OUTPATIENT)
Dept: HEMATOLOGY ONCOLOGY | Facility: CLINIC | Age: 79
End: 2019-07-23

## 2019-07-23 ENCOUNTER — LABORATORY RESULT (OUTPATIENT)
Age: 79
End: 2019-07-23

## 2019-07-23 LAB
ALBUMIN SERPL ELPH-MCNC: 4.2 G/DL
ALP BLD-CCNC: 67 U/L
ALT SERPL-CCNC: 10 U/L
ANION GAP SERPL CALC-SCNC: 11 MMOL/L
AST SERPL-CCNC: 23 U/L
BILIRUB DIRECT SERPL-MCNC: 0.2 MG/DL
BILIRUB INDIRECT SERPL-MCNC: 0.3 MG/DL
BILIRUB SERPL-MCNC: 0.5 MG/DL
BUN SERPL-MCNC: 18 MG/DL
CALCIUM SERPL-MCNC: 9.8 MG/DL
CHLORIDE SERPL-SCNC: 104 MMOL/L
CO2 SERPL-SCNC: 29 MMOL/L
CREAT SERPL-MCNC: 1.2 MG/DL
GLUCOSE SERPL-MCNC: 127 MG/DL
HCT VFR BLD CALC: 36.5 %
HGB BLD-MCNC: 11.8 G/DL
MCHC RBC-ENTMCNC: 32.3 G/DL
MCHC RBC-ENTMCNC: 33.8 PG
MCV RBC AUTO: 104.6 FL
PLATELET # BLD AUTO: 75 K/UL
PMV BLD: 8.8 FL
POTASSIUM SERPL-SCNC: 4.6 MMOL/L
PROT SERPL-MCNC: 7 G/DL
RBC # BLD: 3.49 M/UL
RBC # FLD: 14.3 %
SODIUM SERPL-SCNC: 144 MMOL/L
WBC # FLD AUTO: 26.07 K/UL

## 2019-07-25 ENCOUNTER — LABORATORY RESULT (OUTPATIENT)
Age: 79
End: 2019-07-25

## 2019-07-25 ENCOUNTER — APPOINTMENT (OUTPATIENT)
Dept: INFUSION THERAPY | Facility: CLINIC | Age: 79
End: 2019-07-25

## 2019-07-25 LAB
ANION GAP SERPL CALC-SCNC: 13 MMOL/L
BUN SERPL-MCNC: 19 MG/DL
CALCIUM SERPL-MCNC: 9.3 MG/DL
CHLORIDE SERPL-SCNC: 102 MMOL/L
CO2 SERPL-SCNC: 22 MMOL/L
CREAT SERPL-MCNC: 1.1 MG/DL
GLUCOSE SERPL-MCNC: 149 MG/DL
HCT VFR BLD CALC: 36.5 %
HGB BLD-MCNC: 11.4 G/DL
IGVH MUTATION ANALYSIS: POSITIVE
MCHC RBC-ENTMCNC: 31.2 G/DL
MCHC RBC-ENTMCNC: 32.8 PG
MCV RBC AUTO: 104.9 FL
PLATELET # BLD AUTO: 37 K/UL
PMV BLD: 9.7 FL
POTASSIUM SERPL-SCNC: 4.5 MMOL/L
RBC # BLD: 3.48 M/UL
RBC # FLD: 13.9 %
SODIUM SERPL-SCNC: 137 MMOL/L
URATE SERPL-MCNC: 5.7 MG/DL
WBC # FLD AUTO: 2 K/UL

## 2019-07-25 RX ORDER — ACETAMINOPHEN 500 MG
650 TABLET ORAL ONCE
Refills: 0 | Status: COMPLETED | OUTPATIENT
Start: 2019-07-25 | End: 2019-07-25

## 2019-07-25 RX ORDER — OBINUTUZUMAB 1000 MG/40ML
100 INJECTION, SOLUTION, CONCENTRATE INTRAVENOUS ONCE
Refills: 0 | Status: COMPLETED | OUTPATIENT
Start: 2019-07-25 | End: 2019-07-25

## 2019-07-25 RX ORDER — DEXAMETHASONE 0.5 MG/5ML
20 ELIXIR ORAL ONCE
Refills: 0 | Status: COMPLETED | OUTPATIENT
Start: 2019-07-25 | End: 2019-07-25

## 2019-07-25 RX ORDER — ALLOPURINOL 300 MG
300 TABLET ORAL ONCE
Refills: 0 | Status: COMPLETED | OUTPATIENT
Start: 2019-07-25 | End: 2019-07-25

## 2019-07-25 RX ORDER — DIPHENHYDRAMINE HCL 50 MG
50 CAPSULE ORAL ONCE
Refills: 0 | Status: COMPLETED | OUTPATIENT
Start: 2019-07-25 | End: 2019-07-25

## 2019-07-25 RX ADMIN — OBINUTUZUMAB 100 MILLIGRAM(S): 1000 INJECTION, SOLUTION, CONCENTRATE INTRAVENOUS at 16:30

## 2019-07-25 RX ADMIN — Medication 650 MILLIGRAM(S): at 09:52

## 2019-07-25 RX ADMIN — OBINUTUZUMAB 26 MILLIGRAM(S): 1000 INJECTION, SOLUTION, CONCENTRATE INTRAVENOUS at 11:27

## 2019-07-25 RX ADMIN — Medication 153 MILLIGRAM(S): at 10:08

## 2019-07-25 RX ADMIN — Medication 50 MILLIGRAM(S): at 10:25

## 2019-07-25 RX ADMIN — Medication 165 MILLIGRAM(S): at 09:50

## 2019-07-25 RX ADMIN — Medication 20 MILLIGRAM(S): at 10:20

## 2019-07-25 RX ADMIN — Medication 300 MILLIGRAM(S): at 09:56

## 2019-07-25 RX ADMIN — Medication 650 MILLIGRAM(S): at 09:50

## 2019-07-26 ENCOUNTER — LABORATORY RESULT (OUTPATIENT)
Age: 79
End: 2019-07-26

## 2019-07-26 ENCOUNTER — APPOINTMENT (OUTPATIENT)
Dept: INFUSION THERAPY | Facility: CLINIC | Age: 79
End: 2019-07-26

## 2019-07-26 RX ORDER — DEXAMETHASONE 0.5 MG/5ML
20 ELIXIR ORAL ONCE
Refills: 0 | Status: COMPLETED | OUTPATIENT
Start: 2019-07-26 | End: 2019-07-26

## 2019-07-26 RX ORDER — ONDANSETRON 8 MG/1
16 TABLET, FILM COATED ORAL ONCE
Refills: 0 | Status: COMPLETED | OUTPATIENT
Start: 2019-07-26 | End: 2019-07-26

## 2019-07-26 RX ORDER — OBINUTUZUMAB 1000 MG/40ML
900 INJECTION, SOLUTION, CONCENTRATE INTRAVENOUS ONCE
Refills: 0 | Status: COMPLETED | OUTPATIENT
Start: 2019-07-26 | End: 2019-07-26

## 2019-07-26 RX ORDER — ACETAMINOPHEN 500 MG
650 TABLET ORAL ONCE
Refills: 0 | Status: COMPLETED | OUTPATIENT
Start: 2019-07-26 | End: 2019-07-26

## 2019-07-26 RX ORDER — DIPHENHYDRAMINE HCL 50 MG
50 CAPSULE ORAL ONCE
Refills: 0 | Status: COMPLETED | OUTPATIENT
Start: 2019-07-26 | End: 2019-07-26

## 2019-07-26 RX ADMIN — OBINUTUZUMAB 47.67 MILLIGRAM(S): 1000 INJECTION, SOLUTION, CONCENTRATE INTRAVENOUS at 12:23

## 2019-07-26 RX ADMIN — Medication 165 MILLIGRAM(S): at 10:18

## 2019-07-26 RX ADMIN — Medication 153 MILLIGRAM(S): at 11:35

## 2019-07-26 RX ADMIN — Medication 20 MILLIGRAM(S): at 10:45

## 2019-07-26 RX ADMIN — Medication 650 MILLIGRAM(S): at 11:35

## 2019-07-26 RX ADMIN — ONDANSETRON 116 MILLIGRAM(S): 8 TABLET, FILM COATED ORAL at 15:07

## 2019-07-26 RX ADMIN — Medication 50 MILLIGRAM(S): at 11:50

## 2019-07-26 RX ADMIN — Medication 650 MILLIGRAM(S): at 11:36

## 2019-07-26 RX ADMIN — Medication 50 MILLIGRAM(S): at 15:08

## 2019-07-26 RX ADMIN — ONDANSETRON 16 MILLIGRAM(S): 8 TABLET, FILM COATED ORAL at 15:20

## 2019-07-27 LAB
ANION GAP SERPL CALC-SCNC: 16 MMOL/L
BUN SERPL-MCNC: 22 MG/DL
CALCIUM SERPL-MCNC: 9.4 MG/DL
CHLORIDE SERPL-SCNC: 103 MMOL/L
CO2 SERPL-SCNC: 22 MMOL/L
CREAT SERPL-MCNC: 0.9 MG/DL
GLUCOSE SERPL-MCNC: 145 MG/DL
HBV CORE IGM SER QL: NONREACTIVE
HBV SURFACE AB SER QL: REACTIVE
HBV SURFACE AG SER QL: NONREACTIVE
HCT VFR BLD CALC: 33 %
HGB BLD-MCNC: 10.8 G/DL
MCHC RBC-ENTMCNC: 32.7 G/DL
MCHC RBC-ENTMCNC: 33.2 PG
MCV RBC AUTO: 101.5 FL
PLATELET # BLD AUTO: 58 K/UL
PMV BLD: 10.1 FL
POTASSIUM SERPL-SCNC: 4.2 MMOL/L
RBC # BLD: 3.25 M/UL
RBC # FLD: 13.7 %
SODIUM SERPL-SCNC: 141 MMOL/L
URATE SERPL-MCNC: 5.6 MG/DL
WBC # FLD AUTO: 17.65 K/UL

## 2019-08-01 ENCOUNTER — APPOINTMENT (OUTPATIENT)
Dept: INFUSION THERAPY | Facility: CLINIC | Age: 79
End: 2019-08-01

## 2019-08-01 ENCOUNTER — LABORATORY RESULT (OUTPATIENT)
Age: 79
End: 2019-08-01

## 2019-08-01 VITALS
SYSTOLIC BLOOD PRESSURE: 134 MMHG | RESPIRATION RATE: 18 BRPM | TEMPERATURE: 96.8 F | DIASTOLIC BLOOD PRESSURE: 70 MMHG | HEART RATE: 64 BPM

## 2019-08-01 LAB
HCT VFR BLD CALC: 39.5 %
HCV RNA SERPL NAA DL=5-ACNC: NOT DETECTED IU/ML
HCV RNA SERPL NAA+PROBE-LOG IU: NOT DETECTED LOGIU/ML
HGB BLD-MCNC: 12.8 G/DL
MCHC RBC-ENTMCNC: 32.4 G/DL
MCHC RBC-ENTMCNC: 33.1 PG
MCV RBC AUTO: 102.1 FL
PLATELET # BLD AUTO: 51 K/UL
PMV BLD: 10 FL
RBC # BLD: 3.87 M/UL
RBC # FLD: 13.4 %
WBC # FLD AUTO: 3.69 K/UL

## 2019-08-01 RX ORDER — DIPHENHYDRAMINE HCL 50 MG
50 CAPSULE ORAL ONCE
Refills: 0 | Status: COMPLETED | OUTPATIENT
Start: 2019-08-01 | End: 2019-08-01

## 2019-08-01 RX ORDER — OBINUTUZUMAB 1000 MG/40ML
500 INJECTION, SOLUTION, CONCENTRATE INTRAVENOUS ONCE
Refills: 0 | Status: COMPLETED | OUTPATIENT
Start: 2019-08-01 | End: 2019-08-01

## 2019-08-01 RX ORDER — DEXAMETHASONE 0.5 MG/5ML
20 ELIXIR ORAL ONCE
Refills: 0 | Status: COMPLETED | OUTPATIENT
Start: 2019-08-01 | End: 2019-08-01

## 2019-08-01 RX ORDER — ACETAMINOPHEN 500 MG
650 TABLET ORAL ONCE
Refills: 0 | Status: COMPLETED | OUTPATIENT
Start: 2019-08-01 | End: 2019-08-01

## 2019-08-01 RX ADMIN — Medication 20 MILLIGRAM(S): at 11:35

## 2019-08-01 RX ADMIN — OBINUTUZUMAB 500 MILLIGRAM(S): 1000 INJECTION, SOLUTION, CONCENTRATE INTRAVENOUS at 17:05

## 2019-08-01 RX ADMIN — Medication 650 MILLIGRAM(S): at 10:56

## 2019-08-01 RX ADMIN — OBINUTUZUMAB 50 MILLIGRAM(S): 1000 INJECTION, SOLUTION, CONCENTRATE INTRAVENOUS at 12:22

## 2019-08-01 RX ADMIN — Medication 165 MILLIGRAM(S): at 10:56

## 2019-08-01 RX ADMIN — Medication 153 MILLIGRAM(S): at 10:56

## 2019-08-01 RX ADMIN — Medication 650 MILLIGRAM(S): at 10:55

## 2019-08-01 RX ADMIN — Medication 50 MILLIGRAM(S): at 12:25

## 2019-08-02 ENCOUNTER — OUTPATIENT (OUTPATIENT)
Dept: OUTPATIENT SERVICES | Facility: HOSPITAL | Age: 79
LOS: 1 days | Discharge: HOME | End: 2019-08-02

## 2019-08-02 ENCOUNTER — APPOINTMENT (OUTPATIENT)
Dept: INFUSION THERAPY | Facility: CLINIC | Age: 79
End: 2019-08-02

## 2019-08-02 VITALS
SYSTOLIC BLOOD PRESSURE: 129 MMHG | HEART RATE: 64 BPM | DIASTOLIC BLOOD PRESSURE: 83 MMHG | RESPIRATION RATE: 18 BRPM | TEMPERATURE: 97.1 F

## 2019-08-02 DIAGNOSIS — Z79.01 LONG TERM (CURRENT) USE OF ANTICOAGULANTS: ICD-10-CM

## 2019-08-02 DIAGNOSIS — Z95.2 PRESENCE OF PROSTHETIC HEART VALVE: ICD-10-CM

## 2019-08-02 DIAGNOSIS — Z95.2 PRESENCE OF PROSTHETIC HEART VALVE: Chronic | ICD-10-CM

## 2019-08-02 LAB
POCT INR: 1.6 RATIO — HIGH (ref 0.9–1.2)
POCT PT: 19.6 SEC — HIGH (ref 10–13.4)

## 2019-08-02 RX ORDER — DEXAMETHASONE 0.5 MG/5ML
20 ELIXIR ORAL ONCE
Refills: 0 | Status: COMPLETED | OUTPATIENT
Start: 2019-08-02 | End: 2019-08-02

## 2019-08-02 RX ORDER — OBINUTUZUMAB 1000 MG/40ML
500 INJECTION, SOLUTION, CONCENTRATE INTRAVENOUS ONCE
Refills: 0 | Status: COMPLETED | OUTPATIENT
Start: 2019-08-02 | End: 2019-08-02

## 2019-08-02 RX ORDER — ACETAMINOPHEN 500 MG
650 TABLET ORAL ONCE
Refills: 0 | Status: COMPLETED | OUTPATIENT
Start: 2019-08-02 | End: 2019-08-02

## 2019-08-02 RX ORDER — DIPHENHYDRAMINE HCL 50 MG
50 CAPSULE ORAL ONCE
Refills: 0 | Status: COMPLETED | OUTPATIENT
Start: 2019-08-02 | End: 2019-08-02

## 2019-08-02 RX ADMIN — OBINUTUZUMAB 500 MILLIGRAM(S): 1000 INJECTION, SOLUTION, CONCENTRATE INTRAVENOUS at 16:15

## 2019-08-02 RX ADMIN — Medication 650 MILLIGRAM(S): at 11:02

## 2019-08-02 RX ADMIN — Medication 50 MILLIGRAM(S): at 12:00

## 2019-08-02 RX ADMIN — Medication 20 MILLIGRAM(S): at 11:11

## 2019-08-02 RX ADMIN — OBINUTUZUMAB 50 MILLIGRAM(S): 1000 INJECTION, SOLUTION, CONCENTRATE INTRAVENOUS at 12:31

## 2019-08-02 RX ADMIN — Medication 102 MILLIGRAM(S): at 11:02

## 2019-08-02 RX ADMIN — Medication 165 MILLIGRAM(S): at 11:02

## 2019-08-03 ENCOUNTER — OUTPATIENT (OUTPATIENT)
Dept: OUTPATIENT SERVICES | Facility: HOSPITAL | Age: 79
LOS: 1 days | Discharge: HOME | End: 2019-08-03

## 2019-08-03 DIAGNOSIS — Z79.01 LONG TERM (CURRENT) USE OF ANTICOAGULANTS: ICD-10-CM

## 2019-08-03 DIAGNOSIS — Z95.2 PRESENCE OF PROSTHETIC HEART VALVE: ICD-10-CM

## 2019-08-03 DIAGNOSIS — Z95.2 PRESENCE OF PROSTHETIC HEART VALVE: Chronic | ICD-10-CM

## 2019-08-03 LAB
POCT INR: 2 RATIO — HIGH (ref 0.9–1.2)
POCT PT: 23.5 SEC — HIGH (ref 10–13.4)

## 2019-08-08 ENCOUNTER — LABORATORY RESULT (OUTPATIENT)
Age: 79
End: 2019-08-08

## 2019-08-08 ENCOUNTER — APPOINTMENT (OUTPATIENT)
Dept: INFUSION THERAPY | Facility: CLINIC | Age: 79
End: 2019-08-08

## 2019-08-08 ENCOUNTER — OUTPATIENT (OUTPATIENT)
Dept: OUTPATIENT SERVICES | Facility: HOSPITAL | Age: 79
LOS: 1 days | Discharge: HOME | End: 2019-08-08

## 2019-08-08 DIAGNOSIS — Z79.01 LONG TERM (CURRENT) USE OF ANTICOAGULANTS: ICD-10-CM

## 2019-08-08 DIAGNOSIS — Z95.2 PRESENCE OF PROSTHETIC HEART VALVE: ICD-10-CM

## 2019-08-08 DIAGNOSIS — Z95.2 PRESENCE OF PROSTHETIC HEART VALVE: Chronic | ICD-10-CM

## 2019-08-08 LAB
POCT INR: 2.2 RATIO — HIGH (ref 0.9–1.2)
POCT PT: 26.3 SEC — HIGH (ref 10–13.4)

## 2019-08-08 RX ORDER — OBINUTUZUMAB 1000 MG/40ML
500 INJECTION, SOLUTION, CONCENTRATE INTRAVENOUS ONCE
Refills: 0 | Status: COMPLETED | OUTPATIENT
Start: 2019-08-08 | End: 2019-08-08

## 2019-08-08 RX ORDER — DEXAMETHASONE 0.5 MG/5ML
20 ELIXIR ORAL ONCE
Refills: 0 | Status: COMPLETED | OUTPATIENT
Start: 2019-08-08 | End: 2019-08-08

## 2019-08-08 RX ORDER — ACETAMINOPHEN 500 MG
650 TABLET ORAL ONCE
Refills: 0 | Status: COMPLETED | OUTPATIENT
Start: 2019-08-08 | End: 2019-08-08

## 2019-08-08 RX ORDER — DIPHENHYDRAMINE HCL 50 MG
50 CAPSULE ORAL ONCE
Refills: 0 | Status: COMPLETED | OUTPATIENT
Start: 2019-08-08 | End: 2019-08-08

## 2019-08-08 RX ADMIN — Medication 650 MILLIGRAM(S): at 10:09

## 2019-08-08 RX ADMIN — Medication 102 MILLIGRAM(S): at 10:09

## 2019-08-08 RX ADMIN — OBINUTUZUMAB 50 MILLIGRAM(S): 1000 INJECTION, SOLUTION, CONCENTRATE INTRAVENOUS at 11:08

## 2019-08-08 RX ADMIN — Medication 165 MILLIGRAM(S): at 10:09

## 2019-08-09 ENCOUNTER — APPOINTMENT (OUTPATIENT)
Dept: INFUSION THERAPY | Facility: CLINIC | Age: 79
End: 2019-08-09

## 2019-08-09 RX ORDER — DIPHENHYDRAMINE HCL 50 MG
50 CAPSULE ORAL ONCE
Refills: 0 | Status: COMPLETED | OUTPATIENT
Start: 2019-08-09 | End: 2019-08-09

## 2019-08-09 RX ORDER — DEXAMETHASONE 0.5 MG/5ML
20 ELIXIR ORAL ONCE
Refills: 0 | Status: COMPLETED | OUTPATIENT
Start: 2019-08-09 | End: 2019-08-09

## 2019-08-09 RX ORDER — OBINUTUZUMAB 1000 MG/40ML
500 INJECTION, SOLUTION, CONCENTRATE INTRAVENOUS ONCE
Refills: 0 | Status: COMPLETED | OUTPATIENT
Start: 2019-08-09 | End: 2019-08-09

## 2019-08-09 RX ORDER — ACETAMINOPHEN 500 MG
650 TABLET ORAL ONCE
Refills: 0 | Status: COMPLETED | OUTPATIENT
Start: 2019-08-09 | End: 2019-08-09

## 2019-08-09 RX ADMIN — Medication 50 MILLIGRAM(S): at 09:50

## 2019-08-09 RX ADMIN — OBINUTUZUMAB 50 MILLIGRAM(S): 1000 INJECTION, SOLUTION, CONCENTRATE INTRAVENOUS at 11:10

## 2019-08-09 RX ADMIN — Medication 165 MILLIGRAM(S): at 09:50

## 2019-08-09 RX ADMIN — Medication 102 MILLIGRAM(S): at 09:26

## 2019-08-09 RX ADMIN — Medication 20 MILLIGRAM(S): at 10:10

## 2019-08-09 RX ADMIN — Medication 650 MILLIGRAM(S): at 09:47

## 2019-08-09 RX ADMIN — OBINUTUZUMAB 500 MILLIGRAM(S): 1000 INJECTION, SOLUTION, CONCENTRATE INTRAVENOUS at 14:10

## 2019-08-09 RX ADMIN — Medication 650 MILLIGRAM(S): at 09:27

## 2019-08-12 LAB
HCT VFR BLD CALC: 37.7 %
HGB BLD-MCNC: 12.6 G/DL
MCHC RBC-ENTMCNC: 33.1 PG
MCHC RBC-ENTMCNC: 33.4 G/DL
MCV RBC AUTO: 99 FL
PLATELET # BLD AUTO: 91 K/UL
PMV BLD: 8.8 FL
RBC # BLD: 3.81 M/UL
RBC # FLD: 13.5 %
WBC # FLD AUTO: 8.89 K/UL

## 2019-08-15 ENCOUNTER — OUTPATIENT (OUTPATIENT)
Dept: OUTPATIENT SERVICES | Facility: HOSPITAL | Age: 79
LOS: 1 days | Discharge: HOME | End: 2019-08-15

## 2019-08-15 DIAGNOSIS — Z95.2 PRESENCE OF PROSTHETIC HEART VALVE: Chronic | ICD-10-CM

## 2019-08-15 DIAGNOSIS — Z95.2 PRESENCE OF PROSTHETIC HEART VALVE: ICD-10-CM

## 2019-08-15 DIAGNOSIS — Z79.01 LONG TERM (CURRENT) USE OF ANTICOAGULANTS: ICD-10-CM

## 2019-08-15 LAB
POCT INR: 2.2 RATIO — HIGH (ref 0.9–1.2)
POCT PT: 26.5 SEC — HIGH (ref 10–13.4)

## 2019-08-22 ENCOUNTER — APPOINTMENT (OUTPATIENT)
Dept: HEMATOLOGY ONCOLOGY | Facility: CLINIC | Age: 79
End: 2019-08-22
Payer: MEDICARE

## 2019-08-22 ENCOUNTER — OUTPATIENT (OUTPATIENT)
Dept: OUTPATIENT SERVICES | Facility: HOSPITAL | Age: 79
LOS: 1 days | Discharge: HOME | End: 2019-08-22

## 2019-08-22 ENCOUNTER — LABORATORY RESULT (OUTPATIENT)
Age: 79
End: 2019-08-22

## 2019-08-22 VITALS
DIASTOLIC BLOOD PRESSURE: 80 MMHG | HEART RATE: 86 BPM | HEIGHT: 70 IN | SYSTOLIC BLOOD PRESSURE: 125 MMHG | WEIGHT: 172 LBS | TEMPERATURE: 97.2 F | BODY MASS INDEX: 24.62 KG/M2

## 2019-08-22 DIAGNOSIS — Z79.01 LONG TERM (CURRENT) USE OF ANTICOAGULANTS: ICD-10-CM

## 2019-08-22 DIAGNOSIS — Z95.2 PRESENCE OF PROSTHETIC HEART VALVE: ICD-10-CM

## 2019-08-22 DIAGNOSIS — Z95.2 PRESENCE OF PROSTHETIC HEART VALVE: Chronic | ICD-10-CM

## 2019-08-22 LAB
POCT INR: 2.2 RATIO — HIGH (ref 0.9–1.2)
POCT PT: 26.2 SEC — HIGH (ref 10–13.4)

## 2019-08-22 PROCEDURE — 99214 OFFICE O/P EST MOD 30 MIN: CPT

## 2019-08-23 LAB
ALBUMIN SERPL ELPH-MCNC: 4.5 G/DL
ALP BLD-CCNC: 56 U/L
ALT SERPL-CCNC: 15 U/L
ANION GAP SERPL CALC-SCNC: 13 MMOL/L
AST SERPL-CCNC: 19 U/L
BILIRUB SERPL-MCNC: 0.5 MG/DL
BUN SERPL-MCNC: 18 MG/DL
CALCIUM SERPL-MCNC: 9.5 MG/DL
CHLORIDE SERPL-SCNC: 105 MMOL/L
CO2 SERPL-SCNC: 24 MMOL/L
CREAT SERPL-MCNC: 0.8 MG/DL
GLUCOSE SERPL-MCNC: 87 MG/DL
HCT VFR BLD CALC: 36 %
HGB BLD-MCNC: 11.9 G/DL
MCHC RBC-ENTMCNC: 33.1 G/DL
MCHC RBC-ENTMCNC: 33.5 PG
MCV RBC AUTO: 101.4 FL
PLATELET # BLD AUTO: 74 K/UL
PMV BLD: 9.1 FL
POTASSIUM SERPL-SCNC: 4.2 MMOL/L
PROT SERPL-MCNC: 6.7 G/DL
RBC # BLD: 3.55 M/UL
RBC # FLD: 13.9 %
SODIUM SERPL-SCNC: 142 MMOL/L
WBC # FLD AUTO: 5.75 K/UL

## 2019-08-28 ENCOUNTER — LABORATORY RESULT (OUTPATIENT)
Age: 79
End: 2019-08-28

## 2019-08-28 ENCOUNTER — APPOINTMENT (OUTPATIENT)
Dept: HEMATOLOGY ONCOLOGY | Facility: CLINIC | Age: 79
End: 2019-08-28

## 2019-08-28 ENCOUNTER — RX RENEWAL (OUTPATIENT)
Age: 79
End: 2019-08-28

## 2019-08-28 LAB
HCT VFR BLD CALC: 33.4 %
HGB BLD-MCNC: 11.4 G/DL
MCHC RBC-ENTMCNC: 33.4 PG
MCHC RBC-ENTMCNC: 34.1 G/DL
MCV RBC AUTO: 97.9 FL
PLATELET # BLD AUTO: 85 K/UL
PMV BLD: 9.1 FL
RBC # BLD: 3.41 M/UL
RBC # FLD: 13.8 %
WBC # FLD AUTO: 4.36 K/UL

## 2019-08-29 ENCOUNTER — APPOINTMENT (OUTPATIENT)
Dept: INFUSION THERAPY | Facility: CLINIC | Age: 79
End: 2019-08-29

## 2019-08-29 ENCOUNTER — OUTPATIENT (OUTPATIENT)
Dept: OUTPATIENT SERVICES | Facility: HOSPITAL | Age: 79
LOS: 1 days | Discharge: HOME | End: 2019-08-29

## 2019-08-29 VITALS
RESPIRATION RATE: 18 BRPM | SYSTOLIC BLOOD PRESSURE: 115 MMHG | DIASTOLIC BLOOD PRESSURE: 68 MMHG | TEMPERATURE: 98.8 F | HEART RATE: 68 BPM

## 2019-08-29 DIAGNOSIS — Z79.01 LONG TERM (CURRENT) USE OF ANTICOAGULANTS: ICD-10-CM

## 2019-08-29 DIAGNOSIS — I48.91 UNSPECIFIED ATRIAL FIBRILLATION: ICD-10-CM

## 2019-08-29 DIAGNOSIS — Z95.2 PRESENCE OF PROSTHETIC HEART VALVE: Chronic | ICD-10-CM

## 2019-08-29 LAB
ANION GAP SERPL CALC-SCNC: 10 MMOL/L
BUN SERPL-MCNC: 18 MG/DL
CALCIUM SERPL-MCNC: 9.6 MG/DL
CHLORIDE SERPL-SCNC: 106 MMOL/L
CO2 SERPL-SCNC: 28 MMOL/L
CREAT SERPL-MCNC: 0.9 MG/DL
GLUCOSE SERPL-MCNC: 93 MG/DL
POCT INR: 2.6 RATIO — HIGH (ref 0.9–1.2)
POCT PT: 30.9 SEC — HIGH (ref 10–13.4)
POTASSIUM SERPL-SCNC: 4.6 MMOL/L
SODIUM SERPL-SCNC: 144 MMOL/L
TSH SERPL-ACNC: 4.14 UIU/ML

## 2019-08-29 RX ORDER — OBINUTUZUMAB 1000 MG/40ML
1000 INJECTION, SOLUTION, CONCENTRATE INTRAVENOUS ONCE
Refills: 0 | Status: COMPLETED | OUTPATIENT
Start: 2019-08-29 | End: 2019-08-29

## 2019-08-29 RX ORDER — ACETAMINOPHEN 500 MG
650 TABLET ORAL ONCE
Refills: 0 | Status: COMPLETED | OUTPATIENT
Start: 2019-08-29 | End: 2019-08-29

## 2019-08-29 RX ORDER — DEXAMETHASONE 0.5 MG/5ML
20 ELIXIR ORAL ONCE
Refills: 0 | Status: COMPLETED | OUTPATIENT
Start: 2019-08-29 | End: 2019-08-29

## 2019-08-29 RX ORDER — DIPHENHYDRAMINE HCL 50 MG
50 CAPSULE ORAL ONCE
Refills: 0 | Status: COMPLETED | OUTPATIENT
Start: 2019-08-29 | End: 2019-08-29

## 2019-08-29 RX ADMIN — Medication 650 MILLIGRAM(S): at 08:56

## 2019-08-29 RX ADMIN — OBINUTUZUMAB 50 MILLIGRAM(S): 1000 INJECTION, SOLUTION, CONCENTRATE INTRAVENOUS at 09:48

## 2019-08-29 RX ADMIN — Medication 650 MILLIGRAM(S): at 08:55

## 2019-08-29 RX ADMIN — Medication 102 MILLIGRAM(S): at 09:15

## 2019-08-29 RX ADMIN — Medication 20 MILLIGRAM(S): at 09:15

## 2019-08-29 RX ADMIN — Medication 165 MILLIGRAM(S): at 08:55

## 2019-08-29 RX ADMIN — OBINUTUZUMAB 1000 MILLIGRAM(S): 1000 INJECTION, SOLUTION, CONCENTRATE INTRAVENOUS at 13:35

## 2019-08-29 RX ADMIN — Medication 50 MILLIGRAM(S): at 09:45

## 2019-08-30 ENCOUNTER — APPOINTMENT (OUTPATIENT)
Dept: INFUSION THERAPY | Facility: CLINIC | Age: 79
End: 2019-08-30

## 2019-09-04 ENCOUNTER — LABORATORY RESULT (OUTPATIENT)
Age: 79
End: 2019-09-04

## 2019-09-04 ENCOUNTER — OTHER (OUTPATIENT)
Age: 79
End: 2019-09-04

## 2019-09-04 ENCOUNTER — APPOINTMENT (OUTPATIENT)
Dept: HEMATOLOGY ONCOLOGY | Facility: CLINIC | Age: 79
End: 2019-09-04

## 2019-09-04 LAB
HCT VFR BLD CALC: 35.4 %
HGB BLD-MCNC: 11.8 G/DL
MCHC RBC-ENTMCNC: 32.9 PG
MCHC RBC-ENTMCNC: 33.3 G/DL
MCV RBC AUTO: 98.6 FL
PLATELET # BLD AUTO: 98 K/UL
PMV BLD: 9.2 FL
RBC # BLD: 3.59 M/UL
RBC # FLD: 13.6 %
WBC # FLD AUTO: 5.59 K/UL

## 2019-09-05 ENCOUNTER — APPOINTMENT (OUTPATIENT)
Dept: HEMATOLOGY ONCOLOGY | Facility: CLINIC | Age: 79
End: 2019-09-05

## 2019-09-05 ENCOUNTER — RX RENEWAL (OUTPATIENT)
Age: 79
End: 2019-09-05

## 2019-09-05 ENCOUNTER — OUTPATIENT (OUTPATIENT)
Dept: OUTPATIENT SERVICES | Facility: HOSPITAL | Age: 79
LOS: 1 days | Discharge: HOME | End: 2019-09-05

## 2019-09-05 DIAGNOSIS — Z95.2 PRESENCE OF PROSTHETIC HEART VALVE: Chronic | ICD-10-CM

## 2019-09-05 DIAGNOSIS — I48.91 UNSPECIFIED ATRIAL FIBRILLATION: ICD-10-CM

## 2019-09-05 DIAGNOSIS — Z79.01 LONG TERM (CURRENT) USE OF ANTICOAGULANTS: ICD-10-CM

## 2019-09-05 LAB
ALBUMIN SERPL ELPH-MCNC: 4.7 G/DL
ALP BLD-CCNC: 43 U/L
ALT SERPL-CCNC: 15 U/L
ANION GAP SERPL CALC-SCNC: 13 MMOL/L
AST SERPL-CCNC: 22 U/L
BILIRUB SERPL-MCNC: 0.6 MG/DL
BUN SERPL-MCNC: 15 MG/DL
CALCIUM SERPL-MCNC: 9.7 MG/DL
CHLORIDE SERPL-SCNC: 101 MMOL/L
CO2 SERPL-SCNC: 27 MMOL/L
CREAT SERPL-MCNC: 0.9 MG/DL
GLUCOSE SERPL-MCNC: 81 MG/DL
POCT INR: 2.5 RATIO — HIGH (ref 0.9–1.2)
POCT PT: 30.3 SEC — HIGH (ref 10–13.4)
POTASSIUM SERPL-SCNC: 4.5 MMOL/L
PROT SERPL-MCNC: 6.3 G/DL
SODIUM SERPL-SCNC: 141 MMOL/L

## 2019-09-06 ENCOUNTER — APPOINTMENT (OUTPATIENT)
Dept: INFUSION THERAPY | Facility: CLINIC | Age: 79
End: 2019-09-06

## 2019-09-06 ENCOUNTER — APPOINTMENT (OUTPATIENT)
Dept: CARDIOLOGY | Facility: CLINIC | Age: 79
End: 2019-09-06
Payer: MEDICARE

## 2019-09-06 VITALS — HEIGHT: 70 IN | HEART RATE: 74 BPM | BODY MASS INDEX: 24.48 KG/M2 | WEIGHT: 171 LBS

## 2019-09-06 VITALS — DIASTOLIC BLOOD PRESSURE: 80 MMHG | SYSTOLIC BLOOD PRESSURE: 130 MMHG

## 2019-09-06 LAB
ALBUMIN SERPL ELPH-MCNC: 4.4 G/DL
ALP BLD-CCNC: 50 U/L
ALT SERPL-CCNC: 15 U/L
ANION GAP SERPL CALC-SCNC: 16 MMOL/L
AST SERPL-CCNC: 20 U/L
BILIRUB SERPL-MCNC: 0.8 MG/DL
BUN SERPL-MCNC: 21 MG/DL
CALCIUM SERPL-MCNC: 9.8 MG/DL
CHLORIDE SERPL-SCNC: 98 MMOL/L
CO2 SERPL-SCNC: 26 MMOL/L
CREAT SERPL-MCNC: 0.9 MG/DL
GLUCOSE SERPL-MCNC: 114 MG/DL
POTASSIUM SERPL-SCNC: 4 MMOL/L
PROT SERPL-MCNC: 6.4 G/DL
SODIUM SERPL-SCNC: 140 MMOL/L

## 2019-09-06 PROCEDURE — 93000 ELECTROCARDIOGRAM COMPLETE: CPT

## 2019-09-06 PROCEDURE — 99214 OFFICE O/P EST MOD 30 MIN: CPT

## 2019-09-06 NOTE — PHYSICAL EXAM
[General Appearance - Well Developed] : well developed [Normal Appearance] : normal appearance [Well Groomed] : well groomed [General Appearance - Well Nourished] : well nourished [No Deformities] : no deformities [General Appearance - In No Acute Distress] : no acute distress [Normal Conjunctiva] : the conjunctiva exhibited no abnormalities [Eyelids - No Xanthelasma] : the eyelids demonstrated no xanthelasmas [Normal Oral Mucosa] : normal oral mucosa [No Oral Cyanosis] : no oral cyanosis [No Oral Pallor] : no oral pallor [Respiration, Rhythm And Depth] : normal respiratory rhythm and effort [Exaggerated Use Of Accessory Muscles For Inspiration] : no accessory muscle use [Abdomen Tenderness] : non-tender [Abdomen Soft] : soft [Abdomen Mass (___ Cm)] : no abdominal mass palpated [Abnormal Walk] : normal gait [Gait - Sufficient For Exercise Testing] : the gait was sufficient for exercise testing [Nail Clubbing] : no clubbing of the fingernails [Petechial Hemorrhages (___cm)] : no petechial hemorrhages [Cyanosis, Localized] : no localized cyanosis [Skin Color & Pigmentation] : normal skin color and pigmentation [] : no rash [No Venous Stasis] : no venous stasis [Skin Lesions] : no skin lesions [No Skin Ulcers] : no skin ulcer [No Xanthoma] : no  xanthoma was observed [Irregularly Irregular] : irregularly irregular [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  [Rt] : no varicose veins of the right leg

## 2019-09-06 NOTE — ASSESSMENT
[FreeTextEntry1] : The patient has been getting chemotherapy and is now on oral medication for his CLL . The pateint has had no chest pain . SOB improved. PAin management did not hep his pain . He was told of a large spleen but this was not seenon exam . Known hx of LAD stent- remote and AVR - mechanical which has been functioning well. He remains in NSR

## 2019-09-15 ENCOUNTER — OTHER (OUTPATIENT)
Age: 79
End: 2019-09-15

## 2019-09-15 NOTE — HISTORY OF PRESENT ILLNESS
[de-identified] : this is a 76-year-old white man with multiple medical problems including coronary artery disease status post angioplasty valvular heart disease status post metallic aortic valve replacement. He is referred for abnormal hemogram noted recently, WBC is 10.6 hemoglobin 14.2 platelets 131 absolute lymphocytes 5268 chemistry is unremarkable. He denies any constitutional symptoms  specifically no fever ,weight loss ,night sweats, fatigue or  pruritus. he feels fantastic with good energy level. 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 He is scheduled for ablation for atrial fibrillation , he continues on coumadin for mechanical valve. He reports minor bruising on coumadin and plavix. He feels slight fatigue . He denies fever , weight loss or night sweats . He is also on androgen deprivation for elevated PSA , , He had previously on surveillance and had multiple prostate biopsies ( unclear if it showed cancer ) . Bone scan is allegedly negative . Last PSA is less than 1 and patient denies obstructive symptoms. \par "\par 01/10/2019 Patient returns for follow up for CLL on watchful waiting , he " feels tired all the time " , He had unsuccessful ablation for atrial fibrillation and was told to increase metoprolol . He denies B symptoms . \par \par 02/07/2019 Patient returns for follow up , he had unsuccessful ablation for paroxysmal atrial fibrillation and was placed on amiodarone , he continues on coumadin without ASA and denies abnormal bleeding , he reports occasional LUQ pain . no  B symptoms.\par \par 05/16/2019 Patient returns for follow up for CLL/SLL he reports few episodes of dizziness associated with nausea lasting for 1 to 2 hours . CT head ( non-contrast ) was negative , he had negative ENT evaluation and had long term event monitor placed 2 weeks ago without recurrence ( also discontinued amiodarone ) , CBC shows slight decrease in Hb and platelets from baseline . He continues on coumadin and plavix and denies abnormal bleeding . He continues with mild LUQ and back pain . \par \par 06/13/2019 Patient returns for follow up for SLL/CLL with anemia, thrombocytopenia and marked splenomegaly ( 19cm ) . He complains of left flank and back pain . he had a trial of epidural anethesia and is scheduled for nerve block/ ablation / epidural injection . he continues on coumadin and denies any abnormal bleeding . \par \par 07/11/2019 Patient returns with persistent LUQ discomfort , back pain despite epidural injections . 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 He is relatively asymptomatic except for mild left flank pain . no significant bleeding on coumadin and plavix . Given his age , multiple co-morbidities and high risk features , the regimen of choice is combination of venetoclax and obinotuzumab . \par \par 08/22/2019 Patient returns for follow up after starting on obinotuzumab , he had mild reaction during the split first dose ( nausea ) . He tolerated day 8 and day 15 very well ( also given in split dose ) . He lost 10 lbs despite good po intake . He denies fever . he continues with mild back pain .

## 2019-09-15 NOTE — ASSESSMENT
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 \par  on obinotuzumab , to start venetoclax next week . CBC improved except for thrombocytopenia . spleen no longer palpable .  \par Monitor for tumor lysis . \par \par

## 2019-09-15 NOTE — PHYSICAL EXAM
[Normal] : no JVD, no calf tenderness, venous stasis changes, varices [de-identified] : Pale chronically ill in NAD .  [de-identified] : healed scar  of lymph node biopsy on the left, no residual or  recurrent adenopathy. [de-identified] : prominent aortic valve  click. [de-identified] : spleen no longer palpable .  [de-identified] : no peripheral adenopathy.

## 2019-09-16 ENCOUNTER — RX RENEWAL (OUTPATIENT)
Age: 79
End: 2019-09-16

## 2019-09-19 ENCOUNTER — OUTPATIENT (OUTPATIENT)
Dept: OUTPATIENT SERVICES | Facility: HOSPITAL | Age: 79
LOS: 1 days | Discharge: HOME | End: 2019-09-19

## 2019-09-19 DIAGNOSIS — Z79.01 LONG TERM (CURRENT) USE OF ANTICOAGULANTS: ICD-10-CM

## 2019-09-19 DIAGNOSIS — Z95.2 PRESENCE OF PROSTHETIC HEART VALVE: Chronic | ICD-10-CM

## 2019-09-19 DIAGNOSIS — I48.91 UNSPECIFIED ATRIAL FIBRILLATION: ICD-10-CM

## 2019-09-19 LAB
POCT INR: 2.7 RATIO — HIGH (ref 0.9–1.2)
POCT PT: 32.3 SEC — HIGH (ref 10–13.4)

## 2019-09-24 ENCOUNTER — APPOINTMENT (OUTPATIENT)
Dept: HEMATOLOGY ONCOLOGY | Facility: CLINIC | Age: 79
End: 2019-09-24
Payer: MEDICARE

## 2019-09-24 ENCOUNTER — LABORATORY RESULT (OUTPATIENT)
Age: 79
End: 2019-09-24

## 2019-09-24 VITALS
HEART RATE: 72 BPM | RESPIRATION RATE: 18 BRPM | BODY MASS INDEX: 24.62 KG/M2 | TEMPERATURE: 97.5 F | HEIGHT: 70 IN | SYSTOLIC BLOOD PRESSURE: 128 MMHG | WEIGHT: 172 LBS | DIASTOLIC BLOOD PRESSURE: 60 MMHG

## 2019-09-24 LAB
ALBUMIN SERPL ELPH-MCNC: 4.5 G/DL
ALP BLD-CCNC: 50 U/L
ALT SERPL-CCNC: 12 U/L
ANION GAP SERPL CALC-SCNC: 12 MMOL/L
AST SERPL-CCNC: 20 U/L
BILIRUB SERPL-MCNC: 0.6 MG/DL
BUN SERPL-MCNC: 12 MG/DL
CALCIUM SERPL-MCNC: 9.4 MG/DL
CHLORIDE SERPL-SCNC: 105 MMOL/L
CO2 SERPL-SCNC: 26 MMOL/L
CREAT SERPL-MCNC: 0.8 MG/DL
GLUCOSE SERPL-MCNC: 94 MG/DL
HCT VFR BLD CALC: 37.6 %
HGB BLD-MCNC: 12.2 G/DL
MCHC RBC-ENTMCNC: 32.4 G/DL
MCHC RBC-ENTMCNC: 33.5 PG
MCV RBC AUTO: 103.3 FL
PLATELET # BLD AUTO: 101 K/UL
PMV BLD: 9.6 FL
POTASSIUM SERPL-SCNC: 4.1 MMOL/L
PROT SERPL-MCNC: 6.5 G/DL
RBC # BLD: 3.64 M/UL
RBC # FLD: 13.7 %
SODIUM SERPL-SCNC: 143 MMOL/L
WBC # FLD AUTO: 6.38 K/UL

## 2019-09-24 PROCEDURE — 99214 OFFICE O/P EST MOD 30 MIN: CPT

## 2019-09-25 NOTE — HISTORY OF PRESENT ILLNESS
[de-identified] : this is a 76-year-old white man with multiple medical problems including coronary artery disease status post angioplasty valvular heart disease status post metallic aortic valve replacement. He is referred for abnormal hemogram noted recently, WBC is 10.6 hemoglobin 14.2 platelets 131 absolute lymphocytes 5268 chemistry is unremarkable. He denies any constitutional symptoms  specifically no fever ,weight loss ,night sweats, fatigue or  pruritus. he feels fantastic with good energy level. 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 He is scheduled for ablation for atrial fibrillation , he continues on coumadin for mechanical valve. He reports minor bruising on coumadin and plavix. He feels slight fatigue . He denies fever , weight loss or night sweats . He is also on androgen deprivation for elevated PSA , , He had previously on surveillance and had multiple prostate biopsies ( unclear if it showed cancer ) . Bone scan is allegedly negative . Last PSA is less than 1 and patient denies obstructive symptoms. \par "\par 01/10/2019 Patient returns for follow up for CLL on watchful waiting , he " feels tired all the time " , He had unsuccessful ablation for atrial fibrillation and was told to increase metoprolol . He denies B symptoms . \par \par 02/07/2019 Patient returns for follow up , he had unsuccessful ablation for paroxysmal atrial fibrillation and was placed on amiodarone , he continues on coumadin without ASA and denies abnormal bleeding , he reports occasional LUQ pain . no  B symptoms.\par \par 05/16/2019 Patient returns for follow up for CLL/SLL he reports few episodes of dizziness associated with nausea lasting for 1 to 2 hours . CT head ( non-contrast ) was negative , he had negative ENT evaluation and had long term event monitor placed 2 weeks ago without recurrence ( also discontinued amiodarone ) , CBC shows slight decrease in Hb and platelets from baseline . He continues on coumadin and plavix and denies abnormal bleeding . He continues with mild LUQ and back pain . \par \par 06/13/2019 Patient returns for follow up for SLL/CLL with anemia, thrombocytopenia and marked splenomegaly ( 19cm ) . He complains of left flank and back pain . he had a trial of epidural anethesia and is scheduled for nerve block/ ablation / epidural injection . he continues on coumadin and denies any abnormal bleeding . \par \par 07/11/2019 Patient returns with persistent LUQ discomfort , back pain despite epidural injections . 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 He is relatively asymptomatic except for mild left flank pain . no significant bleeding on coumadin and plavix . Given his age , multiple co-morbidities and high risk features , the regimen of choice is combination of venetoclax and obinotuzumab . \par \par 08/22/2019 Patient returns for follow up after starting on obinotuzumab , he had mild reaction during the split first dose ( nausea ) . He tolerated day 8 and day 15 very well ( also given in split dose ) . He lost 10 lbs despite good po intake . He denies fever . he continues with mild back pain . \par \par 9/24/19: Pt returns for a f/u visit. He noticed an increase in the size of his right sided inguinal hernia. No other complaints. Has been tolerating obinotuzumab and venetoclax fairly well. He is presently on 100 mg venetoclax and will go up to 200 mg on 9/26/19. CBC showed significant improvement: WBC 6.38, Hb 12.2, Plts 101.

## 2019-09-25 NOTE — PHYSICAL EXAM
[Restricted in physically strenuous activity but ambulatory and able to carry out work of a light or sedentary nature] : Status 1- Restricted in physically strenuous activity but ambulatory and able to carry out work of a light or sedentary nature, e.g., light house work, office work [Normal] : no peripheral adenopathy appreciated [de-identified] : Pale chronically ill in NAD .  [de-identified] : healed scar  of lymph node biopsy on the left, no residual or  recurrent adenopathy. [de-identified] : prominent aortic valve  click. [de-identified] : spleen no longer palpable, right sided inguinal hernia [de-identified] : no peripheral adenopathy.

## 2019-09-25 NOTE — ASSESSMENT
[FreeTextEntry1] : This is a 79-year-old white man with a remote history of non-Hodgkin's lymphoma, High risk CLL with symptomatic splenomegaly , anemia, thrombocytopenia . \par Has been tolerating obinotuzumab and venetoclax fairly well. He is presently on 100 mg venetoclax and will go up to 200 mg on 9/26/19. CBC showed significant improvement: WBC 6.38, Hb 12.2, Plts 101. Will proceed with 3rd cycle of obinotuzumab\par Spleen no longer palpable.  \par \par Monitor for tumor lysis . C/W allopurinol\par \par RTO in 1 month\par \par

## 2019-09-26 ENCOUNTER — APPOINTMENT (OUTPATIENT)
Dept: INFUSION THERAPY | Facility: CLINIC | Age: 79
End: 2019-09-26

## 2019-09-26 LAB
LDH SERPL-CCNC: 320 U/L
URATE SERPL-MCNC: 3.1 MG/DL

## 2019-09-26 RX ORDER — OBINUTUZUMAB 1000 MG/40ML
1000 INJECTION, SOLUTION, CONCENTRATE INTRAVENOUS ONCE
Refills: 0 | Status: COMPLETED | OUTPATIENT
Start: 2019-09-26 | End: 2019-09-26

## 2019-09-26 RX ORDER — DIPHENHYDRAMINE HCL 50 MG
50 CAPSULE ORAL ONCE
Refills: 0 | Status: COMPLETED | OUTPATIENT
Start: 2019-09-26 | End: 2019-09-26

## 2019-09-26 RX ORDER — ACETAMINOPHEN 500 MG
650 TABLET ORAL ONCE
Refills: 0 | Status: COMPLETED | OUTPATIENT
Start: 2019-09-26 | End: 2019-09-26

## 2019-09-26 RX ORDER — DEXAMETHASONE 0.5 MG/5ML
20 ELIXIR ORAL ONCE
Refills: 0 | Status: COMPLETED | OUTPATIENT
Start: 2019-09-26 | End: 2019-09-26

## 2019-09-26 RX ADMIN — Medication 650 MILLIGRAM(S): at 09:24

## 2019-09-26 RX ADMIN — Medication 165 MILLIGRAM(S): at 09:22

## 2019-09-26 RX ADMIN — Medication 102 MILLIGRAM(S): at 09:40

## 2019-09-26 RX ADMIN — Medication 20 MILLIGRAM(S): at 09:44

## 2019-09-26 RX ADMIN — OBINUTUZUMAB 50 MILLIGRAM(S): 1000 INJECTION, SOLUTION, CONCENTRATE INTRAVENOUS at 09:28

## 2019-09-26 RX ADMIN — Medication 650 MILLIGRAM(S): at 09:22

## 2019-10-10 ENCOUNTER — RX RENEWAL (OUTPATIENT)
Age: 79
End: 2019-10-10

## 2019-10-10 ENCOUNTER — OUTPATIENT (OUTPATIENT)
Dept: OUTPATIENT SERVICES | Facility: HOSPITAL | Age: 79
LOS: 1 days | Discharge: HOME | End: 2019-10-10

## 2019-10-10 DIAGNOSIS — I48.91 UNSPECIFIED ATRIAL FIBRILLATION: ICD-10-CM

## 2019-10-10 DIAGNOSIS — Z95.2 PRESENCE OF PROSTHETIC HEART VALVE: Chronic | ICD-10-CM

## 2019-10-10 DIAGNOSIS — Z79.01 LONG TERM (CURRENT) USE OF ANTICOAGULANTS: ICD-10-CM

## 2019-10-10 LAB
POCT INR: 3.4 RATIO — HIGH (ref 0.9–1.2)
POCT PT: 41 SEC — HIGH (ref 10–13.4)

## 2019-10-22 ENCOUNTER — LABORATORY RESULT (OUTPATIENT)
Age: 79
End: 2019-10-22

## 2019-10-22 ENCOUNTER — APPOINTMENT (OUTPATIENT)
Dept: HEMATOLOGY ONCOLOGY | Facility: CLINIC | Age: 79
End: 2019-10-22
Payer: MEDICARE

## 2019-10-22 VITALS
TEMPERATURE: 96.4 F | BODY MASS INDEX: 24.91 KG/M2 | WEIGHT: 174 LBS | DIASTOLIC BLOOD PRESSURE: 61 MMHG | SYSTOLIC BLOOD PRESSURE: 134 MMHG | HEART RATE: 67 BPM | HEIGHT: 70 IN | RESPIRATION RATE: 14 BRPM

## 2019-10-22 LAB
HCT VFR BLD CALC: 32.5 %
HGB BLD-MCNC: 11.6 G/DL
MCHC RBC-ENTMCNC: 33.9 PG
MCHC RBC-ENTMCNC: 35.7 G/DL
MCV RBC AUTO: 95 FL
PLATELET # BLD AUTO: 100 K/UL
PMV BLD: 9.5 FL
RBC # BLD: 3.42 M/UL
RBC # FLD: 14 %
WBC # FLD AUTO: 2.28 K/UL

## 2019-10-22 PROCEDURE — 99214 OFFICE O/P EST MOD 30 MIN: CPT

## 2019-10-22 NOTE — HISTORY OF PRESENT ILLNESS
[de-identified] : this is a 76-year-old white man with multiple medical problems including coronary artery disease status post angioplasty valvular heart disease status post metallic aortic valve replacement. He is referred for abnormal hemogram noted recently, WBC is 10.6 hemoglobin 14.2 platelets 131 absolute lymphocytes 5268 chemistry is unremarkable. He denies any constitutional symptoms  specifically no fever ,weight loss ,night sweats, fatigue or  pruritus. he feels fantastic with good energy level. 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 He is scheduled for ablation for atrial fibrillation , he continues on coumadin for mechanical valve. He reports minor bruising on coumadin and plavix. He feels slight fatigue . He denies fever , weight loss or night sweats . He is also on androgen deprivation for elevated PSA , , He had previously on surveillance and had multiple prostate biopsies ( unclear if it showed cancer ) . Bone scan is allegedly negative . Last PSA is less than 1 and patient denies obstructive symptoms. \par "\par 01/10/2019 Patient returns for follow up for CLL on watchful waiting , he " feels tired all the time " , He had unsuccessful ablation for atrial fibrillation and was told to increase metoprolol . He denies B symptoms . \par \par 02/07/2019 Patient returns for follow up , he had unsuccessful ablation for paroxysmal atrial fibrillation and was placed on amiodarone , he continues on coumadin without ASA and denies abnormal bleeding , he reports occasional LUQ pain . no  B symptoms.\par \par 05/16/2019 Patient returns for follow up for CLL/SLL he reports few episodes of dizziness associated with nausea lasting for 1 to 2 hours . CT head ( non-contrast ) was negative , he had negative ENT evaluation and had long term event monitor placed 2 weeks ago without recurrence ( also discontinued amiodarone ) , CBC shows slight decrease in Hb and platelets from baseline . He continues on coumadin and plavix and denies abnormal bleeding . He continues with mild LUQ and back pain . \par \par 06/13/2019 Patient returns for follow up for SLL/CLL with anemia, thrombocytopenia and marked splenomegaly ( 19cm ) . He complains of left flank and back pain . he had a trial of epidural anethesia and is scheduled for nerve block/ ablation / epidural injection . he continues on coumadin and denies any abnormal bleeding . \par \par 07/11/2019 Patient returns with persistent LUQ discomfort , back pain despite epidural injections . 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 He is relatively asymptomatic except for mild left flank pain . no significant bleeding on coumadin and plavix . Given his age , multiple co-morbidities and high risk features , the regimen of choice is combination of venetoclax and obinotuzumab . \par \par 08/22/2019 Patient returns for follow up after starting on obinotuzumab , he had mild reaction during the split first dose ( nausea ) . He tolerated day 8 and day 15 very well ( also given in split dose ) . He lost 10 lbs despite good po intake . He denies fever . he continues with mild back pain . \par \par 9/24/19: Pt returns for a f/u visit. He noticed an increase in the size of his right sided inguinal hernia. No other complaints. Has been tolerating obinotuzumab and venetoclax fairly well. He is presently on 100 mg venetoclax and will go up to 200 mg on 9/26/19. CBC showed significant improvement: WBC 6.38, Hb 12.2, Plts 101. \par \par 10/22/19: Pt returns for a f/u visit and 3rd cycle of obinotuzumab. He has now been on 400 mg venetoclax for the last 2 weeks. he c/o increased size of right inguinal hernia for which he will be seeing surgeon Dr Ponce next week. Denies having severe pain, irreducibility, constipation, vomiting. CBC showed significant drop in WBC 2.28 and ANC 1.00. Plts 100 and Hb 11.6.

## 2019-10-22 NOTE — ASSESSMENT
05/30/18 1338   Behavioral Health   Hallucinations denies / not responding to hallucinations   Thinking distractable   Orientation time: oriented;date: oriented;place: oriented;person: oriented   Memory baseline memory   Insight poor   Judgement impaired   Eye Contact at examiner   Affect full range affect   Mood mood is calm   Physical Appearance/Attire appears stated age   Hygiene well groomed   Suicidality other (see comments)  (Denies)   1. Wish to be Dead No   2. Non-Specific Active Suicidal Thoughts  No   Self Injury other (see comment)  (Denies)   Activity other (see comment)  (Social with others)   Speech coherent;clear   Psychomotor / Gait steady;balanced   Psycho Education   Type of Intervention 1:1 intervention   Response participates, initiates socially appropriate   Hours 0.5   Activities of Daily Living   Hygiene/Grooming independent   Oral Hygiene independent   Dress street clothes   Laundry with supervision   Room Organization independent   Activity   Activity Assistance Provided independent   Pt spent his morning in room sleeping and he comes out for meals or making requests. He denies suicidal ideation or hearing voices/SIB. He appears social on approach with peers or staff. Pt waiting for placement.   [FreeTextEntry1] : This is a 79-year-old white male with a remote history of non-Hodgkin's lymphoma, High risk CLL with symptomatic splenomegaly , anemia, thrombocytopenia . \par \par Has been tolerating obinotuzumab and venetoclax fairly well. CBC showed significant drop in WBC 2.28 and ANC 1.00. Plts 100 and Hb 11.6. Will repeat CBC in a week. May require dose reduction if there is further drop. \par Proceed with 3rd cycle of obinotuzumab and c/w venetoclax 400 mg for now\par \par Spleen no longer palpable.  \par \par Monitor for tumor lysis . Decrease allopurinol to 100 mg. \par \par F/U with Dr Ponce for inguinal hernia\par \par RTO in 1 month\par \par

## 2019-10-22 NOTE — PHYSICAL EXAM
[Restricted in physically strenuous activity but ambulatory and able to carry out work of a light or sedentary nature] : Status 1- Restricted in physically strenuous activity but ambulatory and able to carry out work of a light or sedentary nature, e.g., light house work, office work [Normal] : no peripheral adenopathy appreciated [de-identified] : Pale chronically ill in NAD .  [de-identified] : healed scar  of lymph node biopsy on the left, no residual or  recurrent adenopathy. [de-identified] : prominent aortic valve  click. [de-identified] : spleen no longer palpable, right sided inguinal hernia [de-identified] : no peripheral adenopathy.

## 2019-10-24 ENCOUNTER — OUTPATIENT (OUTPATIENT)
Dept: OUTPATIENT SERVICES | Facility: HOSPITAL | Age: 79
LOS: 1 days | Discharge: HOME | End: 2019-10-24

## 2019-10-24 ENCOUNTER — APPOINTMENT (OUTPATIENT)
Dept: INFUSION THERAPY | Facility: CLINIC | Age: 79
End: 2019-10-24

## 2019-10-24 ENCOUNTER — RX RENEWAL (OUTPATIENT)
Age: 79
End: 2019-10-24

## 2019-10-24 DIAGNOSIS — C91.90 LYMPHOID LEUKEMIA, UNSPECIFIED NOT HAVING ACHIEVED REMISSION: ICD-10-CM

## 2019-10-24 DIAGNOSIS — Z95.2 PRESENCE OF PROSTHETIC HEART VALVE: Chronic | ICD-10-CM

## 2019-10-24 RX ORDER — OBINUTUZUMAB 1000 MG/40ML
1000 INJECTION, SOLUTION, CONCENTRATE INTRAVENOUS ONCE
Refills: 0 | Status: COMPLETED | OUTPATIENT
Start: 2019-10-24 | End: 2019-10-24

## 2019-10-24 RX ORDER — ACETAMINOPHEN 500 MG
650 TABLET ORAL ONCE
Refills: 0 | Status: COMPLETED | OUTPATIENT
Start: 2019-10-24 | End: 2019-10-24

## 2019-10-24 RX ORDER — ALLOPURINOL 300 MG/1
300 TABLET ORAL
Qty: 30 | Refills: 2 | Status: DISCONTINUED | COMMUNITY
Start: 2019-07-15 | End: 2019-10-24

## 2019-10-24 RX ORDER — DEXAMETHASONE 0.5 MG/5ML
20 ELIXIR ORAL ONCE
Refills: 0 | Status: COMPLETED | OUTPATIENT
Start: 2019-10-24 | End: 2019-10-24

## 2019-10-24 RX ORDER — DIPHENHYDRAMINE HCL 50 MG
50 CAPSULE ORAL ONCE
Refills: 0 | Status: COMPLETED | OUTPATIENT
Start: 2019-10-24 | End: 2019-10-24

## 2019-10-24 RX ADMIN — Medication 50 MILLIGRAM(S): at 10:30

## 2019-10-24 RX ADMIN — Medication 102 MILLIGRAM(S): at 10:00

## 2019-10-24 RX ADMIN — Medication 165 MILLIGRAM(S): at 09:30

## 2019-10-24 RX ADMIN — OBINUTUZUMAB 50 MILLIGRAM(S): 1000 INJECTION, SOLUTION, CONCENTRATE INTRAVENOUS at 10:35

## 2019-10-24 RX ADMIN — OBINUTUZUMAB 1000 MILLIGRAM(S): 1000 INJECTION, SOLUTION, CONCENTRATE INTRAVENOUS at 14:00

## 2019-10-24 RX ADMIN — Medication 650 MILLIGRAM(S): at 09:45

## 2019-10-24 RX ADMIN — Medication 650 MILLIGRAM(S): at 09:32

## 2019-10-24 RX ADMIN — Medication 20 MILLIGRAM(S): at 10:00

## 2019-10-28 LAB
LDH SERPL-CCNC: 444 U/L
URATE SERPL-MCNC: 3.3 MG/DL

## 2019-10-29 ENCOUNTER — LABORATORY RESULT (OUTPATIENT)
Age: 79
End: 2019-10-29

## 2019-10-29 ENCOUNTER — APPOINTMENT (OUTPATIENT)
Dept: HEMATOLOGY ONCOLOGY | Facility: CLINIC | Age: 79
End: 2019-10-29

## 2019-10-29 ENCOUNTER — APPOINTMENT (OUTPATIENT)
Dept: SURGERY | Facility: CLINIC | Age: 79
End: 2019-10-29
Payer: MEDICARE

## 2019-10-29 VITALS — HEIGHT: 70 IN | WEIGHT: 176 LBS | BODY MASS INDEX: 25.2 KG/M2

## 2019-10-29 PROCEDURE — 99214 OFFICE O/P EST MOD 30 MIN: CPT

## 2019-10-29 NOTE — PHYSICAL EXAM
[Normal Breath Sounds] : Normal breath sounds [No Rash or Lesion] : No rash or lesion [Alert] : alert [Calm] : calm [JVD] : no jugular venous distention  [de-identified] : healthy [de-identified] : normal [de-identified] : soft and flat abdomen [de-identified] : normal testicles [de-identified] : large bulging right inguinal hernia

## 2019-10-29 NOTE — CONSULT LETTER
[Dear  ___] : Dear  [unfilled], [Courtesy Letter:] : I had the pleasure of seeing your patient, [unfilled], in my office today. [Please see my note below.] : Please see my note below. [Consult Closing:] : Thank you very much for allowing me to participate in the care of this patient.  If you have any questions, please do not hesitate to contact me. [DrJennifer  ___] : Dr. YE [FreeTextEntry3] : Respectfully,\par \par Shar Ponce M.D., FACS\par

## 2019-10-29 NOTE — ASSESSMENT
[FreeTextEntry1] : Brodie is a pleasant 79-year-old gentleman with a past medical history significant for hypertension, coronary artery disease status post coronary artery stent in 2004 on Plavix, mechanical aortic valve replacement 2009 on warfarin, BPH, prostate cancer currently on surveillance (on hormonal therapy) and non-Hodgkin's lymphoma/high risk CLL originally diagnosed in 1996 with splenomegaly, anemia and thrombocytopenia who presents back to the office a little over one year from his initial visit with me now complaining of pain and swelling in the right groin suspicious for a hernia.\par \par Physical examination does indeed demonstrate a plum-sized bulge in the right groin which is tender to palpation and reducible with a moderate degree of difficulty consistent with a large symptomatic and bulging right inguinal hernia warranting elective surgical repair. There is no evidence of incarceration or strangulation, and the patient denies any symptoms of obstruction.  Examination of his left groin continues to demonstrate moderate degree of weakness but no obvious hernia. Both testicles are normal. His umbilical examination is unremarkable.\par \par I explained the pros and cons of surgery, as well as all risks, benefits, indications and alternatives of the procedure and the patient understood and agreed. He would very much like this repaired in light of its symptomatic state. He will talk this over with his oncologist and his cardiologist and once he is "cleared", he will call us to schedule his procedure. He is aware that the repair of his right inguinal hernia with mesh will be done under local with IV sedation at the [facility] with presurgical testing preceding this date. The patient was encouraged to avoid heavy lifting and strenuous activity in the interim, of course. He would hold his warfarin 3 days prior to surgery and will likely need bridging therapy, but he may remain on his Plavix throughout the perioperative period. I gave him a prescription for an inguinal truss which I strongly recommend he wear daily in the interim.

## 2019-10-30 LAB
HCT VFR BLD CALC: 37.4 %
HGB BLD-MCNC: 12.9 G/DL
MCHC RBC-ENTMCNC: 33.2 PG
MCHC RBC-ENTMCNC: 34.5 G/DL
MCV RBC AUTO: 96.1 FL
PLATELET # BLD AUTO: 82 K/UL
PMV BLD: 10 FL
RBC # BLD: 3.89 M/UL
RBC # FLD: 14 %
WBC # FLD AUTO: 4.3 K/UL

## 2019-10-31 ENCOUNTER — OUTPATIENT (OUTPATIENT)
Dept: OUTPATIENT SERVICES | Facility: HOSPITAL | Age: 79
LOS: 1 days | Discharge: HOME | End: 2019-10-31

## 2019-10-31 ENCOUNTER — APPOINTMENT (OUTPATIENT)
Dept: HEMATOLOGY ONCOLOGY | Facility: CLINIC | Age: 79
End: 2019-10-31

## 2019-10-31 ENCOUNTER — LABORATORY RESULT (OUTPATIENT)
Age: 79
End: 2019-10-31

## 2019-10-31 DIAGNOSIS — Z95.2 PRESENCE OF PROSTHETIC HEART VALVE: Chronic | ICD-10-CM

## 2019-10-31 DIAGNOSIS — Z79.01 LONG TERM (CURRENT) USE OF ANTICOAGULANTS: ICD-10-CM

## 2019-10-31 DIAGNOSIS — I48.91 UNSPECIFIED ATRIAL FIBRILLATION: ICD-10-CM

## 2019-10-31 LAB
POCT INR: 4.3 RATIO — HIGH (ref 0.9–1.2)
POCT PT: 51.6 SEC — HIGH (ref 10–13.4)

## 2019-11-01 ENCOUNTER — RX RENEWAL (OUTPATIENT)
Age: 79
End: 2019-11-01

## 2019-11-01 LAB
HCT VFR BLD CALC: 38.1 %
HGB BLD-MCNC: 13.3 G/DL
MCHC RBC-ENTMCNC: 33.8 PG
MCHC RBC-ENTMCNC: 34.9 G/DL
MCV RBC AUTO: 96.9 FL
PLATELET # BLD AUTO: 79 K/UL
PMV BLD: 10.4 FL
RBC # BLD: 3.93 M/UL
RBC # FLD: 13.8 %
WBC # FLD AUTO: 5.04 K/UL

## 2019-11-19 ENCOUNTER — OTHER (OUTPATIENT)
Age: 79
End: 2019-11-19

## 2019-11-19 ENCOUNTER — APPOINTMENT (OUTPATIENT)
Dept: HEMATOLOGY ONCOLOGY | Facility: CLINIC | Age: 79
End: 2019-11-19
Payer: MEDICARE

## 2019-11-19 ENCOUNTER — RX RENEWAL (OUTPATIENT)
Age: 79
End: 2019-11-19

## 2019-11-19 ENCOUNTER — LABORATORY RESULT (OUTPATIENT)
Age: 79
End: 2019-11-19

## 2019-11-19 VITALS
HEIGHT: 70 IN | HEART RATE: 63 BPM | DIASTOLIC BLOOD PRESSURE: 81 MMHG | SYSTOLIC BLOOD PRESSURE: 133 MMHG | BODY MASS INDEX: 25.48 KG/M2 | TEMPERATURE: 96.7 F | WEIGHT: 178 LBS

## 2019-11-19 PROCEDURE — 99213 OFFICE O/P EST LOW 20 MIN: CPT

## 2019-11-19 NOTE — PHYSICAL EXAM
[Normal] : no JVD, no calf tenderness, venous stasis changes, varices [de-identified] : Pale chronically ill in NAD .  [de-identified] : healed scar  of lymph node biopsy on the left, no residual or  recurrent adenopathy. [de-identified] : no peripheral adenopathy. [de-identified] : spleen no longer palpable .  [de-identified] : prominent aortic valve  click.

## 2019-11-19 NOTE — ASSESSMENT
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 \par  on obinotuzumab and venetoclax with good hematologic and prem response . \par continue same , return in one month . \par Back pain , etiology ? \par \par

## 2019-11-19 NOTE — HISTORY OF PRESENT ILLNESS
[de-identified] : this is a 76-year-old white man with multiple medical problems including coronary artery disease status post angioplasty valvular heart disease status post metallic aortic valve replacement. He is referred for abnormal hemogram noted recently, WBC is 10.6 hemoglobin 14.2 platelets 131 absolute lymphocytes 5268 chemistry is unremarkable. He denies any constitutional symptoms  specifically no fever ,weight loss ,night sweats, fatigue or  pruritus. he feels fantastic with good energy level. 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 He is scheduled for ablation for atrial fibrillation , he continues on coumadin for mechanical valve. He reports minor bruising on coumadin and plavix. He feels slight fatigue . He denies fever , weight loss or night sweats . He is also on androgen deprivation for elevated PSA , , He had previously on surveillance and had multiple prostate biopsies ( unclear if it showed cancer ) . Bone scan is allegedly negative . Last PSA is less than 1 and patient denies obstructive symptoms. \par "\par 01/10/2019 Patient returns for follow up for CLL on watchful waiting , he " feels tired all the time " , He had unsuccessful ablation for atrial fibrillation and was told to increase metoprolol . He denies B symptoms . \par \par 02/07/2019 Patient returns for follow up , he had unsuccessful ablation for paroxysmal atrial fibrillation and was placed on amiodarone , he continues on coumadin without ASA and denies abnormal bleeding , he reports occasional LUQ pain . no  B symptoms.\par \par 05/16/2019 Patient returns for follow up for CLL/SLL he reports few episodes of dizziness associated with nausea lasting for 1 to 2 hours . CT head ( non-contrast ) was negative , he had negative ENT evaluation and had long term event monitor placed 2 weeks ago without recurrence ( also discontinued amiodarone ) , CBC shows slight decrease in Hb and platelets from baseline . He continues on coumadin and plavix and denies abnormal bleeding . He continues with mild LUQ and back pain . \par \par 06/13/2019 Patient returns for follow up for SLL/CLL with anemia, thrombocytopenia and marked splenomegaly ( 19cm ) . He complains of left flank and back pain . he had a trial of epidural anethesia and is scheduled for nerve block/ ablation / epidural injection . he continues on coumadin and denies any abnormal bleeding . \par \par 07/11/2019 Patient returns with persistent LUQ discomfort , back pain despite epidural injections . 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 He is relatively asymptomatic except for mild left flank pain . no significant bleeding on coumadin and plavix . Given his age , multiple co-morbidities and high risk features , the regimen of choice is combination of venetoclax and obinotuzumab . \par \par 08/22/2019 Patient returns for follow up after starting on obinotuzumab , he had mild reaction during the split first dose ( nausea ) . He tolerated day 8 and day 15 very well ( also given in split dose ) . He lost 10 lbs despite good po intake . He denies fever . he continues with mild back pain . \par \par 11/19/2019 patient returns for follow up , he continues on venetoclax/obino and feels well except for persistent back pain , MRI done elsewhere  was allegedly normal , spleen is no longer palpable , Hb is 13 , wbc is normal except for lymphopenia , platelets  : 111 .  He reports occasional episodes of abdominal cramping with nausea. no weight loss.

## 2019-11-20 LAB
HCT VFR BLD CALC: 37.3 %
HGB BLD-MCNC: 12.9 G/DL
MCHC RBC-ENTMCNC: 33.8 PG
MCHC RBC-ENTMCNC: 34.6 G/DL
MCV RBC AUTO: 97.6 FL
PLATELET # BLD AUTO: 111 K/UL
PMV BLD: 9.7 FL
RBC # BLD: 3.82 M/UL
RBC # FLD: 13.8 %
WBC # FLD AUTO: 4.76 K/UL

## 2019-11-21 ENCOUNTER — OUTPATIENT (OUTPATIENT)
Dept: OUTPATIENT SERVICES | Facility: HOSPITAL | Age: 79
LOS: 1 days | Discharge: HOME | End: 2019-11-21

## 2019-11-21 ENCOUNTER — APPOINTMENT (OUTPATIENT)
Dept: INFUSION THERAPY | Facility: CLINIC | Age: 79
End: 2019-11-21

## 2019-11-21 DIAGNOSIS — Z95.2 PRESENCE OF PROSTHETIC HEART VALVE: Chronic | ICD-10-CM

## 2019-11-21 DIAGNOSIS — I48.91 UNSPECIFIED ATRIAL FIBRILLATION: ICD-10-CM

## 2019-11-21 DIAGNOSIS — Z79.01 LONG TERM (CURRENT) USE OF ANTICOAGULANTS: ICD-10-CM

## 2019-11-21 LAB
POCT INR: 3.5 RATIO — HIGH (ref 0.9–1.2)
POCT PT: 42.3 SEC — HIGH (ref 10–13.4)

## 2019-11-21 RX ORDER — DIPHENHYDRAMINE HCL 50 MG
50 CAPSULE ORAL ONCE
Refills: 0 | Status: COMPLETED | OUTPATIENT
Start: 2019-11-21 | End: 2019-11-21

## 2019-11-21 RX ORDER — DEXAMETHASONE 0.5 MG/5ML
20 ELIXIR ORAL ONCE
Refills: 0 | Status: COMPLETED | OUTPATIENT
Start: 2019-11-21 | End: 2019-11-21

## 2019-11-21 RX ORDER — OBINUTUZUMAB 1000 MG/40ML
1000 INJECTION, SOLUTION, CONCENTRATE INTRAVENOUS ONCE
Refills: 0 | Status: COMPLETED | OUTPATIENT
Start: 2019-11-21 | End: 2019-11-21

## 2019-11-21 RX ORDER — ACETAMINOPHEN 500 MG
650 TABLET ORAL ONCE
Refills: 0 | Status: COMPLETED | OUTPATIENT
Start: 2019-11-21 | End: 2019-11-21

## 2019-11-21 RX ADMIN — OBINUTUZUMAB 50 MILLIGRAM(S): 1000 INJECTION, SOLUTION, CONCENTRATE INTRAVENOUS at 10:52

## 2019-11-21 RX ADMIN — Medication 650 MILLIGRAM(S): at 10:15

## 2019-11-21 RX ADMIN — OBINUTUZUMAB 1000 MILLIGRAM(S): 1000 INJECTION, SOLUTION, CONCENTRATE INTRAVENOUS at 14:40

## 2019-11-21 RX ADMIN — Medication 50 MILLIGRAM(S): at 10:45

## 2019-11-21 RX ADMIN — Medication 165 MILLIGRAM(S): at 09:52

## 2019-11-21 RX ADMIN — Medication 20 MILLIGRAM(S): at 10:12

## 2019-11-21 RX ADMIN — Medication 102 MILLIGRAM(S): at 10:15

## 2019-11-22 LAB
ALBUMIN SERPL ELPH-MCNC: 4.8 G/DL
ALP BLD-CCNC: 48 U/L
ALT SERPL-CCNC: 12 U/L
ANION GAP SERPL CALC-SCNC: 15 MMOL/L
AST SERPL-CCNC: 25 U/L
BILIRUB SERPL-MCNC: 0.6 MG/DL
BUN SERPL-MCNC: 22 MG/DL
CALCIUM SERPL-MCNC: 9.8 MG/DL
CHLORIDE SERPL-SCNC: 105 MMOL/L
CO2 SERPL-SCNC: 24 MMOL/L
CREAT SERPL-MCNC: 0.8 MG/DL
GLUCOSE SERPL-MCNC: 182 MG/DL
LDH SERPL-CCNC: 400 U/L
POTASSIUM SERPL-SCNC: 4.5 MMOL/L
PROT SERPL-MCNC: 6.7 G/DL
SODIUM SERPL-SCNC: 144 MMOL/L
URATE SERPL-MCNC: 4.6 MG/DL

## 2019-12-13 ENCOUNTER — APPOINTMENT (OUTPATIENT)
Dept: NEUROLOGY | Facility: CLINIC | Age: 79
End: 2019-12-13
Payer: MEDICARE

## 2019-12-13 VITALS
SYSTOLIC BLOOD PRESSURE: 128 MMHG | HEIGHT: 70 IN | WEIGHT: 170 LBS | DIASTOLIC BLOOD PRESSURE: 72 MMHG | BODY MASS INDEX: 24.34 KG/M2 | HEART RATE: 74 BPM

## 2019-12-13 PROCEDURE — 99202 OFFICE O/P NEW SF 15 MIN: CPT

## 2019-12-13 NOTE — HISTORY OF PRESENT ILLNESS
[FreeTextEntry1] : 80 y/o right handed man with hx Afib, s/p artificial AVR (on Warfarin), CLL w/ thrombocytopenia, CAD w/ Stent (on Plavix), hyperlipidemia, HTN, hypothyroid presenting s/p ER visit in [place] for TIA.\par He was repairing a ceiling leak in his home and suddenly experienced expressive aphasia in the context of producing speech which was inappropriate for the context of the situation. He was alone but his wife incidentally called him as witnessed the sx over the phone. The whole episode lasted approx one hour.\par He had a negative CTH aside from white matter changes, US carotids (+) for plaque in b/l bulbs but no significant stenosis, Echo EF 46-50%.\par LDL 70\par Plts were 75\par

## 2019-12-13 NOTE — REVIEW OF SYSTEMS
[Chills] : no chills [Fever] : no fever [Depression] : no depression [Anxiety] : no anxiety [Leg Weakness] : no leg weakness [Arm Weakness] : no arm weakness [Tingling] : no tingling [Numbness] : no numbness [Palpitations] : no palpitations [Chest Pain] : no chest pain [Eyesight Problems] : no eyesight problems [Shortness Of Breath] : no shortness of breath [Skin Lesions] : no skin lesions

## 2019-12-13 NOTE — ASSESSMENT
[FreeTextEntry1] : 78 y/o man with hx Afib, s/p artificial AVR (on Warfarin), CLL w/ thrombocytopenia, CAD w/ Stent (on Plavix), hyperlipidemia, HTN, hypothyroid s/p episode of aphasia x 1 hour. Likely TIA, had evaluation in ED in [place]. Will need further w/u.\par \par Plan:\par -MRI brain without and with latha\par -MRA neck with latha\par -MRA head without latha\par -Pt on Plavix and AC per cardiology. Would monitor closely given thrombocytopenia and bleeding risk\par -Cardiology f/u for monitoring of AC and Antiplatelets and for new TIA\par -Hematology f/u\par -May do EEG after above\par The patient has MRI with his valve in the past.\par \par D/w Dr. Puentes

## 2019-12-13 NOTE — PHYSICAL EXAM
[FreeTextEntry1] : NIHSS:\par LOC 0\par Facial 0\par Gaze 0\par Visual 0\par Motor Arm 0\par Motor Leg 0\par Ataxia 0\par Sensory 0\par Language 0\par Dysarthria 0\par Extinction 0\par Total: 0\par \par MRS:0\par \par

## 2019-12-16 ENCOUNTER — LABORATORY RESULT (OUTPATIENT)
Age: 79
End: 2019-12-16

## 2019-12-16 ENCOUNTER — APPOINTMENT (OUTPATIENT)
Dept: HEMATOLOGY ONCOLOGY | Facility: CLINIC | Age: 79
End: 2019-12-16
Payer: MEDICARE

## 2019-12-16 VITALS
BODY MASS INDEX: 23.91 KG/M2 | WEIGHT: 167 LBS | TEMPERATURE: 96.7 F | DIASTOLIC BLOOD PRESSURE: 73 MMHG | RESPIRATION RATE: 14 BRPM | SYSTOLIC BLOOD PRESSURE: 104 MMHG | HEART RATE: 69 BPM | HEIGHT: 70 IN

## 2019-12-16 LAB
HCT VFR BLD CALC: 37.8 %
HGB BLD-MCNC: 13.3 G/DL
MCHC RBC-ENTMCNC: 33.5 PG
MCHC RBC-ENTMCNC: 35.2 G/DL
MCV RBC AUTO: 95.2 FL
PLATELET # BLD AUTO: 105 K/UL
PMV BLD: 10 FL
RBC # BLD: 3.97 M/UL
RBC # FLD: 13.2 %
WBC # FLD AUTO: 8.03 K/UL

## 2019-12-16 PROCEDURE — 99214 OFFICE O/P EST MOD 30 MIN: CPT

## 2019-12-17 NOTE — PHYSICAL EXAM
[Normal] : no peripheral adenopathy appreciated [Restricted in physically strenuous activity but ambulatory and able to carry out work of a light or sedentary nature] : Status 1- Restricted in physically strenuous activity but ambulatory and able to carry out work of a light or sedentary nature, e.g., light house work, office work [de-identified] : Pale chronically ill in NAD .  [de-identified] : healed scar  of lymph node biopsy on the left, no residual or  recurrent adenopathy. [de-identified] : prominent aortic valve  click. [de-identified] : spleen no longer palpable .  [de-identified] : no peripheral adenopathy.

## 2019-12-17 NOTE — HISTORY OF PRESENT ILLNESS
[de-identified] : this is a 76-year-old white man with multiple medical problems including coronary artery disease status post angioplasty valvular heart disease status post metallic aortic valve replacement. He is referred for abnormal hemogram noted recently, WBC is 10.6 hemoglobin 14.2 platelets 131 absolute lymphocytes 5268 chemistry is unremarkable. He denies any constitutional symptoms  specifically no fever ,weight loss ,night sweats, fatigue or  pruritus. he feels fantastic with good energy level. 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 He is scheduled for ablation for atrial fibrillation , he continues on coumadin for mechanical valve. He reports minor bruising on coumadin and plavix. He feels slight fatigue . He denies fever , weight loss or night sweats . He is also on androgen deprivation for elevated PSA , , He had previously on surveillance and had multiple prostate biopsies ( unclear if it showed cancer ) . Bone scan is allegedly negative . Last PSA is less than 1 and patient denies obstructive symptoms. \par "\par 01/10/2019 Patient returns for follow up for CLL on watchful waiting , he " feels tired all the time " , He had unsuccessful ablation for atrial fibrillation and was told to increase metoprolol . He denies B symptoms . \par \par 02/07/2019 Patient returns for follow up , he had unsuccessful ablation for paroxysmal atrial fibrillation and was placed on amiodarone , he continues on coumadin without ASA and denies abnormal bleeding , he reports occasional LUQ pain . no  B symptoms.\par \par 05/16/2019 Patient returns for follow up for CLL/SLL he reports few episodes of dizziness associated with nausea lasting for 1 to 2 hours . CT head ( non-contrast ) was negative , he had negative ENT evaluation and had long term event monitor placed 2 weeks ago without recurrence ( also discontinued amiodarone ) , CBC shows slight decrease in Hb and platelets from baseline . He continues on coumadin and plavix and denies abnormal bleeding . He continues with mild LUQ and back pain . \par \par 06/13/2019 Patient returns for follow up for SLL/CLL with anemia, thrombocytopenia and marked splenomegaly ( 19cm ) . He complains of left flank and back pain . he had a trial of epidural anethesia and is scheduled for nerve block/ ablation / epidural injection . he continues on coumadin and denies any abnormal bleeding . \par \par 07/11/2019 Patient returns with persistent LUQ discomfort , back pain despite epidural injections . 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 He is relatively asymptomatic except for mild left flank pain . no significant bleeding on coumadin and plavix . Given his age , multiple co-morbidities and high risk features , the regimen of choice is combination of venetoclax and obinotuzumab . \par \par 08/22/2019 Patient returns for follow up after starting on obinotuzumab , he had mild reaction during the split first dose ( nausea ) . He tolerated day 8 and day 15 very well ( also given in split dose ) . He lost 10 lbs despite good po intake . He denies fever . he continues with mild back pain . \par \par 11/19/2019 patient returns for follow up , he continues on venetoclax/obino and feels well except for persistent back pain , MRI done elsewhere  was allegedly normal , spleen is no longer palpable , Hb is 13 , wbc is normal except for lymphopenia , platelets  : 111 .  He reports occasional episodes of abdominal cramping with nausea. no weight loss. \par \par 12/16/19: Pt returns for a f/u visit. He has been c/o nausea with decreased appetite. However, he has not been taking antiemetics as previously prescribed. Has lost 11 lbs in the last month. Also had a recent TIA manifested as aphasia that lasted for about 45 mins. He was treated in an ED in [place]. CTH was unremarkable. Echo showed EF 45-50%. No clots seen. US carotids showed plaques but no significant stenosis. He is being seen by neurology in [place] and will have MRI/MRA. He was already on plavix and coumadin before having TIA. His INR at the time of ER visit was 4.3.

## 2019-12-17 NOTE — ASSESSMENT
[FreeTextEntry1] : This is a 79-year-old white male with a remote history of non-Hodgkin's lymphoma, High risk CLL with symptomatic splenomegaly , anemia, thrombocytopenia . \par S/P TIA ? \par Has been c/o nausea with decreased appetite. However, he has not been taking antiemetics as previously prescribed. Has lost 11 lbs in the last month. CBC acceptable. \par Proceed with 5th cycle of obinotuzumab. Will decrease venetoclax to 300 mg daily, given the abovementioned complaints. \par Spleen no longer palpable.  \par On allopurinol to 100 mg. No e/o TLS.\par He was told to take zofran as needed for nausea. \par \par RTO in 1 month\par \par

## 2019-12-18 ENCOUNTER — OUTPATIENT (OUTPATIENT)
Dept: OUTPATIENT SERVICES | Facility: HOSPITAL | Age: 79
LOS: 1 days | Discharge: HOME | End: 2019-12-18
Payer: MEDICARE

## 2019-12-18 ENCOUNTER — APPOINTMENT (OUTPATIENT)
Dept: CARDIOLOGY | Facility: CLINIC | Age: 79
End: 2019-12-18

## 2019-12-18 DIAGNOSIS — G45.9 TRANSIENT CEREBRAL ISCHEMIC ATTACK, UNSPECIFIED: ICD-10-CM

## 2019-12-18 DIAGNOSIS — Z95.2 PRESENCE OF PROSTHETIC HEART VALVE: Chronic | ICD-10-CM

## 2019-12-18 PROCEDURE — 70548 MR ANGIOGRAPHY NECK W/DYE: CPT | Mod: 26

## 2019-12-19 ENCOUNTER — OUTPATIENT (OUTPATIENT)
Dept: OUTPATIENT SERVICES | Facility: HOSPITAL | Age: 79
LOS: 1 days | Discharge: HOME | End: 2019-12-19

## 2019-12-19 ENCOUNTER — APPOINTMENT (OUTPATIENT)
Dept: INFUSION THERAPY | Facility: CLINIC | Age: 79
End: 2019-12-19

## 2019-12-19 DIAGNOSIS — Z95.2 PRESENCE OF PROSTHETIC HEART VALVE: Chronic | ICD-10-CM

## 2019-12-19 DIAGNOSIS — Z79.01 LONG TERM (CURRENT) USE OF ANTICOAGULANTS: ICD-10-CM

## 2019-12-19 DIAGNOSIS — I48.91 UNSPECIFIED ATRIAL FIBRILLATION: ICD-10-CM

## 2019-12-19 LAB
ALBUMIN SERPL ELPH-MCNC: 4.6 G/DL
ALP BLD-CCNC: 61 U/L
ALT SERPL-CCNC: 7 U/L
ANION GAP SERPL CALC-SCNC: 13 MMOL/L
AST SERPL-CCNC: 15 U/L
BILIRUB SERPL-MCNC: 0.5 MG/DL
BUN SERPL-MCNC: 18 MG/DL
CALCIUM SERPL-MCNC: 9.8 MG/DL
CHLORIDE SERPL-SCNC: 106 MMOL/L
CO2 SERPL-SCNC: 27 MMOL/L
CREAT SERPL-MCNC: 0.9 MG/DL
GLUCOSE SERPL-MCNC: 92 MG/DL
LDH SERPL-CCNC: 261 U/L
POCT INR: 2.6 RATIO — HIGH (ref 0.9–1.2)
POCT PT: 31.3 SEC — HIGH (ref 10–13.4)
POTASSIUM SERPL-SCNC: 4.7 MMOL/L
PROT SERPL-MCNC: 6.2 G/DL
SODIUM SERPL-SCNC: 146 MMOL/L

## 2019-12-19 RX ORDER — ACETAMINOPHEN 500 MG
650 TABLET ORAL ONCE
Refills: 0 | Status: COMPLETED | OUTPATIENT
Start: 2019-12-19 | End: 2019-12-19

## 2019-12-19 RX ORDER — OBINUTUZUMAB 1000 MG/40ML
1000 INJECTION, SOLUTION, CONCENTRATE INTRAVENOUS ONCE
Refills: 0 | Status: COMPLETED | OUTPATIENT
Start: 2019-12-19 | End: 2019-12-19

## 2019-12-19 RX ORDER — DIPHENHYDRAMINE HCL 50 MG
50 CAPSULE ORAL ONCE
Refills: 0 | Status: COMPLETED | OUTPATIENT
Start: 2019-12-19 | End: 2019-12-19

## 2019-12-19 RX ORDER — DEXAMETHASONE 0.5 MG/5ML
20 ELIXIR ORAL ONCE
Refills: 0 | Status: COMPLETED | OUTPATIENT
Start: 2019-12-19 | End: 2019-12-19

## 2019-12-19 RX ADMIN — Medication 165 MILLIGRAM(S): at 09:55

## 2019-12-19 RX ADMIN — Medication 650 MILLIGRAM(S): at 11:37

## 2019-12-19 RX ADMIN — Medication 102 MILLIGRAM(S): at 10:59

## 2019-12-19 RX ADMIN — Medication 20 MILLIGRAM(S): at 10:59

## 2019-12-19 RX ADMIN — Medication 650 MILLIGRAM(S): at 10:59

## 2019-12-19 RX ADMIN — OBINUTUZUMAB 50 MILLIGRAM(S): 1000 INJECTION, SOLUTION, CONCENTRATE INTRAVENOUS at 11:37

## 2019-12-23 ENCOUNTER — OUTPATIENT (OUTPATIENT)
Dept: OUTPATIENT SERVICES | Facility: HOSPITAL | Age: 79
LOS: 1 days | Discharge: HOME | End: 2019-12-23
Payer: MEDICARE

## 2019-12-23 DIAGNOSIS — G45.9 TRANSIENT CEREBRAL ISCHEMIC ATTACK, UNSPECIFIED: ICD-10-CM

## 2019-12-23 DIAGNOSIS — Z95.2 PRESENCE OF PROSTHETIC HEART VALVE: Chronic | ICD-10-CM

## 2019-12-23 PROCEDURE — 70544 MR ANGIOGRAPHY HEAD W/O DYE: CPT | Mod: 26,59

## 2019-12-23 PROCEDURE — 70553 MRI BRAIN STEM W/O & W/DYE: CPT | Mod: 26

## 2019-12-27 ENCOUNTER — RX RENEWAL (OUTPATIENT)
Age: 79
End: 2019-12-27

## 2020-01-02 ENCOUNTER — OUTPATIENT (OUTPATIENT)
Dept: OUTPATIENT SERVICES | Facility: HOSPITAL | Age: 80
LOS: 1 days | Discharge: HOME | End: 2020-01-02

## 2020-01-02 DIAGNOSIS — I48.91 UNSPECIFIED ATRIAL FIBRILLATION: ICD-10-CM

## 2020-01-02 DIAGNOSIS — Z95.2 PRESENCE OF PROSTHETIC HEART VALVE: Chronic | ICD-10-CM

## 2020-01-02 DIAGNOSIS — Z79.01 LONG TERM (CURRENT) USE OF ANTICOAGULANTS: ICD-10-CM

## 2020-01-02 LAB
POCT INR: 4.4 RATIO — HIGH (ref 0.9–1.2)
POCT PT: 52.9 SEC — HIGH (ref 10–13.4)

## 2020-01-13 ENCOUNTER — APPOINTMENT (OUTPATIENT)
Dept: HEMATOLOGY ONCOLOGY | Facility: CLINIC | Age: 80
End: 2020-01-13
Payer: MEDICARE

## 2020-01-13 ENCOUNTER — LABORATORY RESULT (OUTPATIENT)
Age: 80
End: 2020-01-13

## 2020-01-13 ENCOUNTER — OUTPATIENT (OUTPATIENT)
Dept: OUTPATIENT SERVICES | Facility: HOSPITAL | Age: 80
LOS: 1 days | Discharge: HOME | End: 2020-01-13

## 2020-01-13 VITALS
SYSTOLIC BLOOD PRESSURE: 127 MMHG | TEMPERATURE: 97 F | DIASTOLIC BLOOD PRESSURE: 69 MMHG | HEIGHT: 70 IN | WEIGHT: 169 LBS | HEART RATE: 80 BPM | BODY MASS INDEX: 24.2 KG/M2

## 2020-01-13 DIAGNOSIS — Z95.2 PRESENCE OF PROSTHETIC HEART VALVE: Chronic | ICD-10-CM

## 2020-01-13 DIAGNOSIS — I48.91 UNSPECIFIED ATRIAL FIBRILLATION: ICD-10-CM

## 2020-01-13 DIAGNOSIS — Z79.01 LONG TERM (CURRENT) USE OF ANTICOAGULANTS: ICD-10-CM

## 2020-01-13 LAB
HCT VFR BLD CALC: 35.2 %
HGB BLD-MCNC: 12.6 G/DL
INR PPP: 3.3 RATIO
MCHC RBC-ENTMCNC: 33.7 PG
MCHC RBC-ENTMCNC: 35.8 G/DL
MCV RBC AUTO: 94.1 FL
PLATELET # BLD AUTO: 95 K/UL
PMV BLD: 9.2 FL
POCT INR: 3.3 RATIO — HIGH (ref 0.9–1.2)
POCT PT: 39.2 SEC — HIGH (ref 10–13.4)
POCT-PROTHROMBIN TIME: 39.2 SECS
RBC # BLD: 3.74 M/UL
RBC # FLD: 13.5 %
WBC # FLD AUTO: 3.09 K/UL

## 2020-01-13 PROCEDURE — 99214 OFFICE O/P EST MOD 30 MIN: CPT

## 2020-01-14 LAB
ALBUMIN SERPL ELPH-MCNC: 4.5 G/DL
ALP BLD-CCNC: 50 U/L
ALT SERPL-CCNC: 10 U/L
ANION GAP SERPL CALC-SCNC: 10 MMOL/L
AST SERPL-CCNC: 18 U/L
BILIRUB SERPL-MCNC: 0.6 MG/DL
BUN SERPL-MCNC: 13 MG/DL
CALCIUM SERPL-MCNC: 9.5 MG/DL
CHLORIDE SERPL-SCNC: 107 MMOL/L
CO2 SERPL-SCNC: 24 MMOL/L
CREAT SERPL-MCNC: 0.8 MG/DL
GLUCOSE SERPL-MCNC: 172 MG/DL
LDH SERPL-CCNC: 318 U/L
POTASSIUM SERPL-SCNC: 4 MMOL/L
PROT SERPL-MCNC: 6.3 G/DL
SODIUM SERPL-SCNC: 141 MMOL/L

## 2020-01-14 NOTE — PHYSICAL EXAM
[Restricted in physically strenuous activity but ambulatory and able to carry out work of a light or sedentary nature] : Status 1- Restricted in physically strenuous activity but ambulatory and able to carry out work of a light or sedentary nature, e.g., light house work, office work [Normal] : no peripheral adenopathy appreciated [de-identified] : healed scar  of lymph node biopsy on the left, no residual or  recurrent adenopathy. [de-identified] : Pale chronically ill in NAD .  [de-identified] : prominent aortic valve  click. [de-identified] : spleen no longer palpable .  [de-identified] : no peripheral adenopathy.

## 2020-01-14 NOTE — ASSESSMENT
[FreeTextEntry1] : This is a 79-year-old white male with a remote history of non-Hodgkin's lymphoma, High risk CLL with symptomatic splenomegaly , anemia, thrombocytopenia . \par S/P TIA ? 12/2019. Continues to complain of back pain , nausea and abdominal pain especially after having heavy meal.He has not been taking antiemetics as previously prescribed. His weight stable when compared to last months\par -Spleen no longer palpable\par \par Plan:\par - CBC reviewed today and is  acceptable for treatment. . \par Proceed with 6th cycle of obinotuzumab. Will hold  venetoclax for 2 weeks, given the abovementioned complaints and see if there is any improvement \par On allopurinol to 100 mg. No e/o TLS.\par He was told to take zofran as needed for nausea. \par Will order imaging in his next visit\par \par RTO in 2 weeks. \par Patient seen and examined with Dr. Carey\par Patient and\par \par

## 2020-01-15 ENCOUNTER — APPOINTMENT (OUTPATIENT)
Dept: NEUROLOGY | Facility: CLINIC | Age: 80
End: 2020-01-15

## 2020-01-16 ENCOUNTER — APPOINTMENT (OUTPATIENT)
Dept: INFUSION THERAPY | Facility: CLINIC | Age: 80
End: 2020-01-16

## 2020-01-16 RX ORDER — DIPHENHYDRAMINE HCL 50 MG
50 CAPSULE ORAL ONCE
Refills: 0 | Status: COMPLETED | OUTPATIENT
Start: 2020-01-16 | End: 2020-01-16

## 2020-01-16 RX ORDER — DEXAMETHASONE 0.5 MG/5ML
20 ELIXIR ORAL ONCE
Refills: 0 | Status: COMPLETED | OUTPATIENT
Start: 2020-01-16 | End: 2020-01-16

## 2020-01-16 RX ORDER — OBINUTUZUMAB 1000 MG/40ML
1000 INJECTION, SOLUTION, CONCENTRATE INTRAVENOUS ONCE
Refills: 0 | Status: COMPLETED | OUTPATIENT
Start: 2020-01-16 | End: 2020-01-16

## 2020-01-16 RX ORDER — ACETAMINOPHEN 500 MG
650 TABLET ORAL ONCE
Refills: 0 | Status: COMPLETED | OUTPATIENT
Start: 2020-01-16 | End: 2020-01-16

## 2020-01-16 RX ADMIN — OBINUTUZUMAB 1000 MILLIGRAM(S): 1000 INJECTION, SOLUTION, CONCENTRATE INTRAVENOUS at 13:10

## 2020-01-16 RX ADMIN — Medication 165 MILLIGRAM(S): at 09:49

## 2020-01-16 RX ADMIN — Medication 650 MILLIGRAM(S): at 09:49

## 2020-01-16 RX ADMIN — Medication 20 MILLIGRAM(S): at 10:25

## 2020-01-16 RX ADMIN — Medication 102 MILLIGRAM(S): at 10:25

## 2020-01-16 RX ADMIN — Medication 650 MILLIGRAM(S): at 14:15

## 2020-01-16 RX ADMIN — Medication 50 MILLIGRAM(S): at 10:55

## 2020-01-16 RX ADMIN — OBINUTUZUMAB 50 MILLIGRAM(S): 1000 INJECTION, SOLUTION, CONCENTRATE INTRAVENOUS at 11:03

## 2020-01-27 ENCOUNTER — APPOINTMENT (OUTPATIENT)
Dept: HEMATOLOGY ONCOLOGY | Facility: CLINIC | Age: 80
End: 2020-01-27
Payer: MEDICARE

## 2020-01-27 ENCOUNTER — OUTPATIENT (OUTPATIENT)
Dept: OUTPATIENT SERVICES | Facility: HOSPITAL | Age: 80
LOS: 1 days | Discharge: HOME | End: 2020-01-27

## 2020-01-27 ENCOUNTER — LABORATORY RESULT (OUTPATIENT)
Age: 80
End: 2020-01-27

## 2020-01-27 VITALS
DIASTOLIC BLOOD PRESSURE: 80 MMHG | BODY MASS INDEX: 23.77 KG/M2 | SYSTOLIC BLOOD PRESSURE: 144 MMHG | HEART RATE: 62 BPM | RESPIRATION RATE: 14 BRPM | WEIGHT: 166 LBS | HEIGHT: 70 IN | TEMPERATURE: 96.4 F

## 2020-01-27 DIAGNOSIS — Z95.2 PRESENCE OF PROSTHETIC HEART VALVE: Chronic | ICD-10-CM

## 2020-01-27 DIAGNOSIS — Z79.01 LONG TERM (CURRENT) USE OF ANTICOAGULANTS: ICD-10-CM

## 2020-01-27 DIAGNOSIS — I48.91 UNSPECIFIED ATRIAL FIBRILLATION: ICD-10-CM

## 2020-01-27 LAB
HCT VFR BLD CALC: 37.9 %
HGB BLD-MCNC: 13.3 G/DL
INR PPP: 3.8 RATIO
MCHC RBC-ENTMCNC: 33.6 PG
MCHC RBC-ENTMCNC: 35.1 G/DL
MCV RBC AUTO: 95.7 FL
PLATELET # BLD AUTO: 85 K/UL
PMV BLD: 10.1 FL
POCT INR: 3.8 RATIO — HIGH (ref 0.9–1.2)
POCT PT: 45.7 SEC — HIGH (ref 10–13.4)
POCT-PROTHROMBIN TIME: 45.7 SECS
RBC # BLD: 3.96 M/UL
RBC # FLD: 13.7 %
WBC # FLD AUTO: 6.69 K/UL

## 2020-01-27 PROCEDURE — 99214 OFFICE O/P EST MOD 30 MIN: CPT

## 2020-01-28 NOTE — PHYSICAL EXAM
[Restricted in physically strenuous activity but ambulatory and able to carry out work of a light or sedentary nature] : Status 1- Restricted in physically strenuous activity but ambulatory and able to carry out work of a light or sedentary nature, e.g., light house work, office work [Normal] : no peripheral adenopathy appreciated [de-identified] : Pale chronically ill in NAD .  [de-identified] : prominent aortic valve  click. [de-identified] : healed scar  of lymph node biopsy on the left, no residual or  recurrent adenopathy. [de-identified] : spleen no longer palpable .  [de-identified] : no peripheral adenopathy.

## 2020-01-28 NOTE — HISTORY OF PRESENT ILLNESS
[de-identified] : this is a 76-year-old white man with multiple medical problems including coronary artery disease status post angioplasty valvular heart disease status post metallic aortic valve replacement. He is referred for abnormal hemogram noted recently, WBC is 10.6 hemoglobin 14.2 platelets 131 absolute lymphocytes 5268 chemistry is unremarkable. He denies any constitutional symptoms  specifically no fever ,weight loss ,night sweats, fatigue or  pruritus. he feels fantastic with good energy level. 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 He is scheduled for ablation for atrial fibrillation , he continues on coumadin for mechanical valve. He reports minor bruising on coumadin and plavix. He feels slight fatigue . He denies fever , weight loss or night sweats . He is also on androgen deprivation for elevated PSA , , He had previously on surveillance and had multiple prostate biopsies ( unclear if it showed cancer ) . Bone scan is allegedly negative . Last PSA is less than 1 and patient denies obstructive symptoms. \par "\par 01/10/2019 Patient returns for follow up for CLL on watchful waiting , he " feels tired all the time " , He had unsuccessful ablation for atrial fibrillation and was told to increase metoprolol . He denies B symptoms . \par \par 02/07/2019 Patient returns for follow up , he had unsuccessful ablation for paroxysmal atrial fibrillation and was placed on amiodarone , he continues on coumadin without ASA and denies abnormal bleeding , he reports occasional LUQ pain . no  B symptoms.\par \par 05/16/2019 Patient returns for follow up for CLL/SLL he reports few episodes of dizziness associated with nausea lasting for 1 to 2 hours . CT head ( non-contrast ) was negative , he had negative ENT evaluation and had long term event monitor placed 2 weeks ago without recurrence ( also discontinued amiodarone ) , CBC shows slight decrease in Hb and platelets from baseline . He continues on coumadin and plavix and denies abnormal bleeding . He continues with mild LUQ and back pain . \par \par 06/13/2019 Patient returns for follow up for SLL/CLL with anemia, thrombocytopenia and marked splenomegaly ( 19cm ) . He complains of left flank and back pain . he had a trial of epidural anethesia and is scheduled for nerve block/ ablation / epidural injection . he continues on coumadin and denies any abnormal bleeding . \par \par 07/11/2019 Patient returns with persistent LUQ discomfort , back pain despite epidural injections . 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 He is relatively asymptomatic except for mild left flank pain . no significant bleeding on coumadin and plavix . Given his age , multiple co-morbidities and high risk features , the regimen of choice is combination of venetoclax and obinotuzumab . \par \par 08/22/2019 Patient returns for follow up after starting on obinotuzumab , he had mild reaction during the split first dose ( nausea ) . He tolerated day 8 and day 15 very well ( also given in split dose ) . He lost 10 lbs despite good po intake . He denies fever . he continues with mild back pain . \par \par 11/19/2019 patient returns for follow up , he continues on venetoclax/obino and feels well except for persistent back pain , MRI done elsewhere  was allegedly normal , spleen is no longer palpable , Hb is 13 , wbc is normal except for lymphopenia , platelets  : 111 .  He reports occasional episodes of abdominal cramping with nausea. no weight loss. \par \par 12/16/19: Pt returns for a f/u visit. He has been c/o nausea with decreased appetite. However, he has not been taking antiemetics as previously prescribed. Has lost 11 lbs in the last month. Also had a recent TIA manifested as aphasia that lasted for about 45 mins. He was treated in an ED in [place]. CTH was unremarkable. Echo showed EF 45-50%. No clots seen. US carotids showed plaques but no significant stenosis. He is being seen by neurology in [place] and will have MRI/MRA. He was already on plavix and coumadin before having TIA. His INR at the time of ER visit was 4.3. \par \par 01/13/2020\par Patient returns for a follow up visit. This will be his last cycle of obinutuzumab ( #6). He is on venetoclax 300 mg. It was dose reduced on 12/16 as he was complaining of nausea and abdominal pain. He also has chronic back pain. He remains on coumadin and plavix. \par He is tolerating obinutuzumab with no reactions.

## 2020-02-05 ENCOUNTER — FORM ENCOUNTER (OUTPATIENT)
Age: 80
End: 2020-02-05

## 2020-02-06 ENCOUNTER — APPOINTMENT (OUTPATIENT)
Dept: HEMATOLOGY ONCOLOGY | Facility: CLINIC | Age: 80
End: 2020-02-06
Payer: MEDICARE

## 2020-02-06 ENCOUNTER — OUTPATIENT (OUTPATIENT)
Dept: OUTPATIENT SERVICES | Facility: HOSPITAL | Age: 80
LOS: 1 days | Discharge: HOME | End: 2020-02-06

## 2020-02-06 ENCOUNTER — LABORATORY RESULT (OUTPATIENT)
Age: 80
End: 2020-02-06

## 2020-02-06 ENCOUNTER — OUTPATIENT (OUTPATIENT)
Dept: OUTPATIENT SERVICES | Facility: HOSPITAL | Age: 80
LOS: 1 days | Discharge: HOME | End: 2020-02-06
Payer: MEDICARE

## 2020-02-06 VITALS
TEMPERATURE: 99.3 F | HEART RATE: 86 BPM | RESPIRATION RATE: 14 BRPM | BODY MASS INDEX: 23.62 KG/M2 | HEIGHT: 70 IN | DIASTOLIC BLOOD PRESSURE: 75 MMHG | WEIGHT: 165 LBS | SYSTOLIC BLOOD PRESSURE: 118 MMHG

## 2020-02-06 DIAGNOSIS — R50.9 FEVER, UNSPECIFIED: ICD-10-CM

## 2020-02-06 DIAGNOSIS — C91.10 CHRONIC LYMPHOCYTIC LEUKEMIA OF B-CELL TYPE NOT HAVING ACHIEVED REMISSION: ICD-10-CM

## 2020-02-06 DIAGNOSIS — Z95.2 PRESENCE OF PROSTHETIC HEART VALVE: Chronic | ICD-10-CM

## 2020-02-06 LAB
ALBUMIN SERPL ELPH-MCNC: 4 G/DL
ALP BLD-CCNC: 46 U/L
ALT SERPL-CCNC: 11 U/L
ANION GAP SERPL CALC-SCNC: 11 MMOL/L
AST SERPL-CCNC: 21 U/L
BILIRUB SERPL-MCNC: 0.6 MG/DL
BUN SERPL-MCNC: 20 MG/DL
CALCIUM SERPL-MCNC: 8.5 MG/DL
CHLORIDE SERPL-SCNC: 107 MMOL/L
CO2 SERPL-SCNC: 24 MMOL/L
CREAT SERPL-MCNC: 0.9 MG/DL
GLUCOSE SERPL-MCNC: 95 MG/DL
HCT VFR BLD CALC: 34.4 %
HGB BLD-MCNC: 11.7 G/DL
MCHC RBC-ENTMCNC: 32.9 PG
MCHC RBC-ENTMCNC: 34 G/DL
MCV RBC AUTO: 96.6 FL
PLATELET # BLD AUTO: 73 K/UL
PMV BLD: 9 FL
POTASSIUM SERPL-SCNC: 4.3 MMOL/L
PROT SERPL-MCNC: 6 G/DL
RBC # BLD: 3.56 M/UL
RBC # FLD: 14.1 %
SODIUM SERPL-SCNC: 142 MMOL/L
WBC # FLD AUTO: 2.51 K/UL

## 2020-02-06 PROCEDURE — 99214 OFFICE O/P EST MOD 30 MIN: CPT

## 2020-02-06 PROCEDURE — 71046 X-RAY EXAM CHEST 2 VIEWS: CPT | Mod: 26

## 2020-02-07 NOTE — END OF VISIT
[>50% of Time Spent on Counseling for ____] : Greater than 50% of the encounter time was spent on counseling for [unfilled] [FreeTextEntry3] : I was physically present for the key portions of the evaluation and management service provided.  I agree with the history and physical, and plan which I have reviewed and edited where appropriate.\par \par

## 2020-02-07 NOTE — PHYSICAL EXAM
[Restricted in physically strenuous activity but ambulatory and able to carry out work of a light or sedentary nature] : Status 1- Restricted in physically strenuous activity but ambulatory and able to carry out work of a light or sedentary nature, e.g., light house work, office work [Normal] : no peripheral adenopathy appreciated [de-identified] : healed scar  of lymph node biopsy on the left, no residual or  recurrent adenopathy. [de-identified] : bilateral crackles LLL diminished [de-identified] : Pale chronically ill in NAD .  [de-identified] : spleen no longer palpable .  [de-identified] : prominent aortic valve  click. [de-identified] : no peripheral adenopathy.

## 2020-02-07 NOTE — ASSESSMENT
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 \par -Completed 6 cycles of Obinituzumab and Venetoclax was dose reduced to 300mg secondary to gagging and nausea. Clinically he had good response with spleen no longer being palpable. \par Hematologically, he ad improvement in his HB.\par S/P TIA ? 12/2019. \par \par 2) Fever , cough , mild neutropenia .\par \par PLAN:\par - CBC reviewed today and is stable. We held Venetoclax from 01/16/2020 given the worsening gagging and nausea. He feels much better without the venetoclax.  With Obinotuzumab he experienced  dry heaves for 24 hrs which resolved spontaneously. \par - discussed in detail with patient and his wife. We can restart venetoclax at lower dose ( 200 mg ) or we can continue to observe him off treatment given that he is clinically better. Patient decide to be off treatment with CBC monitoring. \par -We will repeat US abdomen \par On allopurinol to 100 mg. No e/o TLS.\par \par # inguinal hernia : Stable. surgery f/u. \par \par #Febrile cough, mild neutropenia :\par -- CBC WBC: 2.51 K/uL ANC 1.48 \par -- Follow up blood cultures and urine cultures \par -- STAT CXR 2/6/2020: mild atelectasis at left base\par -- Start Levaquin 500mg daily for 10 days \par -- Patient and family aware that if symptoms are progressing and he is still febrile to follow up at office or go to the ED for further evaluation \par -- Interaction of coumadin and levaquin addressed - [facility] with [facility]- adjusted his medication accordingly and will follow up with [facility] on monday as scheduled \par \par RTO in 1 week with CBC check \par Patient seen and examined with Dr. Carey\par \par \par

## 2020-02-07 NOTE — HISTORY OF PRESENT ILLNESS
[de-identified] : this is a 76-year-old white man with multiple medical problems including coronary artery disease status post angioplasty valvular heart disease status post metallic aortic valve replacement. He is referred for abnormal hemogram noted recently, WBC is 10.6 hemoglobin 14.2 platelets 131 absolute lymphocytes 5268 chemistry is unremarkable. He denies any constitutional symptoms  specifically no fever ,weight loss ,night sweats, fatigue or  pruritus. he feels fantastic with good energy level. 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 He is scheduled for ablation for atrial fibrillation , he continues on coumadin for mechanical valve. He reports minor bruising on coumadin and plavix. He feels slight fatigue . He denies fever , weight loss or night sweats . He is also on androgen deprivation for elevated PSA , , He had previously on surveillance and had multiple prostate biopsies ( unclear if it showed cancer ) . Bone scan is allegedly negative . Last PSA is less than 1 and patient denies obstructive symptoms. \par "\par 01/10/2019 Patient returns for follow up for CLL on watchful waiting , he " feels tired all the time " , He had unsuccessful ablation for atrial fibrillation and was told to increase metoprolol . He denies B symptoms . \par \par 02/07/2019 Patient returns for follow up , he had unsuccessful ablation for paroxysmal atrial fibrillation and was placed on amiodarone , he continues on coumadin without ASA and denies abnormal bleeding , he reports occasional LUQ pain . no  B symptoms.\par \par 05/16/2019 Patient returns for follow up for CLL/SLL he reports few episodes of dizziness associated with nausea lasting for 1 to 2 hours . CT head ( non-contrast ) was negative , he had negative ENT evaluation and had long term event monitor placed 2 weeks ago without recurrence ( also discontinued amiodarone ) , CBC shows slight decrease in Hb and platelets from baseline . He continues on coumadin and plavix and denies abnormal bleeding . He continues with mild LUQ and back pain . \par \par 06/13/2019 Patient returns for follow up for SLL/CLL with anemia, thrombocytopenia and marked splenomegaly ( 19cm ) . He complains of left flank and back pain . he had a trial of epidural anethesia and is scheduled for nerve block/ ablation / epidural injection . he continues on coumadin and denies any abnormal bleeding . \par \par 07/11/2019 Patient returns with persistent LUQ discomfort , back pain despite epidural injections . 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 He is relatively asymptomatic except for mild left flank pain . no significant bleeding on coumadin and plavix . Given his age , multiple co-morbidities and high risk features , the regimen of choice is combination of venetoclax and obinotuzumab . \par \par 08/22/2019 Patient returns for follow up after starting on obinotuzumab , he had mild reaction during the split first dose ( nausea ) . He tolerated day 8 and day 15 very well ( also given in split dose ) . He lost 10 lbs despite good po intake . He denies fever . he continues with mild back pain . \par \par 11/19/2019 patient returns for follow up , he continues on venetoclax/obino and feels well except for persistent back pain , MRI done elsewhere  was allegedly normal , spleen is no longer palpable , Hb is 13 , wbc is normal except for lymphopenia , platelets  : 111 .  He reports occasional episodes of abdominal cramping with nausea. no weight loss. \par \par 12/16/19: Pt returns for a f/u visit. He has been c/o nausea with decreased appetite. However, he has not been taking antiemetics as previously prescribed. Has lost 11 lbs in the last month. Also had a recent TIA manifested as aphasia that lasted for about 45 mins. He was treated in an ED in [place]. CTH was unremarkable. Echo showed EF 45-50%. No clots seen. US carotids showed plaques but no significant stenosis. He is being seen by neurology in [place] and will have MRI/MRA. He was already on plavix and coumadin before having TIA. His INR at the time of ER visit was 4.3. \par \par 01/13/2020\par Patient returns for a follow up visit. This will be his last cycle of obinutuzumab ( #6). He is on venetoclax 300 mg. It was dose reduced on 12/16 as he was complaining of nausea and abdominal pain. He also has chronic back pain. He remains on coumadin and plavix. \par He is tolerating obinutuzumab with no reactions. \par \par 2/6/2020: TERRELL ATKINSON is a 79 year old male here today for sick visit. He has CLL and was treated last with obinutuzumab last dose on 1/16/2020. Venetoclax was stopped due to adverse effects , He states that he developed a fever of 101 degrees yesterday associated with chills and a mild cough with hemoptysis. He continues to spike fever today. He is taking Tylenol for symptom relief. He denies SOB and chest pain at this time. \par

## 2020-02-09 ENCOUNTER — FORM ENCOUNTER (OUTPATIENT)
Age: 80
End: 2020-02-09

## 2020-02-10 ENCOUNTER — APPOINTMENT (OUTPATIENT)
Dept: HEMATOLOGY ONCOLOGY | Facility: CLINIC | Age: 80
End: 2020-02-10

## 2020-02-10 ENCOUNTER — OUTPATIENT (OUTPATIENT)
Dept: OUTPATIENT SERVICES | Facility: HOSPITAL | Age: 80
LOS: 1 days | Discharge: HOME | End: 2020-02-10
Payer: MEDICARE

## 2020-02-10 ENCOUNTER — RX RENEWAL (OUTPATIENT)
Age: 80
End: 2020-02-10

## 2020-02-10 ENCOUNTER — OUTPATIENT (OUTPATIENT)
Dept: OUTPATIENT SERVICES | Facility: HOSPITAL | Age: 80
LOS: 1 days | Discharge: HOME | End: 2020-02-10

## 2020-02-10 DIAGNOSIS — Z79.01 LONG TERM (CURRENT) USE OF ANTICOAGULANTS: ICD-10-CM

## 2020-02-10 DIAGNOSIS — Z95.2 PRESENCE OF PROSTHETIC HEART VALVE: Chronic | ICD-10-CM

## 2020-02-10 DIAGNOSIS — I48.91 UNSPECIFIED ATRIAL FIBRILLATION: ICD-10-CM

## 2020-02-10 DIAGNOSIS — C91.10 CHRONIC LYMPHOCYTIC LEUKEMIA OF B-CELL TYPE NOT HAVING ACHIEVED REMISSION: ICD-10-CM

## 2020-02-10 LAB
POCT INR: 2.9 RATIO — HIGH (ref 0.9–1.2)
POCT PT: 35.3 SEC — HIGH (ref 10–13.4)

## 2020-02-10 PROCEDURE — 76700 US EXAM ABDOM COMPLETE: CPT | Mod: 26

## 2020-02-11 DIAGNOSIS — Z02.9 ENCOUNTER FOR ADMINISTRATIVE EXAMINATIONS, UNSPECIFIED: ICD-10-CM

## 2020-02-11 DIAGNOSIS — C91.10 CHRONIC LYMPHOCYTIC LEUKEMIA OF B-CELL TYPE NOT HAVING ACHIEVED REMISSION: ICD-10-CM

## 2020-02-11 LAB
INR PPP: 2.9 RATIO
POCT-PROTHROMBIN TIME: 35.3 SECS

## 2020-02-13 ENCOUNTER — APPOINTMENT (OUTPATIENT)
Dept: INFUSION THERAPY | Facility: CLINIC | Age: 80
End: 2020-02-13

## 2020-02-13 LAB — BACTERIA BLD CULT: NORMAL

## 2020-02-19 ENCOUNTER — OUTPATIENT (OUTPATIENT)
Dept: OUTPATIENT SERVICES | Facility: HOSPITAL | Age: 80
LOS: 1 days | Discharge: HOME | End: 2020-02-19

## 2020-02-19 DIAGNOSIS — Z95.2 PRESENCE OF PROSTHETIC HEART VALVE: Chronic | ICD-10-CM

## 2020-02-19 DIAGNOSIS — Z79.01 LONG TERM (CURRENT) USE OF ANTICOAGULANTS: ICD-10-CM

## 2020-02-19 DIAGNOSIS — I48.91 UNSPECIFIED ATRIAL FIBRILLATION: ICD-10-CM

## 2020-02-19 LAB
INR PPP: 3 RATIO
POCT INR: 3 RATIO — HIGH (ref 0.9–1.2)
POCT PT: 35.9 SEC — HIGH (ref 10–13.4)
POCT-PROTHROMBIN TIME: 35.9 SECS

## 2020-02-26 ENCOUNTER — APPOINTMENT (OUTPATIENT)
Dept: CARDIOLOGY | Facility: CLINIC | Age: 80
End: 2020-02-26
Payer: MEDICARE

## 2020-02-26 VITALS
SYSTOLIC BLOOD PRESSURE: 138 MMHG | HEIGHT: 70 IN | DIASTOLIC BLOOD PRESSURE: 80 MMHG | BODY MASS INDEX: 23.48 KG/M2 | WEIGHT: 164 LBS | HEART RATE: 61 BPM

## 2020-02-26 DIAGNOSIS — I35.1 NONRHEUMATIC AORTIC (VALVE) INSUFFICIENCY: ICD-10-CM

## 2020-02-26 PROCEDURE — 93000 ELECTROCARDIOGRAM COMPLETE: CPT

## 2020-02-26 PROCEDURE — 99214 OFFICE O/P EST MOD 30 MIN: CPT

## 2020-02-26 RX ORDER — LEVOFLOXACIN 500 MG/1
500 TABLET, FILM COATED ORAL DAILY
Qty: 10 | Refills: 0 | Status: DISCONTINUED | COMMUNITY
Start: 2020-02-06 | End: 2020-02-26

## 2020-02-26 RX ORDER — VENETOCLAX 100 MG/1
100 TABLET, FILM COATED ORAL DAILY
Qty: 90 | Refills: 2 | Status: DISCONTINUED | COMMUNITY
Start: 2019-07-15 | End: 2020-02-26

## 2020-02-26 RX ORDER — DEXAMETHASONE 4 MG/1
4 TABLET ORAL
Qty: 5 | Refills: 0 | Status: DISCONTINUED | COMMUNITY
Start: 2019-12-16 | End: 2020-02-26

## 2020-02-26 RX ORDER — VENETOCLAX 100 MG/1
100 TABLET, FILM COATED ORAL DAILY
Qty: 90 | Refills: 0 | Status: DISCONTINUED | COMMUNITY
Start: 2019-12-16 | End: 2020-02-26

## 2020-02-26 NOTE — HISTORY OF PRESENT ILLNESS
[FreeTextEntry1] : The patient had a TIA . Had a period of Aphasia . This has resolved within one half hour . She was not subtherapeutic on his Coumadin . Had MRI which showed no major vascular stenosis.  No chest pain or SOB . Subsequently had PNA .

## 2020-02-26 NOTE — ASSESSMENT
[FreeTextEntry1] : The patient has had a TIA . Etiology /Source is uncertain but may have been cardioembolic . At this time he is on Coumadin and antiplatelet medication . Although this would put him at risk of bleeding . He has had a remote stent in his LAD . A/C despite having a low platelet count has been well tolerated At ths time he has not had a recurrence for over 2 months . Would not opt for CONCHA at this time . certainly if he had a recurrence would reconsider this . He has had an AF and AFL ablation in the past .

## 2020-02-26 NOTE — PHYSICAL EXAM
[General Appearance - Well Developed] : well developed [Well Groomed] : well groomed [Normal Appearance] : normal appearance [General Appearance - Well Nourished] : well nourished [No Deformities] : no deformities [General Appearance - In No Acute Distress] : no acute distress [Normal Oral Mucosa] : normal oral mucosa [No Oral Pallor] : no oral pallor [Eyelids - No Xanthelasma] : the eyelids demonstrated no xanthelasmas [Normal Conjunctiva] : the conjunctiva exhibited no abnormalities [No Oral Cyanosis] : no oral cyanosis [Exaggerated Use Of Accessory Muscles For Inspiration] : no accessory muscle use [Respiration, Rhythm And Depth] : normal respiratory rhythm and effort [Abdomen Soft] : soft [Abdomen Tenderness] : non-tender [Abnormal Walk] : normal gait [Abdomen Mass (___ Cm)] : no abdominal mass palpated [Gait - Sufficient For Exercise Testing] : the gait was sufficient for exercise testing [Cyanosis, Localized] : no localized cyanosis [Nail Clubbing] : no clubbing of the fingernails [Petechial Hemorrhages (___cm)] : no petechial hemorrhages [] : no rash [No Venous Stasis] : no venous stasis [Skin Color & Pigmentation] : normal skin color and pigmentation [No Xanthoma] : no  xanthoma was observed [No Skin Ulcers] : no skin ulcer [Skin Lesions] : no skin lesions [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  [Rt] : no varicose veins of the right leg

## 2020-02-26 NOTE — REASON FOR VISIT
[Follow-Up - Clinic] : a clinic follow-up of [Aortic Stenosis] : aortic stenosis [Hyperlipidemia] : hyperlipidemia [Hypertension] : hypertension [Coronary Artery Disease] : coronary artery disease

## 2020-03-03 ENCOUNTER — OUTPATIENT (OUTPATIENT)
Dept: OUTPATIENT SERVICES | Facility: HOSPITAL | Age: 80
LOS: 1 days | Discharge: HOME | End: 2020-03-03

## 2020-03-03 DIAGNOSIS — Z79.01 LONG TERM (CURRENT) USE OF ANTICOAGULANTS: ICD-10-CM

## 2020-03-03 DIAGNOSIS — I48.91 UNSPECIFIED ATRIAL FIBRILLATION: ICD-10-CM

## 2020-03-03 DIAGNOSIS — Z95.2 PRESENCE OF PROSTHETIC HEART VALVE: Chronic | ICD-10-CM

## 2020-03-03 LAB
INR PPP: 4.2 RATIO
POCT INR: 4.2 RATIO — HIGH (ref 0.9–1.2)
POCT PT: 50 SEC — HIGH (ref 10–13.4)
POCT-PROTHROMBIN TIME: 50 SECS
QUALITY CONTROL: YES

## 2020-03-12 ENCOUNTER — OUTPATIENT (OUTPATIENT)
Dept: OUTPATIENT SERVICES | Facility: HOSPITAL | Age: 80
LOS: 1 days | Discharge: HOME | End: 2020-03-12

## 2020-03-12 DIAGNOSIS — Z79.01 LONG TERM (CURRENT) USE OF ANTICOAGULANTS: ICD-10-CM

## 2020-03-12 DIAGNOSIS — I48.91 UNSPECIFIED ATRIAL FIBRILLATION: ICD-10-CM

## 2020-03-12 DIAGNOSIS — Z95.2 PRESENCE OF PROSTHETIC HEART VALVE: Chronic | ICD-10-CM

## 2020-03-12 LAB
INR PPP: 3.4 RATIO
POCT INR: 3.4 RATIO — HIGH (ref 0.9–1.2)
POCT PT: 40.3 SEC — HIGH (ref 10–13.4)
POCT-PROTHROMBIN TIME: 40.3 SECS
QUALITY CONTROL: YES

## 2020-03-30 ENCOUNTER — APPOINTMENT (OUTPATIENT)
Dept: HEMATOLOGY ONCOLOGY | Facility: CLINIC | Age: 80
End: 2020-03-30

## 2020-04-04 ENCOUNTER — LABORATORY RESULT (OUTPATIENT)
Age: 80
End: 2020-04-04

## 2020-04-27 ENCOUNTER — RX RENEWAL (OUTPATIENT)
Age: 80
End: 2020-04-27

## 2020-04-27 NOTE — H&P PST ADULT - NEUROLOGICAL
Called and spoke with mom regarding lab results, Informed her that per Dr. Banks they were good. Mom voiced understanding.     ----- Message from Steffen Banks MD sent at 4/27/2020  9:22 AM CDT -----  Can you please let mom know that counts are good!    Thanks,  E  ----- Message -----  From: Karen Mckeon RN  Sent: 4/7/2020   8:26 AM CDT  To: Steffen Banks MD        
Alert & oriented; no sensory, motor or coordination deficits, normal reflexes

## 2020-05-04 ENCOUNTER — APPOINTMENT (OUTPATIENT)
Dept: CARDIOLOGY | Facility: CLINIC | Age: 80
End: 2020-05-04
Payer: MEDICARE

## 2020-05-04 VITALS — DIASTOLIC BLOOD PRESSURE: 90 MMHG | SYSTOLIC BLOOD PRESSURE: 160 MMHG

## 2020-05-04 PROCEDURE — 99214 OFFICE O/P EST MOD 30 MIN: CPT | Mod: 95

## 2020-05-04 NOTE — ASSESSMENT
[FreeTextEntry1] : The patient has not had further neurological symptoms . Neuro work up was negative . Cannot R/O cardioemboic etiology . He has not had further symptms though . He is tolerqting Coumadin and Plavix . Do not want to hange in view of recent neurological symptoms . He has had periods of nausea without vomiting and at times retches . The patient has had variable BP control. and there is question of reliabilty of his home BP cuff . He is going to the coumadin center to have this checked.

## 2020-05-04 NOTE — REASON FOR VISIT
[Follow-Up - Clinic] : a clinic follow-up of [Coronary Artery Disease] : coronary artery disease [Aortic Stenosis] : aortic stenosis [Hypertension] : hypertension [Hyperlipidemia] : hyperlipidemia

## 2020-05-04 NOTE — HISTORY OF PRESENT ILLNESS
[Other Location: e.g. Home (Enter Location, City,State)___] : at [unfilled] [Home] : at home, [unfilled] , at the time of the visit. [Patient] : the patient [FreeTextEntry1] : The patient has not had a TIA or further periods of Aphasia . He has periods of gagging and dry heaves and says this been occurring since chemotherapy . This seems to corresponds to pain in his back. The patient has no had bleeding . No chest pain or SOB . .

## 2020-05-04 NOTE — PHYSICAL EXAM
[Well Groomed] : well groomed [Normal Appearance] : normal appearance [General Appearance - Well Developed] : well developed [General Appearance - Well Nourished] : well nourished [No Deformities] : no deformities [General Appearance - In No Acute Distress] : no acute distress [Normal Conjunctiva] : the conjunctiva exhibited no abnormalities [No Oral Pallor] : no oral pallor [Eyelids - No Xanthelasma] : the eyelids demonstrated no xanthelasmas [Normal Oral Mucosa] : normal oral mucosa [Respiration, Rhythm And Depth] : normal respiratory rhythm and effort [No Oral Cyanosis] : no oral cyanosis [Exaggerated Use Of Accessory Muscles For Inspiration] : no accessory muscle use [Abdomen Mass (___ Cm)] : no abdominal mass palpated [Abnormal Walk] : normal gait [Nail Clubbing] : no clubbing of the fingernails [Cyanosis, Localized] : no localized cyanosis [Petechial Hemorrhages (___cm)] : no petechial hemorrhages [Skin Color & Pigmentation] : normal skin color and pigmentation [] : no rash [No Venous Stasis] : no venous stasis [Skin Lesions] : no skin lesions [No Skin Ulcers] : no skin ulcer [No Xanthoma] : no  xanthoma was observed [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  [Rt] : no varicose veins of the right leg

## 2020-05-16 ENCOUNTER — RX RENEWAL (OUTPATIENT)
Age: 80
End: 2020-05-16

## 2020-06-04 ENCOUNTER — OUTPATIENT (OUTPATIENT)
Dept: OUTPATIENT SERVICES | Facility: HOSPITAL | Age: 80
LOS: 1 days | Discharge: HOME | End: 2020-06-04

## 2020-06-04 DIAGNOSIS — Z95.2 PRESENCE OF PROSTHETIC HEART VALVE: Chronic | ICD-10-CM

## 2020-06-15 ENCOUNTER — RX RENEWAL (OUTPATIENT)
Age: 80
End: 2020-06-15

## 2020-06-19 ENCOUNTER — APPOINTMENT (OUTPATIENT)
Dept: HEMATOLOGY ONCOLOGY | Facility: CLINIC | Age: 80
End: 2020-06-19
Payer: MEDICARE

## 2020-06-19 PROCEDURE — 99203 OFFICE O/P NEW LOW 30 MIN: CPT | Mod: 95

## 2020-06-19 PROCEDURE — 99214 OFFICE O/P EST MOD 30 MIN: CPT | Mod: 95

## 2020-06-22 ENCOUNTER — OUTPATIENT (OUTPATIENT)
Dept: OUTPATIENT SERVICES | Facility: HOSPITAL | Age: 80
LOS: 1 days | Discharge: HOME | End: 2020-06-22

## 2020-06-22 DIAGNOSIS — Z95.2 PRESENCE OF PROSTHETIC HEART VALVE: Chronic | ICD-10-CM

## 2020-07-06 ENCOUNTER — OUTPATIENT (OUTPATIENT)
Dept: OUTPATIENT SERVICES | Facility: HOSPITAL | Age: 80
LOS: 1 days | Discharge: HOME | End: 2020-07-06

## 2020-07-06 DIAGNOSIS — Z79.01 LONG TERM (CURRENT) USE OF ANTICOAGULANTS: ICD-10-CM

## 2020-07-06 DIAGNOSIS — I48.91 UNSPECIFIED ATRIAL FIBRILLATION: ICD-10-CM

## 2020-07-06 DIAGNOSIS — Z95.2 PRESENCE OF PROSTHETIC HEART VALVE: Chronic | ICD-10-CM

## 2020-07-07 NOTE — ASSESSMENT
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 \par -Completed 6 cycles of Obinituzumab and Venetoclax was dose reduced to 300mg then stopped secondary to gagging and nausea. Clinically he had good response with spleen no longer being palpable. GOod hematologic response >.\par S/P TIA ? 12/2019. ( cardioembolic ? ) \par \par 2) S/p Fever , cough , mild neutropenia . in February now resolved rule out Covid 19 infection . \par 3) Weight loss , abdominal pain . \par PLAN:\par  review most recent blood work , check covid 19 antibodies , follow up for in person visit when possible .

## 2020-07-07 NOTE — HISTORY OF PRESENT ILLNESS
[Home] : at home, [unfilled] , at the time of the visit. [Medical Office: ([facility])___] : at the medical office located in  [Spouse] : spouse [Verbal consent obtained from patient] : the patient, [unfilled] [de-identified] : this is a 76-year-old white man with multiple medical problems including coronary artery disease status post angioplasty valvular heart disease status post metallic aortic valve replacement. He is referred for abnormal hemogram noted recently, WBC is 10.6 hemoglobin 14.2 platelets 131 absolute lymphocytes 5268 chemistry is unremarkable. He denies any constitutional symptoms  specifically no fever ,weight loss ,night sweats, fatigue or  pruritus. he feels fantastic with good energy level. 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 He is scheduled for ablation for atrial fibrillation , he continues on coumadin for mechanical valve. He reports minor bruising on coumadin and plavix. He feels slight fatigue . He denies fever , weight loss or night sweats . He is also on androgen deprivation for elevated PSA , , He had previously on surveillance and had multiple prostate biopsies ( unclear if it showed cancer ) . Bone scan is allegedly negative . Last PSA is less than 1 and patient denies obstructive symptoms. \par "\par 01/10/2019 Patient returns for follow up for CLL on watchful waiting , he " feels tired all the time " , He had unsuccessful ablation for atrial fibrillation and was told to increase metoprolol . He denies B symptoms . \par \par 02/07/2019 Patient returns for follow up , he had unsuccessful ablation for paroxysmal atrial fibrillation and was placed on amiodarone , he continues on coumadin without ASA and denies abnormal bleeding , he reports occasional LUQ pain . no  B symptoms.\par \par 05/16/2019 Patient returns for follow up for CLL/SLL he reports few episodes of dizziness associated with nausea lasting for 1 to 2 hours . CT head ( non-contrast ) was negative , he had negative ENT evaluation and had long term event monitor placed 2 weeks ago without recurrence ( also discontinued amiodarone ) , CBC shows slight decrease in Hb and platelets from baseline . He continues on coumadin and plavix and denies abnormal bleeding . He continues with mild LUQ and back pain . \par \par 06/13/2019 Patient returns for follow up for SLL/CLL with anemia, thrombocytopenia and marked splenomegaly ( 19cm ) . He complains of left flank and back pain . he had a trial of epidural anethesia and is scheduled for nerve block/ ablation / epidural injection . he continues on coumadin and denies any abnormal bleeding . \par \par 07/11/2019 Patient returns with persistent LUQ discomfort , back pain despite epidural injections . 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 He is relatively asymptomatic except for mild left flank pain . no significant bleeding on coumadin and plavix . Given his age , multiple co-morbidities and high risk features , the regimen of choice is combination of venetoclax and obinotuzumab . \par \par 08/22/2019 Patient returns for follow up after starting on obinotuzumab , he had mild reaction during the split first dose ( nausea ) . He tolerated day 8 and day 15 very well ( also given in split dose ) . He lost 10 lbs despite good po intake . He denies fever . he continues with mild back pain . \par \par 11/19/2019 patient returns for follow up , he continues on venetoclax/obino and feels well except for persistent back pain , MRI done elsewhere  was allegedly normal , spleen is no longer palpable , Hb is 13 , wbc is normal except for lymphopenia , platelets  : 111 .  He reports occasional episodes of abdominal cramping with nausea. no weight loss. \par \par 12/16/19: Pt returns for a f/u visit. He has been c/o nausea with decreased appetite. However, he has not been taking antiemetics as previously prescribed. Has lost 11 lbs in the last month. Also had a recent TIA manifested as aphasia that lasted for about 45 mins. He was treated in an ED in [place]. CTH was unremarkable. Echo showed EF 45-50%. No clots seen. US carotids showed plaques but no significant stenosis. He is being seen by neurology in [place] and will have MRI/MRA. He was already on plavix and coumadin before having TIA. His INR at the time of ER visit was 4.3. \par \par 01/13/2020\par Patient returns for a follow up visit. This will be his last cycle of obinutuzumab ( #6). He is on venetoclax 300 mg. It was dose reduced on 12/16 as he was complaining of nausea and abdominal pain. He also has chronic back pain. He remains on coumadin and plavix. \par He is tolerating obinutuzumab with no reactions. \par \par 2/6/2020: TERRELL ATKINSON is a 79 year old male here today for sick visit. He has CLL and was treated last with obinutuzumab last dose on 1/16/2020. Venetoclax was stopped due to adverse effects , He states that he developed a fever of 101 degrees yesterday associated with chills and a mild cough with hemoptysis. He continues to spike fever today. He is taking Tylenol for symptom relief. He denies SOB and chest pain at this time. \par \par 06/19/2020 Today's virtual encounter was done via telehealth given the situation surrounding Covid 19 outbreak . He is staying currently in [place] , he had recently a virtual visit with his cardiologist as well . He has good and bad days , he continues with left sided abdominal pain , he has allegedly lost more weight ( 25 lbs in total since last year ) . He denies fever or night sweats , he was last treated for a respiratory infection in February and his wife is wondering if he had contracted Covid 19 ( not tested , chest xray was negative ) . Blood work from April showed normal Hb , wbc and platelet improved compared to 1/2020 . He continues on coumadin and denies any abnormal bleeding or symptoms suggestive of cardioembolic disorder. \par

## 2020-08-17 ENCOUNTER — LABORATORY RESULT (OUTPATIENT)
Age: 80
End: 2020-08-17

## 2020-08-17 ENCOUNTER — APPOINTMENT (OUTPATIENT)
Dept: HEMATOLOGY ONCOLOGY | Facility: CLINIC | Age: 80
End: 2020-08-17
Payer: MEDICARE

## 2020-08-17 ENCOUNTER — OUTPATIENT (OUTPATIENT)
Dept: OUTPATIENT SERVICES | Facility: HOSPITAL | Age: 80
LOS: 1 days | Discharge: HOME | End: 2020-08-17

## 2020-08-17 ENCOUNTER — APPOINTMENT (OUTPATIENT)
Dept: MEDICATION MANAGEMENT | Facility: CLINIC | Age: 80
End: 2020-08-17

## 2020-08-17 VITALS — OXYGEN SATURATION: 98 % | RESPIRATION RATE: 16 BRPM | HEART RATE: 62 BPM

## 2020-08-17 VITALS
TEMPERATURE: 97.1 F | BODY MASS INDEX: 20.62 KG/M2 | WEIGHT: 144 LBS | DIASTOLIC BLOOD PRESSURE: 60 MMHG | RESPIRATION RATE: 16 BRPM | SYSTOLIC BLOOD PRESSURE: 110 MMHG | HEART RATE: 65 BPM | HEIGHT: 70 IN

## 2020-08-17 DIAGNOSIS — Z95.2 PRESENCE OF PROSTHETIC HEART VALVE: Chronic | ICD-10-CM

## 2020-08-17 DIAGNOSIS — Z79.01 LONG TERM (CURRENT) USE OF ANTICOAGULANTS: ICD-10-CM

## 2020-08-17 DIAGNOSIS — I48.91 UNSPECIFIED ATRIAL FIBRILLATION: ICD-10-CM

## 2020-08-17 LAB
HCT VFR BLD CALC: 39.5 %
HGB BLD-MCNC: 13.8 G/DL
INR PPP: 1.9 RATIO
MCHC RBC-ENTMCNC: 33.1 PG
MCHC RBC-ENTMCNC: 34.9 G/DL
MCV RBC AUTO: 94.7 FL
PLATELET # BLD AUTO: 118 K/UL
PMV BLD: 10.1 FL
POCT-PROTHROMBIN TIME: 23 SECS
QUALITY CONTROL: YES
RBC # BLD: 4.17 M/UL
RBC # FLD: 13.6 %
WBC # FLD AUTO: 7.93 K/UL

## 2020-08-17 PROCEDURE — 99214 OFFICE O/P EST MOD 30 MIN: CPT

## 2020-08-17 NOTE — ASSESSMENT
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 He completed 6 cycles of Obinituzumab and Venetoclax was dose reduced to 300mg then stopped secondary to gagging and nausea. Clinically he had good response with spleen no longer being palpable and good hematological response. He lost around 20 lbs since we last saw him.\par He is also complaining of hematuria at initiation of stream . \par \par PLAN:\par - CBC reviewed. Counts are improving.\par - Will order cmp, LDH , TSH, PSA , UA/Ucx and urine cytology for his hematuria\par - His last US spleen showed a spleen of 13 cm, We will repeat CT C/A/P due to unexplained weight loss. \par - He will follow up with urology\par - RTC in 2 months or prior PRN\par \par The patient was seen and examined by Dr nunez who agreed with the above plan

## 2020-08-17 NOTE — HISTORY OF PRESENT ILLNESS
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 He is scheduled for ablation for atrial fibrillation , he continues on coumadin for mechanical valve. He reports minor bruising on coumadin and plavix. He feels slight fatigue . He denies fever , weight loss or night sweats . He is also on androgen deprivation for elevated PSA , , He had previously on surveillance and had multiple prostate biopsies ( unclear if it showed cancer ) . Bone scan is allegedly negative . Last PSA is less than 1 and patient denies obstructive symptoms. \par "\par 01/10/2019 Patient returns for follow up for CLL on watchful waiting , he " feels tired all the time " , He had unsuccessful ablation for atrial fibrillation and was told to increase metoprolol . He denies B symptoms . \par \par 02/07/2019 Patient returns for follow up , he had unsuccessful ablation for paroxysmal atrial fibrillation and was placed on amiodarone , he continues on coumadin without ASA and denies abnormal bleeding , he reports occasional LUQ pain . no  B symptoms.\par \par 05/16/2019 Patient returns for follow up for CLL/SLL he reports few episodes of dizziness associated with nausea lasting for 1 to 2 hours . CT head ( non-contrast ) was negative , he had negative ENT evaluation and had long term event monitor placed 2 weeks ago without recurrence ( also discontinued amiodarone ) , CBC shows slight decrease in Hb and platelets from baseline . He continues on coumadin and plavix and denies abnormal bleeding . He continues with mild LUQ and back pain . \par \par 06/13/2019 Patient returns for follow up for SLL/CLL with anemia, thrombocytopenia and marked splenomegaly ( 19cm ) . He complains of left flank and back pain . he had a trial of epidural anethesia and is scheduled for nerve block/ ablation / epidural injection . he continues on coumadin and denies any abnormal bleeding . \par \par 07/11/2019 Patient returns with persistent LUQ discomfort , back pain despite epidural injections . 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 He is relatively asymptomatic except for mild left flank pain . no significant bleeding on coumadin and plavix . Given his age , multiple co-morbidities and high risk features , the regimen of choice is combination of venetoclax and obinotuzumab . \par \par 08/22/2019 Patient returns for follow up after starting on obinotuzumab , he had mild reaction during the split first dose ( nausea ) . He tolerated day 8 and day 15 very well ( also given in split dose ) . He lost 10 lbs despite good po intake . He denies fever . he continues with mild back pain . \par \par 11/19/2019 patient returns for follow up , he continues on venetoclax/obino and feels well except for persistent back pain , MRI done elsewhere  was allegedly normal , spleen is no longer palpable , Hb is 13 , wbc is normal except for lymphopenia , platelets  : 111 .  He reports occasional episodes of abdominal cramping with nausea. no weight loss. \par \par 12/16/19: Pt returns for a f/u visit. He has been c/o nausea with decreased appetite. However, he has not been taking antiemetics as previously prescribed. Has lost 11 lbs in the last month. Also had a recent TIA manifested as aphasia that lasted for about 45 mins. He was treated in an ED in [place]. CTH was unremarkable. Echo showed EF 45-50%. No clots seen. US carotids showed plaques but no significant stenosis. He is being seen by neurology in [place] and will have MRI/MRA. He was already on plavix and coumadin before having TIA. His INR at the time of ER visit was 4.3. \par \par 01/13/2020\par Patient returns for a follow up visit. This will be his last cycle of obinutuzumab ( #6). He is on venetoclax 300 mg. It was dose reduced on 12/16 as he was complaining of nausea and abdominal pain. He also has chronic back pain. He remains on coumadin and plavix. \par He is tolerating obinutuzumab with no reactions. \par \par 2/6/2020: TERRELL ATKINSON is a 79 year old male here today for sick visit. He has CLL and was treated last with obinutuzumab last dose on 1/16/2020. Venetoclax was stopped due to adverse effects , He states that he developed a fever of 101 degrees yesterday associated with chills and a mild cough with hemoptysis. He continues to spike fever today. He is taking Tylenol for symptom relief. He denies SOB and chest pain at this time. \par \par 06/19/2020 Today's virtual encounter was done via telehealth given the situation surrounding Covid 19 outbreak . He is staying currently in [place] , he had recently a virtual visit with his cardiologist as well . He has good and bad days , he continues with left sided abdominal pain , he has allegedly lost more weight ( 25 lbs in total since last year ) . He denies fever or night sweats , he was last treated for a respiratory infection in February and his wife is wondering if he had contracted Covid 19 ( not tested , chest xray was negative ) . Blood work from April showed normal Hb , wbc and platelet improved compared to 1/2020 . He continues on coumadin and denies any abnormal bleeding or symptoms suggestive of cardioembolic disorder. \par \par 8/17/2020: The patient is here for follow up . He had no issues since last encounter. He lost around 20-25 lbs, no fever, no night sweats. His only complaint is chronic left sided abdominal pain , No other GI issues. He reported hematuria ongoing for 1 month, he describes passing a clot at first , followed by blood tinged urine that clears up at the end of the micturition\par  [de-identified] : this is a 76-year-old white man with multiple medical problems including coronary artery disease status post angioplasty valvular heart disease status post metallic aortic valve replacement. He is referred for abnormal hemogram noted recently, WBC is 10.6 hemoglobin 14.2 platelets 131 absolute lymphocytes 5268 chemistry is unremarkable. He denies any constitutional symptoms  specifically no fever ,weight loss ,night sweats, fatigue or  pruritus. he feels fantastic with good energy level.

## 2020-08-17 NOTE — PHYSICAL EXAM
[Restricted in physically strenuous activity but ambulatory and able to carry out work of a light or sedentary nature] : Status 1- Restricted in physically strenuous activity but ambulatory and able to carry out work of a light or sedentary nature, e.g., light house work, office work [Thin] : thin [Normal] : normal appearance, no rash, nodules, vesicles, ulcers, erythema [de-identified] : No palpable spleen  [de-identified] : + murmur , click +

## 2020-08-18 LAB — BACTERIA UR CULT: NORMAL

## 2020-08-24 ENCOUNTER — OUTPATIENT (OUTPATIENT)
Dept: OUTPATIENT SERVICES | Facility: HOSPITAL | Age: 80
LOS: 1 days | Discharge: HOME | End: 2020-08-24

## 2020-08-24 ENCOUNTER — APPOINTMENT (OUTPATIENT)
Dept: MEDICATION MANAGEMENT | Facility: CLINIC | Age: 80
End: 2020-08-24

## 2020-08-24 VITALS — RESPIRATION RATE: 16 BRPM | HEART RATE: 75 BPM | OXYGEN SATURATION: 98 %

## 2020-08-24 DIAGNOSIS — Z95.2 PRESENCE OF PROSTHETIC HEART VALVE: Chronic | ICD-10-CM

## 2020-08-24 DIAGNOSIS — I48.91 UNSPECIFIED ATRIAL FIBRILLATION: ICD-10-CM

## 2020-08-24 DIAGNOSIS — Z79.01 LONG TERM (CURRENT) USE OF ANTICOAGULANTS: ICD-10-CM

## 2020-08-24 LAB
INR PPP: 2.2 RATIO
POCT-PROTHROMBIN TIME: 26.3 SECS
QUALITY CONTROL: YES

## 2020-08-25 ENCOUNTER — APPOINTMENT (OUTPATIENT)
Dept: CARDIOLOGY | Facility: CLINIC | Age: 80
End: 2020-08-25
Payer: MEDICARE

## 2020-08-25 ENCOUNTER — APPOINTMENT (OUTPATIENT)
Dept: HEMATOLOGY ONCOLOGY | Facility: CLINIC | Age: 80
End: 2020-08-25

## 2020-08-25 VITALS
HEART RATE: 73 BPM | HEIGHT: 70 IN | DIASTOLIC BLOOD PRESSURE: 86 MMHG | BODY MASS INDEX: 20.76 KG/M2 | SYSTOLIC BLOOD PRESSURE: 140 MMHG | TEMPERATURE: 97.2 F | WEIGHT: 145 LBS

## 2020-08-25 VITALS — SYSTOLIC BLOOD PRESSURE: 140 MMHG | DIASTOLIC BLOOD PRESSURE: 80 MMHG

## 2020-08-25 DIAGNOSIS — Z79.01 ENCOUNTER FOR THERAPEUTIC DRUG LVL MONITORING: ICD-10-CM

## 2020-08-25 DIAGNOSIS — Z51.81 ENCOUNTER FOR THERAPEUTIC DRUG LVL MONITORING: ICD-10-CM

## 2020-08-25 LAB
ANION GAP SERPL CALC-SCNC: 12 MMOL/L
BUN SERPL-MCNC: 18 MG/DL
CALCIUM SERPL-MCNC: 9.8 MG/DL
CHLORIDE SERPL-SCNC: 106 MMOL/L
CO2 SERPL-SCNC: 28 MMOL/L
CREAT SERPL-MCNC: 0.9 MG/DL
GLUCOSE SERPL-MCNC: 83 MG/DL
POTASSIUM SERPL-SCNC: 4.4 MMOL/L
SODIUM SERPL-SCNC: 146 MMOL/L

## 2020-08-25 PROCEDURE — 93306 TTE W/DOPPLER COMPLETE: CPT

## 2020-08-25 PROCEDURE — 99214 OFFICE O/P EST MOD 30 MIN: CPT

## 2020-08-25 PROCEDURE — 93000 ELECTROCARDIOGRAM COMPLETE: CPT

## 2020-08-25 NOTE — HISTORY OF PRESENT ILLNESS
[FreeTextEntry1] : The patient has not had further episodes of neurological episodes . The patient has had wegith losss which is signfcant . Seen by hematology and CT scan of the chest abdomen and pelvis was ordered. The patient has been feeling fatigued . . .  98

## 2020-08-25 NOTE — ASSESSMENT
[FreeTextEntry1] : The patient has not had further neurological symptoms . Neuro work up was negative . Cannot R/O cardioemboic etiology . He has not had further symptms though . He is tolerqting Coumadin and Plavix . Do not want to hange in view of recent neurological symptoms .  The patient had recently lost 20 pounds . He hdoes have periods of nausea and wretching . He is in NSR and AVR seems to function terell on prelimimlary reading on echo .

## 2020-08-25 NOTE — PHYSICAL EXAM
[Normal Appearance] : normal appearance [General Appearance - Well Developed] : well developed [General Appearance - Well Nourished] : well nourished [Well Groomed] : well groomed [Normal Conjunctiva] : the conjunctiva exhibited no abnormalities [No Deformities] : no deformities [General Appearance - In No Acute Distress] : no acute distress [Eyelids - No Xanthelasma] : the eyelids demonstrated no xanthelasmas [Normal Oral Mucosa] : normal oral mucosa [No Oral Pallor] : no oral pallor [No Oral Cyanosis] : no oral cyanosis [Respiration, Rhythm And Depth] : normal respiratory rhythm and effort [FreeTextEntry1] : Coarse rales at bases.  [Exaggerated Use Of Accessory Muscles For Inspiration] : no accessory muscle use [Abdomen Mass (___ Cm)] : no abdominal mass palpated [Abnormal Walk] : normal gait [Cyanosis, Localized] : no localized cyanosis [Nail Clubbing] : no clubbing of the fingernails [Petechial Hemorrhages (___cm)] : no petechial hemorrhages [No Venous Stasis] : no venous stasis [] : no rash [Skin Color & Pigmentation] : normal skin color and pigmentation [No Skin Ulcers] : no skin ulcer [Skin Lesions] : no skin lesions [No Xanthoma] : no  xanthoma was observed [Normal Rate] : normal [Prosthetic Aortic Valve] : prosthetic aortic valve heard [Rhythm Regular] : regular [II] : a grade 2 [1+] : left 1+ [No Pitting Edema] : no pitting edema present [Rt] : no varicose veins of the right leg

## 2020-08-31 ENCOUNTER — RECORD ABSTRACTING (OUTPATIENT)
Age: 80
End: 2020-08-31

## 2020-08-31 DIAGNOSIS — Z86.79 PERSONAL HISTORY OF OTHER DISEASES OF THE CIRCULATORY SYSTEM: ICD-10-CM

## 2020-09-03 ENCOUNTER — RESULT REVIEW (OUTPATIENT)
Age: 80
End: 2020-09-03

## 2020-09-03 ENCOUNTER — APPOINTMENT (OUTPATIENT)
Dept: MEDICATION MANAGEMENT | Facility: CLINIC | Age: 80
End: 2020-09-03

## 2020-09-03 ENCOUNTER — OUTPATIENT (OUTPATIENT)
Dept: OUTPATIENT SERVICES | Facility: HOSPITAL | Age: 80
LOS: 1 days | Discharge: HOME | End: 2020-09-03
Payer: MEDICARE

## 2020-09-03 ENCOUNTER — OUTPATIENT (OUTPATIENT)
Dept: OUTPATIENT SERVICES | Facility: HOSPITAL | Age: 80
LOS: 1 days | Discharge: HOME | End: 2020-09-03

## 2020-09-03 VITALS — OXYGEN SATURATION: 98 % | HEART RATE: 65 BPM | RESPIRATION RATE: 16 BRPM

## 2020-09-03 DIAGNOSIS — I48.91 UNSPECIFIED ATRIAL FIBRILLATION: ICD-10-CM

## 2020-09-03 DIAGNOSIS — Z95.2 PRESENCE OF PROSTHETIC HEART VALVE: Chronic | ICD-10-CM

## 2020-09-03 DIAGNOSIS — C91.10 CHRONIC LYMPHOCYTIC LEUKEMIA OF B-CELL TYPE NOT HAVING ACHIEVED REMISSION: ICD-10-CM

## 2020-09-03 DIAGNOSIS — Z79.01 LONG TERM (CURRENT) USE OF ANTICOAGULANTS: ICD-10-CM

## 2020-09-03 LAB
INR PPP: 2.5 RATIO
POCT-PROTHROMBIN TIME: 29.9 SECS
QUALITY CONTROL: YES

## 2020-09-03 PROCEDURE — 71260 CT THORAX DX C+: CPT | Mod: 26

## 2020-09-03 PROCEDURE — 74177 CT ABD & PELVIS W/CONTRAST: CPT | Mod: 26

## 2020-09-08 DIAGNOSIS — Z02.9 ENCOUNTER FOR ADMINISTRATIVE EXAMINATIONS, UNSPECIFIED: ICD-10-CM

## 2020-09-08 DIAGNOSIS — C91.10 CHRONIC LYMPHOCYTIC LEUKEMIA OF B-CELL TYPE NOT HAVING ACHIEVED REMISSION: ICD-10-CM

## 2020-09-17 ENCOUNTER — APPOINTMENT (OUTPATIENT)
Dept: MEDICATION MANAGEMENT | Facility: CLINIC | Age: 80
End: 2020-09-17

## 2020-09-17 LAB
INR PPP: 2.65 RATIO
POCT-PROTHROMBIN TIME: 25.6 SECS

## 2020-10-01 ENCOUNTER — APPOINTMENT (OUTPATIENT)
Dept: MEDICATION MANAGEMENT | Facility: CLINIC | Age: 80
End: 2020-10-01

## 2020-10-06 ENCOUNTER — APPOINTMENT (OUTPATIENT)
Dept: MEDICATION MANAGEMENT | Facility: CLINIC | Age: 80
End: 2020-10-06

## 2020-10-06 ENCOUNTER — OUTPATIENT (OUTPATIENT)
Dept: OUTPATIENT SERVICES | Facility: HOSPITAL | Age: 80
LOS: 1 days | Discharge: HOME | End: 2020-10-06

## 2020-10-06 VITALS — HEART RATE: 66 BPM | OXYGEN SATURATION: 98 % | RESPIRATION RATE: 16 BRPM

## 2020-10-06 DIAGNOSIS — I48.91 UNSPECIFIED ATRIAL FIBRILLATION: ICD-10-CM

## 2020-10-06 DIAGNOSIS — Z79.01 LONG TERM (CURRENT) USE OF ANTICOAGULANTS: ICD-10-CM

## 2020-10-06 DIAGNOSIS — Z95.2 PRESENCE OF PROSTHETIC HEART VALVE: Chronic | ICD-10-CM

## 2020-10-06 LAB
INR PPP: 2.8 RATIO
POCT-PROTHROMBIN TIME: 34.2 SECS
QUALITY CONTROL: YES

## 2020-10-13 ENCOUNTER — APPOINTMENT (OUTPATIENT)
Dept: UROLOGY | Facility: CLINIC | Age: 80
End: 2020-10-13
Payer: MEDICARE

## 2020-10-13 ENCOUNTER — APPOINTMENT (OUTPATIENT)
Dept: UROLOGY | Facility: CLINIC | Age: 80
End: 2020-10-13

## 2020-10-13 ENCOUNTER — EMERGENCY (EMERGENCY)
Facility: HOSPITAL | Age: 80
LOS: 0 days | Discharge: HOME | End: 2020-10-13
Attending: EMERGENCY MEDICINE | Admitting: EMERGENCY MEDICINE
Payer: MEDICARE

## 2020-10-13 VITALS
OXYGEN SATURATION: 100 % | RESPIRATION RATE: 18 BRPM | DIASTOLIC BLOOD PRESSURE: 87 MMHG | HEART RATE: 88 BPM | TEMPERATURE: 98 F | SYSTOLIC BLOOD PRESSURE: 168 MMHG

## 2020-10-13 VITALS
OXYGEN SATURATION: 100 % | TEMPERATURE: 98 F | RESPIRATION RATE: 18 BRPM | HEIGHT: 70 IN | HEART RATE: 95 BPM | DIASTOLIC BLOOD PRESSURE: 107 MMHG | SYSTOLIC BLOOD PRESSURE: 196 MMHG | WEIGHT: 149.91 LBS

## 2020-10-13 VITALS — TEMPERATURE: 97.2 F | HEIGHT: 70 IN | WEIGHT: 145 LBS | BODY MASS INDEX: 20.76 KG/M2

## 2020-10-13 DIAGNOSIS — R33.9 RETENTION OF URINE, UNSPECIFIED: ICD-10-CM

## 2020-10-13 DIAGNOSIS — Z95.2 PRESENCE OF PROSTHETIC HEART VALVE: Chronic | ICD-10-CM

## 2020-10-13 DIAGNOSIS — Z87.438 PERSONAL HISTORY OF OTHER DISEASES OF MALE GENITAL ORGANS: ICD-10-CM

## 2020-10-13 DIAGNOSIS — I48.91 UNSPECIFIED ATRIAL FIBRILLATION: ICD-10-CM

## 2020-10-13 DIAGNOSIS — I25.10 ATHEROSCLEROTIC HEART DISEASE OF NATIVE CORONARY ARTERY WITHOUT ANGINA PECTORIS: ICD-10-CM

## 2020-10-13 PROCEDURE — 76872 US TRANSRECTAL: CPT

## 2020-10-13 PROCEDURE — 99283 EMERGENCY DEPT VISIT LOW MDM: CPT

## 2020-10-13 PROCEDURE — 99214 OFFICE O/P EST MOD 30 MIN: CPT

## 2020-10-13 NOTE — ED PROVIDER NOTE - NS ED ROS FT
CONST: No fever, chills or bodyaches  EYES: No pain, redness, drainage or visual changes.  ENT: No ear pain or discharge, nasal discharge or congestion. No sore throat  CARD: No chest pain, palpitations  RESP: No SOB, cough, hemoptysis. No hx of asthma or COPD  GI: No abdominal pain, N/V/D  : (+) urinary retention.   MS: No joint pain, back pain or extremity pain/injury  SKIN: No rashes  NEURO: No headache, dizziness, paresthesias or LOC

## 2020-10-13 NOTE — ED PROVIDER NOTE - PROGRESS NOTE DETAILS
Pt drained 400cc. Feels much better. Eager to leave. Will f/u w his urologist. Full DC instructions discussed and patient knows when to seek immediate medical attention. Patient has proper follow-up. All results discussed with the patient they may require further work-up. Limitations of ED work-up discussed. All  questions and concerns from patient or family addressed. Understanding of insturctions verbalized FF: bedside US reveals urinary retention, cisneros placed. 400cc of urine drainage. pt feels relieved. pt would like to leave. advised to f/u with dr mi tomorrow. advised of return precautions discussed at bedside. agreeable to dc.

## 2020-10-13 NOTE — ED PROVIDER NOTE - PHYSICAL EXAMINATION
Physical Exam    Vital Signs: I have reviewed the initial vital signs.  Constitutional: well-nourished, appears stated age, no acute distress  Eyes: Conjunctiva pink, Sclera clear  Cardiovascular: S1 and S2, regular rate, regular rhythm, well-perfused extremities, radial pulses equal and 2+ b/l.   Respiratory: unlabored respiratory effort, clear to auscultation bilaterally no wheezing, rales and rhonchi. pt is speaking full sentences. no accessory muscle use.   Gastrointestinal: soft, non-tender, nondistended abdomen, no pulsatile mass, normal bowl sounds, no rebound, no guarding  Musculoskeletal: supple neck, no lower extremity edema, no calf tenderness, FROM of b/l upper and lower extremities.   Integumentary: warm, dry, no rash  Neurologic: awake, alert  Psychiatric: appropriate mood, appropriate affect

## 2020-10-13 NOTE — ED ADULT NURSE NOTE - OBJECTIVE STATEMENT
Patient states he had cisneros removed early this morning. Patient had one normal void around afternoon but hasn't been able to urinate since then. C.o lower abdominal pain.

## 2020-10-13 NOTE — ED PROVIDER NOTE - OBJECTIVE STATEMENT
79 y/o male with a PMH of CAD, BPH, and mechanical aortic valve replacement on coumadin presents to the ED for evaluation of urinary retention x several hours today. As per pt he has had 3 foleys placed this month due to bph and urinary retention. pt reports last cisneros was placed by Dr. Shore 9 days ago. Pt reports he was seen around 12:30pm this afternoon at hiwot's office, cisneros was removed, and pt was able to urinate successfully when he got home around 1pm. Pt reports he has not been able to urinate since. pt reports she has tried and its just dribbles of urine. Pt denies fever, chills, back pain, abdominal pain, n/v/d/c, blood in the urine, new medications, or genital rashes.

## 2020-10-13 NOTE — ED PROVIDER NOTE - CARE PROVIDER_API CALL
Shar Shore  UROLOGY  [address] [place] [address]  Phone: [phone]  Fax: [phone]  Follow Up Time: Urgent

## 2020-10-13 NOTE — ED PROVIDER NOTE - CLINICAL SUMMARY MEDICAL DECISION MAKING FREE TEXT BOX
Pt drained 400cc. Feels much better. Eager to leave. Will f/u w his urologist. Full DC instructions discussed and patient knows when to seek immediate medical attention. Patient has proper follow-up. All results discussed with the patient they may require further work-up. Limitations of ED work-up discussed. All  questions and concerns from patient or family addressed. Understanding of insturctions verbalized

## 2020-10-13 NOTE — ED PROVIDER NOTE - NSDCPRINTRESULTS_ED_ALL_ED
Treatment as noted above   Patient requests all Lab and Radiology Results on their Discharge Instructions

## 2020-10-13 NOTE — ED PROVIDER NOTE - ATTENDING CONTRIBUTION TO CARE
I personally evaluated the patient. I reviewed the Resident’s or Physician Assistant’s note (as assigned above), and agree with the findings and plan except as documented in my note.    81 y/o M w hx of BPH w cisneros removal 5 hrs ago presents w recurrent urinary retention. Cannot urinate for a few hrs. No back pain. No fevers, chills.     CONSTITUTIONAL: Well-developed; well-nourished; in no acute distress. Sitting up and providing appropriate history and physical examination  SKIN: skin exam is warm and dry, no acute rash.  HEAD: Normocephalic; atraumatic.  EYES: PERRL, 3 mm bilateral, no nystagmus, EOM intact; conjunctiva and sclera clear.  ENT: No nasal discharge; airway clear.  NECK: Supple; non tender.+ full passive ROM in all directions. No JVD  CARD: S1, S2 normal; no murmurs, gallops, or rubs. Regular rate and rhythm. + Symmetric Strong Pulses  RESP: No wheezes, rales or rhonchi. Good air movement bilaterally  ABD: soft; non-distended; non-tender. No Rebound, No Gaurding, No signs of peritnitis, No CVA tenderness  EXT: Normal ROM    Plan- will place a folley and reassess

## 2020-10-13 NOTE — ED PROVIDER NOTE - NSFOLLOWUPINSTRUCTIONS_ED_ALL_ED_FT
Urinary Retention    Acute urinary retention is the temporary inability to urinate. This is a common problem in older men. As men age their prostates become larger and block the flow of urine from the bladder. If you are sent home with a cisneros catheter and a drainage system make sure to keep the drainage bag emptied and lower than your catheter. Keep the cisneros catheter in until you follow up with a urologist.    There are two main types of drainage bags. One is a large bag that usually is used at night. It has a good capacity that will allow you to sleep through the night without having to empty it. The second type is called a leg bag. It has a smaller capacity, so it needs to be emptied more frequently. However, the main advantage is that it can be attached by a leg strap and can go underneath your clothing, allowing you the freedom to move about or leave your home.     SEEK IMMEDIATE MEDICAL CARE IF YOU DEVELOP THE FOLLOWING SYMPTOMS: the catheter stops draining urine, the catheter falls out, abdominal pain, nausea/vomiting, or chills/fever.

## 2020-10-13 NOTE — ED PROVIDER NOTE - PATIENT PORTAL LINK FT
You can access the FollowMyHealth Patient Portal offered by [facility] by registering at the following website: [email or web address]. By joining [facility]’s FollowMyHealth portal, you will also be able to view your health information using other applications (apps) compatible with our system.

## 2020-10-19 ENCOUNTER — LABORATORY RESULT (OUTPATIENT)
Age: 80
End: 2020-10-19

## 2020-10-19 ENCOUNTER — OUTPATIENT (OUTPATIENT)
Dept: OUTPATIENT SERVICES | Facility: HOSPITAL | Age: 80
LOS: 1 days | Discharge: HOME | End: 2020-10-19

## 2020-10-19 ENCOUNTER — APPOINTMENT (OUTPATIENT)
Dept: HEMATOLOGY ONCOLOGY | Facility: CLINIC | Age: 80
End: 2020-10-19
Payer: MEDICARE

## 2020-10-19 VITALS
WEIGHT: 145 LBS | HEIGHT: 70 IN | SYSTOLIC BLOOD PRESSURE: 138 MMHG | HEART RATE: 59 BPM | DIASTOLIC BLOOD PRESSURE: 83 MMHG | RESPIRATION RATE: 18 BRPM | BODY MASS INDEX: 20.76 KG/M2 | TEMPERATURE: 98 F

## 2020-10-19 DIAGNOSIS — C91.10 CHRONIC LYMPHOCYTIC LEUKEMIA OF B-CELL TYPE NOT HAVING ACHIEVED REMISSION: ICD-10-CM

## 2020-10-19 DIAGNOSIS — Z95.2 PRESENCE OF PROSTHETIC HEART VALVE: Chronic | ICD-10-CM

## 2020-10-19 LAB
ALBUMIN SERPL ELPH-MCNC: 4.5 G/DL
ALP BLD-CCNC: 53 U/L
ALT SERPL-CCNC: 9 U/L
ANION GAP SERPL CALC-SCNC: 8 MMOL/L
AST SERPL-CCNC: 17 U/L
BILIRUB SERPL-MCNC: 0.5 MG/DL
BUN SERPL-MCNC: 13 MG/DL
CALCIUM SERPL-MCNC: 9.4 MG/DL
CHLORIDE SERPL-SCNC: 104 MMOL/L
CO2 SERPL-SCNC: 30 MMOL/L
CREAT SERPL-MCNC: 0.8 MG/DL
GLUCOSE SERPL-MCNC: 75 MG/DL
HCT VFR BLD CALC: 36.3 %
HGB BLD-MCNC: 12.3 G/DL
MCHC RBC-ENTMCNC: 33.2 PG
MCHC RBC-ENTMCNC: 33.9 G/DL
MCV RBC AUTO: 98.1 FL
PLATELET # BLD AUTO: 100 K/UL
PMV BLD: 8.3 FL
POTASSIUM SERPL-SCNC: 5.5 MMOL/L
PROT SERPL-MCNC: 6 G/DL
RBC # BLD: 3.7 M/UL
RBC # FLD: 13.3 %
SODIUM SERPL-SCNC: 142 MMOL/L
WBC # FLD AUTO: 4.07 K/UL

## 2020-10-19 PROCEDURE — 99214 OFFICE O/P EST MOD 30 MIN: CPT

## 2020-10-20 ENCOUNTER — APPOINTMENT (OUTPATIENT)
Dept: UROLOGY | Facility: CLINIC | Age: 80
End: 2020-10-20
Payer: MEDICARE

## 2020-10-20 VITALS — HEIGHT: 70 IN | BODY MASS INDEX: 20.76 KG/M2 | TEMPERATURE: 98 F | WEIGHT: 145 LBS

## 2020-10-20 DIAGNOSIS — N13.8 BENIGN PROSTATIC HYPERPLASIA WITH LOWER URINARY TRACT SYMPMS: ICD-10-CM

## 2020-10-20 DIAGNOSIS — N40.1 BENIGN PROSTATIC HYPERPLASIA WITH LOWER URINARY TRACT SYMPMS: ICD-10-CM

## 2020-10-20 PROCEDURE — 99214 OFFICE O/P EST MOD 30 MIN: CPT

## 2020-10-20 NOTE — PHYSICAL EXAM
[Restricted in physically strenuous activity but ambulatory and able to carry out work of a light or sedentary nature] : Status 1- Restricted in physically strenuous activity but ambulatory and able to carry out work of a light or sedentary nature, e.g., light house work, office work [Thin] : thin [Normal] : normal appearance, no rash, nodules, vesicles, ulcers, erythema [de-identified] : + murmur , click + [de-identified] : No palpable spleen

## 2020-10-20 NOTE — ASSESSMENT
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 He completed 6 cycles of Obinituzumab and Venetoclax was dose reduced to 300mg then stopped secondary to gagging and nausea. Clinically he had good response with spleen no longer being palpable and good hematological response. He lost around 20 lbs since April , but his weight is stable now. \par CT abdomen/pelvis negative for any masses\par \par PLAN:\par - CBC reviewed. Counts are improving.\par - He will follow up with urology regarding his urinary retention , he will consider restarting eligard ( non-biopsy proven cancer ) vs surgery. \par - RTC in 2 months or prior PRN\par \par The patient was seen and examined by Dr nunez who agreed with the above plan

## 2020-10-20 NOTE — HISTORY OF PRESENT ILLNESS
[de-identified] : this is a 76-year-old white man with multiple medical problems including coronary artery disease status post angioplasty valvular heart disease status post metallic aortic valve replacement. He is referred for abnormal hemogram noted recently, WBC is 10.6 hemoglobin 14.2 platelets 131 absolute lymphocytes 5268 chemistry is unremarkable. He denies any constitutional symptoms  specifically no fever ,weight loss ,night sweats, fatigue or  pruritus. he feels fantastic with good energy level. 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 He is scheduled for ablation for atrial fibrillation , he continues on coumadin for mechanical valve. He reports minor bruising on coumadin and plavix. He feels slight fatigue . He denies fever , weight loss or night sweats . He is also on androgen deprivation for elevated PSA , , He had previously on surveillance and had multiple prostate biopsies ( unclear if it showed cancer ) . Bone scan is allegedly negative . Last PSA is less than 1 and patient denies obstructive symptoms. \par "\par 01/10/2019 Patient returns for follow up for CLL on watchful waiting , he " feels tired all the time " , He had unsuccessful ablation for atrial fibrillation and was told to increase metoprolol . He denies B symptoms . \par \par 02/07/2019 Patient returns for follow up , he had unsuccessful ablation for paroxysmal atrial fibrillation and was placed on amiodarone , he continues on coumadin without ASA and denies abnormal bleeding , he reports occasional LUQ pain . no  B symptoms.\par \par 05/16/2019 Patient returns for follow up for CLL/SLL he reports few episodes of dizziness associated with nausea lasting for 1 to 2 hours . CT head ( non-contrast ) was negative , he had negative ENT evaluation and had long term event monitor placed 2 weeks ago without recurrence ( also discontinued amiodarone ) , CBC shows slight decrease in Hb and platelets from baseline . He continues on coumadin and plavix and denies abnormal bleeding . He continues with mild LUQ and back pain . \par \par 06/13/2019 Patient returns for follow up for SLL/CLL with anemia, thrombocytopenia and marked splenomegaly ( 19cm ) . He complains of left flank and back pain . he had a trial of epidural anethesia and is scheduled for nerve block/ ablation / epidural injection . he continues on coumadin and denies any abnormal bleeding . \par \par 07/11/2019 Patient returns with persistent LUQ discomfort , back pain despite epidural injections . 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 He is relatively asymptomatic except for mild left flank pain . no significant bleeding on coumadin and plavix . Given his age , multiple co-morbidities and high risk features , the regimen of choice is combination of venetoclax and obinotuzumab . \par \par 08/22/2019 Patient returns for follow up after starting on obinotuzumab , he had mild reaction during the split first dose ( nausea ) . He tolerated day 8 and day 15 very well ( also given in split dose ) . He lost 10 lbs despite good po intake . He denies fever . he continues with mild back pain . \par \par 11/19/2019 patient returns for follow up , he continues on venetoclax/obino and feels well except for persistent back pain , MRI done elsewhere  was allegedly normal , spleen is no longer palpable , Hb is 13 , wbc is normal except for lymphopenia , platelets  : 111 .  He reports occasional episodes of abdominal cramping with nausea. no weight loss. \par \par 12/16/19: Pt returns for a f/u visit. He has been c/o nausea with decreased appetite. However, he has not been taking antiemetics as previously prescribed. Has lost 11 lbs in the last month. Also had a recent TIA manifested as aphasia that lasted for about 45 mins. He was treated in an ED in [place]. CTH was unremarkable. Echo showed EF 45-50%. No clots seen. US carotids showed plaques but no significant stenosis. He is being seen by neurology in [place] and will have MRI/MRA. He was already on plavix and coumadin before having TIA. His INR at the time of ER visit was 4.3. \par \par 01/13/2020\par Patient returns for a follow up visit. This will be his last cycle of obinutuzumab ( #6). He is on venetoclax 300 mg. It was dose reduced on 12/16 as he was complaining of nausea and abdominal pain. He also has chronic back pain. He remains on coumadin and plavix. \par He is tolerating obinutuzumab with no reactions. \par \par 2/6/2020: TERRELL ATKINSON is a 79 year old male here today for sick visit. He has CLL and was treated last with obinutuzumab last dose on 1/16/2020. Venetoclax was stopped due to adverse effects , He states that he developed a fever of 101 degrees yesterday associated with chills and a mild cough with hemoptysis. He continues to spike fever today. He is taking Tylenol for symptom relief. He denies SOB and chest pain at this time. \par \par 06/19/2020 Today's virtual encounter was done via telehealth given the situation surrounding Covid 19 outbreak . He is staying currently in [place] , he had recently a virtual visit with his cardiologist as well . He has good and bad days , he continues with left sided abdominal pain , he has allegedly lost more weight ( 25 lbs in total since last year ) . He denies fever or night sweats , he was last treated for a respiratory infection in February and his wife is wondering if he had contracted Covid 19 ( not tested , chest xray was negative ) . Blood work from April showed normal Hb , wbc and platelet improved compared to 1/2020 . He continues on coumadin and denies any abnormal bleeding or symptoms suggestive of cardioembolic disorder. \par \par 8/17/2020: The patient is here for follow up . He had no issues since last encounter. He lost around 20-25 lbs, no fever, no night sweats. His only complaint is chronic left sided abdominal pain , No other GI issues. He reported hematuria ongoing for 1 month, he describes passing a clot at first , followed by blood tinged urine that clears up at the end of the micturition\par \par 10/19/2020: The patient is here for follow up. Since last visit, he had 3 visits to the ED for urinary retention , and a cisneros catheter was placed,  He denies any fever, chills , no recurrent infections , His weight has stabilized and he is eating better now. He had a CT C/A/P which was negative for any malignancies

## 2020-10-27 ENCOUNTER — APPOINTMENT (OUTPATIENT)
Dept: MEDICATION MANAGEMENT | Facility: CLINIC | Age: 80
End: 2020-10-27

## 2020-10-27 ENCOUNTER — OUTPATIENT (OUTPATIENT)
Dept: OUTPATIENT SERVICES | Facility: HOSPITAL | Age: 80
LOS: 1 days | Discharge: HOME | End: 2020-10-27

## 2020-10-27 VITALS — HEART RATE: 60 BPM | RESPIRATION RATE: 16 BRPM

## 2020-10-27 DIAGNOSIS — I48.91 UNSPECIFIED ATRIAL FIBRILLATION: ICD-10-CM

## 2020-10-27 DIAGNOSIS — Z79.01 LONG TERM (CURRENT) USE OF ANTICOAGULANTS: ICD-10-CM

## 2020-10-27 DIAGNOSIS — Z95.2 PRESENCE OF PROSTHETIC HEART VALVE: Chronic | ICD-10-CM

## 2020-10-27 LAB
INR PPP: 3.9 RATIO
POCT-PROTHROMBIN TIME: 47 SECS
QUALITY CONTROL: YES

## 2020-11-10 ENCOUNTER — APPOINTMENT (OUTPATIENT)
Dept: UROLOGY | Facility: CLINIC | Age: 80
End: 2020-11-10
Payer: MEDICARE

## 2020-11-10 ENCOUNTER — OUTPATIENT (OUTPATIENT)
Dept: OUTPATIENT SERVICES | Facility: HOSPITAL | Age: 80
LOS: 1 days | Discharge: HOME | End: 2020-11-10

## 2020-11-10 ENCOUNTER — APPOINTMENT (OUTPATIENT)
Dept: MEDICATION MANAGEMENT | Facility: CLINIC | Age: 80
End: 2020-11-10

## 2020-11-10 VITALS — OXYGEN SATURATION: 97 % | HEART RATE: 69 BPM | RESPIRATION RATE: 16 BRPM

## 2020-11-10 VITALS — BODY MASS INDEX: 20.76 KG/M2 | TEMPERATURE: 98.2 F | HEIGHT: 70 IN | WEIGHT: 145 LBS

## 2020-11-10 DIAGNOSIS — Z79.01 LONG TERM (CURRENT) USE OF ANTICOAGULANTS: ICD-10-CM

## 2020-11-10 DIAGNOSIS — Z95.2 PRESENCE OF PROSTHETIC HEART VALVE: Chronic | ICD-10-CM

## 2020-11-10 DIAGNOSIS — I48.91 UNSPECIFIED ATRIAL FIBRILLATION: ICD-10-CM

## 2020-11-10 LAB
INR PPP: 2.2 RATIO
POCT-PROTHROMBIN TIME: 26.5 SECS
QUALITY CONTROL: YES

## 2020-11-10 PROCEDURE — 99215 OFFICE O/P EST HI 40 MIN: CPT

## 2020-11-10 NOTE — PHYSICAL EXAM
[General Appearance - Well Developed] : well developed [General Appearance - Well Nourished] : well nourished [Normal Appearance] : normal appearance [Well Groomed] : well groomed [General Appearance - In No Acute Distress] : no acute distress [Abdomen Soft] : soft [Abdomen Tenderness] : non-tender [Costovertebral Angle Tenderness] : no ~M costovertebral angle tenderness [Urethral Meatus] : meatus normal [Urinary Bladder Findings] : the bladder was normal on palpation [Scrotum] : the scrotum was normal [Testes Mass (___cm)] : there were no testicular masses [Edema] : no peripheral edema [] : no respiratory distress [Respiration, Rhythm And Depth] : normal respiratory rhythm and effort [Exaggerated Use Of Accessory Muscles For Inspiration] : no accessory muscle use [Oriented To Time, Place, And Person] : oriented to person, place, and time [Affect] : the affect was normal [Mood] : the mood was normal [Not Anxious] : not anxious [Normal Station and Gait] : the gait and station were normal for the patient's age [No Focal Deficits] : no focal deficits [No Palpable Adenopathy] : no palpable adenopathy [FreeTextEntry1] : cisneros clear

## 2020-11-10 NOTE — HISTORY OF PRESENT ILLNESS
[FreeTextEntry1] : TERRELL ATKINSON is a 80 year old male remote history of non-Hodgkin's lymphoma, high risk CLL with symptomatic splenomegaly not on treatment currently, CAD with mechanical valve on AC presents for consultation for large BPH/urinary retention.\par \par Patient developed urinary retention while in [place] and has had a catheter since having failed multiple trial of voids.\par He has a history of prior nephrolithiasis and ureteroscopy and had severe bleeding after ureteroscopy.  Typically with surgery he is bridged w lovenox but is unable to come off anticoagulation.\par Denies gross hematuria, dysuria or associated symptoms.  Of note he has a history of assumed prostate cancer without history of biopsy previously on Eligard.\par \par CT abdomen pelvis images visualized show prostate volume 170 g my measurement with moderate to severe trilobar intravesical lobe.\par Dr Shore's TRUS 130 g\par \par Denies  PMH including previous kidney stones, recurrent UTIs. \par Family History: No  malignancies\par \par

## 2020-11-10 NOTE — ASSESSMENT
[FreeTextEntry1] : TERRELL ATKINSON is a 80 year old male remote history of non-Hodgkin's lymphoma, high risk CLL with symptomatic splenomegaly not on treatment currently, CAD with mechanical valve on AC presents for consultation for large BPH/urinary retention.\par \par We discussed various options including continued Hernandez catheterization, simple prostatectomy, prostatic artery embolization and transurethral procedures such as greenlight/bipolar TURP.\par I conveyed my concern regarding the high bleeding risk as patient is not able to come off of anticoagulation and for this reason I am recommending the off label treatment for prostatic artery embolization.  He will discuss this in more detail with a consultation with Dr. Landau who I discussed this case with.\par Can have SP tube on the day of if pt prefers, as this will be easier to deal with post op.\par Also offered greenlight which is the most favorable energy source w anticoagulation however with this large BPH still likely higher bleeding risk. This is always an option if he fails PAE.\par \par flomax/finasteride

## 2020-11-18 ENCOUNTER — APPOINTMENT (OUTPATIENT)
Dept: UROLOGY | Facility: CLINIC | Age: 80
End: 2020-11-18
Payer: MEDICARE

## 2020-11-18 VITALS — HEIGHT: 70 IN | WEIGHT: 145 LBS | TEMPERATURE: 98 F | BODY MASS INDEX: 20.76 KG/M2

## 2020-11-18 DIAGNOSIS — N40.1 BENIGN PROSTATIC HYPERPLASIA WITH LOWER URINARY TRACT SYMPMS: ICD-10-CM

## 2020-11-18 DIAGNOSIS — N13.8 BENIGN PROSTATIC HYPERPLASIA WITH LOWER URINARY TRACT SYMPMS: ICD-10-CM

## 2020-11-18 PROCEDURE — 51703 INSERT BLADDER CATH COMPLEX: CPT

## 2020-11-24 ENCOUNTER — APPOINTMENT (OUTPATIENT)
Dept: MEDICATION MANAGEMENT | Facility: CLINIC | Age: 80
End: 2020-11-24

## 2020-11-24 ENCOUNTER — OUTPATIENT (OUTPATIENT)
Dept: OUTPATIENT SERVICES | Facility: HOSPITAL | Age: 80
LOS: 1 days | Discharge: HOME | End: 2020-11-24
Payer: MEDICARE

## 2020-11-24 ENCOUNTER — APPOINTMENT (OUTPATIENT)
Dept: INTERVENTIONAL RADIOLOGY/VASCULAR | Facility: CLINIC | Age: 80
End: 2020-11-24

## 2020-11-24 ENCOUNTER — OUTPATIENT (OUTPATIENT)
Dept: OUTPATIENT SERVICES | Facility: HOSPITAL | Age: 80
LOS: 1 days | Discharge: HOME | End: 2020-11-24

## 2020-11-24 VITALS — OXYGEN SATURATION: 98 % | HEART RATE: 92 BPM | RESPIRATION RATE: 16 BRPM

## 2020-11-24 VITALS
TEMPERATURE: 98 F | OXYGEN SATURATION: 97 % | DIASTOLIC BLOOD PRESSURE: 76 MMHG | HEART RATE: 78 BPM | HEIGHT: 70 IN | RESPIRATION RATE: 18 BRPM | WEIGHT: 141.1 LBS | SYSTOLIC BLOOD PRESSURE: 156 MMHG

## 2020-11-24 DIAGNOSIS — Z01.818 ENCOUNTER FOR OTHER PREPROCEDURAL EXAMINATION: ICD-10-CM

## 2020-11-24 DIAGNOSIS — N40.1 BENIGN PROSTATIC HYPERPLASIA WITH LOWER URINARY TRACT SYMPTOMS: ICD-10-CM

## 2020-11-24 DIAGNOSIS — Z95.2 PRESENCE OF PROSTHETIC HEART VALVE: Chronic | ICD-10-CM

## 2020-11-24 DIAGNOSIS — I48.91 UNSPECIFIED ATRIAL FIBRILLATION: ICD-10-CM

## 2020-11-24 DIAGNOSIS — Z79.01 LONG TERM (CURRENT) USE OF ANTICOAGULANTS: ICD-10-CM

## 2020-11-24 LAB
ALBUMIN SERPL ELPH-MCNC: 4.7 G/DL — SIGNIFICANT CHANGE UP (ref 3.5–5.2)
ALP SERPL-CCNC: 56 U/L — SIGNIFICANT CHANGE UP (ref 30–115)
ALT FLD-CCNC: 12 U/L — SIGNIFICANT CHANGE UP (ref 0–41)
ANION GAP SERPL CALC-SCNC: 12 MMOL/L — SIGNIFICANT CHANGE UP (ref 7–14)
APTT BLD: 48.1 SEC — HIGH (ref 27–39.2)
AST SERPL-CCNC: 20 U/L — SIGNIFICANT CHANGE UP (ref 0–41)
BASOPHILS # BLD AUTO: 0.08 K/UL — SIGNIFICANT CHANGE UP (ref 0–0.2)
BASOPHILS NFR BLD AUTO: 1.1 % — HIGH (ref 0–1)
BILIRUB SERPL-MCNC: 0.9 MG/DL — SIGNIFICANT CHANGE UP (ref 0.2–1.2)
BUN SERPL-MCNC: 16 MG/DL — SIGNIFICANT CHANGE UP (ref 10–20)
CALCIUM SERPL-MCNC: 10.1 MG/DL — SIGNIFICANT CHANGE UP (ref 8.5–10.1)
CHLORIDE SERPL-SCNC: 100 MMOL/L — SIGNIFICANT CHANGE UP (ref 98–110)
CO2 SERPL-SCNC: 28 MMOL/L — SIGNIFICANT CHANGE UP (ref 17–32)
CREAT SERPL-MCNC: 0.9 MG/DL — SIGNIFICANT CHANGE UP (ref 0.7–1.5)
EOSINOPHIL # BLD AUTO: 0.29 K/UL — SIGNIFICANT CHANGE UP (ref 0–0.7)
EOSINOPHIL NFR BLD AUTO: 4 % — SIGNIFICANT CHANGE UP (ref 0–8)
GLUCOSE SERPL-MCNC: 63 MG/DL — LOW (ref 70–99)
HCT VFR BLD CALC: 43 % — SIGNIFICANT CHANGE UP (ref 42–52)
HGB BLD-MCNC: 14.1 G/DL — SIGNIFICANT CHANGE UP (ref 14–18)
IMM GRANULOCYTES NFR BLD AUTO: 0.1 % — SIGNIFICANT CHANGE UP (ref 0.1–0.3)
INR BLD: 3.89 RATIO — HIGH (ref 0.65–1.3)
INR PPP: 3.9 RATIO
LYMPHOCYTES # BLD AUTO: 0.93 K/UL — LOW (ref 1.2–3.4)
LYMPHOCYTES # BLD AUTO: 12.8 % — LOW (ref 20.5–51.1)
MCHC RBC-ENTMCNC: 32.4 PG — HIGH (ref 27–31)
MCHC RBC-ENTMCNC: 32.8 G/DL — SIGNIFICANT CHANGE UP (ref 32–37)
MCV RBC AUTO: 98.9 FL — HIGH (ref 80–94)
MONOCYTES # BLD AUTO: 0.98 K/UL — HIGH (ref 0.1–0.6)
MONOCYTES NFR BLD AUTO: 13.4 % — HIGH (ref 1.7–9.3)
NEUTROPHILS # BLD AUTO: 5 K/UL — SIGNIFICANT CHANGE UP (ref 1.4–6.5)
NEUTROPHILS NFR BLD AUTO: 68.6 % — SIGNIFICANT CHANGE UP (ref 42.2–75.2)
NRBC # BLD: 0 /100 WBCS — SIGNIFICANT CHANGE UP (ref 0–0)
PLATELET # BLD AUTO: 169 K/UL — SIGNIFICANT CHANGE UP (ref 130–400)
POCT-PROTHROMBIN TIME: 46.9 SECS
POTASSIUM SERPL-MCNC: 4.7 MMOL/L — SIGNIFICANT CHANGE UP (ref 3.5–5)
POTASSIUM SERPL-SCNC: 4.7 MMOL/L — SIGNIFICANT CHANGE UP (ref 3.5–5)
PROT SERPL-MCNC: 6.9 G/DL — SIGNIFICANT CHANGE UP (ref 6–8)
PROTHROM AB SERPL-ACNC: >40 SEC — HIGH (ref 9.95–12.87)
QUALITY CONTROL: YES
RBC # BLD: 4.35 M/UL — LOW (ref 4.7–6.1)
RBC # FLD: 13.2 % — SIGNIFICANT CHANGE UP (ref 11.5–14.5)
SODIUM SERPL-SCNC: 140 MMOL/L — SIGNIFICANT CHANGE UP (ref 135–146)
WBC # BLD: 7.29 K/UL — SIGNIFICANT CHANGE UP (ref 4.8–10.8)
WBC # FLD AUTO: 7.29 K/UL — SIGNIFICANT CHANGE UP (ref 4.8–10.8)

## 2020-11-24 PROCEDURE — 93010 ELECTROCARDIOGRAM REPORT: CPT

## 2020-11-24 PROCEDURE — XXXXX: CPT | Mod: 1L

## 2020-11-24 NOTE — H&P PST ADULT - HISTORY OF PRESENT ILLNESS
pt with hx of bph with urinary retention with multiple cisneros insertions this yr pt with hx of bph with urinary retention with multiple cisneros insertions this yr    PATIENT CURRENTLY DENIES CHEST PAIN  SHORTNESS OF BREATH  PALPITATIONS,  DYSURIA, OR UPPER RESPIRATORY INFECTION IN PAST 2 WEEKS  EXERCISE  TOLERANCE  1-2 FLIGHT OF STAIRS  WITHOUT SHORTNESS OF BREATH  denies travel outside the [place] in the past 30 days  pt denies any covid s/s, or tested positive in the past  pt advised self quarantine till day of procedure    Anesthesia Alert  NO--Difficult Airway  NO--History of neck surgery or radiation  NO--Limited ROM of neck  NO--History of Malignant hyperthermia  NO--No personal or family history of Pseudocholinesterase deficiency.  NO--Prior Anesthesia Complication  NO--Latex Allergy  NO--Loose teeth  NO--History of Rheumatoid Arthritis  NO--CONTRERAS  mechanical aortic valve    ^PROSTATE ARTERY EMBOLIZATION N40.1                   No pertinent family history in first degree relatives    Afib    BPH (benign prostatic hyperplasia)    Lymphoma    Aortic valve stenosis    CAD (coronary artery disease)    H/O aortic valve replacement     pt with hx of bph with urinary retention with multiple cisneros insertions this yr  pt currently w cisneros in use     PATIENT CURRENTLY DENIES CHEST PAIN  SHORTNESS OF BREATH  PALPITATIONS,  DYSURIA, OR UPPER RESPIRATORY INFECTION IN PAST 2 WEEKS  EXERCISE  TOLERANCE  1-2 FLIGHT OF STAIRS  WITHOUT SHORTNESS OF BREATH  denies travel outside the [place] in the past 30 days  pt denies any covid s/s, or tested positive in the past  pt advised self quarantine till day of procedure    Anesthesia Alert  NO--Difficult Airway  NO--History of neck surgery or radiation  NO--Limited ROM of neck  NO--History of Malignant hyperthermia  NO--No personal or family history of Pseudocholinesterase deficiency.  NO--Prior Anesthesia Complication  NO--Latex Allergy  NO--Loose teeth  NO--History of Rheumatoid Arthritis  NO--CONTRERAS  mechanical aortic valve    ^PROSTATE ARTERY EMBOLIZATION N40.1                   No pertinent family history in first degree relatives    Afib    BPH (benign prostatic hyperplasia)    Lymphoma    Aortic valve stenosis    CAD (coronary artery disease)    H/O aortic valve replacement

## 2020-11-24 NOTE — H&P PST ADULT - REASON FOR ADMISSION
prostate artery embolization for dr alejandro to be done  by dr landau prostate artery embolization for dr alejandro to be done  by dr landau on 12/1/2020

## 2020-11-24 NOTE — H&P PST ADULT - NSANTHOSAYNRD_GEN_A_CORE
No. CONTRERAS screening performed.  STOP BANG Legend: 0-2 = LOW Risk; 3-4 = INTERMEDIATE Risk; 5-8 = HIGH Risk

## 2020-11-24 NOTE — H&P PST ADULT - NSANTHGENDERRD_ENT_A_CORE
Received records from [facility]. Med list, Vital Signs (2/5/19 /65, HR 70 and 2/1/19 /80 and ), and last [facility] Rounding Form. BMP, TSH, Free T3/T4 to be done this week, add vesicare 5 mg PO at at bedtime for bladder spasm and start scheduled dose of Tramadol 25 mg PO at at bedtime. Original sent to scan, copy with chart prep.    Treasure Reese RN on 2/12/2019 at 2:48 PM     Yes

## 2020-11-27 DIAGNOSIS — N40.1 BENIGN PROSTATIC HYPERPLASIA WITH LOWER URINARY TRACT SYMPTOMS: ICD-10-CM

## 2020-11-27 DIAGNOSIS — Z01.818 ENCOUNTER FOR OTHER PREPROCEDURAL EXAMINATION: ICD-10-CM

## 2020-11-27 DIAGNOSIS — Z02.9 ENCOUNTER FOR ADMINISTRATIVE EXAMINATIONS, UNSPECIFIED: ICD-10-CM

## 2020-11-28 ENCOUNTER — OUTPATIENT (OUTPATIENT)
Dept: OUTPATIENT SERVICES | Facility: HOSPITAL | Age: 80
LOS: 1 days | Discharge: HOME | End: 2020-11-28

## 2020-11-28 ENCOUNTER — LABORATORY RESULT (OUTPATIENT)
Age: 80
End: 2020-11-28

## 2020-11-28 DIAGNOSIS — Z11.59 ENCOUNTER FOR SCREENING FOR OTHER VIRAL DISEASES: ICD-10-CM

## 2020-11-28 DIAGNOSIS — Z95.2 PRESENCE OF PROSTHETIC HEART VALVE: Chronic | ICD-10-CM

## 2020-11-29 ENCOUNTER — RX RENEWAL (OUTPATIENT)
Age: 80
End: 2020-11-29

## 2020-11-30 ENCOUNTER — APPOINTMENT (OUTPATIENT)
Dept: CARDIOLOGY | Facility: CLINIC | Age: 80
End: 2020-11-30
Payer: MEDICARE

## 2020-11-30 ENCOUNTER — OUTPATIENT (OUTPATIENT)
Dept: OUTPATIENT SERVICES | Facility: HOSPITAL | Age: 80
LOS: 1 days | Discharge: HOME | End: 2020-11-30

## 2020-11-30 ENCOUNTER — APPOINTMENT (OUTPATIENT)
Dept: MEDICATION MANAGEMENT | Facility: CLINIC | Age: 80
End: 2020-11-30

## 2020-11-30 VITALS
SYSTOLIC BLOOD PRESSURE: 120 MMHG | BODY MASS INDEX: 20.04 KG/M2 | WEIGHT: 140 LBS | HEIGHT: 70 IN | TEMPERATURE: 96.8 F | DIASTOLIC BLOOD PRESSURE: 70 MMHG | HEART RATE: 67 BPM

## 2020-11-30 VITALS — OXYGEN SATURATION: 98 % | RESPIRATION RATE: 16 BRPM | HEART RATE: 57 BPM

## 2020-11-30 DIAGNOSIS — Z95.2 PRESENCE OF PROSTHETIC HEART VALVE: Chronic | ICD-10-CM

## 2020-11-30 DIAGNOSIS — Z79.01 LONG TERM (CURRENT) USE OF ANTICOAGULANTS: ICD-10-CM

## 2020-11-30 DIAGNOSIS — I48.91 UNSPECIFIED ATRIAL FIBRILLATION: ICD-10-CM

## 2020-11-30 LAB
INR PPP: 3.1 RATIO
POCT-PROTHROMBIN TIME: 37.2 SECS
QUALITY CONTROL: YES

## 2020-11-30 PROCEDURE — 93000 ELECTROCARDIOGRAM COMPLETE: CPT

## 2020-11-30 PROCEDURE — 99214 OFFICE O/P EST MOD 30 MIN: CPT

## 2020-11-30 RX ORDER — WARFARIN 2 MG/1
2 TABLET ORAL
Qty: 30 | Refills: 0 | Status: DISCONTINUED | COMMUNITY
Start: 2019-01-19 | End: 2020-11-30

## 2020-11-30 RX ORDER — ENOXAPARIN SODIUM 100 MG/ML
60 INJECTION SUBCUTANEOUS
Qty: 10 | Refills: 0 | Status: DISCONTINUED | COMMUNITY
Start: 2020-11-27 | End: 2020-11-30

## 2020-11-30 NOTE — HISTORY OF PRESENT ILLNESS
[FreeTextEntry1] : The patient has urinary  retention and has indwelling Hernandez, ambulatory bag . The patient is going for prostatic artery embolization .  The patiet continues to lose weight . No SOB No chest pain . The patient has a history of having ng a AVR which is mechanical. He has an old LAD stent

## 2020-11-30 NOTE — ASSESSMENT
[FreeTextEntry1] : The patient has an indwelling Cisneros cath and BPH which has been difficult to treat . The patient is going for embolization of the prostatic artery an an attempt to remove cisneros cath . The patient has a mechanical AVR . The patient is on Coumadin and this is being managed by the Coumadin center. He has had a TIA in the past as well which makes interruption of A/C a concern and should be kept to a minimum .  When INR is <2.0 he should be started on LMWH . MAy hold Plavix for now . If there is manipulation of the prostate directly then would need antibiotic prophylaxis .

## 2020-11-30 NOTE — PHYSICAL EXAM
[General Appearance - Well Developed] : well developed [Normal Appearance] : normal appearance [Well Groomed] : well groomed [General Appearance - Well Nourished] : well nourished [No Deformities] : no deformities [General Appearance - In No Acute Distress] : no acute distress [Normal Conjunctiva] : the conjunctiva exhibited no abnormalities [Eyelids - No Xanthelasma] : the eyelids demonstrated no xanthelasmas [Normal Oral Mucosa] : normal oral mucosa [No Oral Pallor] : no oral pallor [No Oral Cyanosis] : no oral cyanosis [Respiration, Rhythm And Depth] : normal respiratory rhythm and effort [Exaggerated Use Of Accessory Muscles For Inspiration] : no accessory muscle use [Abdomen Mass (___ Cm)] : no abdominal mass palpated [Abnormal Walk] : normal gait [Nail Clubbing] : no clubbing of the fingernails [Cyanosis, Localized] : no localized cyanosis [Petechial Hemorrhages (___cm)] : no petechial hemorrhages [Skin Color & Pigmentation] : normal skin color and pigmentation [] : no rash [No Venous Stasis] : no venous stasis [Skin Lesions] : no skin lesions [No Skin Ulcers] : no skin ulcer [No Xanthoma] : no  xanthoma was observed [Normal Rate] : normal [Rhythm Regular] : regular [Prosthetic Aortic Valve] : prosthetic aortic valve heard [II] : a grade 2 [1+] : left 1+ [No Pitting Edema] : no pitting edema present [FreeTextEntry1] : Coarse rales at bases.  [Rt] : no varicose veins of the right leg

## 2020-12-01 ENCOUNTER — OUTPATIENT (OUTPATIENT)
Dept: OUTPATIENT SERVICES | Facility: HOSPITAL | Age: 80
LOS: 1 days | Discharge: HOME | End: 2020-12-01
Payer: MEDICARE

## 2020-12-01 DIAGNOSIS — Z95.2 PRESENCE OF PROSTHETIC HEART VALVE: Chronic | ICD-10-CM

## 2020-12-01 LAB
APTT BLD: 43.4 SEC — HIGH (ref 27–39.2)
INR BLD: 1.77 RATIO — HIGH (ref 0.65–1.3)
PROTHROM AB SERPL-ACNC: 20.4 SEC — HIGH (ref 9.95–12.87)

## 2020-12-01 RX ORDER — CEFAZOLIN SODIUM 1 G
1000 VIAL (EA) INJECTION ONCE
Refills: 0 | Status: DISCONTINUED | OUTPATIENT
Start: 2020-12-01 | End: 2020-12-15

## 2020-12-01 NOTE — PROGRESS NOTE ADULT - SUBJECTIVE AND OBJECTIVE BOX
PREOPERATIVE DAY OF PROCEDURE EVALUATION:     I have personally seen and examined this patient. I agree with the history and physical which I have reviewed and noted any changes below:     Plan is for pelvic angiography, prostate artery embolization and placement of suprapubic catheter with conscious sedation    Procedure/ risks/ benefits/ goals/ alternatives were explained. All questions answered. Informed content obtained from patient. Consent placed in chart.

## 2020-12-01 NOTE — PROGRESS NOTE ADULT - SUBJECTIVE AND OBJECTIVE BOX
INTERVENTIONAL RADIOLOGY BRIEF-OPERATIVE NOTE    Procedure: Pelvic angiogram, selective angiography bilateral internal iliac arteries, suprapubic catheter placement    Pre-Op Diagnosis: Chronic retention, BPH    Post-Op Diagnosis: Same    Attending: Elliot Landau  Resident: Rafa Peck    Anesthesia (type):  [ ] General Anesthesia  [x] Sedation  [ ] Spinal Anesthesia  [x] Local/Regional    Contrast: 75 cc Visipaque    Estimated Blood Loss: < 5 cc    Condition:   [ ] Critical  [ ] Serious  [x] Fair   [ ] Good    Findings/Follow up Plan of Care: Angiography demonstrated markedly tortuous iliac arteries with multifocal stenoses. 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 Extensive vascular disease precluded safe embolization. Image guided placement of 10 Arabic suprapubic catheter. Patient tolerated procedure well.    Lovenox was given after procedure was completed due to patient history of artifical valve.     Specimens Removed: None    Implants: None    Complications: None    Disposition: Recovery room pending D/C      Please call Interventional Radiology [phone] with any questions, concerns, or issues.

## 2020-12-02 ENCOUNTER — RESULT REVIEW (OUTPATIENT)
Age: 80
End: 2020-12-02

## 2020-12-02 PROCEDURE — 36248 INS CATH ABD/L-EXT ART ADDL: CPT

## 2020-12-02 PROCEDURE — 36245 INS CATH ABD/L-EXT ART 1ST: CPT | Mod: XS

## 2020-12-02 PROCEDURE — 75736 ARTERY X-RAYS PELVIS: CPT | Mod: 26

## 2020-12-02 PROCEDURE — 75716 ARTERY X-RAYS ARMS/LEGS: CPT | Mod: 26

## 2020-12-02 PROCEDURE — 76942 ECHO GUIDE FOR BIOPSY: CPT | Mod: 26,XU

## 2020-12-02 PROCEDURE — 99152 MOD SED SAME PHYS/QHP 5/>YRS: CPT

## 2020-12-02 PROCEDURE — 36247 INS CATH ABD/L-EXT ART 3RD: CPT

## 2020-12-02 PROCEDURE — 51102 DRAIN BL W/CATH INSERTION: CPT

## 2020-12-02 PROCEDURE — 76937 US GUIDE VASCULAR ACCESS: CPT | Mod: 26

## 2020-12-02 PROCEDURE — 76380 CAT SCAN FOLLOW-UP STUDY: CPT | Mod: 26

## 2020-12-04 ENCOUNTER — OUTPATIENT (OUTPATIENT)
Dept: OUTPATIENT SERVICES | Facility: HOSPITAL | Age: 80
LOS: 1 days | Discharge: HOME | End: 2020-12-04

## 2020-12-04 ENCOUNTER — APPOINTMENT (OUTPATIENT)
Dept: MEDICATION MANAGEMENT | Facility: CLINIC | Age: 80
End: 2020-12-04

## 2020-12-04 VITALS — RESPIRATION RATE: 16 BRPM | HEART RATE: 86 BPM

## 2020-12-04 DIAGNOSIS — I48.91 UNSPECIFIED ATRIAL FIBRILLATION: ICD-10-CM

## 2020-12-04 DIAGNOSIS — Z79.01 LONG TERM (CURRENT) USE OF ANTICOAGULANTS: ICD-10-CM

## 2020-12-04 DIAGNOSIS — Z95.2 PRESENCE OF PROSTHETIC HEART VALVE: Chronic | ICD-10-CM

## 2020-12-04 LAB
INR PPP: 1.6 RATIO
POCT-PROTHROMBIN TIME: 18.9 SECS
QUALITY CONTROL: YES

## 2020-12-07 ENCOUNTER — APPOINTMENT (OUTPATIENT)
Dept: MEDICATION MANAGEMENT | Facility: CLINIC | Age: 80
End: 2020-12-07

## 2020-12-07 ENCOUNTER — OUTPATIENT (OUTPATIENT)
Dept: OUTPATIENT SERVICES | Facility: HOSPITAL | Age: 80
LOS: 1 days | Discharge: HOME | End: 2020-12-07

## 2020-12-07 VITALS — OXYGEN SATURATION: 94 % | HEART RATE: 65 BPM | RESPIRATION RATE: 16 BRPM

## 2020-12-07 DIAGNOSIS — N40.1 BENIGN PROSTATIC HYPERPLASIA WITH LOWER URINARY TRACT SYMPTOMS: ICD-10-CM

## 2020-12-07 DIAGNOSIS — Z79.01 LONG TERM (CURRENT) USE OF ANTICOAGULANTS: ICD-10-CM

## 2020-12-07 DIAGNOSIS — I48.91 UNSPECIFIED ATRIAL FIBRILLATION: ICD-10-CM

## 2020-12-07 DIAGNOSIS — Z95.2 PRESENCE OF PROSTHETIC HEART VALVE: Chronic | ICD-10-CM

## 2020-12-07 DIAGNOSIS — I10 ESSENTIAL (PRIMARY) HYPERTENSION: ICD-10-CM

## 2020-12-07 DIAGNOSIS — Z79.02 LONG TERM (CURRENT) USE OF ANTITHROMBOTICS/ANTIPLATELETS: ICD-10-CM

## 2020-12-07 DIAGNOSIS — R33.8 OTHER RETENTION OF URINE: ICD-10-CM

## 2020-12-07 DIAGNOSIS — I25.10 ATHEROSCLEROTIC HEART DISEASE OF NATIVE CORONARY ARTERY WITHOUT ANGINA PECTORIS: ICD-10-CM

## 2020-12-07 LAB
INR PPP: 3.1 RATIO
POCT-PROTHROMBIN TIME: 36.7 SECS
QUALITY CONTROL: YES

## 2020-12-15 ENCOUNTER — OUTPATIENT (OUTPATIENT)
Dept: OUTPATIENT SERVICES | Facility: HOSPITAL | Age: 80
LOS: 1 days | Discharge: HOME | End: 2020-12-15

## 2020-12-15 ENCOUNTER — APPOINTMENT (OUTPATIENT)
Dept: MEDICATION MANAGEMENT | Facility: CLINIC | Age: 80
End: 2020-12-15

## 2020-12-15 VITALS — OXYGEN SATURATION: 98 % | HEART RATE: 62 BPM | RESPIRATION RATE: 16 BRPM

## 2020-12-15 DIAGNOSIS — Z79.01 LONG TERM (CURRENT) USE OF ANTICOAGULANTS: ICD-10-CM

## 2020-12-15 DIAGNOSIS — Z95.2 PRESENCE OF PROSTHETIC HEART VALVE: Chronic | ICD-10-CM

## 2020-12-15 DIAGNOSIS — I48.91 UNSPECIFIED ATRIAL FIBRILLATION: ICD-10-CM

## 2020-12-15 LAB
INR PPP: 3.4 RATIO
POCT-PROTHROMBIN TIME: 41.1 SECS
QUALITY CONTROL: YES

## 2020-12-21 ENCOUNTER — RX RENEWAL (OUTPATIENT)
Age: 80
End: 2020-12-21

## 2020-12-23 ENCOUNTER — APPOINTMENT (OUTPATIENT)
Dept: MEDICATION MANAGEMENT | Facility: CLINIC | Age: 80
End: 2020-12-23

## 2020-12-23 ENCOUNTER — OUTPATIENT (OUTPATIENT)
Dept: OUTPATIENT SERVICES | Facility: HOSPITAL | Age: 80
LOS: 1 days | Discharge: HOME | End: 2020-12-23

## 2020-12-23 ENCOUNTER — APPOINTMENT (OUTPATIENT)
Dept: CARDIOLOGY | Facility: CLINIC | Age: 80
End: 2020-12-23
Payer: MEDICARE

## 2020-12-23 VITALS — HEART RATE: 60 BPM | RESPIRATION RATE: 16 BRPM | OXYGEN SATURATION: 92 %

## 2020-12-23 VITALS
DIASTOLIC BLOOD PRESSURE: 74 MMHG | BODY MASS INDEX: 19.9 KG/M2 | SYSTOLIC BLOOD PRESSURE: 130 MMHG | WEIGHT: 139 LBS | HEIGHT: 70 IN | TEMPERATURE: 96.1 F | HEART RATE: 65 BPM

## 2020-12-23 DIAGNOSIS — Z95.2 PRESENCE OF PROSTHETIC HEART VALVE: Chronic | ICD-10-CM

## 2020-12-23 DIAGNOSIS — Z79.01 LONG TERM (CURRENT) USE OF ANTICOAGULANTS: ICD-10-CM

## 2020-12-23 DIAGNOSIS — I48.91 UNSPECIFIED ATRIAL FIBRILLATION: ICD-10-CM

## 2020-12-23 LAB
INR PPP: 3.5 RATIO
POCT-PROTHROMBIN TIME: 42.1 SECS
QUALITY CONTROL: YES

## 2020-12-23 PROCEDURE — 93000 ELECTROCARDIOGRAM COMPLETE: CPT

## 2020-12-23 PROCEDURE — 99214 OFFICE O/P EST MOD 30 MIN: CPT

## 2020-12-23 RX ORDER — ALLOPURINOL 100 MG/1
100 TABLET ORAL DAILY
Qty: 30 | Refills: 2 | Status: DISCONTINUED | COMMUNITY
Start: 2019-10-24 | End: 2020-12-23

## 2020-12-23 RX ORDER — ALFUZOSIN HYDROCHLORIDE 10 MG/1
10 TABLET, EXTENDED RELEASE ORAL DAILY
Refills: 0 | Status: DISCONTINUED | COMMUNITY
End: 2020-12-23

## 2020-12-23 RX ORDER — TAMSULOSIN HYDROCHLORIDE 0.4 MG/1
0.4 CAPSULE ORAL
Qty: 90 | Refills: 3 | Status: DISCONTINUED | COMMUNITY
Start: 2020-11-10 | End: 2020-12-23

## 2020-12-23 NOTE — ASSESSMENT
[FreeTextEntry1] : The patient  has had urological issues . The patient had attempts at prostatic artery embolization . This was not successful. He now has a suprapubic tube . He is going to a urologist at [facility] . He has a planed cystoscopy . The patient has a known mechanical AV and has had a TIA in the past . This makes interruption of A/ C more difficult and the patient will need bridging with LMWH . The patient has a remote stent inhis LAD . He had been on Plavix. Will change to ASA .

## 2020-12-23 NOTE — HISTORY OF PRESENT ILLNESS
[FreeTextEntry1] : The patient had attempted prostatic artery embolization . This was not successful. He had a suprapubic tube placed . The patient needs to go for cystoscopy.and transrectal ultrasound . The patient has had a mechanical MVR and has had a TIA in the past . . In view of that he would need to have bridging of his Coumadin .  His SOB has improved . No chest pain

## 2020-12-31 ENCOUNTER — APPOINTMENT (OUTPATIENT)
Dept: MEDICATION MANAGEMENT | Facility: CLINIC | Age: 80
End: 2020-12-31

## 2020-12-31 ENCOUNTER — OUTPATIENT (OUTPATIENT)
Dept: OUTPATIENT SERVICES | Facility: HOSPITAL | Age: 80
LOS: 1 days | Discharge: HOME | End: 2020-12-31

## 2020-12-31 VITALS — OXYGEN SATURATION: 98 % | HEART RATE: 55 BPM | RESPIRATION RATE: 16 BRPM

## 2020-12-31 DIAGNOSIS — I48.91 UNSPECIFIED ATRIAL FIBRILLATION: ICD-10-CM

## 2020-12-31 DIAGNOSIS — Z95.2 PRESENCE OF PROSTHETIC HEART VALVE: Chronic | ICD-10-CM

## 2020-12-31 DIAGNOSIS — Z79.01 LONG TERM (CURRENT) USE OF ANTICOAGULANTS: ICD-10-CM

## 2020-12-31 LAB
INR PPP: 2.2 RATIO
POCT-PROTHROMBIN TIME: 25.9 SECS
QUALITY CONTROL: YES

## 2021-01-06 ENCOUNTER — OUTPATIENT (OUTPATIENT)
Dept: OUTPATIENT SERVICES | Facility: HOSPITAL | Age: 81
LOS: 1 days | Discharge: HOME | End: 2021-01-06

## 2021-01-06 ENCOUNTER — APPOINTMENT (OUTPATIENT)
Dept: MEDICATION MANAGEMENT | Facility: CLINIC | Age: 81
End: 2021-01-06

## 2021-01-06 VITALS — RESPIRATION RATE: 16 BRPM | HEART RATE: 63 BPM | OXYGEN SATURATION: 94 %

## 2021-01-06 DIAGNOSIS — I48.91 UNSPECIFIED ATRIAL FIBRILLATION: ICD-10-CM

## 2021-01-06 DIAGNOSIS — Z95.2 PRESENCE OF PROSTHETIC HEART VALVE: Chronic | ICD-10-CM

## 2021-01-06 DIAGNOSIS — Z79.01 LONG TERM (CURRENT) USE OF ANTICOAGULANTS: ICD-10-CM

## 2021-01-06 LAB
INR PPP: 3.6 RATIO
POCT-PROTHROMBIN TIME: 43.5 SECS
QUALITY CONTROL: YES

## 2021-01-07 ENCOUNTER — APPOINTMENT (OUTPATIENT)
Dept: UROLOGY | Facility: CLINIC | Age: 81
End: 2021-01-07

## 2021-01-11 ENCOUNTER — APPOINTMENT (OUTPATIENT)
Dept: MEDICATION MANAGEMENT | Facility: CLINIC | Age: 81
End: 2021-01-11

## 2021-01-11 ENCOUNTER — OUTPATIENT (OUTPATIENT)
Dept: OUTPATIENT SERVICES | Facility: HOSPITAL | Age: 81
LOS: 1 days | Discharge: HOME | End: 2021-01-11

## 2021-01-11 VITALS — RESPIRATION RATE: 16 BRPM | HEART RATE: 70 BPM

## 2021-01-11 DIAGNOSIS — Z79.01 LONG TERM (CURRENT) USE OF ANTICOAGULANTS: ICD-10-CM

## 2021-01-11 DIAGNOSIS — I48.91 UNSPECIFIED ATRIAL FIBRILLATION: ICD-10-CM

## 2021-01-11 DIAGNOSIS — Z95.2 PRESENCE OF PROSTHETIC HEART VALVE: Chronic | ICD-10-CM

## 2021-01-11 LAB
INR PPP: 2.3 RATIO
POCT-PROTHROMBIN TIME: 27.6 SECS
QUALITY CONTROL: YES

## 2021-01-12 ENCOUNTER — APPOINTMENT (OUTPATIENT)
Dept: MEDICATION MANAGEMENT | Facility: CLINIC | Age: 81
End: 2021-01-12

## 2021-01-12 ENCOUNTER — OUTPATIENT (OUTPATIENT)
Dept: OUTPATIENT SERVICES | Facility: HOSPITAL | Age: 81
LOS: 1 days | Discharge: HOME | End: 2021-01-12

## 2021-01-12 VITALS — OXYGEN SATURATION: 95 % | HEART RATE: 61 BPM | RESPIRATION RATE: 16 BRPM

## 2021-01-12 DIAGNOSIS — I48.91 UNSPECIFIED ATRIAL FIBRILLATION: ICD-10-CM

## 2021-01-12 DIAGNOSIS — Z79.01 LONG TERM (CURRENT) USE OF ANTICOAGULANTS: ICD-10-CM

## 2021-01-12 DIAGNOSIS — Z95.2 PRESENCE OF PROSTHETIC HEART VALVE: Chronic | ICD-10-CM

## 2021-01-12 LAB
INR PPP: 1.6 RATIO
POCT-PROTHROMBIN TIME: 19.7 SECS
QUALITY CONTROL: YES

## 2021-01-18 ENCOUNTER — LABORATORY RESULT (OUTPATIENT)
Age: 81
End: 2021-01-18

## 2021-01-18 ENCOUNTER — APPOINTMENT (OUTPATIENT)
Dept: HEMATOLOGY ONCOLOGY | Facility: CLINIC | Age: 81
End: 2021-01-18
Payer: MEDICARE

## 2021-01-18 VITALS
BODY MASS INDEX: 20.04 KG/M2 | DIASTOLIC BLOOD PRESSURE: 61 MMHG | TEMPERATURE: 97.1 F | HEIGHT: 70 IN | WEIGHT: 140 LBS | HEART RATE: 58 BPM | RESPIRATION RATE: 16 BRPM | SYSTOLIC BLOOD PRESSURE: 104 MMHG

## 2021-01-18 LAB
HCT VFR BLD CALC: 36.7 %
HGB BLD-MCNC: 12.7 G/DL
MCHC RBC-ENTMCNC: 33.3 PG
MCHC RBC-ENTMCNC: 34.6 G/DL
MCV RBC AUTO: 96.3 FL
PLATELET # BLD AUTO: 102 K/UL
PMV BLD: 9.8 FL
RBC # BLD: 3.81 M/UL
RBC # FLD: 13.7 %
WBC # FLD AUTO: 6.21 K/UL

## 2021-01-18 PROCEDURE — 99214 OFFICE O/P EST MOD 30 MIN: CPT

## 2021-01-18 NOTE — HISTORY OF PRESENT ILLNESS
[de-identified] : this is a 76-year-old white man with multiple medical problems including coronary artery disease status post angioplasty valvular heart disease status post metallic aortic valve replacement. He is referred for abnormal hemogram noted recently, WBC is 10.6 hemoglobin 14.2 platelets 131 absolute lymphocytes 5268 chemistry is unremarkable. He denies any constitutional symptoms  specifically no fever ,weight loss ,night sweats, fatigue or  pruritus. he feels fantastic with good energy level. 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 He is scheduled for ablation for atrial fibrillation , he continues on coumadin for mechanical valve. He reports minor bruising on coumadin and plavix. He feels slight fatigue . He denies fever , weight loss or night sweats . He is also on androgen deprivation for elevated PSA , , He had previously on surveillance and had multiple prostate biopsies ( unclear if it showed cancer ) . Bone scan is allegedly negative . Last PSA is less than 1 and patient denies obstructive symptoms. \par "\par 01/10/2019 Patient returns for follow up for CLL on watchful waiting , he " feels tired all the time " , He had unsuccessful ablation for atrial fibrillation and was told to increase metoprolol . He denies B symptoms . \par \par 02/07/2019 Patient returns for follow up , he had unsuccessful ablation for paroxysmal atrial fibrillation and was placed on amiodarone , he continues on coumadin without ASA and denies abnormal bleeding , he reports occasional LUQ pain . no  B symptoms.\par \par 05/16/2019 Patient returns for follow up for CLL/SLL he reports few episodes of dizziness associated with nausea lasting for 1 to 2 hours . CT head ( non-contrast ) was negative , he had negative ENT evaluation and had long term event monitor placed 2 weeks ago without recurrence ( also discontinued amiodarone ) , CBC shows slight decrease in Hb and platelets from baseline . He continues on coumadin and plavix and denies abnormal bleeding . He continues with mild LUQ and back pain . \par \par 06/13/2019 Patient returns for follow up for SLL/CLL with anemia, thrombocytopenia and marked splenomegaly ( 19cm ) . He complains of left flank and back pain . he had a trial of epidural anethesia and is scheduled for nerve block/ ablation / epidural injection . he continues on coumadin and denies any abnormal bleeding . \par \par 07/11/2019 Patient returns with persistent LUQ discomfort , back pain despite epidural injections . 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 He is relatively asymptomatic except for mild left flank pain . no significant bleeding on coumadin and plavix . Given his age , multiple co-morbidities and high risk features , the regimen of choice is combination of venetoclax and obinotuzumab . \par \par 08/22/2019 Patient returns for follow up after starting on obinotuzumab , he had mild reaction during the split first dose ( nausea ) . He tolerated day 8 and day 15 very well ( also given in split dose ) . He lost 10 lbs despite good po intake . He denies fever . he continues with mild back pain . \par \par 11/19/2019 patient returns for follow up , he continues on venetoclax/obino and feels well except for persistent back pain , MRI done elsewhere  was allegedly normal , spleen is no longer palpable , Hb is 13 , wbc is normal except for lymphopenia , platelets  : 111 .  He reports occasional episodes of abdominal cramping with nausea. no weight loss. \par \par 12/16/19: Pt returns for a f/u visit. He has been c/o nausea with decreased appetite. However, he has not been taking antiemetics as previously prescribed. Has lost 11 lbs in the last month. Also had a recent TIA manifested as aphasia that lasted for about 45 mins. He was treated in an ED in [place]. CTH was unremarkable. Echo showed EF 45-50%. No clots seen. US carotids showed plaques but no significant stenosis. He is being seen by neurology in [place] and will have MRI/MRA. He was already on plavix and coumadin before having TIA. His INR at the time of ER visit was 4.3. \par \par 01/13/2020\par Patient returns for a follow up visit. This will be his last cycle of obinutuzumab ( #6). He is on venetoclax 300 mg. It was dose reduced on 12/16 as he was complaining of nausea and abdominal pain. He also has chronic back pain. He remains on coumadin and plavix. \par He is tolerating obinutuzumab with no reactions. \par \par 2/6/2020: TERRELL ATKINSON is a 79 year old male here today for sick visit. He has CLL and was treated last with obinutuzumab last dose on 1/16/2020. Venetoclax was stopped due to adverse effects , He states that he developed a fever of 101 degrees yesterday associated with chills and a mild cough with hemoptysis. He continues to spike fever today. He is taking Tylenol for symptom relief. He denies SOB and chest pain at this time. \par \par 06/19/2020 Today's virtual encounter was done via telehealth given the situation surrounding Covid 19 outbreak . He is staying currently in [place] , he had recently a virtual visit with his cardiologist as well . He has good and bad days , he continues with left sided abdominal pain , he has allegedly lost more weight ( 25 lbs in total since last year ) . He denies fever or night sweats , he was last treated for a respiratory infection in February and his wife is wondering if he had contracted Covid 19 ( not tested , chest xray was negative ) . Blood work from April showed normal Hb , wbc and platelet improved compared to 1/2020 . He continues on coumadin and denies any abnormal bleeding or symptoms suggestive of cardioembolic disorder. \par \par 01/18/2021 Patient returns for follow p for CLL s/p venetoclax/obinotuzumab , treatment truncated due to several factors including Covid 19 pandemics. His weight is stable after losing more than 20 lbs , he continues to complain of mild abdominal pain after meals. no fever or night sweats . his last CBC shows normal wbc with lymphopenia , Hb 12.7 , platelets : 102  CT abdomen in 9/2020 was negative for adenopathy and splenomegaly .\par His main problem is currently urinary retention due to BPH , not surgical candidate due to bleeding risk . He is scheduled for embolization in [facility] tomorrow , he stopped plavix and coumadin , he continues on ASA and bridging with lovenox. \par

## 2021-01-18 NOTE — PHYSICAL EXAM
[Normal] : no peripheral adenopathy appreciated [de-identified] : Pale chronically ill in NAD .  [de-identified] : healed scar  of lymph node biopsy on the left, no residual or  recurrent adenopathy. [de-identified] : prominent aortic valve  click. [de-identified] : spleen no longer palpable .  [de-identified] : no peripheral adenopathy.

## 2021-01-18 NOTE — ASSESSMENT
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 He completed 6 cycles of Obinituzumab and Venetoclax was dose reduced to 300mg then stopped secondary to persistent side effects, Clinically he had good response with spleen no longer being palpable and good hematological response. He lost around 20 lbs since April , but his weight is stable now. \par CT abdomen/pelvis negative in 9/2020\par \par PLAN:\par - CBC is stable ( mild chronic thrombocytopenia ).\par - Embolization for BPH with urinary retention . . \par follow up in 2 months , continue watchful waiting .

## 2021-01-19 ENCOUNTER — APPOINTMENT (OUTPATIENT)
Dept: MEDICATION MANAGEMENT | Facility: CLINIC | Age: 81
End: 2021-01-19

## 2021-01-22 ENCOUNTER — APPOINTMENT (OUTPATIENT)
Dept: MEDICATION MANAGEMENT | Facility: CLINIC | Age: 81
End: 2021-01-22

## 2021-01-22 ENCOUNTER — OUTPATIENT (OUTPATIENT)
Dept: OUTPATIENT SERVICES | Facility: HOSPITAL | Age: 81
LOS: 1 days | Discharge: HOME | End: 2021-01-22

## 2021-01-22 VITALS — RESPIRATION RATE: 16 BRPM

## 2021-01-22 DIAGNOSIS — Z79.01 LONG TERM (CURRENT) USE OF ANTICOAGULANTS: ICD-10-CM

## 2021-01-22 DIAGNOSIS — I48.91 UNSPECIFIED ATRIAL FIBRILLATION: ICD-10-CM

## 2021-01-22 DIAGNOSIS — Z95.2 PRESENCE OF PROSTHETIC HEART VALVE: Chronic | ICD-10-CM

## 2021-01-22 LAB
INR PPP: 1.4 RATIO
POCT-PROTHROMBIN TIME: 16.9 SECS
QUALITY CONTROL: YES

## 2021-01-26 ENCOUNTER — APPOINTMENT (OUTPATIENT)
Dept: MEDICATION MANAGEMENT | Facility: CLINIC | Age: 81
End: 2021-01-26

## 2021-01-26 ENCOUNTER — OUTPATIENT (OUTPATIENT)
Dept: OUTPATIENT SERVICES | Facility: HOSPITAL | Age: 81
LOS: 1 days | Discharge: HOME | End: 2021-01-26

## 2021-01-26 VITALS — RESPIRATION RATE: 16 BRPM

## 2021-01-26 DIAGNOSIS — Z79.01 LONG TERM (CURRENT) USE OF ANTICOAGULANTS: ICD-10-CM

## 2021-01-26 DIAGNOSIS — Z95.2 PRESENCE OF PROSTHETIC HEART VALVE: Chronic | ICD-10-CM

## 2021-01-26 DIAGNOSIS — I48.91 UNSPECIFIED ATRIAL FIBRILLATION: ICD-10-CM

## 2021-01-26 LAB
INR PPP: 1.9 RATIO
POCT-PROTHROMBIN TIME: 22.9 SECS
QUALITY CONTROL: YES

## 2021-02-03 ENCOUNTER — OUTPATIENT (OUTPATIENT)
Dept: OUTPATIENT SERVICES | Facility: HOSPITAL | Age: 81
LOS: 1 days | Discharge: HOME | End: 2021-02-03

## 2021-02-03 ENCOUNTER — APPOINTMENT (OUTPATIENT)
Dept: MEDICATION MANAGEMENT | Facility: CLINIC | Age: 81
End: 2021-02-03

## 2021-02-03 VITALS — RESPIRATION RATE: 16 BRPM

## 2021-02-03 DIAGNOSIS — Z79.01 LONG TERM (CURRENT) USE OF ANTICOAGULANTS: ICD-10-CM

## 2021-02-03 DIAGNOSIS — I48.91 UNSPECIFIED ATRIAL FIBRILLATION: ICD-10-CM

## 2021-02-03 DIAGNOSIS — Z95.2 PRESENCE OF PROSTHETIC HEART VALVE: Chronic | ICD-10-CM

## 2021-02-03 LAB
INR PPP: 3.6 RATIO
POCT-PROTHROMBIN TIME: 43.6 SECS
QUALITY CONTROL: YES

## 2021-02-09 ENCOUNTER — APPOINTMENT (OUTPATIENT)
Dept: MEDICATION MANAGEMENT | Facility: CLINIC | Age: 81
End: 2021-02-09

## 2021-02-09 ENCOUNTER — OUTPATIENT (OUTPATIENT)
Dept: OUTPATIENT SERVICES | Facility: HOSPITAL | Age: 81
LOS: 1 days | Discharge: HOME | End: 2021-02-09

## 2021-02-09 VITALS — OXYGEN SATURATION: 93 % | HEART RATE: 67 BPM | RESPIRATION RATE: 16 BRPM

## 2021-02-09 DIAGNOSIS — Z95.2 PRESENCE OF PROSTHETIC HEART VALVE: Chronic | ICD-10-CM

## 2021-02-09 DIAGNOSIS — Z79.01 LONG TERM (CURRENT) USE OF ANTICOAGULANTS: ICD-10-CM

## 2021-02-09 DIAGNOSIS — I48.91 UNSPECIFIED ATRIAL FIBRILLATION: ICD-10-CM

## 2021-02-09 LAB
INR PPP: 2.6 RATIO
POCT-PROTHROMBIN TIME: 31.4 SECS
QUALITY CONTROL: YES

## 2021-02-09 NOTE — H&P PST ADULT - RESPIRATORY
Pulmonary Progress Note    Patient: Gabo Cruz Date: 21     : 1940 Attending: James Haynes MD   80 year old male      Admit Date: 2021    Subjective:  Alert / No distress (seen in HD)    PertinentReviewed: Allergies, Medications, Labs, Imaging and Physician and Nursing Notes    Meds  • metoPROLOL succinate  12.5 mg Oral Daily   • [Held by provider] polyethylene glycol  17 g Oral Daily   • cefTRIAXone (ROCEPHIN) IV  2,000 mg Intravenous Daily   • [Held by provider] furosemide  20 mg Intravenous BID   • sodium chloride (PF)  2 mL Intracatheter 2 times per day   • [Held by provider] WARFARIN - PHARMACIST MONITORED   Does not apply See Admin Instructions   • atorvastatin  40 mg Oral Nightly   • cholecalciferol  25 mcg Oral Daily   • [Held by provider] finasteride  5 mg Oral Daily   • influenza virus quadrivalent vaccine inactivated injection  0.5 mL Intramuscular Once       Vital 24 Hour Range Last Value   Temperature Temp  Min: 97.7 °F (36.5 °C)  Max: 98.3 °F (36.8 °C) 97.7 °F (36.5 °C)   Pulse Pulse  Min: 60  Max: 123 78   Respiratory Resp  Min: 16  Max: 18 18   Blood Pressure BP  Min: 71/64  Max: 143/97 107/69   Art BP No data recorded     Pulse Oximetry SpO2  Min: 94 %  Max: 100 % 99 %     Vital Admission Last Value   Weight Weight: 69.7 kg 63.9 kg   Height N/A 5' 9\" (175.3 cm)   BMI N/A 20.8     No results found     No intake or output data in the 24 hours ending 21 0724  Physical Exam:   ENT:  No lymphadenopathy, no jugular distention  CHEST:  Quiet throughout with good aeration bilaterally.  No wheezes, crackles or rhonchi  Room air--SaO2 94-98%  NPC.  HEART:  S1, S2, irregularly irregular. Valve click. 2/6 stenotic murmur  ABDOMEN:  Soft, nontender.  EXTREMITIES:  No edema.    Laboratory Results:  Recent Labs   Lab 21  0551 21  1349 21  0443 21  0449 21  2307  21  0712   WBC 9.1  --  8.9 9.7  --   --  8.7   HCT 24.6*  --  22.2* 23.8*  --   --   25.5*   HGB 8.1*  --  7.4* 7.8*  --   --  8.3*   PLT 91*  --  94* 109*  --   --  121*   INR  --  1.6 1.7  --   --   --  1.7   PTT  --   --  51* 47* 47*   < >  --    SODIUM 137  --  140 141  --   --  146*   POTASSIUM 3.7  --  4.0 3.9  --   --  3.7   CHLORIDE 104  --  107 107  --   --  111*   CO2 23  --  25 24  --   --  25   GLUCOSE 110*  --  97 98  --   --  87   BUN 72*  --  66* 66*  --   --  65*   CREATININE 5.43*  --  4.79* 4.36*  --   --  4.27*    < > = values in this interval not displayed.     Cultures:   Blood Cx 1/28--Anaerobic (Proprionibacterium acnes) x 2  Urine strep / legionella Ag--Negative    Left pleural fluid 2/06 (800 cc)   --Sterile transudate   --Cytology depending    Imaging:   Chest x-ray 2/06  Small residual left pleural fluid    CT abdomen/pelvis 2/02 (anaerobic bacteremia)  Moderate L / Small R effusions  Moderate stool burden    CHAYO 1/30  No endocarditis /No vegetations  Severe stenosis of bioprosthetic aortic valve (SHAWN 0.8 cm2)  Moderate-severe MR    CT head 2/01  No acute changes    Assessment:   # Acute hypoxic respiratory distress, improved/resolved   # Chronic bilateral pleural effusion   --L>R  #Anaerobic bacteremia   --Proprionibacterium acnes  # Severe pulmonary HTN   --PAS 76 mmHg  # Severe prosthetic aortic valve stenosis / Severe mitral regurgitaion  # 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    --SaO2 94-98%    Diuresing   --Net loss 5.5 L    Cardiology evaluating valves (AS/MR)    +MPO/PR3   --?Granulomatous polyangitis (GPA)    --No obvious pulmonary involvement    --For renal Bx soon    --HD started 2/09    Possible repeat CHAYO later this week   Breath Sounds equal & clear to percussion & auscultation, no accessory muscle use

## 2021-02-11 RX ORDER — ENOXAPARIN SODIUM 100 MG/ML
60 INJECTION SUBCUTANEOUS
Qty: 10 | Refills: 1 | Status: DISCONTINUED | COMMUNITY
Start: 2021-01-07 | End: 2021-02-11

## 2021-02-22 ENCOUNTER — OUTPATIENT (OUTPATIENT)
Dept: OUTPATIENT SERVICES | Facility: HOSPITAL | Age: 81
LOS: 1 days | Discharge: HOME | End: 2021-02-22

## 2021-02-22 ENCOUNTER — APPOINTMENT (OUTPATIENT)
Dept: MEDICATION MANAGEMENT | Facility: CLINIC | Age: 81
End: 2021-02-22

## 2021-02-22 VITALS — HEART RATE: 71 BPM | RESPIRATION RATE: 16 BRPM

## 2021-02-22 DIAGNOSIS — Z95.2 PRESENCE OF PROSTHETIC HEART VALVE: Chronic | ICD-10-CM

## 2021-02-22 DIAGNOSIS — I48.91 UNSPECIFIED ATRIAL FIBRILLATION: ICD-10-CM

## 2021-02-22 DIAGNOSIS — Z79.01 LONG TERM (CURRENT) USE OF ANTICOAGULANTS: ICD-10-CM

## 2021-02-22 LAB
INR PPP: 2.8 RATIO
POCT-PROTHROMBIN TIME: 33.2 SECS
QUALITY CONTROL: YES

## 2021-03-10 ENCOUNTER — OUTPATIENT (OUTPATIENT)
Dept: OUTPATIENT SERVICES | Facility: HOSPITAL | Age: 81
LOS: 1 days | Discharge: HOME | End: 2021-03-10

## 2021-03-10 ENCOUNTER — APPOINTMENT (OUTPATIENT)
Dept: MEDICATION MANAGEMENT | Facility: CLINIC | Age: 81
End: 2021-03-10

## 2021-03-10 VITALS — OXYGEN SATURATION: 94 % | HEART RATE: 59 BPM | RESPIRATION RATE: 16 BRPM

## 2021-03-10 DIAGNOSIS — Z79.01 LONG TERM (CURRENT) USE OF ANTICOAGULANTS: ICD-10-CM

## 2021-03-10 DIAGNOSIS — I48.91 UNSPECIFIED ATRIAL FIBRILLATION: ICD-10-CM

## 2021-03-10 DIAGNOSIS — Z95.2 PRESENCE OF PROSTHETIC HEART VALVE: Chronic | ICD-10-CM

## 2021-03-10 LAB
INR PPP: 3.1 RATIO
POCT-PROTHROMBIN TIME: 36.6 SECS
QUALITY CONTROL: YES

## 2021-03-17 ENCOUNTER — OUTPATIENT (OUTPATIENT)
Dept: OUTPATIENT SERVICES | Facility: HOSPITAL | Age: 81
LOS: 1 days | Discharge: HOME | End: 2021-03-17

## 2021-03-17 ENCOUNTER — APPOINTMENT (OUTPATIENT)
Dept: MEDICATION MANAGEMENT | Facility: CLINIC | Age: 81
End: 2021-03-17

## 2021-03-17 DIAGNOSIS — I48.91 UNSPECIFIED ATRIAL FIBRILLATION: ICD-10-CM

## 2021-03-17 DIAGNOSIS — Z79.01 LONG TERM (CURRENT) USE OF ANTICOAGULANTS: ICD-10-CM

## 2021-03-17 DIAGNOSIS — Z95.2 PRESENCE OF PROSTHETIC HEART VALVE: Chronic | ICD-10-CM

## 2021-03-17 LAB
INR PPP: 4.1 RATIO
POCT-PROTHROMBIN TIME: 49.7 SECS
QUALITY CONTROL: YES

## 2021-03-22 ENCOUNTER — LABORATORY RESULT (OUTPATIENT)
Age: 81
End: 2021-03-22

## 2021-03-22 ENCOUNTER — APPOINTMENT (OUTPATIENT)
Dept: HEMATOLOGY ONCOLOGY | Facility: CLINIC | Age: 81
End: 2021-03-22
Payer: MEDICARE

## 2021-03-22 VITALS
WEIGHT: 140 LBS | DIASTOLIC BLOOD PRESSURE: 73 MMHG | RESPIRATION RATE: 16 BRPM | TEMPERATURE: 97.6 F | SYSTOLIC BLOOD PRESSURE: 125 MMHG | BODY MASS INDEX: 20.04 KG/M2 | HEART RATE: 66 BPM | HEIGHT: 70 IN

## 2021-03-22 LAB
HCT VFR BLD CALC: 35.7 %
HGB BLD-MCNC: 12.5 G/DL
MCHC RBC-ENTMCNC: 33.2 PG
MCHC RBC-ENTMCNC: 35 G/DL
MCV RBC AUTO: 94.9 FL
PLATELET # BLD AUTO: 108 K/UL
PMV BLD: 9.6 FL
RBC # BLD: 3.76 M/UL
RBC # FLD: 13.2 %
WBC # FLD AUTO: 3.3 K/UL

## 2021-03-22 PROCEDURE — 99214 OFFICE O/P EST MOD 30 MIN: CPT

## 2021-03-22 NOTE — ASSESSMENT
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 He completed 6 cycles of Obinituzumab and Venetoclax was dose reduced to 300mg then stopped secondary to persistent side effects, Clinically he had good response with spleen no longer being palpable and good hematological response. He lost around 20 lbs since April , but his weight is stable now. \par CT abdomen/pelvis negative in 9/2020\par 2) BPH with indwelling cisneros catheter .\par PLAN:\par - CBC shows decrease wbc from baseline ( 2 weeks post covid 19 vaccination ) , rest is stable . \par - repeat cbc in one month , follow up in 2 months. \par -Consider marinol for appetite enhancement .

## 2021-03-22 NOTE — PHYSICAL EXAM
[Normal] : no peripheral adenopathy appreciated [de-identified] : Pale chronically ill in NAD .  [de-identified] : healed scar  of lymph node biopsy on the left, no residual or  recurrent adenopathy. [de-identified] : prominent aortic valve  click. [de-identified] : spleen no longer palpable .  [de-identified] : no peripheral adenopathy.

## 2021-03-22 NOTE — HISTORY OF PRESENT ILLNESS
[de-identified] : this is a 76-year-old white man with multiple medical problems including coronary artery disease status post angioplasty valvular heart disease status post metallic aortic valve replacement. He is referred for abnormal hemogram noted recently, WBC is 10.6 hemoglobin 14.2 platelets 131 absolute lymphocytes 5268 chemistry is unremarkable. He denies any constitutional symptoms  specifically no fever ,weight loss ,night sweats, fatigue or  pruritus. he feels fantastic with good energy level. 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 He is scheduled for ablation for atrial fibrillation , he continues on coumadin for mechanical valve. He reports minor bruising on coumadin and plavix. He feels slight fatigue . He denies fever , weight loss or night sweats . He is also on androgen deprivation for elevated PSA , , He had previously on surveillance and had multiple prostate biopsies ( unclear if it showed cancer ) . Bone scan is allegedly negative . Last PSA is less than 1 and patient denies obstructive symptoms. \par "\par 01/10/2019 Patient returns for follow up for CLL on watchful waiting , he " feels tired all the time " , He had unsuccessful ablation for atrial fibrillation and was told to increase metoprolol . He denies B symptoms . \par \par 02/07/2019 Patient returns for follow up , he had unsuccessful ablation for paroxysmal atrial fibrillation and was placed on amiodarone , he continues on coumadin without ASA and denies abnormal bleeding , he reports occasional LUQ pain . no  B symptoms.\par \par 05/16/2019 Patient returns for follow up for CLL/SLL he reports few episodes of dizziness associated with nausea lasting for 1 to 2 hours . CT head ( non-contrast ) was negative , he had negative ENT evaluation and had long term event monitor placed 2 weeks ago without recurrence ( also discontinued amiodarone ) , CBC shows slight decrease in Hb and platelets from baseline . He continues on coumadin and plavix and denies abnormal bleeding . He continues with mild LUQ and back pain . \par \par 06/13/2019 Patient returns for follow up for SLL/CLL with anemia, thrombocytopenia and marked splenomegaly ( 19cm ) . He complains of left flank and back pain . he had a trial of epidural anethesia and is scheduled for nerve block/ ablation / epidural injection . he continues on coumadin and denies any abnormal bleeding . \par \par 07/11/2019 Patient returns with persistent LUQ discomfort , back pain despite epidural injections . 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 He is relatively asymptomatic except for mild left flank pain . no significant bleeding on coumadin and plavix . Given his age , multiple co-morbidities and high risk features , the regimen of choice is combination of venetoclax and obinotuzumab . \par \par 08/22/2019 Patient returns for follow up after starting on obinotuzumab , he had mild reaction during the split first dose ( nausea ) . He tolerated day 8 and day 15 very well ( also given in split dose ) . He lost 10 lbs despite good po intake . He denies fever . he continues with mild back pain . \par \par 11/19/2019 patient returns for follow up , he continues on venetoclax/obino and feels well except for persistent back pain , MRI done elsewhere  was allegedly normal , spleen is no longer palpable , Hb is 13 , wbc is normal except for lymphopenia , platelets  : 111 .  He reports occasional episodes of abdominal cramping with nausea. no weight loss. \par \par 12/16/19: Pt returns for a f/u visit. He has been c/o nausea with decreased appetite. However, he has not been taking antiemetics as previously prescribed. Has lost 11 lbs in the last month. Also had a recent TIA manifested as aphasia that lasted for about 45 mins. He was treated in an ED in [place]. CTH was unremarkable. Echo showed EF 45-50%. No clots seen. US carotids showed plaques but no significant stenosis. He is being seen by neurology in [place] and will have MRI/MRA. He was already on plavix and coumadin before having TIA. His INR at the time of ER visit was 4.3. \par \par 01/13/2020\par Patient returns for a follow up visit. This will be his last cycle of obinutuzumab ( #6). He is on venetoclax 300 mg. It was dose reduced on 12/16 as he was complaining of nausea and abdominal pain. He also has chronic back pain. He remains on coumadin and plavix. \par He is tolerating obinutuzumab with no reactions. \par \par 2/6/2020: TERRELL ATKINSON is a 79 year old male here today for sick visit. He has CLL and was treated last with obinutuzumab last dose on 1/16/2020. Venetoclax was stopped due to adverse effects , He states that he developed a fever of 101 degrees yesterday associated with chills and a mild cough with hemoptysis. He continues to spike fever today. He is taking Tylenol for symptom relief. He denies SOB and chest pain at this time. \par \par 06/19/2020 Today's virtual encounter was done via telehealth given the situation surrounding Covid 19 outbreak . He is staying currently in [place] , he had recently a virtual visit with his cardiologist as well . He has good and bad days , he continues with left sided abdominal pain , he has allegedly lost more weight ( 25 lbs in total since last year ) . He denies fever or night sweats , he was last treated for a respiratory infection in February and his wife is wondering if he had contracted Covid 19 ( not tested , chest xray was negative ) . Blood work from April showed normal Hb , wbc and platelet improved compared to 1/2020 . He continues on coumadin and denies any abnormal bleeding or symptoms suggestive of cardioembolic disorder. \par \par 01/18/2021 Patient returns for follow p for CLL s/p venetoclax/obinotuzumab , treatment truncated due to several factors including Covid 19 pandemics. His weight is stable after losing more than 20 lbs , he continues to complain of mild abdominal pain after meals. no fever or night sweats . his last CBC shows normal wbc with lymphopenia , Hb 12.7 , platelets : 102  CT abdomen in 9/2020 was negative for adenopathy and splenomegaly .\par His main problem is currently urinary retention due to BPH , not surgical candidate due to bleeding risk . He is scheduled for embolization in [facility] tomorrow , he stopped plavix and coumadin , he continues on ASA and bridging with lovenox. \par \par 03/22/2021 Patient returns for for follow up for CLL in remission , currently on watchful waiting . He had right prostatic embolization with slight improvement in voiding symptoms , he is scheduled for left side followed by light therapy . He denies any new symptoms except inability to gain weight . \par

## 2021-03-24 ENCOUNTER — OUTPATIENT (OUTPATIENT)
Dept: OUTPATIENT SERVICES | Facility: HOSPITAL | Age: 81
LOS: 1 days | Discharge: HOME | End: 2021-03-24

## 2021-03-24 ENCOUNTER — APPOINTMENT (OUTPATIENT)
Dept: MEDICATION MANAGEMENT | Facility: CLINIC | Age: 81
End: 2021-03-24

## 2021-03-24 VITALS — RESPIRATION RATE: 16 BRPM | OXYGEN SATURATION: 98 % | HEART RATE: 56 BPM

## 2021-03-24 DIAGNOSIS — I48.91 UNSPECIFIED ATRIAL FIBRILLATION: ICD-10-CM

## 2021-03-24 DIAGNOSIS — Z95.2 PRESENCE OF PROSTHETIC HEART VALVE: Chronic | ICD-10-CM

## 2021-03-24 DIAGNOSIS — Z79.01 LONG TERM (CURRENT) USE OF ANTICOAGULANTS: ICD-10-CM

## 2021-03-24 LAB
INR PPP: 2.8 RATIO
POCT-PROTHROMBIN TIME: 33.1 SECS
QUALITY CONTROL: YES

## 2021-03-25 ENCOUNTER — APPOINTMENT (OUTPATIENT)
Dept: CARDIOLOGY | Facility: CLINIC | Age: 81
End: 2021-03-25
Payer: MEDICARE

## 2021-03-25 VITALS
TEMPERATURE: 97 F | BODY MASS INDEX: 19.9 KG/M2 | HEART RATE: 57 BPM | SYSTOLIC BLOOD PRESSURE: 118 MMHG | WEIGHT: 139 LBS | HEIGHT: 70 IN | DIASTOLIC BLOOD PRESSURE: 70 MMHG

## 2021-03-25 PROCEDURE — 99214 OFFICE O/P EST MOD 30 MIN: CPT

## 2021-03-25 PROCEDURE — 93000 ELECTROCARDIOGRAM COMPLETE: CPT

## 2021-03-25 RX ORDER — DEXAMETHASONE 4 MG/1
4 TABLET ORAL
Refills: 0 | Status: DISCONTINUED | COMMUNITY
End: 2021-03-25

## 2021-03-25 RX ORDER — DRONABINOL 2.5 MG/1
2.5 CAPSULE ORAL TWICE DAILY
Qty: 60 | Refills: 0 | Status: DISCONTINUED | COMMUNITY
Start: 2020-08-17 | End: 2021-03-25

## 2021-03-25 NOTE — HISTORY OF PRESENT ILLNESS
[FreeTextEntry1] : The patient did have arterial embolization of the prostatic artery  He is going for another embolization procedure . He had tolerated the procedure well. No SOB No chest pain .

## 2021-03-25 NOTE — ASSESSMENT
[FreeTextEntry1] : The patient  has had urological issues . The patient had attempts at prostatic artery embolization . This was not successful initially . He had an embolization procedure at [facility] and is now going for his second procedure .  He now has a suprapubic tube . He is going to a urologist at [facility] .  The patient has a known mechanical AV and has had a TIA in the past . This makes interruption of A/ C more difficult and the patient will need bridging with LMWH . This will be handled by the Coumadin center. The patient has a remote stent in his LAD . Would continue ASA unless there is an unacceptable bleeding risk

## 2021-03-31 ENCOUNTER — APPOINTMENT (OUTPATIENT)
Dept: MEDICATION MANAGEMENT | Facility: CLINIC | Age: 81
End: 2021-03-31

## 2021-03-31 ENCOUNTER — OUTPATIENT (OUTPATIENT)
Dept: OUTPATIENT SERVICES | Facility: HOSPITAL | Age: 81
LOS: 1 days | Discharge: HOME | End: 2021-03-31

## 2021-03-31 VITALS — OXYGEN SATURATION: 99 % | HEART RATE: 69 BPM | RESPIRATION RATE: 16 BRPM

## 2021-03-31 DIAGNOSIS — I48.91 UNSPECIFIED ATRIAL FIBRILLATION: ICD-10-CM

## 2021-03-31 DIAGNOSIS — Z95.2 PRESENCE OF PROSTHETIC HEART VALVE: Chronic | ICD-10-CM

## 2021-03-31 DIAGNOSIS — Z79.01 LONG TERM (CURRENT) USE OF ANTICOAGULANTS: ICD-10-CM

## 2021-03-31 LAB
INR PPP: 1.9 RATIO
POCT-PROTHROMBIN TIME: 22.7 SECS
QUALITY CONTROL: YES

## 2021-04-06 ENCOUNTER — APPOINTMENT (OUTPATIENT)
Dept: MEDICATION MANAGEMENT | Facility: CLINIC | Age: 81
End: 2021-04-06

## 2021-04-06 ENCOUNTER — OUTPATIENT (OUTPATIENT)
Dept: OUTPATIENT SERVICES | Facility: HOSPITAL | Age: 81
LOS: 1 days | Discharge: HOME | End: 2021-04-06

## 2021-04-06 VITALS — OXYGEN SATURATION: 98 % | HEART RATE: 68 BPM | RESPIRATION RATE: 16 BRPM

## 2021-04-06 DIAGNOSIS — Z95.2 PRESENCE OF PROSTHETIC HEART VALVE: Chronic | ICD-10-CM

## 2021-04-06 DIAGNOSIS — Z79.01 LONG TERM (CURRENT) USE OF ANTICOAGULANTS: ICD-10-CM

## 2021-04-06 DIAGNOSIS — I48.91 UNSPECIFIED ATRIAL FIBRILLATION: ICD-10-CM

## 2021-04-06 LAB
INR PPP: 2.3 RATIO
POCT-PROTHROMBIN TIME: 27.6 SECS
QUALITY CONTROL: YES

## 2021-04-08 RX ORDER — ENOXAPARIN SODIUM 100 MG/ML
60 INJECTION SUBCUTANEOUS
Qty: 10 | Refills: 1 | Status: DISCONTINUED | COMMUNITY
Start: 2021-03-10 | End: 2021-04-08

## 2021-04-09 NOTE — ED ADULT NURSE NOTE - PAIN: PRESENCE, MLM

## 2021-04-14 ENCOUNTER — OUTPATIENT (OUTPATIENT)
Dept: OUTPATIENT SERVICES | Facility: HOSPITAL | Age: 81
LOS: 1 days | Discharge: HOME | End: 2021-04-14

## 2021-04-14 ENCOUNTER — APPOINTMENT (OUTPATIENT)
Dept: MEDICATION MANAGEMENT | Facility: CLINIC | Age: 81
End: 2021-04-14

## 2021-04-14 VITALS — HEART RATE: 55 BPM | OXYGEN SATURATION: 99 % | RESPIRATION RATE: 16 BRPM

## 2021-04-14 DIAGNOSIS — Z95.2 PRESENCE OF PROSTHETIC HEART VALVE: Chronic | ICD-10-CM

## 2021-04-14 DIAGNOSIS — Z79.01 LONG TERM (CURRENT) USE OF ANTICOAGULANTS: ICD-10-CM

## 2021-04-14 DIAGNOSIS — I48.91 UNSPECIFIED ATRIAL FIBRILLATION: ICD-10-CM

## 2021-04-14 LAB
INR PPP: 2.1 RATIO
POCT-PROTHROMBIN TIME: 24.7 SECS
QUALITY CONTROL: YES

## 2021-04-22 ENCOUNTER — OUTPATIENT (OUTPATIENT)
Dept: OUTPATIENT SERVICES | Facility: HOSPITAL | Age: 81
LOS: 1 days | Discharge: HOME | End: 2021-04-22

## 2021-04-22 ENCOUNTER — APPOINTMENT (OUTPATIENT)
Dept: MEDICATION MANAGEMENT | Facility: CLINIC | Age: 81
End: 2021-04-22

## 2021-04-22 ENCOUNTER — LABORATORY RESULT (OUTPATIENT)
Age: 81
End: 2021-04-22

## 2021-04-22 ENCOUNTER — APPOINTMENT (OUTPATIENT)
Dept: HEMATOLOGY ONCOLOGY | Facility: CLINIC | Age: 81
End: 2021-04-22

## 2021-04-22 VITALS — RESPIRATION RATE: 16 BRPM

## 2021-04-22 DIAGNOSIS — C91.10 CHRONIC LYMPHOCYTIC LEUKEMIA OF B-CELL TYPE NOT HAVING ACHIEVED REMISSION: ICD-10-CM

## 2021-04-22 DIAGNOSIS — Z95.2 PRESENCE OF PROSTHETIC HEART VALVE: Chronic | ICD-10-CM

## 2021-04-22 DIAGNOSIS — Z79.01 LONG TERM (CURRENT) USE OF ANTICOAGULANTS: ICD-10-CM

## 2021-04-22 DIAGNOSIS — I48.91 UNSPECIFIED ATRIAL FIBRILLATION: ICD-10-CM

## 2021-04-22 LAB
HCT VFR BLD CALC: 36.8 %
HGB BLD-MCNC: 12.6 G/DL
INR PPP: 2.1 RATIO
MCHC RBC-ENTMCNC: 34 PG
MCHC RBC-ENTMCNC: 34.2 G/DL
MCV RBC AUTO: 99.2 FL
PLATELET # BLD AUTO: 120 K/UL
PMV BLD: 9.6 FL
POCT-PROTHROMBIN TIME: 25.6 SECS
QUALITY CONTROL: YES
RBC # BLD: 3.71 M/UL
RBC # FLD: 14.3 %
WBC # FLD AUTO: 5.58 K/UL

## 2021-04-25 ENCOUNTER — RX RENEWAL (OUTPATIENT)
Age: 81
End: 2021-04-25

## 2021-05-01 ENCOUNTER — EMERGENCY (EMERGENCY)
Facility: HOSPITAL | Age: 81
LOS: 0 days | Discharge: HOME | End: 2021-05-01
Attending: EMERGENCY MEDICINE | Admitting: EMERGENCY MEDICINE
Payer: MEDICARE

## 2021-05-01 VITALS
DIASTOLIC BLOOD PRESSURE: 64 MMHG | HEART RATE: 80 BPM | WEIGHT: 130.95 LBS | HEIGHT: 70 IN | OXYGEN SATURATION: 98 % | RESPIRATION RATE: 18 BRPM | TEMPERATURE: 97 F | SYSTOLIC BLOOD PRESSURE: 117 MMHG

## 2021-05-01 VITALS
RESPIRATION RATE: 18 BRPM | SYSTOLIC BLOOD PRESSURE: 141 MMHG | OXYGEN SATURATION: 97 % | DIASTOLIC BLOOD PRESSURE: 66 MMHG | TEMPERATURE: 97 F | HEART RATE: 72 BPM
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
## 2021-05-01 DIAGNOSIS — I48.91 UNSPECIFIED ATRIAL FIBRILLATION: ICD-10-CM

## 2021-05-01 DIAGNOSIS — Z95.2 PRESENCE OF PROSTHETIC HEART VALVE: Chronic | ICD-10-CM

## 2021-05-01 DIAGNOSIS — I25.10 ATHEROSCLEROTIC HEART DISEASE OF NATIVE CORONARY ARTERY WITHOUT ANGINA PECTORIS: ICD-10-CM

## 2021-05-01 DIAGNOSIS — T83.098A OTHER MECHANICAL COMPLICATION OF OTHER URINARY CATHETER, INITIAL ENCOUNTER: ICD-10-CM

## 2021-05-01 DIAGNOSIS — N40.0 BENIGN PROSTATIC HYPERPLASIA WITHOUT LOWER URINARY TRACT SYMPTOMS: ICD-10-CM

## 2021-05-01 DIAGNOSIS — N39.0 URINARY TRACT INFECTION, SITE NOT SPECIFIED: ICD-10-CM
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
## 2021-05-01 DIAGNOSIS — Z95.4 PRESENCE OF OTHER HEART-VALVE REPLACEMENT: ICD-10-CM

## 2021-05-01 DIAGNOSIS — Z79.01 LONG TERM (CURRENT) USE OF ANTICOAGULANTS: ICD-10-CM

## 2021-05-01 DIAGNOSIS — Z79.899 OTHER LONG TERM (CURRENT) DRUG THERAPY: ICD-10-CM

## 2021-05-01 LAB
APPEARANCE UR: ABNORMAL
BACTERIA # UR AUTO: NEGATIVE — SIGNIFICANT CHANGE UP
BILIRUB UR-MCNC: ABNORMAL
COLOR SPEC: ABNORMAL
DIFF PNL FLD: ABNORMAL
EPI CELLS # UR: 2 /HPF — SIGNIFICANT CHANGE UP (ref 0–5)
GLUCOSE UR QL: NEGATIVE — SIGNIFICANT CHANGE UP
HYALINE CASTS # UR AUTO: >145 /LPF — HIGH (ref 0–7)
KETONES UR-MCNC: ABNORMAL
LEUKOCYTE ESTERASE UR-ACNC: ABNORMAL
NITRITE UR-MCNC: NEGATIVE — SIGNIFICANT CHANGE UP
PH UR: 5 — SIGNIFICANT CHANGE UP (ref 5–8)
PROT UR-MCNC: ABNORMAL
RBC CASTS # UR COMP ASSIST: >720 /HPF — HIGH (ref 0–4)
SP GR SPEC: 1.04 — HIGH (ref 1.01–1.03)
UROBILINOGEN FLD QL: SIGNIFICANT CHANGE UP
WBC UR QL: 18 /HPF — HIGH (ref 0–5)

## 2021-05-01 PROCEDURE — 99283 EMERGENCY DEPT VISIT LOW MDM: CPT

## 2021-05-01 RX ORDER — CEFPODOXIME PROXETIL 100 MG
1 TABLET ORAL
Qty: 20 | Refills: 0
Start: 2021-05-01 | End: 2021-05-10

## 2021-05-01 NOTE — ED PROVIDER NOTE - PHYSICAL EXAMINATION
CONSTITUTIONAL: Well-appearing; well-nourished; in no apparent distress.   EYES: PERRL; EOM intact.   ENT: normal nose; no rhinorrhea; normal pharynx with no tonsillar hypertrophy.   NECK: Supple; non-tender; no cervical lymphadenopathy.   CARDIOVASCULAR: Normal S1, S2; no murmurs, rubs, or gallops.   RESPIRATORY: Normal chest excursion with respiration; breath sounds clear and equal bilaterally; no wheezes, rhonchi, or rales.  GI/: Normal bowel sounds; non-distended; non-tender; no palpable organomegaly. No cva ttp  MS: No evidence of trauma or deformity. Normal ROM in all four extremities; non-tender to palpation; distal pulses are normal.   SKIN: Normal for age and race; warm; dry; good turgor; no apparent lesions or exudate.   NEURO/PSYCH: A & O x 4; grossly unremarkable. mood and manner are appropriate.

## 2021-05-01 NOTE — ED PROVIDER NOTE - CARE PLAN
Principal Discharge DX:	Hernandez catheter problem  Secondary Diagnosis:	UTI (urinary tract infection)

## 2021-05-01 NOTE — ED PROVIDER NOTE - OBJECTIVE STATEMENT
81 year old M with hx BPH s/p prostate artery embolization, a fib on coumadin, aortic valve stenosis presenting with cisneros catheter complications. Pt sts cisneros was placed by urology x 2 weeks ago and since yesterday urine has been "leaking" around cisneros catheter. Pt otherwise denies any lower abdominal pain, urinary retention, dysuria, fever/chills, nausea, vomiting.

## 2021-05-01 NOTE — ED ADULT TRIAGE NOTE - CHIEF COMPLAINT QUOTE
"I have a catheter and the pee is coming around the sides. I had it placed 2 weeks ago. this started happening 2 days ago. theres also blood"

## 2021-05-01 NOTE — ED PROVIDER NOTE - PATIENT PORTAL LINK FT
You can access the FollowMyHealth Patient Portal offered by [facility] by registering at the following website: [email or web address]. By joining [facility]’s FollowMyHealth portal, you will also be able to view your health information using other applications (apps) compatible with our system.

## 2021-05-01 NOTE — ED ADULT NURSE REASSESSMENT NOTE - NS ED NURSE REASSESS COMMENT FT1
Pt from home with Hernandez catheter complication , denies no pain no SOB ,dark  bloody urine noted MD aware , Hernandez  catheter is taken uot and replaced tolerated well no residual noted UA and urine cultures send to the lab MD aware , pt remain comfortable ambulate steady ,teaching done for catheter care verbalize understanding of instruction given ,ready to be D/C home with the family NAD noted

## 2021-05-01 NOTE — ED PROVIDER NOTE - ATTENDING CONTRIBUTION TO CARE
81 year old male, pmhx as documented presenting with cisneros complications. States it was placed by urology 2 weeks ago and since then he has felt leaking around the catheter site. Otherwise denies any pain and denies fevers, abdominal pain, N/V, hematuria or any other symptoms.    Vital Signs: I have reviewed the initial vital signs.  Constitutional: NAD, well-nourished, appears stated age, no acute distress.  HEENT: Airway patent, moist MM, no erythema/swelling/deformity of oral structures. EOMI, PERRLA.  CV: regular rate, regular rhythm, well-perfused extremities, 2+ b/l DP and radial pulses equal.  Lungs: BCTA, no increased WOB.  ABD: NTND, no guarding or rebound, no pulsatile mass, no hernias.   : (+) cisneros in place, no leakage visualized, no hematuria, draining urine  MSK: Neck supple, nontender, nl ROM, no stepoff. Chest nontender. Back nontender in TLS spine or to b/l bony structures or flanks. Ext nontender, nl rom, no deformity.   INTEG: Skin warm, dry, no rash.  NEURO: A&Ox3, normal strength, nl sensation throughout, normal speech.   PSYCH: Calm, cooperative, normal affect and interaction.    Will replace cisneros, obtain UA, urine cx, re-eval.

## 2021-05-01 NOTE — ED ADULT NURSE NOTE - OBJECTIVE STATEMENT
Pt with C.O  dark bloody urine from yesterday in the Hernandez catheter placed by Urology 2 weeks ago and  urine coming  from the around the Hernandez catheter exit and Hernandez bag at same tome for 2 days .  HX hernia

## 2021-05-01 NOTE — ED PROVIDER NOTE - NSFOLLOWUPINSTRUCTIONS_ED_ALL_ED_FT
Urinary Tract Infection    A urinary tract infection (UTI) is an infection of any part of the urinary tract, which includes the kidneys, ureters, bladder, and urethra. Risk factors include ignoring your need to urinate, wiping back to front if female, being an uncircumcised male, and having diabetes or a weak immune system. Symptoms include frequent urination, pain or burning with urination, foul smelling urine, cloudy urine, pain in the lower abdomen, blood in the urine, and fever. If you were prescribed an antibiotic medicine, take it as told by your health care provider. Do not stop taking the antibiotic even if you start to feel better.    SEEK IMMEDIATE MEDICAL CARE IF YOU HAVE ANY OF THE FOLLOWING SYMPTOMS: severe back or abdominal pain, fever, inability to keep fluids or medicine down, dizziness/lightheadedness, or a change in mental status.    Please follow up with your urologist within one week.

## 2021-05-01 NOTE — ED PROVIDER NOTE - CLINICAL SUMMARY MEDICAL DECISION MAKING FREE TEXT BOX
Patient presented with leaking around cisneros site. otherwise afebrile, HD stable, abd non-tender, well appearing.  exam showed cisneros in place but no other abnormalities noted. Cisneros replaced without complication in ED and no further leakage visualized. UA obtained and showed (+) possible UTI, urine cx sent. Given the above, will discharge home with outpatient follow up. Patient agreeable with plan. Agrees to return to ED for any new or worsening symptoms.

## 2021-05-01 NOTE — ED PROVIDER NOTE - NS ED ROS FT
Constitutional: no fever, chills, no recent weight loss, change in appetite or malaise  Eyes: no redness/discharge/pain/vision changes  ENT: no rhinorrhea/ear pain/sore throat  Cardiac: No chest pain, SOB or edema.  Respiratory: No cough or respiratory distress  GI: No nausea, vomiting, diarrhea or abdominal pain.  : + urine leaking around cisneros catheter. No urinary retention  MS: no pain to back or extremities, no loss of ROM, no weakness  Neuro: No headache or weakness. No LOC.  Skin: No skin rash.  Endocrine: No history of thyroid disease or diabetes.  Except as documented in the HPI, all other systems are negative.

## 2021-05-02 LAB
CULTURE RESULTS: SIGNIFICANT CHANGE UP
SPECIMEN SOURCE: SIGNIFICANT CHANGE UP

## 2021-05-05 ENCOUNTER — APPOINTMENT (OUTPATIENT)
Dept: MEDICATION MANAGEMENT | Facility: CLINIC | Age: 81
End: 2021-05-05

## 2021-05-05 ENCOUNTER — OUTPATIENT (OUTPATIENT)
Dept: OUTPATIENT SERVICES | Facility: HOSPITAL | Age: 81
LOS: 1 days | Discharge: HOME | End: 2021-05-05

## 2021-05-05 ENCOUNTER — EMERGENCY (EMERGENCY)
Facility: HOSPITAL | Age: 81
LOS: 0 days | Discharge: HOME | End: 2021-05-05
Attending: EMERGENCY MEDICINE | Admitting: EMERGENCY MEDICINE
Payer: MEDICARE

## 2021-05-05 VITALS
OXYGEN SATURATION: 99 % | HEIGHT: 70 IN | TEMPERATURE: 99 F | SYSTOLIC BLOOD PRESSURE: 134 MMHG | HEART RATE: 65 BPM | WEIGHT: 134.92 LBS | RESPIRATION RATE: 18 BRPM | DIASTOLIC BLOOD PRESSURE: 70 MMHG

## 2021-05-05 VITALS
RESPIRATION RATE: 18 BRPM | HEART RATE: 74 BPM | SYSTOLIC BLOOD PRESSURE: 147 MMHG | OXYGEN SATURATION: 99 % | DIASTOLIC BLOOD PRESSURE: 68 MMHG | TEMPERATURE: 98 F

## 2021-05-05 VITALS — RESPIRATION RATE: 16 BRPM

## 2021-05-05 DIAGNOSIS — R10.9 UNSPECIFIED ABDOMINAL PAIN: ICD-10-CM

## 2021-05-05 DIAGNOSIS — Z87.19 PERSONAL HISTORY OF OTHER DISEASES OF THE DIGESTIVE SYSTEM: ICD-10-CM

## 2021-05-05 DIAGNOSIS — Z79.899 OTHER LONG TERM (CURRENT) DRUG THERAPY: ICD-10-CM

## 2021-05-05 DIAGNOSIS — Z79.01 LONG TERM (CURRENT) USE OF ANTICOAGULANTS: ICD-10-CM

## 2021-05-05 DIAGNOSIS — I48.91 UNSPECIFIED ATRIAL FIBRILLATION: ICD-10-CM

## 2021-05-05 DIAGNOSIS — I25.10 ATHEROSCLEROTIC HEART DISEASE OF NATIVE CORONARY ARTERY WITHOUT ANGINA PECTORIS: ICD-10-CM

## 2021-05-05 DIAGNOSIS — Z95.2 PRESENCE OF PROSTHETIC HEART VALVE: Chronic | ICD-10-CM

## 2021-05-05 DIAGNOSIS — Z79.02 LONG TERM (CURRENT) USE OF ANTITHROMBOTICS/ANTIPLATELETS: ICD-10-CM

## 2021-05-05 DIAGNOSIS — K59.00 CONSTIPATION, UNSPECIFIED: ICD-10-CM

## 2021-05-05 DIAGNOSIS — Z87.438 PERSONAL HISTORY OF OTHER DISEASES OF MALE GENITAL ORGANS: ICD-10-CM

## 2021-05-05 DIAGNOSIS — Z85.72 PERSONAL HISTORY OF NON-HODGKIN LYMPHOMAS: ICD-10-CM

## 2021-05-05 DIAGNOSIS — K40.90 UNILATERAL INGUINAL HERNIA, WITHOUT OBSTRUCTION OR GANGRENE, NOT SPECIFIED AS RECURRENT: ICD-10-CM

## 2021-05-05 LAB
ALBUMIN SERPL ELPH-MCNC: 3.6 G/DL — SIGNIFICANT CHANGE UP (ref 3.5–5.2)
ALP SERPL-CCNC: 55 U/L — SIGNIFICANT CHANGE UP (ref 30–115)
ALT FLD-CCNC: 5 U/L — SIGNIFICANT CHANGE UP (ref 0–41)
ANION GAP SERPL CALC-SCNC: 7 MMOL/L — SIGNIFICANT CHANGE UP (ref 7–14)
ANISOCYTOSIS BLD QL: SLIGHT — SIGNIFICANT CHANGE UP
APTT BLD: 43.5 SEC — HIGH (ref 27–39.2)
AST SERPL-CCNC: 14 U/L — SIGNIFICANT CHANGE UP (ref 0–41)
BASOPHILS # BLD AUTO: 0.06 K/UL — SIGNIFICANT CHANGE UP (ref 0–0.2)
BASOPHILS NFR BLD AUTO: 1.8 % — HIGH (ref 0–1)
BILIRUB SERPL-MCNC: 0.5 MG/DL — SIGNIFICANT CHANGE UP (ref 0.2–1.2)
BLD GP AB SCN SERPL QL: SIGNIFICANT CHANGE UP
BUN SERPL-MCNC: 14 MG/DL — SIGNIFICANT CHANGE UP (ref 10–20)
CALCIUM SERPL-MCNC: 9.1 MG/DL — SIGNIFICANT CHANGE UP (ref 8.5–10.1)
CHLORIDE SERPL-SCNC: 102 MMOL/L — SIGNIFICANT CHANGE UP (ref 98–110)
CO2 SERPL-SCNC: 29 MMOL/L — SIGNIFICANT CHANGE UP (ref 17–32)
CREAT SERPL-MCNC: 0.9 MG/DL — SIGNIFICANT CHANGE UP (ref 0.7–1.5)
EOSINOPHIL # BLD AUTO: 0.09 K/UL — SIGNIFICANT CHANGE UP (ref 0–0.7)
EOSINOPHIL NFR BLD AUTO: 2.6 % — SIGNIFICANT CHANGE UP (ref 0–8)
GLUCOSE SERPL-MCNC: 101 MG/DL — HIGH (ref 70–99)
HCT VFR BLD CALC: 30.5 % — LOW (ref 42–52)
HGB BLD-MCNC: 10.6 G/DL — LOW (ref 14–18)
INR BLD: 2.92 RATIO — HIGH (ref 0.65–1.3)
INR PPP: 2.8 RATIO
LACTATE SERPL-SCNC: 1.2 MMOL/L — SIGNIFICANT CHANGE UP (ref 0.7–2)
LIDOCAIN IGE QN: 32 U/L — SIGNIFICANT CHANGE UP (ref 7–60)
LYMPHOCYTES # BLD AUTO: 0.62 K/UL — LOW (ref 1.2–3.4)
LYMPHOCYTES # BLD AUTO: 19.1 % — LOW (ref 20.5–51.1)
MAGNESIUM SERPL-MCNC: 1.6 MG/DL — LOW (ref 1.8–2.4)
MANUAL SMEAR VERIFICATION: SIGNIFICANT CHANGE UP
MCHC RBC-ENTMCNC: 33.2 PG — HIGH (ref 27–31)
MCHC RBC-ENTMCNC: 34.8 G/DL — SIGNIFICANT CHANGE UP (ref 32–37)
MCV RBC AUTO: 95.6 FL — HIGH (ref 80–94)
MICROCYTES BLD QL: SLIGHT — SIGNIFICANT CHANGE UP
MONOCYTES # BLD AUTO: 0.45 K/UL — SIGNIFICANT CHANGE UP (ref 0.1–0.6)
MONOCYTES NFR BLD AUTO: 13.9 % — HIGH (ref 1.7–9.3)
NEUTROPHILS # BLD AUTO: 2.05 K/UL — SIGNIFICANT CHANGE UP (ref 1.4–6.5)
NEUTROPHILS NFR BLD AUTO: 62.6 % — SIGNIFICANT CHANGE UP (ref 42.2–75.2)
OVALOCYTES BLD QL SMEAR: SLIGHT — SIGNIFICANT CHANGE UP
PLAT MORPH BLD: NORMAL — SIGNIFICANT CHANGE UP
PLATELET # BLD AUTO: 129 K/UL — LOW (ref 130–400)
POCT-PROTHROMBIN TIME: 33.2 SECS
POIKILOCYTOSIS BLD QL AUTO: SLIGHT — SIGNIFICANT CHANGE UP
POLYCHROMASIA BLD QL SMEAR: SLIGHT — SIGNIFICANT CHANGE UP
POTASSIUM SERPL-MCNC: 5 MMOL/L — SIGNIFICANT CHANGE UP (ref 3.5–5)
POTASSIUM SERPL-SCNC: 5 MMOL/L — SIGNIFICANT CHANGE UP (ref 3.5–5)
PROT SERPL-MCNC: 5.4 G/DL — LOW (ref 6–8)
PROTHROM AB SERPL-ACNC: 33.6 SEC — HIGH (ref 9.95–12.87)
QUALITY CONTROL: YES
RBC # BLD: 3.19 M/UL — LOW (ref 4.7–6.1)
RBC # FLD: 13.8 % — SIGNIFICANT CHANGE UP (ref 11.5–14.5)
RBC BLD AUTO: ABNORMAL
SODIUM SERPL-SCNC: 138 MMOL/L — SIGNIFICANT CHANGE UP (ref 135–146)
WBC # BLD: 3.27 K/UL — LOW (ref 4.8–10.8)
WBC # FLD AUTO: 3.27 K/UL — LOW (ref 4.8–10.8)

## 2021-05-05 PROCEDURE — 99283 EMERGENCY DEPT VISIT LOW MDM: CPT | Mod: GC

## 2021-05-05 PROCEDURE — 74177 CT ABD & PELVIS W/CONTRAST: CPT | Mod: 26,MA

## 2021-05-05 PROCEDURE — 99284 EMERGENCY DEPT VISIT MOD MDM: CPT

## 2021-05-05 RX ORDER — SODIUM CHLORIDE 9 MG/ML
1000 INJECTION INTRAMUSCULAR; INTRAVENOUS; SUBCUTANEOUS ONCE
Refills: 0 | Status: COMPLETED | OUTPATIENT
Start: 2021-05-05 | End: 2021-05-05

## 2021-05-05 RX ORDER — MAGNESIUM SULFATE 500 MG/ML
2 VIAL (ML) INJECTION ONCE
Refills: 0 | Status: COMPLETED | OUTPATIENT
Start: 2021-05-05 | End: 2021-05-05

## 2021-05-05 RX ORDER — IOHEXOL 300 MG/ML
30 INJECTION, SOLUTION INTRAVENOUS ONCE
Refills: 0 | Status: COMPLETED | OUTPATIENT
Start: 2021-05-05 | End: 2021-05-05

## 2021-05-05 RX ADMIN — Medication 50 GRAM(S): at 13:38

## 2021-05-05 RX ADMIN — SODIUM CHLORIDE 1000 MILLILITER(S): 9 INJECTION INTRAMUSCULAR; INTRAVENOUS; SUBCUTANEOUS at 11:08

## 2021-05-05 RX ADMIN — IOHEXOL 30 MILLILITER(S): 300 INJECTION, SOLUTION INTRAVENOUS at 11:08

## 2021-05-05 NOTE — CONSULT NOTE ADULT - ASSESSMENT
81M w/ extensive PMH including afib, CAD s/p PCI, BPH w/ chronic indwelling cisneros, aortic valve stenosis s/p aortic root replacement w/ modified Bentall procedure (3/16/09), and lymphoma s/p chemo/radiation (1996) who presented to the ED with 5-6 days of constipation and R groin pain. Pt has history of R inguinal hernia. CT A/P showed mild free fluid in the pelvis and R inguinal canal. Surgery is consulted for possible intervention.    PLAN:   - no acute surgical intervention/urgent inguinal hernia repair indicated  - pain much improved and almost resolved, now passing flatus  - recommend regular bowel regimen  - has seen general surgery in the past for possible repair, but has many acute medical problems and is not interested in elective surgery currently  - may FU as needed outpatient  - dispo per ED  - d/w pt and Dr. Perea

## 2021-05-05 NOTE — ED PROVIDER NOTE - CARE PROVIDER_API CALL
Shar Ponce)  Surgery  [address]  [place] [address]  Phone: [phone]  Fax: [phone]  Follow Up Time:

## 2021-05-05 NOTE — ED PROVIDER NOTE - NSFOLLOWUPINSTRUCTIONS_ED_ALL_ED_FT
Follow up with PMD and Surgery in 1-2 days.    Hernia    A hernia is when fat or the intestines push through a weak area in the abdominal wall. This can occur around the belly button (umbilical hernia) or where the leg meets the lower abdomen (inguinal hernia). This creates a rounded lump (bulge). Hernias that can be pushed back into the belly are called reducible and those that cannot are called incarcerated. Hernias that are not reducible and lose their blood supply are called strangulated and require emergency surgery. Hernias can be caused or worsened when straining during bowel movements or lifting heavy objects as well as coughing or sneezing. Surgery may be helpful in treating this condition so follow up with a surgeon.    SEEK IMMEDIATE MEDICAL CARE IF YOU HAVE ANY OF THE FOLLOWING SYMPTOMS: worsening abdominal pain, change in color over the hernia, nausea/vomiting, or inability to have a bowel movement or pass gas.

## 2021-05-05 NOTE — CONSULT NOTE ADULT - ATTENDING COMMENTS
right inguinal hernia , not containing bowel with no clinical or radiologic evidence of obstruction   no indication for surgery at this time

## 2021-05-05 NOTE — ED PROVIDER NOTE - NS ED ROS FT
Constitutional: (-) fever  Eyes/ENT: (-) blurry vision, (-) epistaxis  Cardiovascular: (-) chest pain, (-) syncope  Respiratory: (-) cough, (-) shortness of breath  Gastrointestinal: (+) abd pain, (+) constipation, (-) vomiting, (-) diarrhea  : (-) dysuria, (-) hematuria  Musculoskeletal: (-) neck pain, (-) back pain, (-) joint pain  Integumentary: (-) rash, (-) edema  Neurological: (-) headache, (-) altered mental status  Allergic/Immunologic: (-) pruritus

## 2021-05-05 NOTE — ED PROVIDER NOTE - OBJECTIVE STATEMENT
81y M pmh BPH, Afib on coumadin, AVS presents for abd pain. Pt has moderate pressure like abdominal pain localized to R inguinal hernia with associated constipation, states he hasn't had a bm in 2 days, no relieving factors. Denies fever, ha, cp, sob, weakness, numbness, dysuria, hematuria, n/v

## 2021-05-05 NOTE — CONSULT NOTE ADULT - SUBJECTIVE AND OBJECTIVE BOX
TERRELL ATKINSON [id number]  81y Male    HPI:  81M w/ extensive PMH including afib, CAD s/p PCI, BPH w/ chronic indwelling cisneros, aortic valve stenosis s/p aortic root replacement w/ modified Bentall procedure (3/16/09), and lymphoma s/p chemo/radiation (1996) who presented to the ED with 5-6 days of constipation and R groin pain. Pt denies inciting factor. No nausea, vomiting, fever, or chills. Endorses associated obstipation, however during evaluation in ED, pt had started to pass flatus. Pt has known history of this R inguinal hernia (years) and has been seen by Dr. Ponce for possible repair. However, he reports that d/t his multiple comorbidities has been told he is not a candidate for elective repair. Pt reports ongoing BPH and cisneros issues and is not interested in elective hernia repair. In the ED, WBC 3.2, lactate 1.2. CT A/P showed no bowel obstruction, terminal ileitis, and mild free fluid in the pelvis and right inguinal canal without drainable fluid collection. On physical exam, pt's abdomen soft, non-TTP. R inguinal hernia palpable, non-TTP, no overlying skin changes. Pt feels much improved since presentation and is passing flatus.    PAST MEDICAL & SURGICAL HISTORY:  CAD (coronary artery disease) 2004 1 stent  Aortic valve stenosis s/p replacement 2009  Lymphoma 1996, s/p chemo&amp; radiation  BPH (benign prostatic hyperplasia)  Afib  H/O aortic valve replacement    Allergies  No Known Allergies    REVIEW OF SYSTEMS  [X] A ten-point review of systems was otherwise negative except as noted.  [ ] Due to altered mental status/intubation, subjective information were not able to be obtained from the patient. History was obtained, to the extent possible, from review of the chart and collateral sources of information.    Vital Signs Last 24 Hrs  T(C): 37.2 (05 May 2021 10:06), Max: 37.2 (05 May 2021 10:06)  T(F): 98.9 (05 May 2021 10:06), Max: 98.9 (05 May 2021 10:06)  HR: 65 (05 May 2021 10:06) (65 - 65)  BP: 134/70 (05 May 2021 10:06) (134/70 - 134/70)  RR: 18 (05 May 2021 10:06) (18 - 18)  SpO2: 99% (05 May 2021 10:06) (99% - 99%)    PHYSICAL EXAM:  GENERAL: NAD, well-appearing  CHEST/LUNG: Clear to auscultation bilaterally  ABDOMEN: thin, soft, non-TTP, nondistended, small palpable R inguinal hernia (non-TTP, no overlying skin changes), chronic indwelling cisneros   EXTREMITIES: No clubbing, cyanosis, or edema    LABS:                  10.6   3.27  )-----------( 129      ( 05 May 2021 11:10 )             30.5       Auto Neutrophil %: 62.6 % (05-05-21 @ 11:10)    05-05    138  |  102  |  14  ----------------------------<  101<H>  5.0   |  29  |  0.9    Calcium, Total Serum: 9.1 mg/dL (05-05-21 @ 11:10)    LFTs:             5.4  | 0.5  | 14       ------------------[55      ( 05 May 2021 11:10 )  3.6  | x    | 5           Lipase:32     Lactate, Blood: 1.2 mmol/L (05-05-21 @ 11:10)    Coags:     33.60  ----< 2.92    ( 05 May 2021 11:10 )     43.5     RADIOLOGY & ADDITIONAL STUDIES:    < from: CT Abdomen and Pelvis w/ Oral Cont and w/ IV Cont (05.05.21 @ 13:19) >  PERITONEUM/MESENTERY/BOWEL: No bowel obstruction or free air. There is stool throughout the colon. Wall thickening noted within the terminal ileum. Interval development of mild free fluid in the pelvis and right inguinal canal. Air-filled appendix. There are a few cecal diverticula seen on series4 image 244 and 250.    IMPRESSION:  No bowel obstruction. Terminal ileitis. Mild free fluid in the pelvis and right inguinal canal. No drainable fluid collection. No extraluminal oral contrast. No macro perforation.

## 2021-05-05 NOTE — ED PROVIDER NOTE - PHYSICAL EXAMINATION
CONST: NAD  EYES: Sclera and conjunctiva clear.   ENT: No nasal discharge. Oropharynx normal appearing, no erythema or exudates. No abscess or swelling. Uvula midline.   NECK: Non-tender, no meningeal signs. normal ROM. supple   CARD: S1 S2; No jvd  RESP: Equal BS B/L, No wheezes, rhonchi or rales. No distress  GI: Tender irreducible R sided inguinal hernia. Soft, non-distended. no cva tenderness. normal BS  MS: Normal ROM in all extremities. pulses 2 +. no calf tenderness or swelling  SKIN: Warm, dry, no acute rashes. Good turgor  NEURO: A&Ox3, No focal deficits. Strength 5/5 with no sensory deficits.

## 2021-05-05 NOTE — ED PROVIDER NOTE - CLINICAL SUMMARY MEDICAL DECISION MAKING FREE TEXT BOX
82 yo M presented to ED for abdominal pain. CT scan demonstrated Mild free fluid in the pelvis and right inguinal canal. No drainable fluid collection. No extraluminal oral contrast. No macro perforation.  Surgery was consulted and did not recommend surgical intervention at this time. Discussed findings with patient and instructed him if pain returns, he develops fever or chills to return to the ED immediately. Pt understands

## 2021-05-05 NOTE — ED PROVIDER NOTE - ATTENDING CONTRIBUTION TO CARE
80 yo M with PMH of BPH (with chronic cisneros), afib on coumadin presents to ED for R lower abdominal pain. Pt states he has a R inguinal hernia but states that recently he has noticed it is larger in side and unable to be pushed back in. Associated with constipation. Last BM was 2 days ago. Normal flatus. No other concerns- no SOB, CP, nausea, vomiting, fever or chills.     Const: Well nourished, well developed, appears stated age  Eyes: PERRL, no conjunctival injection  HENT:  Neck supple without meningismus   CV: RRR, Warm, well-perfused extremities  RESP: CTA B/L, no tachypnea   GI: soft, R inguinal hernia, mildly tender. No skin changes.   : cisneros in place   MSK: No gross deformities appreciated  Skin: Warm, dry. No rashes  Neuro: Alert, CNs II-XII grossly intact. Sensation and motor function of extremities grossly intact.  Psych: Appropriate mood and affect.    Concern for obstruction. Will do labs and CT scan

## 2021-05-05 NOTE — ED PROVIDER NOTE - PATIENT PORTAL LINK FT
You can access the FollowMyHealth Patient Portal offered by [facility] by registering at the following website: [email or web address]. By joining [employer]’s FollowMyHealth portal, you will also be able to view your health information using other applications (apps) compatible with our system.

## 2021-05-07 DIAGNOSIS — Z02.9 ENCOUNTER FOR ADMINISTRATIVE EXAMINATIONS, UNSPECIFIED: ICD-10-CM

## 2021-05-24 ENCOUNTER — APPOINTMENT (OUTPATIENT)
Dept: MEDICATION MANAGEMENT | Facility: CLINIC | Age: 81
End: 2021-05-24

## 2021-05-24 ENCOUNTER — OUTPATIENT (OUTPATIENT)
Dept: OUTPATIENT SERVICES | Facility: HOSPITAL | Age: 81
LOS: 1 days | Discharge: HOME | End: 2021-05-24

## 2021-05-24 ENCOUNTER — LABORATORY RESULT (OUTPATIENT)
Age: 81
End: 2021-05-24

## 2021-05-24 ENCOUNTER — APPOINTMENT (OUTPATIENT)
Dept: HEMATOLOGY ONCOLOGY | Facility: CLINIC | Age: 81
End: 2021-05-24
Payer: MEDICARE

## 2021-05-24 VITALS
WEIGHT: 138 LBS | HEART RATE: 63 BPM | BODY MASS INDEX: 19.76 KG/M2 | DIASTOLIC BLOOD PRESSURE: 62 MMHG | SYSTOLIC BLOOD PRESSURE: 104 MMHG | TEMPERATURE: 98.7 F | HEIGHT: 70 IN | RESPIRATION RATE: 16 BRPM

## 2021-05-24 VITALS — RESPIRATION RATE: 16 BRPM

## 2021-05-24 DIAGNOSIS — Z95.2 PRESENCE OF PROSTHETIC HEART VALVE: Chronic | ICD-10-CM

## 2021-05-24 DIAGNOSIS — Z79.01 LONG TERM (CURRENT) USE OF ANTICOAGULANTS: ICD-10-CM

## 2021-05-24 DIAGNOSIS — I48.91 UNSPECIFIED ATRIAL FIBRILLATION: ICD-10-CM

## 2021-05-24 LAB
ALBUMIN SERPL ELPH-MCNC: 4.5 G/DL
ALP BLD-CCNC: 57 U/L
ALT SERPL-CCNC: 8 U/L
ANION GAP SERPL CALC-SCNC: 12 MMOL/L
AST SERPL-CCNC: 17 U/L
BILIRUB SERPL-MCNC: 0.4 MG/DL
BUN SERPL-MCNC: 20 MG/DL
CALCIUM SERPL-MCNC: 9.6 MG/DL
CHLORIDE SERPL-SCNC: 107 MMOL/L
CO2 SERPL-SCNC: 24 MMOL/L
CREAT SERPL-MCNC: 1 MG/DL
GLUCOSE SERPL-MCNC: 62 MG/DL
HCT VFR BLD CALC: 35.4 %
HCT VFR BLD CALC: 35.7 %
HGB BLD-MCNC: 11.8 G/DL
HGB BLD-MCNC: 11.9 G/DL
INR PPP: 3.5 RATIO
LDH SERPL-CCNC: 351 U/L
MCHC RBC-ENTMCNC: 33.3 G/DL
MCHC RBC-ENTMCNC: 33.3 G/DL
MCHC RBC-ENTMCNC: 33.7 PG
MCHC RBC-ENTMCNC: 33.8 PG
MCV RBC AUTO: 101.1 FL
MCV RBC AUTO: 101.4 FL
PLATELET # BLD AUTO: 105 K/UL
PLATELET # BLD AUTO: 112 K/UL
PMV BLD: 10 FL
PMV BLD: 9.3 FL
POCT-PROTHROMBIN TIME: 41.8 SECS
POTASSIUM SERPL-SCNC: 5.5 MMOL/L
PROT SERPL-MCNC: 6 G/DL
QUALITY CONTROL: YES
RBC # BLD: 3.49 M/UL
RBC # BLD: 3.53 M/UL
RBC # FLD: 13.7 %
RBC # FLD: 13.8 %
RETICS # AUTO: 0.9 %
RETICS AGGREG/RBC NFR: 31.2 K/UL
SODIUM SERPL-SCNC: 143 MMOL/L
WBC # FLD AUTO: 4.24 K/UL
WBC # FLD AUTO: 4.47 K/UL

## 2021-05-24 PROCEDURE — 99214 OFFICE O/P EST MOD 30 MIN: CPT

## 2021-05-24 NOTE — HISTORY OF PRESENT ILLNESS
[de-identified] : this is a 76-year-old white man with multiple medical problems including coronary artery disease status post angioplasty valvular heart disease status post metallic aortic valve replacement. He is referred for abnormal hemogram noted recently, WBC is 10.6 hemoglobin 14.2 platelets 131 absolute lymphocytes 5268 chemistry is unremarkable. He denies any constitutional symptoms  specifically no fever ,weight loss ,night sweats, fatigue or  pruritus. he feels fantastic with good energy level. 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 He is scheduled for ablation for atrial fibrillation , he continues on coumadin for mechanical valve. He reports minor bruising on coumadin and plavix. He feels slight fatigue . He denies fever , weight loss or night sweats . He is also on androgen deprivation for elevated PSA , , He had previously on surveillance and had multiple prostate biopsies ( unclear if it showed cancer ) . Bone scan is allegedly negative . Last PSA is less than 1 and patient denies obstructive symptoms. \par "\par 01/10/2019 Patient returns for follow up for CLL on watchful waiting , he " feels tired all the time " , He had unsuccessful ablation for atrial fibrillation and was told to increase metoprolol . He denies B symptoms . \par \par 02/07/2019 Patient returns for follow up , he had unsuccessful ablation for paroxysmal atrial fibrillation and was placed on amiodarone , he continues on coumadin without ASA and denies abnormal bleeding , he reports occasional LUQ pain . no  B symptoms.\par \par 05/16/2019 Patient returns for follow up for CLL/SLL he reports few episodes of dizziness associated with nausea lasting for 1 to 2 hours . CT head ( non-contrast ) was negative , he had negative ENT evaluation and had long term event monitor placed 2 weeks ago without recurrence ( also discontinued amiodarone ) , CBC shows slight decrease in Hb and platelets from baseline . He continues on coumadin and plavix and denies abnormal bleeding . He continues with mild LUQ and back pain . \par \par 06/13/2019 Patient returns for follow up for SLL/CLL with anemia, thrombocytopenia and marked splenomegaly ( 19cm ) . He complains of left flank and back pain . he had a trial of epidural anethesia and is scheduled for nerve block/ ablation / epidural injection . he continues on coumadin and denies any abnormal bleeding . \par \par 07/11/2019 Patient returns with persistent LUQ discomfort , back pain despite epidural injections . 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 He is relatively asymptomatic except for mild left flank pain . no significant bleeding on coumadin and plavix . Given his age , multiple co-morbidities and high risk features , the regimen of choice is combination of venetoclax and obinotuzumab . \par \par 08/22/2019 Patient returns for follow up after starting on obinotuzumab , he had mild reaction during the split first dose ( nausea ) . He tolerated day 8 and day 15 very well ( also given in split dose ) . He lost 10 lbs despite good po intake . He denies fever . he continues with mild back pain . \par \par 11/19/2019 patient returns for follow up , he continues on venetoclax/obino and feels well except for persistent back pain , MRI done elsewhere  was allegedly normal , spleen is no longer palpable , Hb is 13 , wbc is normal except for lymphopenia , platelets  : 111 .  He reports occasional episodes of abdominal cramping with nausea. no weight loss. \par \par 12/16/19: Pt returns for a f/u visit. He has been c/o nausea with decreased appetite. However, he has not been taking antiemetics as previously prescribed. Has lost 11 lbs in the last month. Also had a recent TIA manifested as aphasia that lasted for about 45 mins. He was treated in an ED in [place]. CTH was unremarkable. Echo showed EF 45-50%. No clots seen. US carotids showed plaques but no significant stenosis. He is being seen by neurology in [place] and will have MRI/MRA. He was already on plavix and coumadin before having TIA. His INR at the time of ER visit was 4.3. \par \par 01/13/2020\par Patient returns for a follow up visit. This will be his last cycle of obinutuzumab ( #6). He is on venetoclax 300 mg. It was dose reduced on 12/16 as he was complaining of nausea and abdominal pain. He also has chronic back pain. He remains on coumadin and plavix. \par He is tolerating obinutuzumab with no reactions. \par \par 2/6/2020: TERRELL ATKINSON is a 79 year old male here today for sick visit. He has CLL and was treated last with obinutuzumab last dose on 1/16/2020. Venetoclax was stopped due to adverse effects , He states that he developed a fever of 101 degrees yesterday associated with chills and a mild cough with hemoptysis. He continues to spike fever today. He is taking Tylenol for symptom relief. He denies SOB and chest pain at this time. \par \par 06/19/2020 Today's virtual encounter was done via telehealth given the situation surrounding Covid 19 outbreak . He is staying currently in [place] , he had recently a virtual visit with his cardiologist as well . He has good and bad days , he continues with left sided abdominal pain , he has allegedly lost more weight ( 25 lbs in total since last year ) . He denies fever or night sweats , he was last treated for a respiratory infection in February and his wife is wondering if he had contracted Covid 19 ( not tested , chest xray was negative ) . Blood work from April showed normal Hb , wbc and platelet improved compared to 1/2020 . He continues on coumadin and denies any abnormal bleeding or symptoms suggestive of cardioembolic disorder. \par \par 01/18/2021 Patient returns for follow p for CLL s/p venetoclax/obinotuzumab , treatment truncated due to several factors including Covid 19 pandemics. His weight is stable after losing more than 20 lbs , he continues to complain of mild abdominal pain after meals. no fever or night sweats . his last CBC shows normal wbc with lymphopenia , Hb 12.7 , platelets : 102  CT abdomen in 9/2020 was negative for adenopathy and splenomegaly .\par His main problem is currently urinary retention due to BPH , not surgical candidate due to bleeding risk . He is scheduled for embolization in [place] tomorrow , he stopped plavix and coumadin , he continues on ASA and bridging with lovenox. \par \par 03/22/2021 Patient returns for for follow up for CLL in remission , currently on watchful waiting . He had right prostatic embolization with slight improvement in voiding symptoms , he is scheduled for left side followed by light therapy . He denies any new symptoms except inability to gain weight . \par \par 05/24/2021 Patient returns for follow up , he was seen in ED recently for abdominal pain , CT scan showed fluid filled right inguinal hernia , no other abnormality or evidence of obstruction , blood work significant for Hb decreased from baseline . platelets and wbc were stable , he felt better with IV hydration  . he denies fever or weight loss. Main complaint is chronic back pain . \par

## 2021-05-24 NOTE — PHYSICAL EXAM
[Normal] : no peripheral adenopathy appreciated [de-identified] : Pale chronically ill in NAD .  [de-identified] : healed scar  of lymph node biopsy on the left, no residual or  recurrent adenopathy. [de-identified] : prominent aortic valve  click. [de-identified] : spleen no longer palpable . no tenderness. right inguinal hernia, reducible .  [de-identified] : no peripheral adenopathy.

## 2021-05-24 NOTE — ASSESSMENT
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 He completed 6 cycles of Obinituzumab and Venetoclax was dose reduced to 300mg then stopped secondary to persistent side effects, Clinically he had good response with spleen no longer being palpable and good hematological response. He lost around 20 lbs since April , but his weight is stable now. \par 2) CBC is stable with mild pancytopenia . \par s/p episode of abdominal pain ( incarcerated hernia ? now resolved ) \par CT abdomen/pelvis negative in 5/2021 shows no splenomegaly or adenopathy . right inguinal hernia .\par needs follow up with surgery . \par 2) BPH with suprapubic  catheter .\par PLAN: follow up in 3 months . surgical follow up . \par

## 2021-05-25 LAB — FERRITIN SERPL-MCNC: 45 NG/ML

## 2021-06-07 ENCOUNTER — APPOINTMENT (OUTPATIENT)
Dept: MEDICATION MANAGEMENT | Facility: CLINIC | Age: 81
End: 2021-06-07

## 2021-06-07 ENCOUNTER — OUTPATIENT (OUTPATIENT)
Dept: OUTPATIENT SERVICES | Facility: HOSPITAL | Age: 81
LOS: 1 days | Discharge: HOME | End: 2021-06-07

## 2021-06-07 VITALS — OXYGEN SATURATION: 99 % | HEART RATE: 76 BPM | RESPIRATION RATE: 16 BRPM

## 2021-06-07 DIAGNOSIS — Z79.01 LONG TERM (CURRENT) USE OF ANTICOAGULANTS: ICD-10-CM

## 2021-06-07 DIAGNOSIS — I48.91 UNSPECIFIED ATRIAL FIBRILLATION: ICD-10-CM

## 2021-06-07 DIAGNOSIS — Z95.2 PRESENCE OF PROSTHETIC HEART VALVE: Chronic | ICD-10-CM

## 2021-06-07 LAB
INR PPP: 3.2 RATIO
POCT-PROTHROMBIN TIME: 37.9 SECS
QUALITY CONTROL: YES

## 2021-06-14 ENCOUNTER — APPOINTMENT (OUTPATIENT)
Dept: MEDICATION MANAGEMENT | Facility: CLINIC | Age: 81
End: 2021-06-14

## 2021-06-14 ENCOUNTER — OUTPATIENT (OUTPATIENT)
Dept: OUTPATIENT SERVICES | Facility: HOSPITAL | Age: 81
LOS: 1 days | Discharge: HOME | End: 2021-06-14

## 2021-06-14 VITALS — RESPIRATION RATE: 16 BRPM

## 2021-06-14 DIAGNOSIS — I48.91 UNSPECIFIED ATRIAL FIBRILLATION: ICD-10-CM

## 2021-06-14 DIAGNOSIS — Z95.2 PRESENCE OF PROSTHETIC HEART VALVE: Chronic | ICD-10-CM

## 2021-06-14 DIAGNOSIS — Z79.01 LONG TERM (CURRENT) USE OF ANTICOAGULANTS: ICD-10-CM

## 2021-06-14 LAB
INR PPP: 2.1 RATIO
POCT-PROTHROMBIN TIME: 24.9 SECS
QUALITY CONTROL: YES

## 2021-06-22 ENCOUNTER — APPOINTMENT (OUTPATIENT)
Dept: MEDICATION MANAGEMENT | Facility: CLINIC | Age: 81
End: 2021-06-22

## 2021-06-22 ENCOUNTER — OUTPATIENT (OUTPATIENT)
Dept: OUTPATIENT SERVICES | Facility: HOSPITAL | Age: 81
LOS: 1 days | Discharge: HOME | End: 2021-06-22

## 2021-06-22 VITALS — RESPIRATION RATE: 16 BRPM

## 2021-06-22 DIAGNOSIS — I48.91 UNSPECIFIED ATRIAL FIBRILLATION: ICD-10-CM

## 2021-06-22 DIAGNOSIS — Z79.01 LONG TERM (CURRENT) USE OF ANTICOAGULANTS: ICD-10-CM

## 2021-06-22 DIAGNOSIS — Z95.2 PRESENCE OF PROSTHETIC HEART VALVE: Chronic | ICD-10-CM

## 2021-06-22 LAB
INR PPP: 1.6 RATIO
POCT-PROTHROMBIN TIME: 19.3 SECS
QUALITY CONTROL: YES

## 2021-06-22 NOTE — H&P PST ADULT - DOES THIS PATIENT HAVE A HISTORY OF OR HAS BEEN DX WITH HEART FAILURE?
Also, she should NOT take metoprolol or lisinopril. I would like to see her back in the office in 3 months    yes Attending Attestation (For Attendings USE Only)...

## 2021-06-24 NOTE — PATIENT PROFILE ADULT - BRADEN MOISTURE
Please inform patient that I would like her to come for a follow-up for dizziness.  We would like to check her orthostatic blood pressures, that means in sitting standing and lying down positions to evaluate for any drop in blood pressure in these positions.  I should be able to write a prescription for home blood pressure monitor.  She can bring the form    Bisi Matute MD  3/3/2019         (4) rarely moist

## 2021-06-28 ENCOUNTER — OUTPATIENT (OUTPATIENT)
Dept: OUTPATIENT SERVICES | Facility: HOSPITAL | Age: 81
LOS: 1 days | Discharge: HOME | End: 2021-06-28

## 2021-06-28 ENCOUNTER — APPOINTMENT (OUTPATIENT)
Dept: MEDICATION MANAGEMENT | Facility: CLINIC | Age: 81
End: 2021-06-28

## 2021-06-28 VITALS — RESPIRATION RATE: 16 BRPM

## 2021-06-28 DIAGNOSIS — Z79.01 LONG TERM (CURRENT) USE OF ANTICOAGULANTS: ICD-10-CM

## 2021-06-28 DIAGNOSIS — Z95.2 PRESENCE OF PROSTHETIC HEART VALVE: Chronic | ICD-10-CM

## 2021-06-28 DIAGNOSIS — I48.91 UNSPECIFIED ATRIAL FIBRILLATION: ICD-10-CM

## 2021-06-28 LAB
INR PPP: 2.9 RATIO
POCT-PROTHROMBIN TIME: 34.7 SECS
QUALITY CONTROL: YES

## 2021-07-06 ENCOUNTER — APPOINTMENT (OUTPATIENT)
Dept: CARDIOLOGY | Facility: CLINIC | Age: 81
End: 2021-07-06
Payer: MEDICARE

## 2021-07-06 VITALS
TEMPERATURE: 96.6 F | BODY MASS INDEX: 19.33 KG/M2 | SYSTOLIC BLOOD PRESSURE: 108 MMHG | HEART RATE: 60 BPM | DIASTOLIC BLOOD PRESSURE: 80 MMHG | SYSTOLIC BLOOD PRESSURE: 97 MMHG | HEIGHT: 70 IN | DIASTOLIC BLOOD PRESSURE: 60 MMHG | WEIGHT: 135 LBS

## 2021-07-06 DIAGNOSIS — K40.90 UNILATERAL INGUINAL HERNIA, W/OUT OBSTRUCTION OR GANGRENE, NOT SPECIFIED AS RECURRENT: ICD-10-CM

## 2021-07-06 PROCEDURE — 93000 ELECTROCARDIOGRAM COMPLETE: CPT

## 2021-07-06 PROCEDURE — 99214 OFFICE O/P EST MOD 30 MIN: CPT

## 2021-07-06 NOTE — PHYSICAL EXAM
[General Appearance - Well Developed] : well developed [Normal Appearance] : normal appearance [Well Groomed] : well groomed [General Appearance - Well Nourished] : well nourished [No Deformities] : no deformities [General Appearance - In No Acute Distress] : no acute distress [Normal Conjunctiva] : the conjunctiva exhibited no abnormalities [Eyelids - No Xanthelasma] : the eyelids demonstrated no xanthelasmas [Normal Oral Mucosa] : normal oral mucosa [No Oral Pallor] : no oral pallor [No Oral Cyanosis] : no oral cyanosis [Respiration, Rhythm And Depth] : normal respiratory rhythm and effort [Exaggerated Use Of Accessory Muscles For Inspiration] : no accessory muscle use [Abdomen Mass (___ Cm)] : no abdominal mass palpated [Abnormal Walk] : normal gait [Cyanosis, Localized] : no localized cyanosis [Nail Clubbing] : no clubbing of the fingernails [Petechial Hemorrhages (___cm)] : no petechial hemorrhages [Skin Color & Pigmentation] : normal skin color and pigmentation [] : no rash [No Venous Stasis] : no venous stasis [Skin Lesions] : no skin lesions [No Skin Ulcers] : no skin ulcer [No Xanthoma] : no  xanthoma was observed [Normal Rate] : normal [Rhythm Regular] : regular [Prosthetic Aortic Valve] : prosthetic aortic valve heard [II] : a grade 2 [1+] : left 1+ [No Pitting Edema] : no pitting edema present [FreeTextEntry1] : Coarse rales at bases.  [Rt] : no varicose veins of the right leg

## 2021-07-06 NOTE — ASSESSMENT
[FreeTextEntry1] : The patient  has had urological issues . The patient had attempts at prostatic artery embolization . This was not successful initially . He had 2 embolization procedures at [facility] and is now going for Laser prostatectomy .  .  He has a suprapubic tube .   The patient has a known mechanical AV and has had a TIA in the past . This makes interruption of A/ C more difficult and the patient will need bridging with LMWH . This will be handled by the Coumadin center. The patient has a remote stent in his LAD . Would continue ASA unless there is an unacceptable bleeding risk . The patient should be considered an intrmediate to high cardiac risk undergoing a minor to intermediate risk procedure. He does need antibiotic prophylaxis . The patient 's last EF was 46% .

## 2021-07-06 NOTE — HISTORY OF PRESENT ILLNESS
[FreeTextEntry1] : The patient had 2 embolization procedures of the prostate . The patient is going for green light  Laser therapy . The patient has not had chest pain . No increased SOB .

## 2021-07-06 NOTE — REASON FOR VISIT
[Arrhythmia/ECG Abnorrmalities] : arrhythmia/ECG abnormalities [Structural Heart and Valve Disease] : structural heart and valve disease [Follow-Up - Clinic] : a clinic follow-up of [Aortic Stenosis] : aortic stenosis [Coronary Artery Disease] : coronary artery disease [Hyperlipidemia] : hyperlipidemia [Hypertension] : hypertension [FreeTextEntry3] : Dr. Parisi

## 2021-07-07 ENCOUNTER — APPOINTMENT (OUTPATIENT)
Dept: MEDICATION MANAGEMENT | Facility: CLINIC | Age: 81
End: 2021-07-07

## 2021-07-07 ENCOUNTER — OUTPATIENT (OUTPATIENT)
Dept: OUTPATIENT SERVICES | Facility: HOSPITAL | Age: 81
LOS: 1 days | Discharge: HOME | End: 2021-07-07

## 2021-07-07 VITALS — OXYGEN SATURATION: 92 % | HEART RATE: 65 BPM | RESPIRATION RATE: 16 BRPM

## 2021-07-07 DIAGNOSIS — Z79.01 LONG TERM (CURRENT) USE OF ANTICOAGULANTS: ICD-10-CM

## 2021-07-07 DIAGNOSIS — I48.91 UNSPECIFIED ATRIAL FIBRILLATION: ICD-10-CM

## 2021-07-07 DIAGNOSIS — Z95.2 PRESENCE OF PROSTHETIC HEART VALVE: Chronic | ICD-10-CM

## 2021-07-07 LAB
INR PPP: 4 RATIO
POCT-PROTHROMBIN TIME: 47.9 SECS
QUALITY CONTROL: YES

## 2021-07-08 NOTE — REASON FOR VISIT
Medicare Wellness Visit  Plan for Preventive Care    A good way for you to stay healthy is to use preventive care.  Medicare covers many services that can help you stay healthy.* The goal of these services is to find any health problems as quickly as possible. Finding problems early can help make them easier to treat.  Your personal plan below lists the services you may need and when they are due.     Health Maintenance Summary     Medicare Wellness Visit (Yearly)  Overdue - never done    Osteoporosis Screening (Once)  Order placed this encounter    Pneumococcal Vaccine 65+ (1 of 1 - PPSV23)  Overdue - never done    Influenza Vaccine (1)  Next due on 8/1/2021    Depression Screening (Yearly)  Next due on 7/8/2022    Colonoscopy Risk (Every 5 Years)  Next due on 7/14/2022    Breast Cancer Screening (Every 2 Years)  Order placed this encounter    DTaP/Tdap/Td Vaccine (2 - Td or Tdap)  Next due on 8/18/2024    Hepatitis C Screening   Completed    Shingles Vaccine   Completed    COVID-19 Vaccine   Completed    Hepatitis B Vaccine   Aged Out    Meningococcal Vaccine   Aged Out    HPV Vaccine   Aged Out           Preventive Care for Women and Men    Heart Screenings (Cardiovascular):  · Blood tests are used to check your cholesterol, lipid and triglyceride levels. High levels can increase your risk for heart disease and stroke. High levels can be treated with medications, diet and exercise. Lowering your levels can help keep your heart and blood vessels healthy.  Your provider will order these tests if they are needed.    · An ultrasound is done to see if you have an abdominal aortic aneurysm (AAA).  This is an enlargement of one of the main blood vessels that delivers blood to the body.   In the [place], 9,000 deaths are caused by AAA.  You may not even know you have this problem and as many as 1 in 3 people will have a serious problem if it is not treated.  Early diagnosis allows for more effective treatment and  cure.  If you have a family history of AAA or are a male age 65-75 who has smoked, you are at higher risk of an AAA.  Your provider can order this test, if needed.    Colorectal Screening:  · There are many tests that are used to check for cancer of your colon and rectum. You and your provider should discuss what test is best for you and when to have it done.  Options include:  · Screening Colonoscopy: exam of the entire colon, seen through a flexible lighted tube.  · Flexible Sigmoidoscopy: exam of the last third (sigmoid portion) of the colon and rectum, seen through a flexible lighted tube.  · Cologuard DNA stool test: a sample of your stool is used to screen for cancer and unseen blood in your stool.  · Fecal Occult Blood Test: a sample of your stool is studied to find any unseen blood    Flu Shot:  · An immunization that helps to prevent influenza (the flu). You should get this every year. The best time to get the shot is in the fall.    Pneumococcal Shot:  • Vaccines are available that can help prevent pneumococcal disease, which is any type of infection caused by Streptococcus pneumoniae bacteria.   Their use can prevent some cases of pneumonia, meningitis, and sepsis. There are two types of pneumococcal vaccines:   o Conjugate vaccines (PCV-13 or Prevnar 13®) - helps protect against the 13 types of pneumococcal bacteria that are the most common causes of serious infections in children and adults.    o Polysaccharide vaccine (PPSV23 or Bxcyjmteu14®) - helps protect against 23 types of pneumococcal bacteria for patients who are recommended to get it.  These vaccines should be given at least 12 months apart.  A booster is usually not needed.     Hepatitis B Shot:  · An immunization that helps to protect people from getting Hepatitis B. Hepatitis B is a virus that spreads through contact with infected blood or body fluids. Many people with the virus do not have symptoms.  The virus can lead to serious problems,  such as liver disease. Some people are at higher risk than others. Your doctor will tell you if you need this shot.     Diabetes Screening:  · A test to measure sugar (glucose) in your blood is called a fasting blood sugar. Fasting means you cannot have food or drink for at least 8 hours before the test. This test can detect diabetes long before you may notice symptoms.    Glaucoma Screening:  · Glaucoma screening is performed by your eye doctor. The test measures the fluid pressure inside your eyes to determine if you have glaucoma.     Hepatitis C Screening:  · A blood test to see if you have the hepatitis C virus.  Hepatitis C attacks the liver and is a major cause of chronic liver disease.  Medicare will cover a single screening for all adults born between 1945 & 1965, or high risk patients (people who have injected illegal drugs or people who have had blood transfusions).  High risk patients who continue to inject illegal drugs can be screened for Hepatitis C every year.    Smoking and Tobacco-Use Cessation Counseling:  · Tobacco is the single greatest cause of disease and early death in our country today. Medication and counseling together can increase a person’s chance of quitting for good.   · Medicare covers two quitting attempts per year, with four counseling sessions per attempt (eight sessions in a 12 month period)    Preventive Screening tests for Women    Screening Mammograms and Breast Exams:  · An x-ray of your breasts to check for breast cancer before you or your doctor may be able to feel it.  If breast cancer is found early it can usually be treated with success.    Pelvic Exams and Pap Tests:  · An exam to check for cervical and vaginal cancer. A Pap test is a lab test in which cells are taken from your cervix and sent to the lab to look for signs of cervical cancer. If cancer of the cervix is found early, chances for a cure are good. Testing can generally end at age 65, or if a woman has a  hysterectomy for a benign condition. Your provider may recommend more frequent testing if certain abnormal results are found.    Bone Mass Measurements:  · A painless x-ray of your bone density to see if you are at risk for a broken bone. Bone density refers to the thickness of bones or how tightly the bone tissue is packed.    Preventive Screening tests for Men    Prostate Screening:  · Should you have a prostate cancer test (PSA)?  It is up to you to decide if you want a prostate cancer test. Talk to your clinician to find out if the test is right for you.  Things for you to consider and talk about should include:  · Benefits and harms of the test  · Your family history  · How your race/ethnicity may influence the test  · If the test may impact other medical conditions you have  · Your values on screenings and treatments    *Medicare pays for many preventive services to keep you healthy. For some of these services, you might have to pay a deductible, coinsurance, and / or copayment.  The amounts vary depending on the type of services you need and the kind of Medicare health plan you have.    For further details on screenings offered by Medicare please visit: https://www.medicare.gov/coverage/preventive-screening-services    [Follow-Up Visit] : a follow-up

## 2021-07-14 ENCOUNTER — APPOINTMENT (OUTPATIENT)
Dept: MEDICATION MANAGEMENT | Facility: CLINIC | Age: 81
End: 2021-07-14

## 2021-07-14 ENCOUNTER — OUTPATIENT (OUTPATIENT)
Dept: OUTPATIENT SERVICES | Facility: HOSPITAL | Age: 81
LOS: 1 days | Discharge: HOME | End: 2021-07-14

## 2021-07-14 VITALS — RESPIRATION RATE: 16 BRPM

## 2021-07-14 DIAGNOSIS — Z79.01 LONG TERM (CURRENT) USE OF ANTICOAGULANTS: ICD-10-CM

## 2021-07-14 DIAGNOSIS — I48.91 UNSPECIFIED ATRIAL FIBRILLATION: ICD-10-CM

## 2021-07-14 DIAGNOSIS — Z95.2 PRESENCE OF PROSTHETIC HEART VALVE: Chronic | ICD-10-CM

## 2021-07-14 LAB
INR PPP: 1.6 RATIO
POCT-PROTHROMBIN TIME: 19.7 SECS
QUALITY CONTROL: YES

## 2021-07-19 ENCOUNTER — APPOINTMENT (OUTPATIENT)
Dept: MEDICATION MANAGEMENT | Facility: CLINIC | Age: 81
End: 2021-07-19

## 2021-07-19 ENCOUNTER — APPOINTMENT (OUTPATIENT)
Dept: HEMATOLOGY ONCOLOGY | Facility: CLINIC | Age: 81
End: 2021-07-19

## 2021-07-22 ENCOUNTER — OUTPATIENT (OUTPATIENT)
Dept: OUTPATIENT SERVICES | Facility: HOSPITAL | Age: 81
LOS: 1 days | Discharge: HOME | End: 2021-07-22

## 2021-07-22 ENCOUNTER — APPOINTMENT (OUTPATIENT)
Dept: MEDICATION MANAGEMENT | Facility: CLINIC | Age: 81
End: 2021-07-22

## 2021-07-22 DIAGNOSIS — Z79.01 LONG TERM (CURRENT) USE OF ANTICOAGULANTS: ICD-10-CM

## 2021-07-22 DIAGNOSIS — I48.91 UNSPECIFIED ATRIAL FIBRILLATION: ICD-10-CM

## 2021-07-22 DIAGNOSIS — Z95.2 PRESENCE OF PROSTHETIC HEART VALVE: Chronic | ICD-10-CM

## 2021-07-22 LAB
INR PPP: 2 RATIO
POCT-PROTHROMBIN TIME: 24.3 SECS
QUALITY CONTROL: YES

## 2021-07-26 ENCOUNTER — OUTPATIENT (OUTPATIENT)
Dept: OUTPATIENT SERVICES | Facility: HOSPITAL | Age: 81
LOS: 1 days | Discharge: HOME | End: 2021-07-26

## 2021-07-26 ENCOUNTER — APPOINTMENT (OUTPATIENT)
Dept: MEDICATION MANAGEMENT | Facility: CLINIC | Age: 81
End: 2021-07-26

## 2021-07-26 VITALS — RESPIRATION RATE: 16 BRPM

## 2021-07-26 DIAGNOSIS — I48.91 UNSPECIFIED ATRIAL FIBRILLATION: ICD-10-CM

## 2021-07-26 DIAGNOSIS — Z95.2 PRESENCE OF PROSTHETIC HEART VALVE: Chronic | ICD-10-CM

## 2021-07-26 DIAGNOSIS — Z79.01 LONG TERM (CURRENT) USE OF ANTICOAGULANTS: ICD-10-CM

## 2021-07-26 LAB
INR PPP: 2.2 RATIO
POCT-PROTHROMBIN TIME: 27 SECS
QUALITY CONTROL: YES

## 2021-07-29 ENCOUNTER — RX RENEWAL (OUTPATIENT)
Age: 81
End: 2021-07-29

## 2021-08-03 ENCOUNTER — APPOINTMENT (OUTPATIENT)
Dept: MEDICATION MANAGEMENT | Facility: CLINIC | Age: 81
End: 2021-08-03

## 2021-08-03 ENCOUNTER — APPOINTMENT (OUTPATIENT)
Dept: CARDIOLOGY | Facility: CLINIC | Age: 81
End: 2021-08-03

## 2021-08-03 ENCOUNTER — OUTPATIENT (OUTPATIENT)
Dept: OUTPATIENT SERVICES | Facility: HOSPITAL | Age: 81
LOS: 1 days | Discharge: HOME | End: 2021-08-03

## 2021-08-03 VITALS — OXYGEN SATURATION: 92 % | HEART RATE: 63 BPM | RESPIRATION RATE: 16 BRPM

## 2021-08-03 DIAGNOSIS — Z79.01 LONG TERM (CURRENT) USE OF ANTICOAGULANTS: ICD-10-CM

## 2021-08-03 DIAGNOSIS — I48.91 UNSPECIFIED ATRIAL FIBRILLATION: ICD-10-CM

## 2021-08-03 DIAGNOSIS — Z95.2 PRESENCE OF PROSTHETIC HEART VALVE: Chronic | ICD-10-CM

## 2021-08-03 LAB
INR PPP: 3.6 RATIO
POCT-PROTHROMBIN TIME: 42.7 SECS
QUALITY CONTROL: YES

## 2021-08-11 ENCOUNTER — OUTPATIENT (OUTPATIENT)
Dept: OUTPATIENT SERVICES | Facility: HOSPITAL | Age: 81
LOS: 1 days | Discharge: HOME | End: 2021-08-11

## 2021-08-11 ENCOUNTER — APPOINTMENT (OUTPATIENT)
Dept: MEDICATION MANAGEMENT | Facility: CLINIC | Age: 81
End: 2021-08-11

## 2021-08-11 VITALS — OXYGEN SATURATION: 99 % | HEART RATE: 51 BPM | RESPIRATION RATE: 16 BRPM

## 2021-08-11 DIAGNOSIS — Z95.2 PRESENCE OF PROSTHETIC HEART VALVE: Chronic | ICD-10-CM

## 2021-08-11 DIAGNOSIS — Z79.01 LONG TERM (CURRENT) USE OF ANTICOAGULANTS: ICD-10-CM

## 2021-08-11 DIAGNOSIS — I48.91 UNSPECIFIED ATRIAL FIBRILLATION: ICD-10-CM

## 2021-08-11 LAB
INR PPP: 1.7 RATIO
POCT-PROTHROMBIN TIME: 19.9 SECS
QUALITY CONTROL: YES

## 2021-08-16 ENCOUNTER — OUTPATIENT (OUTPATIENT)
Dept: OUTPATIENT SERVICES | Facility: HOSPITAL | Age: 81
LOS: 1 days | Discharge: HOME | End: 2021-08-16

## 2021-08-16 ENCOUNTER — APPOINTMENT (OUTPATIENT)
Dept: HEMATOLOGY ONCOLOGY | Facility: CLINIC | Age: 81
End: 2021-08-16
Payer: MEDICARE

## 2021-08-16 ENCOUNTER — LABORATORY RESULT (OUTPATIENT)
Age: 81
End: 2021-08-16

## 2021-08-16 VITALS
TEMPERATURE: 97.6 F | SYSTOLIC BLOOD PRESSURE: 130 MMHG | RESPIRATION RATE: 14 BRPM | BODY MASS INDEX: 19.61 KG/M2 | HEART RATE: 62 BPM | HEIGHT: 70 IN | DIASTOLIC BLOOD PRESSURE: 69 MMHG | WEIGHT: 137 LBS

## 2021-08-16 DIAGNOSIS — Z95.2 PRESENCE OF PROSTHETIC HEART VALVE: Chronic | ICD-10-CM

## 2021-08-16 DIAGNOSIS — C91.10 CHRONIC LYMPHOCYTIC LEUKEMIA OF B-CELL TYPE NOT HAVING ACHIEVED REMISSION: ICD-10-CM

## 2021-08-16 DIAGNOSIS — D50.9 IRON DEFICIENCY ANEMIA, UNSPECIFIED: ICD-10-CM

## 2021-08-16 PROCEDURE — 99213 OFFICE O/P EST LOW 20 MIN: CPT

## 2021-08-17 DIAGNOSIS — D69.6 THROMBOCYTOPENIA, UNSPECIFIED: ICD-10-CM

## 2021-08-17 DIAGNOSIS — D50.9 IRON DEFICIENCY ANEMIA, UNSPECIFIED: ICD-10-CM

## 2021-08-17 LAB
HCT VFR BLD CALC: 31.3 %
HGB BLD-MCNC: 10.3 G/DL
MCHC RBC-ENTMCNC: 32.6 PG
MCHC RBC-ENTMCNC: 32.9 G/DL
MCV RBC AUTO: 99.1 FL
PLATELET # BLD AUTO: 111 K/UL
PMV BLD: 10 FL
RBC # BLD: 3.16 M/UL
RBC # FLD: 14.6 %
WBC # FLD AUTO: 3.39 K/UL

## 2021-08-17 NOTE — ASSESSMENT
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 \par He completed 6 cycles of Obinituzumab and Venetoclax was dose reduced to 300mg then stopped secondary to persistent side effects, \par Clinically he had good response with spleen no longer being palpable and good hematological response. He lost around 20 lbs since April , but his weight is stable now. \par 2) CBC is stable with mild pancytopenia .wbc  3.39 , Hb 10.3 and platelets 111\par \par 3)s/p episode of abdominal pain (sec to abdominal hernia  now resolved ) \par \par 4) BPH s/p green light laser surgery on July 15 . moderate post operative bleeding . \par \par \par PLAN: Clinically stable , will continue to monitor . \par Recommended to f/u with surgery for inguinal hernia \par follow up in 3 months . \par \par \par Pt was seen and examined with Dr Carey who agrees with plan of care .

## 2021-08-17 NOTE — HISTORY OF PRESENT ILLNESS
[de-identified] : this is a 76-year-old white man with multiple medical problems including coronary artery disease status post angioplasty valvular heart disease status post metallic aortic valve replacement. He is referred for abnormal hemogram noted recently, WBC is 10.6 hemoglobin 14.2 platelets 131 absolute lymphocytes 5268 chemistry is unremarkable. He denies any constitutional symptoms  specifically no fever ,weight loss ,night sweats, fatigue or  pruritus. he feels fantastic with good energy level. 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 He is scheduled for ablation for atrial fibrillation , he continues on coumadin for mechanical valve. He reports minor bruising on coumadin and plavix. He feels slight fatigue . He denies fever , weight loss or night sweats . He is also on androgen deprivation for elevated PSA , , He had previously on surveillance and had multiple prostate biopsies ( unclear if it showed cancer ) . Bone scan is allegedly negative . Last PSA is less than 1 and patient denies obstructive symptoms. \par "\par 01/10/2019 Patient returns for follow up for CLL on watchful waiting , he " feels tired all the time " , He had unsuccessful ablation for atrial fibrillation and was told to increase metoprolol . He denies B symptoms . \par \par 02/07/2019 Patient returns for follow up , he had unsuccessful ablation for paroxysmal atrial fibrillation and was placed on amiodarone , he continues on coumadin without ASA and denies abnormal bleeding , he reports occasional LUQ pain . no  B symptoms.\par \par 05/16/2019 Patient returns for follow up for CLL/SLL he reports few episodes of dizziness associated with nausea lasting for 1 to 2 hours . CT head ( non-contrast ) was negative , he had negative ENT evaluation and had long term event monitor placed 2 weeks ago without recurrence ( also discontinued amiodarone ) , CBC shows slight decrease in Hb and platelets from baseline . He continues on coumadin and plavix and denies abnormal bleeding . He continues with mild LUQ and back pain . \par \par 06/13/2019 Patient returns for follow up for SLL/CLL with anemia, thrombocytopenia and marked splenomegaly ( 19cm ) . He complains of left flank and back pain . he had a trial of epidural anethesia and is scheduled for nerve block/ ablation / epidural injection . he continues on coumadin and denies any abnormal bleeding . \par \par 07/11/2019 Patient returns with persistent LUQ discomfort , back pain despite epidural injections . 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 He is relatively asymptomatic except for mild left flank pain . no significant bleeding on coumadin and plavix . Given his age , multiple co-morbidities and high risk features , the regimen of choice is combination of venetoclax and obinotuzumab . \par \par 08/22/2019 Patient returns for follow up after starting on obinotuzumab , he had mild reaction during the split first dose ( nausea ) . He tolerated day 8 and day 15 very well ( also given in split dose ) . He lost 10 lbs despite good po intake . He denies fever . he continues with mild back pain . \par \par 11/19/2019 patient returns for follow up , he continues on venetoclax/obino and feels well except for persistent back pain , MRI done elsewhere  was allegedly normal , spleen is no longer palpable , Hb is 13 , wbc is normal except for lymphopenia , platelets  : 111 .  He reports occasional episodes of abdominal cramping with nausea. no weight loss. \par \par 12/16/19: Pt returns for a f/u visit. He has been c/o nausea with decreased appetite. However, he has not been taking antiemetics as previously prescribed. Has lost 11 lbs in the last month. Also had a recent TIA manifested as aphasia that lasted for about 45 mins. He was treated in an ED in [place]. CTH was unremarkable. Echo showed EF 45-50%. No clots seen. US carotids showed plaques but no significant stenosis. He is being seen by neurology in [place] and will have MRI/MRA. He was already on plavix and coumadin before having TIA. His INR at the time of ER visit was 4.3. \par \par 01/13/2020\par Patient returns for a follow up visit. This will be his last cycle of obinutuzumab ( #6). He is on venetoclax 300 mg. It was dose reduced on 12/16 as he was complaining of nausea and abdominal pain. He also has chronic back pain. He remains on coumadin and plavix. \par He is tolerating obinutuzumab with no reactions. \par \par 2/6/2020: TERRELL ATKINSON is a 79 year old male here today for sick visit. He has CLL and was treated last with obinutuzumab last dose on 1/16/2020. Venetoclax was stopped due to adverse effects , He states that he developed a fever of 101 degrees yesterday associated with chills and a mild cough with hemoptysis. He continues to spike fever today. He is taking Tylenol for symptom relief. He denies SOB and chest pain at this time. \par \par 06/19/2020 Today's virtual encounter was done via telehealth given the situation surrounding Covid 19 outbreak . He is staying currently in [place] , he had recently a virtual visit with his cardiologist as well . He has good and bad days , he continues with left sided abdominal pain , he has allegedly lost more weight ( 25 lbs in total since last year ) . He denies fever or night sweats , he was last treated for a respiratory infection in February and his wife is wondering if he had contracted Covid 19 ( not tested , chest xray was negative ) . Blood work from April showed normal Hb , wbc and platelet improved compared to 1/2020 . He continues on coumadin and denies any abnormal bleeding or symptoms suggestive of cardioembolic disorder. \par \par 01/18/2021 Patient returns for follow p for CLL s/p venetoclax/obinotuzumab , treatment truncated due to several factors including Covid 19 pandemics. His weight is stable after losing more than 20 lbs , he continues to complain of mild abdominal pain after meals. no fever or night sweats . his last CBC shows normal wbc with lymphopenia , Hb 12.7 , platelets : 102  CT abdomen in 9/2020 was negative for adenopathy and splenomegaly .\par His main problem is currently urinary retention due to BPH , not surgical candidate due to bleeding risk . He is scheduled for embolization in [place] tomorrow , he stopped plavix and coumadin , he continues on ASA and bridging with lovenox. \par \par 03/22/2021 Patient returns for for follow up for CLL in remission , currently on watchful waiting . He had right prostatic embolization with slight improvement in voiding symptoms , he is scheduled for left side followed by light therapy . He denies any new symptoms except inability to gain weight . \par \par 05/24/2021 Patient returns for follow up , he was seen in ED recently for abdominal pain , CT scan showed fluid filled right inguinal hernia , no other abnormality or evidence of obstruction , blood work significant for Hb decreased from baseline . platelets and wbc were stable , he felt better with IV hydration  . he denies fever or weight loss. Main complaint is chronic back pain . \par \par 8/16/21 \par Pt returns for f/u today . He recently had green light laser surgery at [facility] , he reports that he first had embolization of blood vessels by IR followed by green light laser surgery for BPH . Pt reports that he had significant bleeding during procedure requiring 2 units of PRBC . But pt reports significant improvement in his symptoms since , cisneros has been removed and pt is voiding on his own . Pt reports that he continued to have hematuria until recently but his coumadin was resumed next day after surgery . Pt still c/o abdominal discomfort off and on sec to hernia and wishes to take care of it as well. \par

## 2021-08-17 NOTE — REVIEW OF SYSTEMS
[Fatigue] : fatigue [Negative] : Neurological [Recent Change In Weight] : ~T no recent weight change [Abdominal Pain] : no abdominal pain [Vomiting] : no vomiting [Dysuria] : no dysuria

## 2021-08-20 ENCOUNTER — APPOINTMENT (OUTPATIENT)
Dept: MEDICATION MANAGEMENT | Facility: CLINIC | Age: 81
End: 2021-08-20

## 2021-08-20 ENCOUNTER — OUTPATIENT (OUTPATIENT)
Dept: OUTPATIENT SERVICES | Facility: HOSPITAL | Age: 81
LOS: 1 days | Discharge: HOME | End: 2021-08-20

## 2021-08-20 VITALS — RESPIRATION RATE: 16 BRPM

## 2021-08-20 DIAGNOSIS — I48.91 UNSPECIFIED ATRIAL FIBRILLATION: ICD-10-CM

## 2021-08-20 DIAGNOSIS — Z79.01 LONG TERM (CURRENT) USE OF ANTICOAGULANTS: ICD-10-CM

## 2021-08-20 DIAGNOSIS — Z95.2 PRESENCE OF PROSTHETIC HEART VALVE: Chronic | ICD-10-CM

## 2021-08-20 LAB
INR PPP: 1.6 RATIO
POCT-PROTHROMBIN TIME: 19 SECS
QUALITY CONTROL: YES

## 2021-08-23 ENCOUNTER — RX RENEWAL (OUTPATIENT)
Age: 81
End: 2021-08-23

## 2021-08-30 ENCOUNTER — OUTPATIENT (OUTPATIENT)
Dept: OUTPATIENT SERVICES | Facility: HOSPITAL | Age: 81
LOS: 1 days | Discharge: HOME | End: 2021-08-30

## 2021-08-30 ENCOUNTER — APPOINTMENT (OUTPATIENT)
Dept: MEDICATION MANAGEMENT | Facility: CLINIC | Age: 81
End: 2021-08-30

## 2021-08-30 VITALS — RESPIRATION RATE: 16 BRPM

## 2021-08-30 DIAGNOSIS — Z95.2 PRESENCE OF PROSTHETIC HEART VALVE: Chronic | ICD-10-CM

## 2021-08-30 DIAGNOSIS — I48.91 UNSPECIFIED ATRIAL FIBRILLATION: ICD-10-CM

## 2021-08-30 DIAGNOSIS — Z79.01 LONG TERM (CURRENT) USE OF ANTICOAGULANTS: ICD-10-CM

## 2021-08-30 LAB
INR PPP: 1.8 RATIO
POCT-PROTHROMBIN TIME: 21.6 SECS
QUALITY CONTROL: YES

## 2021-09-17 ENCOUNTER — APPOINTMENT (OUTPATIENT)
Dept: MEDICATION MANAGEMENT | Facility: CLINIC | Age: 81
End: 2021-09-17

## 2021-09-17 ENCOUNTER — OUTPATIENT (OUTPATIENT)
Dept: OUTPATIENT SERVICES | Facility: HOSPITAL | Age: 81
LOS: 1 days | Discharge: HOME | End: 2021-09-17

## 2021-09-17 VITALS — OXYGEN SATURATION: 98 % | HEART RATE: 52 BPM | RESPIRATION RATE: 16 BRPM

## 2021-09-17 DIAGNOSIS — Z79.01 LONG TERM (CURRENT) USE OF ANTICOAGULANTS: ICD-10-CM

## 2021-09-17 DIAGNOSIS — I48.91 UNSPECIFIED ATRIAL FIBRILLATION: ICD-10-CM

## 2021-09-17 DIAGNOSIS — Z95.2 PRESENCE OF PROSTHETIC HEART VALVE: Chronic | ICD-10-CM

## 2021-09-17 LAB
INR PPP: 2.7 RATIO
POCT-PROTHROMBIN TIME: 32.9 SECS
QUALITY CONTROL: YES

## 2021-09-29 ENCOUNTER — APPOINTMENT (OUTPATIENT)
Dept: CARDIOLOGY | Facility: CLINIC | Age: 81
End: 2021-09-29
Payer: MEDICARE

## 2021-09-29 PROCEDURE — 93306 TTE W/DOPPLER COMPLETE: CPT

## 2021-09-29 NOTE — PRE-ANESTHESIA EVALUATION ADULT - WEIGHT IN LBS
LVM again and sent email for final Fourier OLE study follow up 30 days post last dose of medication.  Dinesh Lira RN  Clinical Research Nurse  [phone]    
180.7

## 2021-10-08 ENCOUNTER — APPOINTMENT (OUTPATIENT)
Dept: MEDICATION MANAGEMENT | Facility: CLINIC | Age: 81
End: 2021-10-08

## 2021-10-08 ENCOUNTER — OUTPATIENT (OUTPATIENT)
Dept: OUTPATIENT SERVICES | Facility: HOSPITAL | Age: 81
LOS: 1 days | Discharge: HOME | End: 2021-10-08

## 2021-10-08 VITALS — RESPIRATION RATE: 16 BRPM | HEART RATE: 52 BPM | OXYGEN SATURATION: 97 %

## 2021-10-08 DIAGNOSIS — I48.91 UNSPECIFIED ATRIAL FIBRILLATION: ICD-10-CM

## 2021-10-08 DIAGNOSIS — Z79.01 LONG TERM (CURRENT) USE OF ANTICOAGULANTS: ICD-10-CM

## 2021-10-08 DIAGNOSIS — Z95.2 PRESENCE OF PROSTHETIC HEART VALVE: Chronic | ICD-10-CM

## 2021-10-08 LAB
INR PPP: 2.9 RATIO
POCT-PROTHROMBIN TIME: 34.5 SECS
QUALITY CONTROL: YES

## 2021-11-03 ENCOUNTER — APPOINTMENT (OUTPATIENT)
Dept: MEDICATION MANAGEMENT | Facility: CLINIC | Age: 81
End: 2021-11-03

## 2021-11-03 ENCOUNTER — OUTPATIENT (OUTPATIENT)
Dept: OUTPATIENT SERVICES | Facility: HOSPITAL | Age: 81
LOS: 1 days | Discharge: HOME | End: 2021-11-03

## 2021-11-03 VITALS — RESPIRATION RATE: 16 BRPM | HEART RATE: 58 BPM | OXYGEN SATURATION: 99 %

## 2021-11-03 DIAGNOSIS — Z95.2 PRESENCE OF PROSTHETIC HEART VALVE: Chronic | ICD-10-CM

## 2021-11-03 DIAGNOSIS — Z79.01 LONG TERM (CURRENT) USE OF ANTICOAGULANTS: ICD-10-CM

## 2021-11-03 DIAGNOSIS — I48.91 UNSPECIFIED ATRIAL FIBRILLATION: ICD-10-CM

## 2021-11-03 LAB
INR PPP: 3.2 RATIO
POCT-PROTHROMBIN TIME: 38.4 SECS
QUALITY CONTROL: YES

## 2021-11-15 ENCOUNTER — OUTPATIENT (OUTPATIENT)
Dept: OUTPATIENT SERVICES | Facility: HOSPITAL | Age: 81
LOS: 1 days | Discharge: HOME | End: 2021-11-15

## 2021-11-15 ENCOUNTER — APPOINTMENT (OUTPATIENT)
Dept: HEMATOLOGY ONCOLOGY | Facility: CLINIC | Age: 81
End: 2021-11-15
Payer: MEDICARE

## 2021-11-15 ENCOUNTER — LABORATORY RESULT (OUTPATIENT)
Age: 81
End: 2021-11-15

## 2021-11-15 ENCOUNTER — APPOINTMENT (OUTPATIENT)
Dept: MEDICATION MANAGEMENT | Facility: CLINIC | Age: 81
End: 2021-11-15

## 2021-11-15 VITALS — OXYGEN SATURATION: 99 % | RESPIRATION RATE: 16 BRPM | HEART RATE: 62 BPM

## 2021-11-15 VITALS
TEMPERATURE: 97.5 F | HEART RATE: 64 BPM | WEIGHT: 133 LBS | BODY MASS INDEX: 19.04 KG/M2 | OXYGEN SATURATION: 99 % | SYSTOLIC BLOOD PRESSURE: 139 MMHG | DIASTOLIC BLOOD PRESSURE: 65 MMHG | RESPIRATION RATE: 16 BRPM | HEIGHT: 70 IN

## 2021-11-15 DIAGNOSIS — I48.91 UNSPECIFIED ATRIAL FIBRILLATION: ICD-10-CM

## 2021-11-15 DIAGNOSIS — Z95.2 PRESENCE OF PROSTHETIC HEART VALVE: Chronic | ICD-10-CM

## 2021-11-15 DIAGNOSIS — Z79.01 LONG TERM (CURRENT) USE OF ANTICOAGULANTS: ICD-10-CM

## 2021-11-15 LAB
HCT VFR BLD CALC: 38.7 %
HGB BLD-MCNC: 13.7 G/DL
INR PPP: 2.8 RATIO
MCHC RBC-ENTMCNC: 33.6 PG
MCHC RBC-ENTMCNC: 35.4 G/DL
MCV RBC AUTO: 94.9 FL
PLATELET # BLD AUTO: 106 K/UL
PMV BLD: 9.5 FL
POCT-PROTHROMBIN TIME: 34.2 SECS
QUALITY CONTROL: YES
RBC # BLD: 4.08 M/UL
RBC # FLD: 13.3 %
WBC # FLD AUTO: 4.45 K/UL

## 2021-11-15 PROCEDURE — 99213 OFFICE O/P EST LOW 20 MIN: CPT

## 2021-11-16 NOTE — ASSESSMENT
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 \par He completed 6 cycles of Obinituzumab and Venetoclax was dose reduced to 300mg then stopped secondary to persistent side effects, \par Clinically he had good response with spleen no longer being palpable and good hematological response. He lost around 20 lbs , unable to regain weight .  \par 2) CBC with mild thrombocytopenia . Hgb back to normal on iron . \par \par 3)Chronic nausea . \par \par 4) BPH s/p green light laser surgery on July 15 . [place] symptoms improved . \par \par 5) right inguinal hernia \par PLAN: \par GI consult . \par Recommended to f/u with surgery for inguinal hernia \par follow up in 3 months . \par \par \par Pt was seen and examined with Dr Carey who agrees with plan of care .

## 2021-11-16 NOTE — HISTORY OF PRESENT ILLNESS
[de-identified] : this is a 76-year-old white man with multiple medical problems including coronary artery disease status post angioplasty valvular heart disease status post metallic aortic valve replacement. He is referred for abnormal hemogram noted recently, WBC is 10.6 hemoglobin 14.2 platelets 131 absolute lymphocytes 5268 chemistry is unremarkable. He denies any constitutional symptoms  specifically no fever ,weight loss ,night sweats, fatigue or  pruritus. he feels fantastic with good energy level. 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 He is scheduled for ablation for atrial fibrillation , he continues on coumadin for mechanical valve. He reports minor bruising on coumadin and plavix. He feels slight fatigue . He denies fever , weight loss or night sweats . He is also on androgen deprivation for elevated PSA , , He had previously on surveillance and had multiple prostate biopsies ( unclear if it showed cancer ) . Bone scan is allegedly negative . Last PSA is less than 1 and patient denies obstructive symptoms. \par "\par 01/10/2019 Patient returns for follow up for CLL on watchful waiting , he " feels tired all the time " , He had unsuccessful ablation for atrial fibrillation and was told to increase metoprolol . He denies B symptoms . \par \par 02/07/2019 Patient returns for follow up , he had unsuccessful ablation for paroxysmal atrial fibrillation and was placed on amiodarone , he continues on coumadin without ASA and denies abnormal bleeding , he reports occasional LUQ pain . no  B symptoms.\par \par 05/16/2019 Patient returns for follow up for CLL/SLL he reports few episodes of dizziness associated with nausea lasting for 1 to 2 hours . CT head ( non-contrast ) was negative , he had negative ENT evaluation and had long term event monitor placed 2 weeks ago without recurrence ( also discontinued amiodarone ) , CBC shows slight decrease in Hb and platelets from baseline . He continues on coumadin and plavix and denies abnormal bleeding . He continues with mild LUQ and back pain . \par \par 06/13/2019 Patient returns for follow up for SLL/CLL with anemia, thrombocytopenia and marked splenomegaly ( 19cm ) . He complains of left flank and back pain . he had a trial of epidural anethesia and is scheduled for nerve block/ ablation / epidural injection . he continues on coumadin and denies any abnormal bleeding . \par \par 07/11/2019 Patient returns with persistent LUQ discomfort , back pain despite epidural injections . 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 He is relatively asymptomatic except for mild left flank pain . no significant bleeding on coumadin and plavix . Given his age , multiple co-morbidities and high risk features , the regimen of choice is combination of venetoclax and obinotuzumab . \par \par 08/22/2019 Patient returns for follow up after starting on obinotuzumab , he had mild reaction during the split first dose ( nausea ) . He tolerated day 8 and day 15 very well ( also given in split dose ) . He lost 10 lbs despite good po intake . He denies fever . he continues with mild back pain . \par \par 11/19/2019 patient returns for follow up , he continues on venetoclax/obino and feels well except for persistent back pain , MRI done elsewhere  was allegedly normal , spleen is no longer palpable , Hb is 13 , wbc is normal except for lymphopenia , platelets  : 111 .  He reports occasional episodes of abdominal cramping with nausea. no weight loss. \par \par 12/16/19: Pt returns for a f/u visit. He has been c/o nausea with decreased appetite. However, he has not been taking antiemetics as previously prescribed. Has lost 11 lbs in the last month. Also had a recent TIA manifested as aphasia that lasted for about 45 mins. He was treated in an ED in [place]. CTH was unremarkable. Echo showed EF 45-50%. No clots seen. US carotids showed plaques but no significant stenosis. He is being seen by neurology in [place] and will have MRI/MRA. He was already on plavix and coumadin before having TIA. His INR at the time of ER visit was 4.3. \par \par 01/13/2020\par Patient returns for a follow up visit. This will be his last cycle of obinutuzumab ( #6). He is on venetoclax 300 mg. It was dose reduced on 12/16 as he was complaining of nausea and abdominal pain. He also has chronic back pain. He remains on coumadin and plavix. \par He is tolerating obinutuzumab with no reactions. \par \par 2/6/2020: TERRELL ATKINSON is a 79 year old male here today for sick visit. He has CLL and was treated last with obinutuzumab last dose on 1/16/2020. Venetoclax was stopped due to adverse effects , He states that he developed a fever of 101 degrees yesterday associated with chills and a mild cough with hemoptysis. He continues to spike fever today. He is taking Tylenol for symptom relief. He denies SOB and chest pain at this time. \par \par 06/19/2020 Today's virtual encounter was done via telehealth given the situation surrounding Covid 19 outbreak . He is staying currently in [place] , he had recently a virtual visit with his cardiologist as well . He has good and bad days , he continues with left sided abdominal pain , he has allegedly lost more weight ( 25 lbs in total since last year ) . He denies fever or night sweats , he was last treated for a respiratory infection in February and his wife is wondering if he had contracted Covid 19 ( not tested , chest xray was negative ) . Blood work from April showed normal Hb , wbc and platelet improved compared to 1/2020 . He continues on coumadin and denies any abnormal bleeding or symptoms suggestive of cardioembolic disorder. \par \par 01/18/2021 Patient returns for follow p for CLL s/p venetoclax/obinotuzumab , treatment truncated due to several factors including Covid 19 pandemics. His weight is stable after losing more than 20 lbs , he continues to complain of mild abdominal pain after meals. no fever or night sweats . his last CBC shows normal wbc with lymphopenia , Hb 12.7 , platelets : 102  CT abdomen in 9/2020 was negative for adenopathy and splenomegaly .\par His main problem is currently urinary retention due to BPH , not surgical candidate due to bleeding risk . He is scheduled for embolization in [facility] tomorrow , he stopped plavix and coumadin , he continues on ASA and bridging with lovenox. \par \par 03/22/2021 Patient returns for for follow up for CLL in remission , currently on watchful waiting . He had right prostatic embolization with slight improvement in voiding symptoms , he is scheduled for left side followed by light therapy . He denies any new symptoms except inability to gain weight . \par \par 05/24/2021 Patient returns for follow up , he was seen in ED recently for abdominal pain , CT scan showed fluid filled right inguinal hernia , no other abnormality or evidence of obstruction , blood work significant for Hb decreased from baseline . platelets and wbc were stable , he felt better with IV hydration  . he denies fever or weight loss. Main complaint is chronic back pain . \par \par \par 11/15/2021 Patient returns for interval follow up . He has his good and bad days .  He continues to complain of back pain , inability to regain weight , Intermittent complaints of Gagging , has a reducible right inguinal hernia , no BM issues . he is on iron every other day . Denies fever or night sweats . His lower urinary tract symptoms are markedly improved after surgery .

## 2021-11-16 NOTE — PHYSICAL EXAM
[Normal] : no peripheral adenopathy appreciated [de-identified] : Pale chronically ill in NAD .  [de-identified] : healed scar  of lymph node biopsy on the left, no residual or  recurrent adenopathy. [de-identified] : prominent aortic valve  click. [de-identified] : spleen no longer palpable . no tenderness. right inguinal hernia, reducible .  [de-identified] : no peripheral adenopathy.

## 2021-11-18 DIAGNOSIS — C91.10 CHRONIC LYMPHOCYTIC LEUKEMIA OF B-CELL TYPE NOT HAVING ACHIEVED REMISSION: ICD-10-CM

## 2021-12-13 ENCOUNTER — APPOINTMENT (OUTPATIENT)
Dept: MEDICATION MANAGEMENT | Facility: CLINIC | Age: 81
End: 2021-12-13

## 2021-12-13 ENCOUNTER — OUTPATIENT (OUTPATIENT)
Dept: OUTPATIENT SERVICES | Facility: HOSPITAL | Age: 81
LOS: 1 days | Discharge: HOME | End: 2021-12-13

## 2021-12-13 VITALS — RESPIRATION RATE: 16 BRPM | HEART RATE: 63 BPM | OXYGEN SATURATION: 99 %

## 2021-12-13 DIAGNOSIS — I48.91 UNSPECIFIED ATRIAL FIBRILLATION: ICD-10-CM

## 2021-12-13 DIAGNOSIS — Z79.01 LONG TERM (CURRENT) USE OF ANTICOAGULANTS: ICD-10-CM

## 2021-12-13 DIAGNOSIS — Z95.2 PRESENCE OF PROSTHETIC HEART VALVE: Chronic | ICD-10-CM

## 2021-12-13 LAB
INR PPP: 1.9 RATIO
POCT-PROTHROMBIN TIME: 23 SECS
QUALITY CONTROL: YES

## 2021-12-28 ENCOUNTER — APPOINTMENT (OUTPATIENT)
Dept: CARDIOLOGY | Facility: CLINIC | Age: 81
End: 2021-12-28
Payer: MEDICARE

## 2021-12-28 VITALS
HEART RATE: 53 BPM | WEIGHT: 136 LBS | SYSTOLIC BLOOD PRESSURE: 110 MMHG | HEIGHT: 70 IN | BODY MASS INDEX: 19.47 KG/M2 | DIASTOLIC BLOOD PRESSURE: 74 MMHG | TEMPERATURE: 97.3 F

## 2021-12-28 DIAGNOSIS — L98.9 DISORDER OF THE SKIN AND SUBCUTANEOUS TISSUE, UNSPECIFIED: ICD-10-CM

## 2021-12-28 PROCEDURE — 93000 ELECTROCARDIOGRAM COMPLETE: CPT

## 2021-12-28 PROCEDURE — 99214 OFFICE O/P EST MOD 30 MIN: CPT

## 2021-12-28 RX ORDER — ENOXAPARIN SODIUM 100 MG/ML
60 INJECTION SUBCUTANEOUS
Qty: 10 | Refills: 1 | Status: DISCONTINUED | COMMUNITY
Start: 2021-06-14 | End: 2021-12-28

## 2021-12-28 NOTE — CARDIOLOGY SUMMARY
[___] : [unfilled] [___] : [unfilled] [de-identified] : NSR IVDD LAD \par 12- Sinus Bradycardia IVCD LAD LVH NS T wave change.  [de-identified] : 9- EF 42% mild MR Mod TR Mechanical MVR SIDDHARTH 1.1 with mild paravalvular AI . LVSI was 23cc/m2

## 2021-12-28 NOTE — HISTORY OF PRESENT ILLNESS
[FreeTextEntry1] : The patient has had generalized weakness . The patient had 2 procedures for his prostate .. Since that time he has been urinating better. No CP No SOB . . Weight is about the same .  The patient has been referred to GI by hematology . He has has a bleeding lesion on his left shoulder

## 2021-12-28 NOTE — ASSESSMENT
[FreeTextEntry1] : The patient has history of having an AVR and one vessel CAD . Has had CLL and this had been treated with chemo in the past  .He is under the care of onocolgy . He is going to see GI . He has a right inguinal hernia . He also has a bleeding lesion on his left shoulder . The patient has AVR which is mechanical . Gradients are not high but the SIDDHARTH is 1.1 with LVSI was 23 cc/m2

## 2022-01-01 NOTE — H&P PST ADULT - PSYCHIATRIC
(1) body pink, extremities blue Affect and characteristics of appearance, verbalizations, behaviors are appropriate

## 2022-01-10 ENCOUNTER — OUTPATIENT (OUTPATIENT)
Dept: OUTPATIENT SERVICES | Facility: HOSPITAL | Age: 82
LOS: 1 days | Discharge: HOME | End: 2022-01-10

## 2022-01-10 ENCOUNTER — APPOINTMENT (OUTPATIENT)
Dept: MEDICATION MANAGEMENT | Facility: CLINIC | Age: 82
End: 2022-01-10

## 2022-01-10 VITALS — HEART RATE: 58 BPM | RESPIRATION RATE: 16 BRPM | OXYGEN SATURATION: 99 %

## 2022-01-10 DIAGNOSIS — I48.91 UNSPECIFIED ATRIAL FIBRILLATION: ICD-10-CM

## 2022-01-10 DIAGNOSIS — Z79.01 LONG TERM (CURRENT) USE OF ANTICOAGULANTS: ICD-10-CM

## 2022-01-10 DIAGNOSIS — Z95.2 PRESENCE OF PROSTHETIC HEART VALVE: Chronic | ICD-10-CM

## 2022-01-10 LAB
INR PPP: 2 RATIO
POCT-PROTHROMBIN TIME: 24.3 SECS
QUALITY CONTROL: YES

## 2022-01-24 ENCOUNTER — OUTPATIENT (OUTPATIENT)
Dept: OUTPATIENT SERVICES | Facility: HOSPITAL | Age: 82
LOS: 1 days | Discharge: HOME | End: 2022-01-24

## 2022-01-24 ENCOUNTER — APPOINTMENT (OUTPATIENT)
Dept: MEDICATION MANAGEMENT | Facility: CLINIC | Age: 82
End: 2022-01-24

## 2022-01-24 VITALS — OXYGEN SATURATION: 99 % | HEART RATE: 70 BPM | RESPIRATION RATE: 16 BRPM

## 2022-01-24 DIAGNOSIS — Z79.01 LONG TERM (CURRENT) USE OF ANTICOAGULANTS: ICD-10-CM

## 2022-01-24 DIAGNOSIS — I48.91 UNSPECIFIED ATRIAL FIBRILLATION: ICD-10-CM

## 2022-01-24 DIAGNOSIS — Z95.2 PRESENCE OF PROSTHETIC HEART VALVE: Chronic | ICD-10-CM

## 2022-01-24 LAB
INR PPP: 4.5 RATIO
POCT-PROTHROMBIN TIME: 54.3 SECS
QUALITY CONTROL: YES

## 2022-01-31 ENCOUNTER — OUTPATIENT (OUTPATIENT)
Dept: OUTPATIENT SERVICES | Facility: HOSPITAL | Age: 82
LOS: 1 days | Discharge: HOME | End: 2022-01-31

## 2022-01-31 ENCOUNTER — APPOINTMENT (OUTPATIENT)
Dept: MEDICATION MANAGEMENT | Facility: CLINIC | Age: 82
End: 2022-01-31

## 2022-01-31 DIAGNOSIS — Z95.2 PRESENCE OF PROSTHETIC HEART VALVE: Chronic | ICD-10-CM

## 2022-01-31 DIAGNOSIS — I48.91 UNSPECIFIED ATRIAL FIBRILLATION: ICD-10-CM

## 2022-01-31 DIAGNOSIS — Z79.01 LONG TERM (CURRENT) USE OF ANTICOAGULANTS: ICD-10-CM

## 2022-01-31 LAB
INR PPP: 2.9 RATIO
POCT-PROTHROMBIN TIME: 34.4 SECS
QUALITY CONTROL: YES

## 2022-02-15 ENCOUNTER — OUTPATIENT (OUTPATIENT)
Dept: OUTPATIENT SERVICES | Facility: HOSPITAL | Age: 82
LOS: 1 days | Discharge: HOME | End: 2022-02-15

## 2022-02-15 ENCOUNTER — APPOINTMENT (OUTPATIENT)
Dept: MEDICATION MANAGEMENT | Facility: CLINIC | Age: 82
End: 2022-02-15

## 2022-02-15 ENCOUNTER — LABORATORY RESULT (OUTPATIENT)
Age: 82
End: 2022-02-15

## 2022-02-15 ENCOUNTER — APPOINTMENT (OUTPATIENT)
Dept: HEMATOLOGY ONCOLOGY | Facility: CLINIC | Age: 82
End: 2022-02-15
Payer: MEDICARE

## 2022-02-15 VITALS — HEART RATE: 54 BPM | OXYGEN SATURATION: 98 % | RESPIRATION RATE: 16 BRPM

## 2022-02-15 DIAGNOSIS — Z95.2 PRESENCE OF PROSTHETIC HEART VALVE: Chronic | ICD-10-CM

## 2022-02-15 DIAGNOSIS — Z79.01 LONG TERM (CURRENT) USE OF ANTICOAGULANTS: ICD-10-CM

## 2022-02-15 DIAGNOSIS — I48.91 UNSPECIFIED ATRIAL FIBRILLATION: ICD-10-CM

## 2022-02-15 DIAGNOSIS — C91.10 CHRONIC LYMPHOCYTIC LEUKEMIA OF B-CELL TYPE NOT HAVING ACHIEVED REMISSION: ICD-10-CM

## 2022-02-15 LAB
HCT VFR BLD CALC: 35.7 %
HGB BLD-MCNC: 12.7 G/DL
INR PPP: 3.8 RATIO
MCHC RBC-ENTMCNC: 34.3 PG
MCHC RBC-ENTMCNC: 35.6 G/DL
MCV RBC AUTO: 96.5 FL
PLATELET # BLD AUTO: 109 K/UL
PMV BLD: 10.4 FL
POCT-PROTHROMBIN TIME: 46.1 SECS
QUALITY CONTROL: YES
RBC # BLD: 3.7 M/UL
RBC # FLD: 13.2 %
RETICS # AUTO: 1 %
RETICS AGGREG/RBC NFR: 37.9 K/UL
WBC # FLD AUTO: 7.39 K/UL

## 2022-02-15 PROCEDURE — 99214 OFFICE O/P EST MOD 30 MIN: CPT

## 2022-02-16 LAB
ALBUMIN SERPL ELPH-MCNC: 4.4 G/DL
ALP BLD-CCNC: 62 U/L
ALT SERPL-CCNC: 8 U/L
ANION GAP SERPL CALC-SCNC: 10 MMOL/L
AST SERPL-CCNC: 16 U/L
BILIRUB SERPL-MCNC: 0.6 MG/DL
BUN SERPL-MCNC: 12 MG/DL
CALCIUM SERPL-MCNC: 9.5 MG/DL
CHLORIDE SERPL-SCNC: 104 MMOL/L
CO2 SERPL-SCNC: 27 MMOL/L
CREAT SERPL-MCNC: 0.8 MG/DL
FERRITIN SERPL-MCNC: 66 NG/ML
GLUCOSE SERPL-MCNC: 89 MG/DL
HAPTOGLOB SERPL-MCNC: <20 MG/DL
LDH SERPL-CCNC: 302 U/L
POTASSIUM SERPL-SCNC: 4.7 MMOL/L
PROT SERPL-MCNC: 6.1 G/DL
SODIUM SERPL-SCNC: 141 MMOL/L

## 2022-02-16 NOTE — PHYSICAL EXAM
[Normal] : no peripheral adenopathy appreciated [de-identified] : Pale chronically ill in NAD .  [de-identified] : No palpable adenopathy  [de-identified] : prominent aortic valve  click. [de-identified] : spleen no longer palpable .  [de-identified] : no peripheral adenopathy.

## 2022-02-16 NOTE — REVIEW OF SYSTEMS
[Fatigue] : fatigue [Negative] : Respiratory [FreeTextEntry2] : Unable to gain weight. Overall his weight is stable in recent times

## 2022-02-16 NOTE — HISTORY OF PRESENT ILLNESS
[de-identified] : this is a 76-year-old white man with multiple medical problems including coronary artery disease status post angioplasty valvular heart disease status post metallic aortic valve replacement. He is referred for abnormal hemogram noted recently, WBC is 10.6 hemoglobin 14.2 platelets 131 absolute lymphocytes 5268 chemistry is unremarkable. He denies any constitutional symptoms  specifically no fever ,weight loss ,night sweats, fatigue or  pruritus. he feels fantastic with good energy level. 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 He is scheduled for ablation for atrial fibrillation , he continues on coumadin for mechanical valve. He reports minor bruising on coumadin and plavix. He feels slight fatigue . He denies fever , weight loss or night sweats . He is also on androgen deprivation for elevated PSA , , He had previously on surveillance and had multiple prostate biopsies ( unclear if it showed cancer ) . Bone scan is allegedly negative . Last PSA is less than 1 and patient denies obstructive symptoms. \par "\par 01/10/2019 Patient returns for follow up for CLL on watchful waiting , he " feels tired all the time " , He had unsuccessful ablation for atrial fibrillation and was told to increase metoprolol . He denies B symptoms . \par \par 02/07/2019 Patient returns for follow up , he had unsuccessful ablation for paroxysmal atrial fibrillation and was placed on amiodarone , he continues on coumadin without ASA and denies abnormal bleeding , he reports occasional LUQ pain . no  B symptoms.\par \par 05/16/2019 Patient returns for follow up for CLL/SLL he reports few episodes of dizziness associated with nausea lasting for 1 to 2 hours . CT head ( non-contrast ) was negative , he had negative ENT evaluation and had long term event monitor placed 2 weeks ago without recurrence ( also discontinued amiodarone ) , CBC shows slight decrease in Hb and platelets from baseline . He continues on coumadin and plavix and denies abnormal bleeding . He continues with mild LUQ and back pain . \par \par 06/13/2019 Patient returns for follow up for SLL/CLL with anemia, thrombocytopenia and marked splenomegaly ( 19cm ) . He complains of left flank and back pain . he had a trial of epidural anethesia and is scheduled for nerve block/ ablation / epidural injection . he continues on coumadin and denies any abnormal bleeding . \par \par 07/11/2019 Patient returns with persistent LUQ discomfort , back pain despite epidural injections . 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 He is relatively asymptomatic except for mild left flank pain . no significant bleeding on coumadin and plavix . Given his age , multiple co-morbidities and high risk features , the regimen of choice is combination of venetoclax and obinotuzumab . \par \par 08/22/2019 Patient returns for follow up after starting on obinotuzumab , he had mild reaction during the split first dose ( nausea ) . He tolerated day 8 and day 15 very well ( also given in split dose ) . He lost 10 lbs despite good po intake . He denies fever . he continues with mild back pain . \par \par 11/19/2019 patient returns for follow up , he continues on venetoclax/obino and feels well except for persistent back pain , MRI done elsewhere  was allegedly normal , spleen is no longer palpable , Hb is 13 , wbc is normal except for lymphopenia , platelets  : 111 .  He reports occasional episodes of abdominal cramping with nausea. no weight loss. \par \par 12/16/19: Pt returns for a f/u visit. He has been c/o nausea with decreased appetite. However, he has not been taking antiemetics as previously prescribed. Has lost 11 lbs in the last month. Also had a recent TIA manifested as aphasia that lasted for about 45 mins. He was treated in an ED in [place]. CTH was unremarkable. Echo showed EF 45-50%. No clots seen. US carotids showed plaques but no significant stenosis. He is being seen by neurology in [place] and will have MRI/MRA. He was already on plavix and coumadin before having TIA. His INR at the time of ER visit was 4.3. \par \par 01/13/2020\par Patient returns for a follow up visit. This will be his last cycle of obinutuzumab ( #6). He is on venetoclax 300 mg. It was dose reduced on 12/16 as he was complaining of nausea and abdominal pain. He also has chronic back pain. He remains on coumadin and plavix. \par He is tolerating obinutuzumab with no reactions. \par \par 2/6/2020: TERRELL ATKINSON is a 79 year old male here today for sick visit. He has CLL and was treated last with obinutuzumab last dose on 1/16/2020. Venetoclax was stopped due to adverse effects , He states that he developed a fever of 101 degrees yesterday associated with chills and a mild cough with hemoptysis. He continues to spike fever today. He is taking Tylenol for symptom relief. He denies SOB and chest pain at this time. \par \par 06/19/2020 Today's virtual encounter was done via telehealth given the situation surrounding Covid 19 outbreak . He is staying currently in [place] , he had recently a virtual visit with his cardiologist as well . He has good and bad days , he continues with left sided abdominal pain , he has allegedly lost more weight ( 25 lbs in total since last year ) . He denies fever or night sweats , he was last treated for a respiratory infection in February and his wife is wondering if he had contracted Covid 19 ( not tested , chest xray was negative ) . Blood work from April showed normal Hb , wbc and platelet improved compared to 1/2020 . He continues on coumadin and denies any abnormal bleeding or symptoms suggestive of cardioembolic disorder. \par \par 01/18/2021 Patient returns for follow p for CLL s/p venetoclax/obinotuzumab , treatment truncated due to several factors including Covid 19 pandemics. His weight is stable after losing more than 20 lbs , he continues to complain of mild abdominal pain after meals. no fever or night sweats . his last CBC shows normal wbc with lymphopenia , Hb 12.7 , platelets : 102  CT abdomen in 9/2020 was negative for adenopathy and splenomegaly .\par His main problem is currently urinary retention due to BPH , not surgical candidate due to bleeding risk . He is scheduled for embolization in [facility] tomorrow , he stopped plavix and coumadin , he continues on ASA and bridging with lovenox. \par \par 03/22/2021 Patient returns for for follow up for CLL in remission , currently on watchful waiting . He had right prostatic embolization with slight improvement in voiding symptoms , he is scheduled for left side followed by light therapy . He denies any new symptoms except inability to gain weight . \par \par 05/24/2021 Patient returns for follow up , he was seen in ED recently for abdominal pain , CT scan showed fluid filled right inguinal hernia , no other abnormality or evidence of obstruction , blood work significant for Hb decreased from baseline . platelets and wbc were stable , he felt better with IV hydration  . he denies fever or weight loss. Main complaint is chronic back pain . \par \par \par 11/15/2021 Patient returns for interval follow up . He has his good and bad days .  He continues to complain of back pain , inability to regain weight , Intermittent complaints of Gagging , has a reducible right inguinal hernia , no BM issues . he is on iron every other day . Denies fever or night sweats . His lower urinary tract symptoms are markedly improved after surgery . \par \par \par 2/15/22- Patient is here for follow up for management of CLL. He has complaints of chronic fatigue. Unable to gain weight. He eats well some days and bad on other days. Denies any fever , chills, night sweats.

## 2022-02-16 NOTE — ASSESSMENT
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 \par He completed 6 cycles of Obinituzumab and Venetoclax was dose reduced to 300mg then stopped in 2020 secondary to persistent side effects, \par Clinically he had good response with spleen no longer being palpable and good hematological response. He lost around 20 lbs , unable to regain weight .  \par 2) CBC with mild thrombocytopenia . Hgb back to normal on iron . \par \par 3)Chronic nausea . \par 4) BPH s/p green light laser surgery on July 15 . Lower UT symptoms improved . \par 5) right inguinal hernia \par 6) Mechanical AVR on Coumadin \par \par PLAN: \par Will order CT CAP given his non specific symptoms as well as previously noted 2 mm lung nodule. \par Will do labs- CMP, LDH, Hapto, ret count and rafael test. \par Recommended to f/u with surgery for inguinal hernia \par follow up in 3 months . \par \par Pt was seen and examined with Dr Carey who agrees with plan of care .

## 2022-02-28 ENCOUNTER — OUTPATIENT (OUTPATIENT)
Dept: OUTPATIENT SERVICES | Facility: HOSPITAL | Age: 82
LOS: 1 days | Discharge: HOME | End: 2022-02-28

## 2022-02-28 ENCOUNTER — APPOINTMENT (OUTPATIENT)
Dept: MEDICATION MANAGEMENT | Facility: CLINIC | Age: 82
End: 2022-02-28

## 2022-02-28 ENCOUNTER — APPOINTMENT (OUTPATIENT)
Dept: CARDIOLOGY | Facility: CLINIC | Age: 82
End: 2022-02-28
Payer: MEDICARE

## 2022-02-28 DIAGNOSIS — Z79.01 LONG TERM (CURRENT) USE OF ANTICOAGULANTS: ICD-10-CM

## 2022-02-28 DIAGNOSIS — Z95.2 PRESENCE OF PROSTHETIC HEART VALVE: Chronic | ICD-10-CM

## 2022-02-28 DIAGNOSIS — I48.91 UNSPECIFIED ATRIAL FIBRILLATION: ICD-10-CM

## 2022-02-28 LAB
INR PPP: 2.8 RATIO
POCT-PROTHROMBIN TIME: 34 SECS
QUALITY CONTROL: YES

## 2022-02-28 PROCEDURE — 93306 TTE W/DOPPLER COMPLETE: CPT

## 2022-03-03 ENCOUNTER — RESULT REVIEW (OUTPATIENT)
Age: 82
End: 2022-03-03

## 2022-03-03 ENCOUNTER — OUTPATIENT (OUTPATIENT)
Dept: OUTPATIENT SERVICES | Facility: HOSPITAL | Age: 82
LOS: 1 days | Discharge: HOME | End: 2022-03-03
Payer: MEDICARE

## 2022-03-03 DIAGNOSIS — Z95.2 PRESENCE OF PROSTHETIC HEART VALVE: Chronic | ICD-10-CM

## 2022-03-03 DIAGNOSIS — C91.10 CHRONIC LYMPHOCYTIC LEUKEMIA OF B-CELL TYPE NOT HAVING ACHIEVED REMISSION: ICD-10-CM

## 2022-03-03 PROCEDURE — 74176 CT ABD & PELVIS W/O CONTRAST: CPT | Mod: 26,MH

## 2022-03-03 PROCEDURE — 71250 CT THORAX DX C-: CPT | Mod: 26,MH

## 2022-03-08 ENCOUNTER — NON-APPOINTMENT (OUTPATIENT)
Age: 82
End: 2022-03-08

## 2022-03-21 ENCOUNTER — OUTPATIENT (OUTPATIENT)
Dept: OUTPATIENT SERVICES | Facility: HOSPITAL | Age: 82
LOS: 1 days | Discharge: HOME | End: 2022-03-21

## 2022-03-21 ENCOUNTER — APPOINTMENT (OUTPATIENT)
Dept: MEDICATION MANAGEMENT | Facility: CLINIC | Age: 82
End: 2022-03-21

## 2022-03-21 VITALS — OXYGEN SATURATION: 98 % | RESPIRATION RATE: 16 BRPM | HEART RATE: 60 BPM

## 2022-03-21 DIAGNOSIS — Z95.2 PRESENCE OF PROSTHETIC HEART VALVE: Chronic | ICD-10-CM

## 2022-03-21 DIAGNOSIS — Z79.01 LONG TERM (CURRENT) USE OF ANTICOAGULANTS: ICD-10-CM

## 2022-03-21 DIAGNOSIS — I48.91 UNSPECIFIED ATRIAL FIBRILLATION: ICD-10-CM

## 2022-03-21 LAB
INR PPP: 2.3 RATIO
POCT-PROTHROMBIN TIME: 27.7 SECS
QUALITY CONTROL: YES

## 2022-04-05 ENCOUNTER — OUTPATIENT (OUTPATIENT)
Dept: OUTPATIENT SERVICES | Facility: HOSPITAL | Age: 82
LOS: 1 days | Discharge: HOME | End: 2022-04-05

## 2022-04-05 ENCOUNTER — APPOINTMENT (OUTPATIENT)
Dept: MEDICATION MANAGEMENT | Facility: CLINIC | Age: 82
End: 2022-04-05

## 2022-04-05 VITALS — OXYGEN SATURATION: 99 % | RESPIRATION RATE: 16 BRPM | HEART RATE: 55 BPM

## 2022-04-05 DIAGNOSIS — Z79.01 LONG TERM (CURRENT) USE OF ANTICOAGULANTS: ICD-10-CM

## 2022-04-05 DIAGNOSIS — I48.91 UNSPECIFIED ATRIAL FIBRILLATION: ICD-10-CM

## 2022-04-05 DIAGNOSIS — Z95.2 PRESENCE OF PROSTHETIC HEART VALVE: Chronic | ICD-10-CM

## 2022-04-05 LAB
INR PPP: 2.3 RATIO
POCT-PROTHROMBIN TIME: 27.3 SECS
QUALITY CONTROL: YES

## 2022-04-12 ENCOUNTER — APPOINTMENT (OUTPATIENT)
Dept: CARDIOLOGY | Facility: CLINIC | Age: 82
End: 2022-04-12
Payer: MEDICARE

## 2022-04-12 VITALS
HEIGHT: 70 IN | WEIGHT: 130 LBS | BODY MASS INDEX: 18.61 KG/M2 | TEMPERATURE: 97.3 F | SYSTOLIC BLOOD PRESSURE: 126 MMHG | HEART RATE: 52 BPM | DIASTOLIC BLOOD PRESSURE: 70 MMHG

## 2022-04-12 DIAGNOSIS — R33.9 RETENTION OF URINE, UNSPECIFIED: ICD-10-CM

## 2022-04-12 PROCEDURE — 93000 ELECTROCARDIOGRAM COMPLETE: CPT

## 2022-04-12 PROCEDURE — 99214 OFFICE O/P EST MOD 30 MIN: CPT

## 2022-04-12 NOTE — HISTORY OF PRESENT ILLNESS
[FreeTextEntry1] : The patient had lost weight . He had weakness . He has not had chest pain or SOB . Echocardiogram showd an EF of 49% AVR mechanical Function is adequate with mild paravalvular leak . The patient has an AAA and iliac artery aneurysm and he was instructed to see vascular and he has yet to make appoinment . He was referred to see Dr. López for his fungating lesion on the left shoulder and he was referred to see Dr. Ponce for his hernia but he has yet to make appointments for this and he has not seen GO . He has lost weight

## 2022-04-12 NOTE — ASSESSMENT
[FreeTextEntry1] : The patiient has multiple issues . His AVR functions adequately on last echo and on exam as well. The patient has CLL and is being followed by oncology . He has an EF of 49 % . the patient has a fungating lesion on his left shoulder and this will need plastics to evaluate , suspcious for basal cell or squamous cell lesion of the skin . He has yet to make appoinment . 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 He was told of the need for vascular evalaution and has yet to make the appointmet . He also has a left inguinal hernia and has not seen Dr. Ponce

## 2022-04-12 NOTE — PHYSICAL EXAM
[General Appearance - Well Developed] : well developed [Normal Appearance] : normal appearance [Well Groomed] : well groomed [General Appearance - Well Nourished] : well nourished [No Deformities] : no deformities [General Appearance - In No Acute Distress] : no acute distress [Normal Conjunctiva] : the conjunctiva exhibited no abnormalities [Eyelids - No Xanthelasma] : the eyelids demonstrated no xanthelasmas [Normal Oral Mucosa] : normal oral mucosa [No Oral Pallor] : no oral pallor [No Oral Cyanosis] : no oral cyanosis [Respiration, Rhythm And Depth] : normal respiratory rhythm and effort [Exaggerated Use Of Accessory Muscles For Inspiration] : no accessory muscle use [Abdomen Mass (___ Cm)] : no abdominal mass palpated [Abnormal Walk] : normal gait [Nail Clubbing] : no clubbing of the fingernails [Cyanosis, Localized] : no localized cyanosis [Petechial Hemorrhages (___cm)] : no petechial hemorrhages [Skin Color & Pigmentation] : normal skin color and pigmentation [] : no rash [No Venous Stasis] : no venous stasis [No Skin Ulcers] : no skin ulcer [No Xanthoma] : no  xanthoma was observed [FreeTextEntry1] : large lesion on the left shoulder fungating mass .  [Normal Rate] : normal [Rhythm Regular] : regular [Prosthetic Aortic Valve] : prosthetic aortic valve heard [II] : a grade 2 [1+] : left 1+ [No Pitting Edema] : no pitting edema present [Rt] : no varicose veins of the right leg

## 2022-04-12 NOTE — CARDIOLOGY SUMMARY
[de-identified] : NSR IVDD LAD \par 12- Sinus Bradycardia IVCD LAD LVH NS T wave change.  [de-identified] : 9- EF 42% mild MR Mod TR Mechanical MVR SIDDHARTH 1.1 with mild paravalvular AI . LVSI was 23cc/m2 \par 2-2022 EF 49% AVR junction is adequate . Paravalvular AI  [___] : [unfilled]

## 2022-04-12 NOTE — REASON FOR VISIT
[Arrhythmia/ECG Abnorrmalities] : arrhythmia/ECG abnormalities [Structural Heart and Valve Disease] : structural heart and valve disease [FreeTextEntry3] : Dr. Parisi  [Follow-Up - Clinic] : a clinic follow-up of [Aortic Stenosis] : aortic stenosis [Coronary Artery Disease] : coronary artery disease [Hyperlipidemia] : hyperlipidemia [Hypertension] : hypertension

## 2022-04-24 ENCOUNTER — RX RENEWAL (OUTPATIENT)
Age: 82
End: 2022-04-24

## 2022-04-26 ENCOUNTER — APPOINTMENT (OUTPATIENT)
Dept: MEDICATION MANAGEMENT | Facility: CLINIC | Age: 82
End: 2022-04-26

## 2022-04-26 ENCOUNTER — APPOINTMENT (OUTPATIENT)
Dept: VASCULAR SURGERY | Facility: CLINIC | Age: 82
End: 2022-04-26
Payer: MEDICARE

## 2022-04-26 ENCOUNTER — OUTPATIENT (OUTPATIENT)
Dept: OUTPATIENT SERVICES | Facility: HOSPITAL | Age: 82
LOS: 1 days | Discharge: HOME | End: 2022-04-26

## 2022-04-26 VITALS
WEIGHT: 130 LBS | HEIGHT: 70 IN | SYSTOLIC BLOOD PRESSURE: 151 MMHG | BODY MASS INDEX: 18.61 KG/M2 | DIASTOLIC BLOOD PRESSURE: 70 MMHG

## 2022-04-26 DIAGNOSIS — M47.819 SPONDYLOSIS W/OUT MYELOPATHY OR RADICULOPATHY, SITE UNSPECIFIED: ICD-10-CM

## 2022-04-26 DIAGNOSIS — Z79.01 LONG TERM (CURRENT) USE OF ANTICOAGULANTS: ICD-10-CM

## 2022-04-26 DIAGNOSIS — Z95.2 PRESENCE OF PROSTHETIC HEART VALVE: Chronic | ICD-10-CM

## 2022-04-26 DIAGNOSIS — I48.91 UNSPECIFIED ATRIAL FIBRILLATION: ICD-10-CM

## 2022-04-26 LAB
INR PPP: 2.3 RATIO
POCT-PROTHROMBIN TIME: 27.2 SECS
QUALITY CONTROL: YES

## 2022-04-26 PROCEDURE — 93925 LOWER EXTREMITY STUDY: CPT

## 2022-04-26 PROCEDURE — 93978 VASCULAR STUDY: CPT

## 2022-04-26 PROCEDURE — 99204 OFFICE O/P NEW MOD 45 MIN: CPT

## 2022-04-26 NOTE — HISTORY OF PRESENT ILLNESS
[FreeTextEntry1] : 81 y/o gentleman with h/o mechanical aortic valve, atrial fibrillation on Coumadin, h/o AAA, sent in by Cardiologist for evaluation. 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

## 2022-04-26 NOTE — CONSULT LETTER
[Dear  ___] : Dear  [unfilled], [Consult Letter:] : I had the pleasure of evaluating your patient, [unfilled]. [Please see my note below.] : Please see my note below. [FreeTextEntry2] : Dear Dr. Juan Torres,

## 2022-04-26 NOTE — DATA REVIEWED
[FreeTextEntry1] : I performed an arterial duplex which was medically necessary to evaluate for AAA, popliteal artery aneurysms. It showed AAA 3 cm, 2 cm right common iliac artery and 1.9 cm left common iliac artery aneurysms. 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

## 2022-04-26 NOTE — ASSESSMENT
[FreeTextEntry1] : 83 y/o gentleman with AAA, iliac artery aneurysms and left CFA dilatation.\par \par Arterial duplex showed AAA 3 cm, 2 cm right common iliac artery and 1.9 cm left common iliac artery aneurysms. 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

## 2022-04-28 ENCOUNTER — APPOINTMENT (OUTPATIENT)
Dept: PLASTIC SURGERY | Facility: CLINIC | Age: 82
End: 2022-04-28
Payer: MEDICARE

## 2022-04-28 VITALS — BODY MASS INDEX: 18.61 KG/M2 | HEIGHT: 70 IN | WEIGHT: 130 LBS

## 2022-04-28 PROCEDURE — 99203 OFFICE O/P NEW LOW 30 MIN: CPT

## 2022-05-09 ENCOUNTER — OUTPATIENT (OUTPATIENT)
Dept: OUTPATIENT SERVICES | Facility: HOSPITAL | Age: 82
LOS: 1 days | Discharge: HOME | End: 2022-05-09
Payer: MEDICARE

## 2022-05-09 VITALS
TEMPERATURE: 97 F | OXYGEN SATURATION: 96 % | HEART RATE: 74 BPM | RESPIRATION RATE: 16 BRPM | SYSTOLIC BLOOD PRESSURE: 104 MMHG | DIASTOLIC BLOOD PRESSURE: 65 MMHG | WEIGHT: 130.07 LBS | HEIGHT: 70 IN

## 2022-05-09 DIAGNOSIS — Z01.818 ENCOUNTER FOR OTHER PREPROCEDURAL EXAMINATION: ICD-10-CM

## 2022-05-09 DIAGNOSIS — D48.5 NEOPLASM OF UNCERTAIN BEHAVIOR OF SKIN: ICD-10-CM

## 2022-05-09 DIAGNOSIS — Z95.2 PRESENCE OF PROSTHETIC HEART VALVE: Chronic | ICD-10-CM

## 2022-05-09 LAB
ALBUMIN SERPL ELPH-MCNC: 4.9 G/DL — SIGNIFICANT CHANGE UP (ref 3.5–5.2)
ALP SERPL-CCNC: 52 U/L — SIGNIFICANT CHANGE UP (ref 30–115)
ALT FLD-CCNC: 11 U/L — SIGNIFICANT CHANGE UP (ref 0–41)
ANION GAP SERPL CALC-SCNC: 11 MMOL/L — SIGNIFICANT CHANGE UP (ref 7–14)
APTT BLD: 34.8 SEC — SIGNIFICANT CHANGE UP (ref 27–39.2)
AST SERPL-CCNC: 16 U/L — SIGNIFICANT CHANGE UP (ref 0–41)
BASOPHILS # BLD AUTO: 0.07 K/UL — SIGNIFICANT CHANGE UP (ref 0–0.2)
BASOPHILS NFR BLD AUTO: 0.7 % — SIGNIFICANT CHANGE UP (ref 0–1)
BILIRUB SERPL-MCNC: 1 MG/DL — SIGNIFICANT CHANGE UP (ref 0.2–1.2)
BUN SERPL-MCNC: 14 MG/DL — SIGNIFICANT CHANGE UP (ref 10–20)
CALCIUM SERPL-MCNC: 9.7 MG/DL — SIGNIFICANT CHANGE UP (ref 8.5–10.1)
CHLORIDE SERPL-SCNC: 103 MMOL/L — SIGNIFICANT CHANGE UP (ref 98–110)
CO2 SERPL-SCNC: 29 MMOL/L — SIGNIFICANT CHANGE UP (ref 17–32)
CREAT SERPL-MCNC: 0.8 MG/DL — SIGNIFICANT CHANGE UP (ref 0.7–1.5)
EGFR: 88 ML/MIN/1.73M2 — SIGNIFICANT CHANGE UP
EOSINOPHIL # BLD AUTO: 0.18 K/UL — SIGNIFICANT CHANGE UP (ref 0–0.7)
EOSINOPHIL NFR BLD AUTO: 1.9 % — SIGNIFICANT CHANGE UP (ref 0–8)
GLUCOSE SERPL-MCNC: 55 MG/DL — LOW (ref 70–99)
HCT VFR BLD CALC: 39.1 % — LOW (ref 42–52)
HGB BLD-MCNC: 13.5 G/DL — LOW (ref 14–18)
IMM GRANULOCYTES NFR BLD AUTO: 0.4 % — HIGH (ref 0.1–0.3)
INR BLD: 1.68 RATIO — HIGH (ref 0.65–1.3)
LYMPHOCYTES # BLD AUTO: 0.99 K/UL — LOW (ref 1.2–3.4)
LYMPHOCYTES # BLD AUTO: 10.2 % — LOW (ref 20.5–51.1)
MCHC RBC-ENTMCNC: 34.1 PG — HIGH (ref 27–31)
MCHC RBC-ENTMCNC: 34.5 G/DL — SIGNIFICANT CHANGE UP (ref 32–37)
MCV RBC AUTO: 98.7 FL — HIGH (ref 80–94)
MONOCYTES # BLD AUTO: 0.82 K/UL — HIGH (ref 0.1–0.6)
MONOCYTES NFR BLD AUTO: 8.5 % — SIGNIFICANT CHANGE UP (ref 1.7–9.3)
NEUTROPHILS # BLD AUTO: 7.57 K/UL — HIGH (ref 1.4–6.5)
NEUTROPHILS NFR BLD AUTO: 78.3 % — HIGH (ref 42.2–75.2)
NRBC # BLD: 0 /100 WBCS — SIGNIFICANT CHANGE UP (ref 0–0)
PLATELET # BLD AUTO: 156 K/UL — SIGNIFICANT CHANGE UP (ref 130–400)
POTASSIUM SERPL-MCNC: 3.9 MMOL/L — SIGNIFICANT CHANGE UP (ref 3.5–5)
POTASSIUM SERPL-SCNC: 3.9 MMOL/L — SIGNIFICANT CHANGE UP (ref 3.5–5)
PROT SERPL-MCNC: 6.5 G/DL — SIGNIFICANT CHANGE UP (ref 6–8)
PROTHROM AB SERPL-ACNC: 19.2 SEC — HIGH (ref 9.95–12.87)
RBC # BLD: 3.96 M/UL — LOW (ref 4.7–6.1)
RBC # FLD: 12.7 % — SIGNIFICANT CHANGE UP (ref 11.5–14.5)
SODIUM SERPL-SCNC: 143 MMOL/L — SIGNIFICANT CHANGE UP (ref 135–146)
WBC # BLD: 9.67 K/UL — SIGNIFICANT CHANGE UP (ref 4.8–10.8)
WBC # FLD AUTO: 9.67 K/UL — SIGNIFICANT CHANGE UP (ref 4.8–10.8)

## 2022-05-09 PROCEDURE — 93010 ELECTROCARDIOGRAM REPORT: CPT

## 2022-05-09 RX ORDER — WARFARIN SODIUM 2.5 MG/1
0 TABLET ORAL
Qty: 0 | Refills: 0 | DISCHARGE

## 2022-05-09 RX ORDER — CLOPIDOGREL BISULFATE 75 MG/1
1 TABLET, FILM COATED ORAL
Qty: 0 | Refills: 0 | DISCHARGE

## 2022-05-09 NOTE — H&P PST ADULT - CONSTITUTIONAL DETAILS
03/02/20 1000   Follow Up   Attempted, but not seen Yes   Reason not seen Other (comment)  (Parent refused; patient sleeing and did not want to wake)      well-groomed/no distress/cachectic

## 2022-05-09 NOTE — H&P PST ADULT - REASON FOR ADMISSION
Case Type: OP Block TimeSuite: CASProceduralist: Arben López  Confirmed Surgery DateTime: 05- - 0:00PAST DateTime: 05- - 17:45Procedure: EXCISION OF LEFT POSTERIOR SHOULDER LESION, POSSIBLE SPLIT THICKNESS SKIN GRAFT FROM BACK OR BILATERAL LEG  ERP?: UnavailableLaterality: LeftLength of Procedure: 90 Minutes  Anesthesia Type: General

## 2022-05-09 NOTE — H&P PST ADULT - HISTORY OF PRESENT ILLNESS
81 yo male presents for PAST in preparation for EXCISION OF LEFT POSTERIOR SHOULDER LESION, POSSIBLE SPLIT   Pt complains of "chronic skin cancer" to his left shoulder. Also reports occasional bleeding, Pt is on Warfarin (s/p valve replaced), PMH of leukemia and Lymphoma, radiation and chemo in 1996 and 2020.  Denies any chest pain, difficulty breathing, SOB, palpitations, dysuria, URI, or any other infections in the last 2 weeks/1 month. Denies any recent travel, contact, or exposure to any persons with known or suspected COVID-19. Pt also denies COVID testing within the last 2 weeks. Pt advised to self quarantine until day of procedure. Exercise tolerance of 2-3 city blocks  without dyspnea. CONTRERAS reviewed with patient.    Anesthesia Alert  NO--Difficult Airway  NO--History of neck surgery or radiation  NO--Limited ROM of neck  NO--History of Malignant hyperthermia  NO--Personal or family history of Pseudocholinesterase deficiency.  NO--Prior Anesthesia Complication  NO--Latex Allergy  NO--Loose teeth  NO--History of Rheumatoid Arthritis  NO--CONTRERAS  NO--Bleeding risk  NO--Other_____   written and verbal instructions with teach back on chlorhexidine shampoo provided,  pt verbalized understanding with returned demonstration  Patient verbalized understanding of instructions and was given the opportunity to ask questions and have them answered.

## 2022-05-09 NOTE — H&P PST ADULT - NSICDXPASTMEDICALHX_GEN_ALL_CORE_FT
PAST MEDICAL HISTORY:  Afib     Aortic valve stenosis s/p replacement 2009    BPH (benign prostatic hyperplasia)     CAD (coronary artery disease) 2004 1 stent    Leukemia, lymphoid     Lymphoma 1996, s/p chemo& radiation

## 2022-05-11 PROBLEM — C91.90 LYMPHOID LEUKEMIA, UNSPECIFIED NOT HAVING ACHIEVED REMISSION: Chronic | Status: ACTIVE | Noted: 2022-05-09

## 2022-05-11 NOTE — PHYSICAL EXAM
[NI] : Normal [de-identified] : thin male, NAD [de-identified] : posterior left shoulder lesion approx 3cm x 2.8cm   raised, inflamed

## 2022-05-11 NOTE — ASSESSMENT
[FreeTextEntry1] : 83 yo M with lesion left posterior shoulder, requires complete removal for tissue diagnosis and control of a weeping, draining wound.\par \par uncertain of dx--not a classic looking BCC or SCC\par \par Regarding the reconstruction after skin cancer  surgery, we discussed scarring, risk of repeat procedure, poor wound healing, need for additional revisionary surgery,asymmetry, dissatisfaction with the outcome, and unplanned surgery in the future.  All questions were answered.  All risks were well understood by the patient.\par \par Regarding the procedure, we discussed scarring, poor wound healing, bleeding, infection, need for additional surgery, and dissatisfaction with the outcome.  Also discussed possibility of keloid and/or hypertrophic scar formation as well as recurrence.  All questions were answered and risks understood.\par \par Photos taken with patient permission.\par \par Due to COVID 19, pre-visit patient instructions were explained to the patient and their symptoms were checked upon arrival.  \par Masks were used by the health care providers and staff and the examination room was cleaned after the patient visit was completed.\par

## 2022-05-11 NOTE — HISTORY OF PRESENT ILLNESS
[FreeTextEntry1] : 82 yr old male w multiple medical problems including HTN, mechanical AVR (on Coumadin),  AAA, iliac artery aneurysm and CLL referred by Dr. Torres for evaluation and treatment of left shoulder lesion present for approximately 10 years increasing in size and now draining spontaneously. Denies any prior h/o skin cancer. \par \par nonsmoker\par nondiabetic\par \par lost 40-50 lbs over last two years\par had chemo for leukemia\par \par

## 2022-05-17 ENCOUNTER — APPOINTMENT (OUTPATIENT)
Dept: CARDIOLOGY | Facility: CLINIC | Age: 82
End: 2022-05-17
Payer: MEDICARE

## 2022-05-17 ENCOUNTER — APPOINTMENT (OUTPATIENT)
Dept: MEDICATION MANAGEMENT | Facility: CLINIC | Age: 82
End: 2022-05-17

## 2022-05-17 ENCOUNTER — OUTPATIENT (OUTPATIENT)
Dept: OUTPATIENT SERVICES | Facility: HOSPITAL | Age: 82
LOS: 1 days | Discharge: HOME | End: 2022-05-17

## 2022-05-17 VITALS
WEIGHT: 128 LBS | TEMPERATURE: 97 F | BODY MASS INDEX: 18.32 KG/M2 | DIASTOLIC BLOOD PRESSURE: 80 MMHG | SYSTOLIC BLOOD PRESSURE: 148 MMHG | HEIGHT: 70 IN | HEART RATE: 59 BPM

## 2022-05-17 DIAGNOSIS — Z95.2 PRESENCE OF PROSTHETIC HEART VALVE: Chronic | ICD-10-CM

## 2022-05-17 DIAGNOSIS — I48.91 UNSPECIFIED ATRIAL FIBRILLATION: ICD-10-CM

## 2022-05-17 DIAGNOSIS — Z79.01 LONG TERM (CURRENT) USE OF ANTICOAGULANTS: ICD-10-CM

## 2022-05-17 LAB
INR PPP: 2.4 RATIO
POCT-PROTHROMBIN TIME: 28.5 SECS
QUALITY CONTROL: YES

## 2022-05-17 PROCEDURE — 99214 OFFICE O/P EST MOD 30 MIN: CPT

## 2022-05-17 PROCEDURE — 93000 ELECTROCARDIOGRAM COMPLETE: CPT

## 2022-05-17 NOTE — PHYSICAL EXAM
[General Appearance - Well Developed] : well developed [Normal Appearance] : normal appearance [Well Groomed] : well groomed [General Appearance - Well Nourished] : well nourished [No Deformities] : no deformities [General Appearance - In No Acute Distress] : no acute distress [Normal Conjunctiva] : the conjunctiva exhibited no abnormalities [Eyelids - No Xanthelasma] : the eyelids demonstrated no xanthelasmas [Normal Oral Mucosa] : normal oral mucosa [No Oral Pallor] : no oral pallor [No Oral Cyanosis] : no oral cyanosis [Respiration, Rhythm And Depth] : normal respiratory rhythm and effort [Exaggerated Use Of Accessory Muscles For Inspiration] : no accessory muscle use [Abdomen Mass (___ Cm)] : no abdominal mass palpated [Abnormal Walk] : normal gait [Nail Clubbing] : no clubbing of the fingernails [Cyanosis, Localized] : no localized cyanosis [Petechial Hemorrhages (___cm)] : no petechial hemorrhages [Skin Color & Pigmentation] : normal skin color and pigmentation [] : no rash [No Venous Stasis] : no venous stasis [No Skin Ulcers] : no skin ulcer [No Xanthoma] : no  xanthoma was observed [Normal Rate] : normal [Rhythm Regular] : regular [Prosthetic Aortic Valve] : prosthetic aortic valve heard [II] : a grade 2 [1+] : left 1+ [No Pitting Edema] : no pitting edema present [FreeTextEntry1] : large lesion on the left shoulder fungating mass .  [Rt] : no varicose veins of the right leg

## 2022-05-17 NOTE — CARDIOLOGY SUMMARY
[___] : [unfilled] [de-identified] : NSR IVDD LAD \par 12- Sinus Bradycardia IVCD LAD LVH NS T wave change. \par 5-  Sinus bradycardia IVCD LAD NS  twave change.  [de-identified] : 9- EF 42% mild MR Mod TR Mechanical MVR SIDDHARTH 1.1 with mild paravalvular AI . LVSI was 23cc/m2 \par 2-2022 EF 49% AVR junction is adequate . Paravalvular AI

## 2022-05-17 NOTE — HISTORY OF PRESENT ILLNESS
[FreeTextEntry1] : The patient had lost weight . He had weakness . He has not had chest pain or SOB . Echocardiogram showd an EF of 49% AVR mechanical Function is adequate with mild paravalvular leak . The patient had seen vascular and he had seen Dr. López . He going for resection of his skin lesion with skin grafting . He has not seen Dr. savage as of yet

## 2022-05-17 NOTE — ASSESSMENT
[FreeTextEntry1] : The patiient has multiple issues . His AVR functions adequately on last echo and on exam as well. The patient has CLL and is being followed by oncology . He has an EF of 49 % . the patient has a fungating lesion on his left shoulder and this will need plastics to evaluate , suspicious for basal cell or squamous cell lesion of the skin . He is going for recetion of this lesion and skin grafting . The patient has more than 4 METS exercise tolerance . He has had a remote PCI and LAD stent  . The patient is an intermediate cardiac risk undergoing a minor to intermediate risk procedure. The patient has a mechanical AVR . He is on coumadin .

## 2022-05-19 ENCOUNTER — APPOINTMENT (OUTPATIENT)
Dept: HEMATOLOGY ONCOLOGY | Facility: CLINIC | Age: 82
End: 2022-05-19

## 2022-05-20 ENCOUNTER — APPOINTMENT (OUTPATIENT)
Dept: MEDICATION MANAGEMENT | Facility: CLINIC | Age: 82
End: 2022-05-20

## 2022-05-20 ENCOUNTER — OUTPATIENT (OUTPATIENT)
Dept: OUTPATIENT SERVICES | Facility: HOSPITAL | Age: 82
LOS: 1 days | Discharge: HOME | End: 2022-05-20

## 2022-05-20 ENCOUNTER — LABORATORY RESULT (OUTPATIENT)
Age: 82
End: 2022-05-20

## 2022-05-20 DIAGNOSIS — Z95.2 PRESENCE OF PROSTHETIC HEART VALVE: Chronic | ICD-10-CM

## 2022-05-20 DIAGNOSIS — I48.91 UNSPECIFIED ATRIAL FIBRILLATION: ICD-10-CM

## 2022-05-20 DIAGNOSIS — Z79.01 LONG TERM (CURRENT) USE OF ANTICOAGULANTS: ICD-10-CM

## 2022-05-20 LAB
INR PPP: 2.3 RATIO
POCT-PROTHROMBIN TIME: 27.5 SECS
QUALITY CONTROL: YES

## 2022-05-20 NOTE — ASU PATIENT PROFILE, ADULT - FALL HARM RISK - UNIVERSAL INTERVENTIONS
Bed in lowest position, wheels locked, appropriate side rails in place/Call bell, personal items and telephone in reach/Instruct patient to call for assistance before getting out of bed or chair/Non-slip footwear when patient is out of bed/[place] to call system/Physically safe environment - no spills, clutter or unnecessary equipment/Purposeful Proactive Rounding/Room/bathroom lighting operational, light cord in reach

## 2022-05-23 ENCOUNTER — APPOINTMENT (OUTPATIENT)
Dept: PLASTIC SURGERY | Facility: AMBULATORY SURGERY CENTER | Age: 82
End: 2022-05-23
Payer: MEDICARE

## 2022-05-23 ENCOUNTER — OUTPATIENT (OUTPATIENT)
Dept: OUTPATIENT SERVICES | Facility: HOSPITAL | Age: 82
LOS: 1 days | Discharge: HOME | End: 2022-05-23
Payer: MEDICARE

## 2022-05-23 ENCOUNTER — TRANSCRIPTION ENCOUNTER (OUTPATIENT)
Age: 82
End: 2022-05-23

## 2022-05-23 ENCOUNTER — RESULT REVIEW (OUTPATIENT)
Age: 82
End: 2022-05-23

## 2022-05-23 VITALS
SYSTOLIC BLOOD PRESSURE: 149 MMHG | OXYGEN SATURATION: 98 % | DIASTOLIC BLOOD PRESSURE: 69 MMHG | RESPIRATION RATE: 18 BRPM | HEIGHT: 70 IN | TEMPERATURE: 98 F | WEIGHT: 130.07 LBS | HEART RATE: 71 BPM

## 2022-05-23 VITALS
HEART RATE: 64 BPM | DIASTOLIC BLOOD PRESSURE: 67 MMHG | RESPIRATION RATE: 17 BRPM | SYSTOLIC BLOOD PRESSURE: 139 MMHG | OXYGEN SATURATION: 97 %

## 2022-05-23 DIAGNOSIS — Z95.2 PRESENCE OF PROSTHETIC HEART VALVE: Chronic | ICD-10-CM

## 2022-05-23 PROCEDURE — 88305 TISSUE EXAM BY PATHOLOGIST: CPT | Mod: 26

## 2022-05-23 PROCEDURE — 14001 TIS TRNFR TRUNK 10.1-30SQCM: CPT

## 2022-05-23 RX ORDER — SODIUM CHLORIDE 9 MG/ML
1000 INJECTION, SOLUTION INTRAVENOUS
Refills: 0 | Status: DISCONTINUED | OUTPATIENT
Start: 2022-05-23 | End: 2022-06-06

## 2022-05-23 RX ORDER — ALFUZOSIN HYDROCHLORIDE 10 MG/1
1 TABLET, EXTENDED RELEASE ORAL
Qty: 0 | Refills: 0 | DISCHARGE

## 2022-05-23 RX ORDER — TRAMADOL HYDROCHLORIDE 50 MG/1
1 TABLET ORAL
Qty: 10 | Refills: 0
Start: 2022-05-23

## 2022-05-23 RX ORDER — HYDROMORPHONE HYDROCHLORIDE 2 MG/ML
0.5 INJECTION INTRAMUSCULAR; INTRAVENOUS; SUBCUTANEOUS
Refills: 0 | Status: DISCONTINUED | OUTPATIENT
Start: 2022-05-23 | End: 2022-05-23

## 2022-05-23 RX ORDER — ONDANSETRON 8 MG/1
4 TABLET, FILM COATED ORAL ONCE
Refills: 0 | Status: DISCONTINUED | OUTPATIENT
Start: 2022-05-23 | End: 2022-06-06

## 2022-05-23 RX ADMIN — SODIUM CHLORIDE 75 MILLILITER(S): 9 INJECTION, SOLUTION INTRAVENOUS at 11:49

## 2022-05-23 NOTE — CHART NOTE - NSCHARTNOTEFT_GEN_A_CORE
PACU ANESTHESIA ADMISSION NOTE      Procedure:   Post op diagnosis:      ____  Intubated  TV:______       Rate: ______      FiO2: ______    __x__  Patent Airway    __x__  Full return of protective reflexes    __x__  Full recovery from anesthesia / back to baseline     Vitals:   T:  97.8         R: 20                 BP: 160/69                 Sat:  97                 P: 62      Mental Status:  __x__ Awake   ___x__ Alert   _____ Drowsy   _____ Sedated    Nausea/Vomiting:  _x___ NO  ______Yes,   See Post - Op Orders          Pain Scale (0-10):  _____    Treatment: __x__ None    ____ See Post - Op/PCA Orders    Post - Operative Fluids:   ____ Oral   ___x_ See Post - Op Orders    Plan: Discharge:   __x__Home       _____Floor     _____Critical Care    _____  Other:_________________    Comments:    Uneventful anesthesia. Patient transported to  spontaneously breathing and hemodynamically stable.

## 2022-05-23 NOTE — BRIEF OPERATIVE NOTE - NSICDXBRIEFPROCEDURE_GEN_ALL_CORE_FT
PROCEDURES:  Excision of malignant lesion of back 3.6 to 4.0cm in diameter, including margins 23-May-2022 11:54:53  Cezar Aguillon

## 2022-05-23 NOTE — ASU DISCHARGE PLAN (ADULT/PEDIATRIC) - CARE PROVIDER_API CALL
Arben López)  Plastic Surgery; Surgery of the Hand  [address] [place] [address], [address]  [place] [address]  Phone: [phone]  Fax: [phone]  Follow Up Time:

## 2022-05-23 NOTE — ASU DISCHARGE PLAN (ADULT/PEDIATRIC) - ASU DC SPECIAL INSTRUCTIONSFT
NOTIFY YOUR SURGEON IF YOU HAVE: any bleeding that does not stop, any pus draining from your wound(s), any fever (over 100.4 F) persistent nausea/vomiting, or if your pain is not controlled on your discharge pain medications, unable to urinate.    ACTIVITY: Please refrain from increased physical activity for a period of 2 weeks. Please do not lift anything heavier than a gallon of milk or about 5 pounds. Upon follow up you will be given further instructions on how much physical activity you may do.     DRESSING: please leave all dressings on, in place, and dry until follow up.    PAIN: Please take 1000mg Tylenol every 6 hours as needed. Please do not exceed 4000mg Tylenol in any 24 hour period. You have been prescribed tramadol tablets for pain not controlled with other medications. This is a narcotic medication and should only be taken as needed for severe pain. Please use it sparingly. Please do not drive or make important decisions while taking this medication. This medication has been sent to your designated pharmacy.     ANTIBIOTIC: not required    Please follow up with Dr. López. Please call his office at the number provided to schedule this appointment. Please call within 48 hours of leaving the hospital.

## 2022-05-23 NOTE — ASU DISCHARGE PLAN (ADULT/PEDIATRIC) - PATIENT EDUCATION MATERIALS PROVIED
Pain management/Provider pre-printed instructions given/Pre-printed instructions given for other (specify)

## 2022-05-23 NOTE — ASU DISCHARGE PLAN (ADULT/PEDIATRIC) - NS MD DC FALL RISK RISK
For information on Fall & Injury Prevention, visit: [email or web address] OR  [email or web address] OR  https://www.cdc.gov/steadi/patient.html

## 2022-05-23 NOTE — BRIEF OPERATIVE NOTE - OPERATION/FINDINGS
wide local excision of cutaneous malignancy, stitch marks 12 o'clock position on specimen, closure with elevation of local flaps and closure at midline, small secondary lesion also excised and closed in similar layered manner.

## 2022-05-25 LAB — SURGICAL PATHOLOGY STUDY: SIGNIFICANT CHANGE UP

## 2022-05-27 DIAGNOSIS — N40.0 BENIGN PROSTATIC HYPERPLASIA WITHOUT LOWER URINARY TRACT SYMPTOMS: ICD-10-CM

## 2022-05-27 DIAGNOSIS — L98.499 NON-PRESSURE CHRONIC ULCER OF SKIN OF OTHER SITES WITH UNSPECIFIED SEVERITY: ICD-10-CM

## 2022-05-27 DIAGNOSIS — L98.9 DISORDER OF THE SKIN AND SUBCUTANEOUS TISSUE, UNSPECIFIED: ICD-10-CM

## 2022-05-27 DIAGNOSIS — I48.91 UNSPECIFIED ATRIAL FIBRILLATION: ICD-10-CM

## 2022-05-27 DIAGNOSIS — Z95.5 PRESENCE OF CORONARY ANGIOPLASTY IMPLANT AND GRAFT: ICD-10-CM

## 2022-05-27 DIAGNOSIS — C44.619 BASAL CELL CARCINOMA OF SKIN OF LEFT UPPER LIMB, INCLUDING SHOULDER: ICD-10-CM

## 2022-05-27 DIAGNOSIS — Z79.01 LONG TERM (CURRENT) USE OF ANTICOAGULANTS: ICD-10-CM

## 2022-05-27 DIAGNOSIS — Z95.2 PRESENCE OF PROSTHETIC HEART VALVE: ICD-10-CM

## 2022-05-27 DIAGNOSIS — I25.10 ATHEROSCLEROTIC HEART DISEASE OF NATIVE CORONARY ARTERY WITHOUT ANGINA PECTORIS: ICD-10-CM

## 2022-05-27 DIAGNOSIS — Z85.6 PERSONAL HISTORY OF LEUKEMIA: ICD-10-CM

## 2022-05-27 DIAGNOSIS — Z85.72 PERSONAL HISTORY OF NON-HODGKIN LYMPHOMAS: ICD-10-CM

## 2022-05-31 ENCOUNTER — APPOINTMENT (OUTPATIENT)
Dept: PLASTIC SURGERY | Facility: CLINIC | Age: 82
End: 2022-05-31
Payer: MEDICARE

## 2022-05-31 PROCEDURE — 99024 POSTOP FOLLOW-UP VISIT: CPT

## 2022-05-31 NOTE — PHYSICAL EXAM
[NI] : Normal [de-identified] : thin male, NAD [de-identified] : Left posterior shoulder incision healing well, c/d/i, suture line intact, minimal swelling, no fluid collection

## 2022-05-31 NOTE — DATA REVIEWED
[FreeTextEntry1] :  Pathology             Final\par \par No Documents Attached\par \par \par \par   [facility] Accession Number : [id number]\par Patient:   TERRELL ATKINSON\par \par \par Accession:                             [id number]\par \par Collected Date/Time:                   5/23/2022 11:00 EDT\par Received Date/Time:                    5/23/2022 13:02 EDT\par \par Surgical Pathology Report - Auth (Verified)\par \par Specimen(s) Submitted\par 1  Left posterior shoulder lesion\par 2  Left anterior shoulder lesion\par \par Final Diagnosis\par 1  Left posterior shoulder lesion, excision:\par -Basal cell carcinoma (BCC) with ulcer.\par -All surgical margins of resection, free of the carcinoma (the BCC is\par \par at least 1 cm away from peripheral skin margin and 1 cm from deep soft\par tissue margin).\par -No histologic evidence of lymphovascular invasion identified.\par \par 2  Left anterior shoulder lesion, excision:\par -Skin with superficial ulcer,  no neoplasm seen.\par Verified by: Darci Lemons M.D.\par (Electronic Signature)\par Reported on: 05/25/22 15:36 EDT, [facility],\par [address] [place] [address]\par Phone: [phone]   Fax: [phone]\par _________________________________________________________________\par \par \par Clinical Information\par Excision\par Left posterior shoulder lesion present many years\par COVID (-)\par \par Perioperative Diagnosis\par Left posterior shoulder lesion\par \par Gross Description\par Specimen received in formalin, labeled "left posterior shoulder lesion-\par stitch marks 12:00, rule out carcinoma".  Specimen consists of an ellipse\par of white, wrinkled skin measuring  6 x 4.5 cm.  The specimen is oriented\par with a suture at 12:00.  The skin surface shows a tan-brown irregular,\par diffuse slightly elevated lesion on the skin surface, measuring 3 x 2\par x 0.5 cm.  This lesion is 1.5 cm from the 3:00 tip 1.6 cm from the 6:00\par margin 1.2 cm from the 9:00 tip, and 1 cm from the 12:00 margin.  The\par specimen is inked as follows: 3:00 tip orange,  9:00 tip yellow, 3:00\par to 6:00 short axis margin blue,  6:00 to 9:00 short axis margin purple,\par 9:00 to 12:00 short axis margin red, 12:00 to 3:00 short axis margin\par green, and deep margin black.  Specimen serially sectioned from 3:00 to\par 6:00 tip.  The cross section reveals the lesion is  0.2 cm in thickness\par \par and grossly does not appear to invade the subcutaneous tissue.  Specimen\par submitted entirely.\par \par Summary of sections:\par 1A-3:00 tip perpendicularly sectioned-1\par 1B-9:00 tip perpendicularly sectioned-1\par [id number]-gxcxit section from 3:00 to 9:00 tip-9\par \par Total 11 blocks\par \par 2.  Specimen received in formalin, labeled "left anterior shoulder lesion-\par stitch marks 12:00".  Specimen consists of an ellipse of white, wrinkled\par skin measuring 2.5 x 1 x 0.5 cm.  The specimen is not oriented with a\par suture at 12 o'clock.  There is a linear, light brown, irregular, diffuse\par lesion on the skin surface measuring 1.5 x 0.6 cm.  It is 0.3 cm from\par 1 short axis margin, it is 0.2 cm from the opposite short axis margin.\par Specimen is inked as follows 1 short axis margin and the corresponding\par deep margin orange, the opposite short axis margin and the corresponding\par deep margin black.  Specimen serially sectioned and submitted entirely.\par \par Summary of section:\par 1A-1 tip perpendicularly sectioned-1\par 1B-serial sections of the specimen-1\par 1C-opposite tip perpendicularly sectioned-1\par \par Total 3 blocks\par \par [facility] [address].\par \par 05/23/2022 16:37:41 EDT   rr\par \par  \par \par  Ordered by: VALENTINA TAM IV       Collected/Examined: 23May2022 11:00AM       \par Verification Required       Stage: Final       \par  Performed at: [facility]       Resulted: 25May2022 03:36PM       Last Updated: 25May2022 03:36PM       Accession: [id number]

## 2022-05-31 NOTE — ASSESSMENT
[FreeTextEntry1] : 83 yo M with lesion of left posterior shoulder, requires complete removal for tissue diagnosis and control of a weeping, draining wound.\par \par Now POD#8 s/p excision of left posterior shoulder BCC with LTR. Doing well. \par \par - dressing changed\par - may shower\par - pathology discussed - BCC, margins clear, report given to patient \par - post-op instructions discussed and all questions answered\par - f/u next week for suture removal and scar management \par \par Due to COVID 19, pre-visit patient instructions were explained to the patient and their symptoms were checked upon arrival.  \par Masks were used by the health care providers and staff and the examination room was cleaned after the patient visit was completed.\par

## 2022-05-31 NOTE — HISTORY OF PRESENT ILLNESS
[FreeTextEntry1] : 82 yr old male w multiple medical problems including HTN, mechanical AVR (on Coumadin),  AAA, iliac artery aneurysm and CLL referred by Dr. Torres for evaluation and treatment of left shoulder lesion present for approximately 10 years increasing in size and now draining spontaneously. Denies any prior h/o skin cancer. \par \par nonsmoker\par nondiabetic\par \par lost 40-50 lbs over last two years\par had chemo for leukemia\par \par Interval hx (5/31/22). Patient presents today POD#8 s/p excision of left shoulder lesion with LTR. Doing well with no significant pain, f/c or drainage. \par

## 2022-06-07 ENCOUNTER — APPOINTMENT (OUTPATIENT)
Dept: PLASTIC SURGERY | Facility: CLINIC | Age: 82
End: 2022-06-07
Payer: MEDICARE

## 2022-06-07 DIAGNOSIS — C44.619 BASAL CELL CARCINOMA OF SKIN OF LEFT UPPER LIMB, INCLUDING SHOULDER: ICD-10-CM

## 2022-06-07 PROCEDURE — 99024 POSTOP FOLLOW-UP VISIT: CPT

## 2022-06-07 NOTE — HISTORY OF PRESENT ILLNESS
[FreeTextEntry1] : 82 yr old male w multiple medical problems including HTN, mechanical AVR (on Coumadin),  AAA, iliac artery aneurysm and CLL referred by Dr. Torres for evaluation and treatment of left shoulder lesion present for approximately 10 years increasing in size and now draining spontaneously. Denies any prior h/o skin cancer. \par \par nonsmoker\par nondiabetic\par \par lost 40-50 lbs over last two years\par had chemo for leukemia\par \par Interval hx (5/31/22). Patient presents today POD#8 s/p excision of left shoulder lesion with LTR. Doing well with no significant pain, f/c or drainage. \par \par Interval hx (6/7/22). Patient presents today POD#15 s/p excision of left shoulder BCC with LTR. Offers no complaints. Doing well. Denies any f/c or drainage.

## 2022-06-07 NOTE — PHYSICAL EXAM
[NI] : Normal [de-identified] : thin male, NAD [de-identified] : Left posterior shoulder incision healing well, c/d/i, suture line intact, minimal swelling, no fluid collection

## 2022-06-07 NOTE — DATA REVIEWED
[FreeTextEntry1] :  Pathology             Final\par \par No Documents Attached\par \par \par \par   [facility] Accession Number : [id number]\par Patient:   TERRELL ATKINSON\par \par \par Accession:                             [id number]\par \par Collected Date/Time:                   5/23/2022 11:00 EDT\par Received Date/Time:                    5/23/2022 13:02 EDT\par \par Surgical Pathology Report - Auth (Verified)\par \par Specimen(s) Submitted\par 1  Left posterior shoulder lesion\par 2  Left anterior shoulder lesion\par \par Final Diagnosis\par 1  Left posterior shoulder lesion, excision:\par -Basal cell carcinoma (BCC) with ulcer.\par -All surgical margins of resection, free of the carcinoma (the BCC is\par \par at least 1 cm away from peripheral skin margin and 1 cm from deep soft\par tissue margin).\par -No histologic evidence of lymphovascular invasion identified.\par \par 2  Left anterior shoulder lesion, excision:\par -Skin with superficial ulcer,  no neoplasm seen.\par Verified by: Darci Lemons M.D.\par (Electronic Signature)\par Reported on: 05/25/22 15:36 EDT, [facility],\par [address] [place] [address]\par Phone: [phone]   Fax: [phone]\par _________________________________________________________________\par \par \par Clinical Information\par Excision\par Left posterior shoulder lesion present many years\par COVID (-)\par \par Perioperative Diagnosis\par Left posterior shoulder lesion\par \par Gross Description\par Specimen received in formalin, labeled "left posterior shoulder lesion-\par stitch marks 12:00, rule out carcinoma".  Specimen consists of an ellipse\par of white, wrinkled skin measuring  6 x 4.5 cm.  The specimen is oriented\par with a suture at 12:00.  The skin surface shows a tan-brown irregular,\par diffuse slightly elevated lesion on the skin surface, measuring 3 x 2\par x 0.5 cm.  This lesion is 1.5 cm from the 3:00 tip 1.6 cm from the 6:00\par margin 1.2 cm from the 9:00 tip, and 1 cm from the 12:00 margin.  The\par specimen is inked as follows: 3:00 tip orange,  9:00 tip yellow, 3:00\par to 6:00 short axis margin blue,  6:00 to 9:00 short axis margin purple,\par 9:00 to 12:00 short axis margin red, 12:00 to 3:00 short axis margin\par green, and deep margin black.  Specimen serially sectioned from 3:00 to\par 6:00 tip.  The cross section reveals the lesion is  0.2 cm in thickness\par \par and grossly does not appear to invade the subcutaneous tissue.  Specimen\par submitted entirely.\par \par Summary of sections:\par 1A-3:00 tip perpendicularly sectioned-1\par 1B-9:00 tip perpendicularly sectioned-1\par 5M-3O-amglbg section from 3:00 to 9:00 tip-9\par \par Total 11 blocks\par \par 2.  Specimen received in formalin, labeled "left anterior shoulder lesion-\par stitch marks 12:00".  Specimen consists of an ellipse of white, wrinkled\par skin measuring 2.5 x 1 x 0.5 cm.  The specimen is not oriented with a\par suture at 12 o'clock.  There is a linear, light brown, irregular, diffuse\par lesion on the skin surface measuring 1.5 x 0.6 cm.  It is 0.3 cm from\par 1 short axis margin, it is 0.2 cm from the opposite short axis margin.\par Specimen is inked as follows 1 short axis margin and the corresponding\par deep margin orange, the opposite short axis margin and the corresponding\par deep margin black.  Specimen serially sectioned and submitted entirely.\par \par Summary of section:\par 1A-1 tip perpendicularly sectioned-1\par 1B-serial sections of the specimen-1\par 1C-opposite tip perpendicularly sectioned-1\par \par Total 3 blocks\par \par [facility] [address].\par \par 05/23/2022 16:37:41 EDT   rr\par \par  \par \par  Ordered by: VALENTINA TAM IV       Collected/Examined: 23May2022 11:00AM       \par Verification Required       Stage: Final       \par  Performed at: [facility]       Resulted: 25May2022 03:36PM       Last Updated: 25May2022 03:36PM       Accession: [id number]

## 2022-06-07 NOTE — ASSESSMENT
[FreeTextEntry1] : 81 yo M with lesion of left posterior shoulder, requires complete removal for tissue diagnosis and control of a weeping, draining wound.\par \par Now POD#15 s/p excision of left posterior shoulder BCC with LTR. Doing well. \par \par - dressing changed, all sutures removed \par - daily Aquaphor \par - pathology discussed - BCC, margins clear, report given to patient \par - post-op instructions discussed and all questions answered\par - f/u 1 month with Dr. López \par \par Due to COVID 19, pre-visit patient instructions were explained to the patient and their symptoms were checked upon arrival.  \par Masks were used by the health care providers and staff and the examination room was cleaned after the patient visit was completed.\par

## 2022-06-08 ENCOUNTER — OUTPATIENT (OUTPATIENT)
Dept: OUTPATIENT SERVICES | Facility: HOSPITAL | Age: 82
LOS: 1 days | Discharge: HOME | End: 2022-06-08

## 2022-06-08 ENCOUNTER — APPOINTMENT (OUTPATIENT)
Dept: MEDICATION MANAGEMENT | Facility: CLINIC | Age: 82
End: 2022-06-08

## 2022-06-08 DIAGNOSIS — Z95.2 PRESENCE OF PROSTHETIC HEART VALVE: Chronic | ICD-10-CM

## 2022-06-08 DIAGNOSIS — Z79.01 LONG TERM (CURRENT) USE OF ANTICOAGULANTS: ICD-10-CM

## 2022-06-08 DIAGNOSIS — I48.91 UNSPECIFIED ATRIAL FIBRILLATION: ICD-10-CM

## 2022-06-08 LAB
INR PPP: 2 RATIO
POCT-PROTHROMBIN TIME: 24.1 SECS
QUALITY CONTROL: YES

## 2022-06-16 ENCOUNTER — OUTPATIENT (OUTPATIENT)
Dept: OUTPATIENT SERVICES | Facility: HOSPITAL | Age: 82
LOS: 1 days | Discharge: HOME | End: 2022-06-16

## 2022-06-16 ENCOUNTER — APPOINTMENT (OUTPATIENT)
Dept: MEDICATION MANAGEMENT | Facility: CLINIC | Age: 82
End: 2022-06-16

## 2022-06-16 ENCOUNTER — APPOINTMENT (OUTPATIENT)
Dept: CARDIOLOGY | Facility: CLINIC | Age: 82
End: 2022-06-16
Payer: MEDICARE

## 2022-06-16 VITALS
WEIGHT: 125 LBS | HEIGHT: 70 IN | TEMPERATURE: 97 F | BODY MASS INDEX: 17.9 KG/M2 | HEART RATE: 57 BPM | DIASTOLIC BLOOD PRESSURE: 82 MMHG | SYSTOLIC BLOOD PRESSURE: 140 MMHG

## 2022-06-16 DIAGNOSIS — Z79.01 LONG TERM (CURRENT) USE OF ANTICOAGULANTS: ICD-10-CM

## 2022-06-16 DIAGNOSIS — I48.91 UNSPECIFIED ATRIAL FIBRILLATION: ICD-10-CM

## 2022-06-16 DIAGNOSIS — M54.9 DORSALGIA, UNSPECIFIED: ICD-10-CM

## 2022-06-16 DIAGNOSIS — Z95.2 PRESENCE OF PROSTHETIC HEART VALVE: ICD-10-CM

## 2022-06-16 DIAGNOSIS — G89.29 DORSALGIA, UNSPECIFIED: ICD-10-CM

## 2022-06-16 DIAGNOSIS — Z95.2 PRESENCE OF PROSTHETIC HEART VALVE: Chronic | ICD-10-CM

## 2022-06-16 LAB
INR PPP: 2.5 RATIO
POCT-PROTHROMBIN TIME: 30 SECS
QUALITY CONTROL: YES

## 2022-06-16 PROCEDURE — 99214 OFFICE O/P EST MOD 30 MIN: CPT

## 2022-06-16 PROCEDURE — 93000 ELECTROCARDIOGRAM COMPLETE: CPT

## 2022-06-16 RX ORDER — DUTASTERIDE 0.5 MG/1
0.5 CAPSULE, LIQUID FILLED ORAL
Qty: 30 | Refills: 0 | Status: ACTIVE | COMMUNITY
Start: 2021-11-30

## 2022-06-16 RX ORDER — PANTOPRAZOLE 40 MG/1
40 TABLET, DELAYED RELEASE ORAL
Qty: 30 | Refills: 6 | Status: DISCONTINUED | COMMUNITY
Start: 2020-05-04 | End: 2022-06-16

## 2022-06-17 PROBLEM — M54.9 CHRONIC BACK PAIN: Status: ACTIVE | Noted: 2022-04-26

## 2022-06-17 PROBLEM — Z95.2 PRESENCE OF PROSTHETIC HEART VALVE: Status: ACTIVE | Noted: 2020-08-17

## 2022-06-17 NOTE — HISTORY OF PRESENT ILLNESS
[FreeTextEntry1] : The patient had skin lesion excised . He had basal cell CA according to the patient . He had no complications . The patient has had no chest pain or SOB and states that he has had back issues .

## 2022-06-17 NOTE — ASSESSMENT
[FreeTextEntry1] : The patiient has multiple issues . His AVR functions adequately on last echo and on exam as well. The patient has CLL and is being followed by oncology . He has an EF of 49 % ..the patient had his lesion removed and was told it was basal cell CA . The patient has not had chest pain .or SOB . His appetite is still fair at most  He has chronic back pain which is causing some of this as well. He had seen vascular for his small AAA .

## 2022-06-17 NOTE — CARDIOLOGY SUMMARY
[___] : [unfilled] [de-identified] : NSR IVDD LAD \par 12- Sinus Bradycardia IVCD LAD LVH NS T wave change. \par 5-  Sinus bradycardia IVCD LAD NS  twave change. \par 6- Sinus bradycardaiaIVCD LAD  [de-identified] : 9- EF 42% mild MR Mod TR Mechanical MVR SIDDHARTH 1.1 with mild paravalvular AI . LVSI was 23cc/m2 \par 2-2022 EF 49% AVR junction is adequate . Paravalvular AI

## 2022-07-05 ENCOUNTER — APPOINTMENT (OUTPATIENT)
Dept: PLASTIC SURGERY | Facility: CLINIC | Age: 82
End: 2022-07-05

## 2022-07-05 PROCEDURE — 99024 POSTOP FOLLOW-UP VISIT: CPT

## 2022-07-05 NOTE — ASSESSMENT
[FreeTextEntry1] : 83 yo M with lesion of left posterior shoulder, requires complete removal for tissue diagnosis and control of a weeping, draining wound.\par \par Now POD#15 s/p excision of left posterior shoulder BCC with LTR. Doing well. \par \par - dressing changed, all sutures removed \par - daily Aquaphor \par - pathology discussed - BCC, margins clear, report given to patient \par - post-op instructions discussed and all questions answered\par - f/u 1 month with Dr. López \par \par Due to COVID 19, pre-visit patient instructions were explained to the patient and their symptoms were checked upon arrival.  \par Masks were used by the health care providers and staff and the examination room was cleaned after the patient visit was completed.\par \par \par 7/5/2022\par doing well 6 wks s/p excision of left shoulder BCC w clear margins\par \par no issues\par \par pt happy\par \par Wound care instructions given.\par \par f/u in 6 months\par \par Due to COVID 19, pre-visit patient instructions were explained to the patient and their symptoms were checked upon arrival.  \par Masks were used by the health care providers and staff and the examination room was cleaned after the patient visit was completed.\par

## 2022-07-05 NOTE — DATA REVIEWED
[FreeTextEntry1] :  Pathology             Final\par \par No Documents Attached\par \par \par \par   [facility] Accession Number : [id number]\par Patient:   TERRELL ATKINSON\par \par \par Accession:                             [id number]\par \par Collected Date/Time:                   5/23/2022 11:00 EDT\par Received Date/Time:                    5/23/2022 13:02 EDT\par \par Surgical Pathology Report - Auth (Verified)\par \par Specimen(s) Submitted\par 1  Left posterior shoulder lesion\par 2  Left anterior shoulder lesion\par \par Final Diagnosis\par 1  Left posterior shoulder lesion, excision:\par -Basal cell carcinoma (BCC) with ulcer.\par -All surgical margins of resection, free of the carcinoma (the BCC is\par \par at least 1 cm away from peripheral skin margin and 1 cm from deep soft\par tissue margin).\par -No histologic evidence of lymphovascular invasion identified.\par \par 2  Left anterior shoulder lesion, excision:\par -Skin with superficial ulcer,  no neoplasm seen.\par Verified by: Darci Lemons M.D.\par (Electronic Signature)\par Reported on: 05/25/22 15:36 EDT, [facility],\par [address] [place] [address]\par Phone: [phone]   Fax: [phone]\par _________________________________________________________________\par \par \par Clinical Information\par Excision\par Left posterior shoulder lesion present many years\par COVID (-)\par \par Perioperative Diagnosis\par Left posterior shoulder lesion\par \par Gross Description\par Specimen received in formalin, labeled "left posterior shoulder lesion-\par stitch marks 12:00, rule out carcinoma".  Specimen consists of an ellipse\par of white, wrinkled skin measuring  6 x 4.5 cm.  The specimen is oriented\par with a suture at 12:00.  The skin surface shows a tan-brown irregular,\par diffuse slightly elevated lesion on the skin surface, measuring 3 x 2\par x 0.5 cm.  This lesion is 1.5 cm from the 3:00 tip 1.6 cm from the 6:00\par margin 1.2 cm from the 9:00 tip, and 1 cm from the 12:00 margin.  The\par specimen is inked as follows: 3:00 tip orange,  9:00 tip yellow, 3:00\par to 6:00 short axis margin blue,  6:00 to 9:00 short axis margin purple,\par 9:00 to 12:00 short axis margin red, 12:00 to 3:00 short axis margin\par green, and deep margin black.  Specimen serially sectioned from 3:00 to\par 6:00 tip.  The cross section reveals the lesion is  0.2 cm in thickness\par \par and grossly does not appear to invade the subcutaneous tissue.  Specimen\par submitted entirely.\par \par Summary of sections:\par 1A-3:00 tip perpendicularly sectioned-1\par 1B-9:00 tip perpendicularly sectioned-1\par 1N-6L-irpzgf section from 3:00 to 9:00 tip-9\par \par Total 11 blocks\par \par 2.  Specimen received in formalin, labeled "left anterior shoulder lesion-\par stitch marks 12:00".  Specimen consists of an ellipse of white, wrinkled\par skin measuring 2.5 x 1 x 0.5 cm.  The specimen is not oriented with a\par suture at 12 o'clock.  There is a linear, light brown, irregular, diffuse\par lesion on the skin surface measuring 1.5 x 0.6 cm.  It is 0.3 cm from\par 1 short axis margin, it is 0.2 cm from the opposite short axis margin.\par Specimen is inked as follows 1 short axis margin and the corresponding\par deep margin orange, the opposite short axis margin and the corresponding\par deep margin black.  Specimen serially sectioned and submitted entirely.\par \par Summary of section:\par 1A-1 tip perpendicularly sectioned-1\par 1B-serial sections of the specimen-1\par 1C-opposite tip perpendicularly sectioned-1\par \par Total 3 blocks\par \par [facility] [address].\par \par 05/23/2022 16:37:41 EDT   rr\par \par  \par \par  Ordered by: VALENTINA TAM IV       Collected/Examined: 23May2022 11:00AM       \par Verification Required       Stage: Final       \par  Performed at: [facility]       Resulted: 25May2022 03:36PM       Last Updated: 25May2022 03:36PM       Accession: [id number]

## 2022-07-05 NOTE — PHYSICAL EXAM
[NI] : Normal [de-identified] : thin male, NAD [de-identified] : Left posterior shoulder incision healing well, c/d/i, suture line intact, minimal swelling, no fluid collection

## 2022-07-06 ENCOUNTER — APPOINTMENT (OUTPATIENT)
Dept: MEDICATION MANAGEMENT | Facility: CLINIC | Age: 82
End: 2022-07-06

## 2022-07-06 ENCOUNTER — OUTPATIENT (OUTPATIENT)
Dept: OUTPATIENT SERVICES | Facility: HOSPITAL | Age: 82
LOS: 1 days | Discharge: HOME | End: 2022-07-06

## 2022-07-06 DIAGNOSIS — Z95.2 PRESENCE OF PROSTHETIC HEART VALVE: Chronic | ICD-10-CM

## 2022-07-06 DIAGNOSIS — I48.91 UNSPECIFIED ATRIAL FIBRILLATION: ICD-10-CM

## 2022-07-06 DIAGNOSIS — Z79.01 LONG TERM (CURRENT) USE OF ANTICOAGULANTS: ICD-10-CM

## 2022-07-06 LAB
INR PPP: 1.9 RATIO
POCT-PROTHROMBIN TIME: 22.6 SECS
QUALITY CONTROL: YES

## 2022-07-15 ENCOUNTER — APPOINTMENT (OUTPATIENT)
Dept: MEDICATION MANAGEMENT | Facility: CLINIC | Age: 82
End: 2022-07-15

## 2022-07-15 ENCOUNTER — OUTPATIENT (OUTPATIENT)
Dept: OUTPATIENT SERVICES | Facility: HOSPITAL | Age: 82
LOS: 1 days | Discharge: HOME | End: 2022-07-15

## 2022-07-15 DIAGNOSIS — Z79.01 LONG TERM (CURRENT) USE OF ANTICOAGULANTS: ICD-10-CM

## 2022-07-15 DIAGNOSIS — I48.91 UNSPECIFIED ATRIAL FIBRILLATION: ICD-10-CM

## 2022-07-15 DIAGNOSIS — Z95.2 PRESENCE OF PROSTHETIC HEART VALVE: Chronic | ICD-10-CM

## 2022-07-15 LAB
INR PPP: 3.3 RATIO
POCT-PROTHROMBIN TIME: 39.3 SECS
QUALITY CONTROL: YES

## 2022-07-22 ENCOUNTER — OUTPATIENT (OUTPATIENT)
Dept: OUTPATIENT SERVICES | Facility: HOSPITAL | Age: 82
LOS: 1 days | Discharge: HOME | End: 2022-07-22

## 2022-07-22 ENCOUNTER — APPOINTMENT (OUTPATIENT)
Dept: MEDICATION MANAGEMENT | Facility: CLINIC | Age: 82
End: 2022-07-22

## 2022-07-22 DIAGNOSIS — Z95.2 PRESENCE OF PROSTHETIC HEART VALVE: Chronic | ICD-10-CM

## 2022-07-22 DIAGNOSIS — Z79.01 LONG TERM (CURRENT) USE OF ANTICOAGULANTS: ICD-10-CM

## 2022-07-22 DIAGNOSIS — I48.91 UNSPECIFIED ATRIAL FIBRILLATION: ICD-10-CM

## 2022-07-22 LAB
INR PPP: 3.3 RATIO
POCT-PROTHROMBIN TIME: 39.6 SECS
QUALITY CONTROL: YES

## 2022-08-09 ENCOUNTER — OUTPATIENT (OUTPATIENT)
Dept: OUTPATIENT SERVICES | Facility: HOSPITAL | Age: 82
LOS: 1 days | Discharge: HOME | End: 2022-08-09

## 2022-08-09 ENCOUNTER — APPOINTMENT (OUTPATIENT)
Dept: MEDICATION MANAGEMENT | Facility: CLINIC | Age: 82
End: 2022-08-09

## 2022-08-09 DIAGNOSIS — Z95.2 PRESENCE OF PROSTHETIC HEART VALVE: Chronic | ICD-10-CM

## 2022-08-09 DIAGNOSIS — Z79.01 LONG TERM (CURRENT) USE OF ANTICOAGULANTS: ICD-10-CM

## 2022-08-09 DIAGNOSIS — I48.91 UNSPECIFIED ATRIAL FIBRILLATION: ICD-10-CM

## 2022-08-09 LAB
INR PPP: 2.2 RATIO
POCT-PROTHROMBIN TIME: 26.5 SECS
QUALITY CONTROL: YES

## 2022-08-31 ENCOUNTER — APPOINTMENT (OUTPATIENT)
Dept: MEDICATION MANAGEMENT | Facility: CLINIC | Age: 82
End: 2022-08-31

## 2022-08-31 ENCOUNTER — OUTPATIENT (OUTPATIENT)
Dept: OUTPATIENT SERVICES | Facility: HOSPITAL | Age: 82
LOS: 1 days | Discharge: HOME | End: 2022-08-31

## 2022-08-31 DIAGNOSIS — I48.91 UNSPECIFIED ATRIAL FIBRILLATION: ICD-10-CM

## 2022-08-31 DIAGNOSIS — Z79.01 LONG TERM (CURRENT) USE OF ANTICOAGULANTS: ICD-10-CM

## 2022-08-31 DIAGNOSIS — Z95.2 PRESENCE OF PROSTHETIC HEART VALVE: Chronic | ICD-10-CM

## 2022-08-31 LAB
INR PPP: 2.7 RATIO
POCT-PROTHROMBIN TIME: 32.5 SECS
QUALITY CONTROL: YES

## 2022-09-06 ENCOUNTER — APPOINTMENT (OUTPATIENT)
Dept: CARDIOLOGY | Facility: CLINIC | Age: 82
End: 2022-09-06

## 2022-09-06 VITALS
BODY MASS INDEX: 17.47 KG/M2 | WEIGHT: 122 LBS | HEIGHT: 70 IN | SYSTOLIC BLOOD PRESSURE: 120 MMHG | HEART RATE: 53 BPM | DIASTOLIC BLOOD PRESSURE: 80 MMHG

## 2022-09-06 PROCEDURE — 93000 ELECTROCARDIOGRAM COMPLETE: CPT

## 2022-09-06 PROCEDURE — 99214 OFFICE O/P EST MOD 30 MIN: CPT

## 2022-09-06 NOTE — CARDIOLOGY SUMMARY
[de-identified] : NSR IVDD LAD \par 12- Sinus Bradycardia IVCD LAD LVH NS T wave change. \par 5-  Sinus bradycardia IVCD LAD NS  twave change. \par 6- Sinus bradycardaiaIVCD LAD \par 9-6-2022 Sinus bradycardia IVCD LAD  [de-identified] : 9- EF 42% mild MR Mod TR Mechanical MVR SIDDHARTH 1.1 with mild paravalvular AI . LVSI was 23cc/m2 \par 2-2022 EF 49% AVR junction is adequate . Paravalvular AI  [___] : [unfilled]

## 2022-09-06 NOTE — HISTORY OF PRESENT ILLNESS
[FreeTextEntry1] : The patient has been feeling well. No chest pain or SOB . Was started on testosterone ??? by a pain management MD who is related to him . He was advised to let his urologist known .

## 2022-09-06 NOTE — ASSESSMENT
[FreeTextEntry1] : The patiient has multiple issues . His AVR functions adequately on last echo and on exam as well. The patient has CLL and is being followed by oncology . He has an EF of 49 % ..the patient had his lesion removed and was told it was basal cell CA . The patient has not had chest pain .or SOB and BNP is only 175  His appetite is still fair at most  He has chronic back pain which is causing some of this as well. He had seen vascular for his small AAA .

## 2022-09-28 ENCOUNTER — OUTPATIENT (OUTPATIENT)
Dept: OUTPATIENT SERVICES | Facility: HOSPITAL | Age: 82
LOS: 1 days | Discharge: HOME | End: 2022-09-28

## 2022-09-28 ENCOUNTER — APPOINTMENT (OUTPATIENT)
Dept: MEDICATION MANAGEMENT | Facility: CLINIC | Age: 82
End: 2022-09-28

## 2022-09-28 DIAGNOSIS — Z79.01 LONG TERM (CURRENT) USE OF ANTICOAGULANTS: ICD-10-CM

## 2022-09-28 DIAGNOSIS — Z95.2 PRESENCE OF PROSTHETIC HEART VALVE: Chronic | ICD-10-CM

## 2022-09-28 DIAGNOSIS — I48.91 UNSPECIFIED ATRIAL FIBRILLATION: ICD-10-CM

## 2022-09-28 LAB
INR PPP: 3 RATIO
POCT-PROTHROMBIN TIME: 36.3 SECS
QUALITY CONTROL: YES

## 2022-10-04 ENCOUNTER — APPOINTMENT (OUTPATIENT)
Dept: HEMATOLOGY ONCOLOGY | Facility: CLINIC | Age: 82
End: 2022-10-04

## 2022-10-04 ENCOUNTER — OUTPATIENT (OUTPATIENT)
Dept: OUTPATIENT SERVICES | Facility: HOSPITAL | Age: 82
LOS: 1 days | End: 2022-10-04

## 2022-10-04 VITALS
RESPIRATION RATE: 16 BRPM | TEMPERATURE: 97.8 F | OXYGEN SATURATION: 98 % | HEIGHT: 70 IN | SYSTOLIC BLOOD PRESSURE: 160 MMHG | WEIGHT: 120 LBS | DIASTOLIC BLOOD PRESSURE: 77 MMHG | BODY MASS INDEX: 17.18 KG/M2 | HEART RATE: 66 BPM

## 2022-10-04 DIAGNOSIS — Z95.2 PRESENCE OF PROSTHETIC HEART VALVE: Chronic | ICD-10-CM

## 2022-10-04 LAB
ALBUMIN SERPL ELPH-MCNC: 4.8 G/DL
ALP BLD-CCNC: 46 U/L
ALT SERPL-CCNC: 13 U/L
ANION GAP SERPL CALC-SCNC: 12 MMOL/L
AST SERPL-CCNC: 16 U/L
BASOPHILS # BLD AUTO: 0.04 K/UL
BASOPHILS NFR BLD AUTO: 0.5 %
BILIRUB SERPL-MCNC: 0.8 MG/DL
BUN SERPL-MCNC: 15 MG/DL
CALCIUM SERPL-MCNC: 9.6 MG/DL
CHLORIDE SERPL-SCNC: 103 MMOL/L
CO2 SERPL-SCNC: 27 MMOL/L
CREAT SERPL-MCNC: 0.7 MG/DL
DIRECT COOMBS: NORMAL
EGFR: 92 ML/MIN/1.73M2
EOSINOPHIL # BLD AUTO: 0.12 K/UL
EOSINOPHIL NFR BLD AUTO: 1.4 %
GLUCOSE SERPL-MCNC: 79 MG/DL
HCT VFR BLD CALC: 36.8 %
HGB BLD-MCNC: 12.6 G/DL
IMM GRANULOCYTES NFR BLD AUTO: 0.5 %
LDH SERPL-CCNC: 273 U/L
LYMPHOCYTES # BLD AUTO: 0.62 K/UL
LYMPHOCYTES NFR BLD AUTO: 7.2 %
MAN DIFF?: NORMAL
MCHC RBC-ENTMCNC: 34.2 G/DL
MCHC RBC-ENTMCNC: 34.6 PG
MCV RBC AUTO: 101.1 FL
MONOCYTES # BLD AUTO: 0.51 K/UL
MONOCYTES NFR BLD AUTO: 5.9 %
NEUTROPHILS # BLD AUTO: 7.28 K/UL
NEUTROPHILS NFR BLD AUTO: 84.5 %
PLATELET # BLD AUTO: 141 K/UL
POTASSIUM SERPL-SCNC: 4.2 MMOL/L
PROT SERPL-MCNC: 6.1 G/DL
RBC # BLD: 3.64 M/UL
RBC # FLD: 14.3 %
SODIUM SERPL-SCNC: 142 MMOL/L
WBC # FLD AUTO: 8.61 K/UL

## 2022-10-04 PROCEDURE — 99214 OFFICE O/P EST MOD 30 MIN: CPT

## 2022-10-05 LAB
CEA SERPL-MCNC: 1.9 NG/ML
DEPRECATED KAPPA LC FREE/LAMBDA SER: 1.73 RATIO
FERRITIN SERPL-MCNC: 90 NG/ML
KAPPA LC CSF-MCNC: 0.81 MG/DL
KAPPA LC SERPL-MCNC: 1.4 MG/DL
PSA SERPL-MCNC: 2.76 NG/ML
TSH SERPL-ACNC: 2.06 UIU/ML

## 2022-10-05 NOTE — HISTORY OF PRESENT ILLNESS
[de-identified] : this is a 76-year-old white man with multiple medical problems including coronary artery disease status post angioplasty valvular heart disease status post metallic aortic valve replacement. He is referred for abnormal hemogram noted recently, WBC is 10.6 hemoglobin 14.2 platelets 131 absolute lymphocytes 5268 chemistry is unremarkable. He denies any constitutional symptoms  specifically no fever ,weight loss ,night sweats, fatigue or  pruritus. he feels fantastic with good energy level. 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 He is scheduled for ablation for atrial fibrillation , he continues on coumadin for mechanical valve. He reports minor bruising on coumadin and plavix. He feels slight fatigue . He denies fever , weight loss or night sweats . He is also on androgen deprivation for elevated PSA , , He had previously on surveillance and had multiple prostate biopsies ( unclear if it showed cancer ) . Bone scan is allegedly negative . Last PSA is less than 1 and patient denies obstructive symptoms. \par "\par 01/10/2019 Patient returns for follow up for CLL on watchful waiting , he " feels tired all the time " , He had unsuccessful ablation for atrial fibrillation and was told to increase metoprolol . He denies B symptoms . \par \par 02/07/2019 Patient returns for follow up , he had unsuccessful ablation for paroxysmal atrial fibrillation and was placed on amiodarone , he continues on coumadin without ASA and denies abnormal bleeding , he reports occasional LUQ pain . no  B symptoms.\par \par 05/16/2019 Patient returns for follow up for CLL/SLL he reports few episodes of dizziness associated with nausea lasting for 1 to 2 hours . CT head ( non-contrast ) was negative , he had negative ENT evaluation and had long term event monitor placed 2 weeks ago without recurrence ( also discontinued amiodarone ) , CBC shows slight decrease in Hb and platelets from baseline . He continues on coumadin and plavix and denies abnormal bleeding . He continues with mild LUQ and back pain . \par \par 06/13/2019 Patient returns for follow up for SLL/CLL with anemia, thrombocytopenia and marked splenomegaly ( 19cm ) . He complains of left flank and back pain . he had a trial of epidural anethesia and is scheduled for nerve block/ ablation / epidural injection . he continues on coumadin and denies any abnormal bleeding . \par \par 07/11/2019 Patient returns with persistent LUQ discomfort , back pain despite epidural injections . 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 He is relatively asymptomatic except for mild left flank pain . no significant bleeding on coumadin and plavix . Given his age , multiple co-morbidities and high risk features , the regimen of choice is combination of venetoclax and obinotuzumab . \par \par 08/22/2019 Patient returns for follow up after starting on obinotuzumab , he had mild reaction during the split first dose ( nausea ) . He tolerated day 8 and day 15 very well ( also given in split dose ) . He lost 10 lbs despite good po intake . He denies fever . he continues with mild back pain . \par \par 11/19/2019 patient returns for follow up , he continues on venetoclax/obino and feels well except for persistent back pain , MRI done elsewhere  was allegedly normal , spleen is no longer palpable , Hb is 13 , wbc is normal except for lymphopenia , platelets  : 111 .  He reports occasional episodes of abdominal cramping with nausea. no weight loss. \par \par 12/16/19: Pt returns for a f/u visit. He has been c/o nausea with decreased appetite. However, he has not been taking antiemetics as previously prescribed. Has lost 11 lbs in the last month. Also had a recent TIA manifested as aphasia that lasted for about 45 mins. He was treated in an ED in [place]. CTH was unremarkable. Echo showed EF 45-50%. No clots seen. US carotids showed plaques but no significant stenosis. He is being seen by neurology in [place] and will have MRI/MRA. He was already on plavix and coumadin before having TIA. His INR at the time of ER visit was 4.3. \par \par 01/13/2020\par Patient returns for a follow up visit. This will be his last cycle of obinutuzumab ( #6). He is on venetoclax 300 mg. It was dose reduced on 12/16 as he was complaining of nausea and abdominal pain. He also has chronic back pain. He remains on coumadin and plavix. \par He is tolerating obinutuzumab with no reactions. \par \par 2/6/2020: TERRELL ATKINSON is a 79 year old male here today for sick visit. He has CLL and was treated last with obinutuzumab last dose on 1/16/2020. Venetoclax was stopped due to adverse effects , He states that he developed a fever of 101 degrees yesterday associated with chills and a mild cough with hemoptysis. He continues to spike fever today. He is taking Tylenol for symptom relief. He denies SOB and chest pain at this time. \par \par 06/19/2020 Today's virtual encounter was done via telehealth given the situation surrounding Covid 19 outbreak . He is staying currently in [place] , he had recently a virtual visit with his cardiologist as well . He has good and bad days , he continues with left sided abdominal pain , he has allegedly lost more weight ( 25 lbs in total since last year ) . He denies fever or night sweats , he was last treated for a respiratory infection in February and his wife is wondering if he had contracted Covid 19 ( not tested , chest xray was negative ) . Blood work from April showed normal Hb , wbc and platelet improved compared to 1/2020 . He continues on coumadin and denies any abnormal bleeding or symptoms suggestive of cardioembolic disorder. \par \par 01/18/2021 Patient returns for follow p for CLL s/p venetoclax/obinotuzumab , treatment truncated due to several factors including Covid 19 pandemics. His weight is stable after losing more than 20 lbs , he continues to complain of mild abdominal pain after meals. no fever or night sweats . his last CBC shows normal wbc with lymphopenia , Hb 12.7 , platelets : 102  CT abdomen in 9/2020 was negative for adenopathy and splenomegaly .\par His main problem is currently urinary retention due to BPH , not surgical candidate due to bleeding risk . He is scheduled for embolization in [place] tomorrow , he stopped plavix and coumadin , he continues on ASA and bridging with lovenox. \par \par 03/22/2021 Patient returns for for follow up for CLL in remission , currently on watchful waiting . He had right prostatic embolization with slight improvement in voiding symptoms , he is scheduled for left side followed by light therapy . He denies any new symptoms except inability to gain weight . \par \par 05/24/2021 Patient returns for follow up , he was seen in ED recently for abdominal pain , CT scan showed fluid filled right inguinal hernia , no other abnormality or evidence of obstruction , blood work significant for Hb decreased from baseline . platelets and wbc were stable , he felt better with IV hydration  . he denies fever or weight loss. Main complaint is chronic back pain . \par \par \par 11/15/2021 Patient returns for interval follow up . He has his good and bad days .  He continues to complain of back pain , inability to regain weight , Intermittent complaints of Gagging , has a reducible right inguinal hernia , no BM issues . he is on iron every other day . Denies fever or night sweats . His lower urinary tract symptoms are markedly improved after surgery . \par \par \par 2/15/22- Patient is here for follow up for management of CLL. He has complaints of chronic fatigue. Unable to gain weight. He eats well some days and bad on other days. Denies any fever , chills, night sweats. \par \par 10/4/22: Patient returns for management of his CLL. CT CAP from March did not show any suspicious masses or adenopathy. He continues to lose weight although at much lower rate now. He continues to be fatigued. His appetite waxes and wanes but feels it is overall improved. Denes fevers, night sweats or adenopathy. His platelets have improved to 141 from 109, hemoglobin stable at 12.6 and WBC 8.6 with lymphocytes 0.62.

## 2022-10-05 NOTE — ASSESSMENT
[FreeTextEntry1] : 1)82-year-old white male with a remote history of non-Hodgkin's lymphoma, High risk CLL with symptomatic splenomegaly , anemia, thrombocytopenia . \par He completed 6 cycles of Obinituzumab and Venetoclax was dose reduced to 300mg then stopped in 2020 secondary to persistent side effects, \par Clinically he had good response with spleen no longer being palpable and good hematological response. He lost around 60 lbs since 8/2019 , unable to regain weight .  \par 2) CBC with mild thrombocytopenia, now resolved . Hgb nearly back to normal with PO iron . \par \par 3)Chronic nausea . \par 4) BPH s/p green light laser surgery on July 15 . Lower [place] symptoms improved . \par 5) right inguinal hernia \par 6) Mechanical AVR on Coumadin \par \par PLAN: \par PET scan given continued fatigue and unexplained weight loss, history of small lung nodule\par Check PSA, CEA \par CMP, LDH, Hapto, ret count and rafael test.  \par \par Pt was seen and examined with Dr Carey who agrees with plan of care .

## 2022-10-05 NOTE — REVIEW OF SYSTEMS
[Fatigue] : fatigue [Negative] : Heme/Lymph [FreeTextEntry2] : Unable to gain weight. Overall his weight is stable in recent times

## 2022-10-05 NOTE — PHYSICAL EXAM
[Normal] : grossly intact [de-identified] : Pale chronically ill in NAD .  [de-identified] : No palpable adenopathy  [de-identified] : prominent aortic valve  click. [de-identified] : spleen no longer palpable .  [de-identified] : no peripheral adenopathy.

## 2022-10-11 LAB
ALBUMIN MFR SERPL ELPH: 70.5 %
ALBUMIN SERPL-MCNC: 4.4 G/DL
ALBUMIN/GLOB SERPL: 2.4 RATIO
ALPHA1 GLOB MFR SERPL ELPH: 4.9 %
ALPHA1 GLOB SERPL ELPH-MCNC: 0.3 G/DL
ALPHA2 GLOB MFR SERPL ELPH: 8.6 %
ALPHA2 GLOB SERPL ELPH-MCNC: 0.5 G/DL
B-GLOBULIN MFR SERPL ELPH: 9.6 %
B-GLOBULIN SERPL ELPH-MCNC: 0.6 G/DL
GAMMA GLOB FLD ELPH-MCNC: 0.4 G/DL
GAMMA GLOB MFR SERPL ELPH: 6.4 %
INTERPRETATION SERPL IEP-IMP: NORMAL
M PROTEIN SPEC IFE-MCNC: NORMAL
PROT SERPL-MCNC: 6.2 G/DL
PROT SERPL-MCNC: 6.2 G/DL

## 2022-10-11 NOTE — H&P ADULT - CLICK TO LAUNCH ORM
.
Continue Regimen: Triamcinolone 0.1% cream BID as needed for any flare ups.
Detail Level: Zone
Render In Strict Bullet Format?: No

## 2022-10-18 DIAGNOSIS — C91.10 CHRONIC LYMPHOCYTIC LEUKEMIA OF B-CELL TYPE NOT HAVING ACHIEVED REMISSION: ICD-10-CM

## 2022-10-19 NOTE — H&P PST ADULT - PAIN RATING AT ACTIVITY
Form completed. Faxed. Called patient and informed that paperwork was completed and faxed. Patient verbalized understanding and had no further questions.   09-Aug-2020 02:53 2

## 2022-10-28 ENCOUNTER — OUTPATIENT (OUTPATIENT)
Dept: OUTPATIENT SERVICES | Facility: HOSPITAL | Age: 82
LOS: 1 days | Discharge: HOME | End: 2022-10-28

## 2022-10-28 ENCOUNTER — APPOINTMENT (OUTPATIENT)
Dept: MEDICATION MANAGEMENT | Facility: CLINIC | Age: 82
End: 2022-10-28

## 2022-10-28 DIAGNOSIS — Z79.01 LONG TERM (CURRENT) USE OF ANTICOAGULANTS: ICD-10-CM

## 2022-10-28 DIAGNOSIS — Z95.2 PRESENCE OF PROSTHETIC HEART VALVE: Chronic | ICD-10-CM

## 2022-10-28 DIAGNOSIS — I48.91 UNSPECIFIED ATRIAL FIBRILLATION: ICD-10-CM

## 2022-10-28 LAB
INR PPP: 2.1 RATIO
POCT-PROTHROMBIN TIME: 25.1 SECS
QUALITY CONTROL: YES

## 2022-11-01 ENCOUNTER — OUTPATIENT (OUTPATIENT)
Dept: OUTPATIENT SERVICES | Facility: HOSPITAL | Age: 82
LOS: 1 days | Discharge: HOME | End: 2022-11-01

## 2022-11-01 ENCOUNTER — RESULT REVIEW (OUTPATIENT)
Age: 82
End: 2022-11-01

## 2022-11-01 DIAGNOSIS — C91.10 CHRONIC LYMPHOCYTIC LEUKEMIA OF B-CELL TYPE NOT HAVING ACHIEVED REMISSION: ICD-10-CM

## 2022-11-01 DIAGNOSIS — Z95.2 PRESENCE OF PROSTHETIC HEART VALVE: Chronic | ICD-10-CM

## 2022-11-01 LAB — GLUCOSE BLDC GLUCOMTR-MCNC: 63 MG/DL — LOW (ref 70–99)

## 2022-11-01 PROCEDURE — 78815 PET IMAGE W/CT SKULL-THIGH: CPT | Mod: 26,PI,MH

## 2022-11-22 ENCOUNTER — APPOINTMENT (OUTPATIENT)
Dept: MEDICATION MANAGEMENT | Facility: CLINIC | Age: 82
End: 2022-11-22

## 2022-11-22 ENCOUNTER — APPOINTMENT (OUTPATIENT)
Dept: VASCULAR SURGERY | Facility: CLINIC | Age: 82
End: 2022-11-22

## 2022-11-22 ENCOUNTER — OUTPATIENT (OUTPATIENT)
Dept: OUTPATIENT SERVICES | Facility: HOSPITAL | Age: 82
LOS: 1 days | Discharge: HOME | End: 2022-11-22

## 2022-11-22 DIAGNOSIS — Z95.2 PRESENCE OF PROSTHETIC HEART VALVE: Chronic | ICD-10-CM

## 2022-11-22 DIAGNOSIS — I48.91 UNSPECIFIED ATRIAL FIBRILLATION: ICD-10-CM

## 2022-11-22 DIAGNOSIS — Z79.01 LONG TERM (CURRENT) USE OF ANTICOAGULANTS: ICD-10-CM

## 2022-11-22 LAB
INR PPP: 2.2 RATIO
POCT-PROTHROMBIN TIME: 26.5 SECS
QUALITY CONTROL: YES

## 2022-12-20 ENCOUNTER — OUTPATIENT (OUTPATIENT)
Dept: OUTPATIENT SERVICES | Facility: HOSPITAL | Age: 82
LOS: 1 days | Discharge: HOME | End: 2022-12-20

## 2022-12-20 ENCOUNTER — APPOINTMENT (OUTPATIENT)
Dept: MEDICATION MANAGEMENT | Facility: CLINIC | Age: 82
End: 2022-12-20

## 2022-12-20 DIAGNOSIS — I48.91 UNSPECIFIED ATRIAL FIBRILLATION: ICD-10-CM

## 2022-12-20 DIAGNOSIS — Z95.2 PRESENCE OF PROSTHETIC HEART VALVE: Chronic | ICD-10-CM

## 2022-12-20 DIAGNOSIS — Z79.01 LONG TERM (CURRENT) USE OF ANTICOAGULANTS: ICD-10-CM

## 2022-12-20 LAB
INR PPP: 3.2 RATIO
POCT-PROTHROMBIN TIME: 37.9 SECS
QUALITY CONTROL: YES

## 2022-12-22 DIAGNOSIS — Z79.01 LONG TERM (CURRENT) USE OF ANTICOAGULANTS: ICD-10-CM

## 2023-01-10 ENCOUNTER — APPOINTMENT (OUTPATIENT)
Dept: PLASTIC SURGERY | Facility: CLINIC | Age: 83
End: 2023-01-10

## 2023-01-17 ENCOUNTER — OUTPATIENT (OUTPATIENT)
Dept: OUTPATIENT SERVICES | Facility: HOSPITAL | Age: 83
LOS: 1 days | Discharge: HOME | End: 2023-01-17

## 2023-01-17 ENCOUNTER — APPOINTMENT (OUTPATIENT)
Dept: MEDICATION MANAGEMENT | Facility: CLINIC | Age: 83
End: 2023-01-17

## 2023-01-17 DIAGNOSIS — I48.91 UNSPECIFIED ATRIAL FIBRILLATION: ICD-10-CM

## 2023-01-17 DIAGNOSIS — Z79.01 LONG TERM (CURRENT) USE OF ANTICOAGULANTS: ICD-10-CM

## 2023-01-17 DIAGNOSIS — Z95.2 PRESENCE OF PROSTHETIC HEART VALVE: Chronic | ICD-10-CM

## 2023-01-17 LAB
INR PPP: 2.7 RATIO
POCT-PROTHROMBIN TIME: 32.5 SECS
QUALITY CONTROL: YES

## 2023-01-24 ENCOUNTER — APPOINTMENT (OUTPATIENT)
Dept: CARDIOLOGY | Facility: CLINIC | Age: 83
End: 2023-01-24
Payer: MEDICARE

## 2023-01-24 VITALS
HEIGHT: 70 IN | DIASTOLIC BLOOD PRESSURE: 70 MMHG | BODY MASS INDEX: 17.9 KG/M2 | WEIGHT: 125 LBS | HEART RATE: 52 BPM | SYSTOLIC BLOOD PRESSURE: 132 MMHG

## 2023-01-24 DIAGNOSIS — I72.4 ANEURYSM OF ARTERY OF LOWER EXTREMITY: ICD-10-CM

## 2023-01-24 DIAGNOSIS — Z86.79 OTHER SPECIFIED POSTPROCEDURAL STATES: ICD-10-CM

## 2023-01-24 DIAGNOSIS — I48.92 UNSPECIFIED ATRIAL FLUTTER: ICD-10-CM

## 2023-01-24 DIAGNOSIS — Z98.890 OTHER SPECIFIED POSTPROCEDURAL STATES: ICD-10-CM

## 2023-01-24 DIAGNOSIS — D69.6 THROMBOCYTOPENIA, UNSPECIFIED: ICD-10-CM

## 2023-01-24 PROCEDURE — 93000 ELECTROCARDIOGRAM COMPLETE: CPT

## 2023-01-24 PROCEDURE — 99214 OFFICE O/P EST MOD 30 MIN: CPT

## 2023-01-24 NOTE — ASSESSMENT
[FreeTextEntry1] : The patinet has been feeling better. He has gained weight . No obvious issues from hematologic work up . No chest pain or SOB .  Has CLL and there was concerned about weight loss which has improved . the patient has an AVR which is mechanical.

## 2023-01-24 NOTE — HISTORY OF PRESENT ILLNESS
[FreeTextEntry1] : The patient has fallen . He has gained weight . He had fallen walking back from a store . seen by Dr. Carey. PET scan showed no obvious malignancy /FDG avid lesions indluing lung lesions

## 2023-01-24 NOTE — CARDIOLOGY SUMMARY
[___] : [unfilled] [de-identified] : NSR IVDD LAD \par 12- Sinus Bradycardia IVCD LAD LVH NS T wave change. \par 5-  Sinus bradycardia IVCD LAD NS  twave change. \par 6- Sinus bradycardaiaIVCD LAD \par 1- SB IVCD LAD NS T wave change \par 9-6-2022 Sinus bradycardia IVCD LAD  [de-identified] : 9- EF 42% mild MR Mod TR Mechanical MVR SIDDHARTH 1.1 with mild paravalvular AI . LVSI was 23cc/m2 \par 2-2022 EF 49% AVR junction is adequate . Paravalvular AI

## 2023-02-14 ENCOUNTER — OUTPATIENT (OUTPATIENT)
Dept: OUTPATIENT SERVICES | Facility: HOSPITAL | Age: 83
LOS: 1 days | End: 2023-02-14
Payer: MEDICARE

## 2023-02-14 ENCOUNTER — APPOINTMENT (OUTPATIENT)
Dept: MEDICATION MANAGEMENT | Facility: CLINIC | Age: 83
End: 2023-02-14

## 2023-02-14 DIAGNOSIS — I48.91 UNSPECIFIED ATRIAL FIBRILLATION: ICD-10-CM

## 2023-02-14 DIAGNOSIS — Z79.01 LONG TERM (CURRENT) USE OF ANTICOAGULANTS: ICD-10-CM

## 2023-02-14 DIAGNOSIS — Z95.2 PRESENCE OF PROSTHETIC HEART VALVE: Chronic | ICD-10-CM

## 2023-02-14 LAB
INR PPP: 2.7 RATIO
POCT-PROTHROMBIN TIME: 32.1 SECS
QUALITY CONTROL: YES

## 2023-02-14 PROCEDURE — 99211 OFF/OP EST MAY X REQ PHY/QHP: CPT

## 2023-02-14 PROCEDURE — 36416 COLLJ CAPILLARY BLOOD SPEC: CPT

## 2023-02-14 PROCEDURE — 85610 PROTHROMBIN TIME: CPT

## 2023-02-15 DIAGNOSIS — I48.91 UNSPECIFIED ATRIAL FIBRILLATION: ICD-10-CM

## 2023-02-15 DIAGNOSIS — Z79.01 LONG TERM (CURRENT) USE OF ANTICOAGULANTS: ICD-10-CM

## 2023-02-21 ENCOUNTER — APPOINTMENT (OUTPATIENT)
Dept: CARDIOLOGY | Facility: CLINIC | Age: 83
End: 2023-02-21
Payer: MEDICARE

## 2023-02-21 DIAGNOSIS — I10 ESSENTIAL (PRIMARY) HYPERTENSION: ICD-10-CM

## 2023-02-21 PROCEDURE — 93351 STRESS TTE COMPLETE: CPT

## 2023-03-14 ENCOUNTER — APPOINTMENT (OUTPATIENT)
Dept: MEDICATION MANAGEMENT | Facility: CLINIC | Age: 83
End: 2023-03-14

## 2023-03-14 ENCOUNTER — OUTPATIENT (OUTPATIENT)
Dept: OUTPATIENT SERVICES | Facility: HOSPITAL | Age: 83
LOS: 1 days | End: 2023-03-14
Payer: MEDICARE

## 2023-03-14 DIAGNOSIS — Z95.2 PRESENCE OF PROSTHETIC HEART VALVE: Chronic | ICD-10-CM

## 2023-03-14 DIAGNOSIS — Z79.01 LONG TERM (CURRENT) USE OF ANTICOAGULANTS: ICD-10-CM

## 2023-03-14 DIAGNOSIS — I48.91 UNSPECIFIED ATRIAL FIBRILLATION: ICD-10-CM

## 2023-03-14 LAB
INR PPP: 4 RATIO
POCT-PROTHROMBIN TIME: 47.9 SECS
QUALITY CONTROL: YES

## 2023-03-14 PROCEDURE — 99211 OFF/OP EST MAY X REQ PHY/QHP: CPT

## 2023-03-14 PROCEDURE — 85610 PROTHROMBIN TIME: CPT

## 2023-03-14 PROCEDURE — 36416 COLLJ CAPILLARY BLOOD SPEC: CPT

## 2023-03-15 DIAGNOSIS — Z79.01 LONG TERM (CURRENT) USE OF ANTICOAGULANTS: ICD-10-CM

## 2023-03-15 DIAGNOSIS — I48.91 UNSPECIFIED ATRIAL FIBRILLATION: ICD-10-CM

## 2023-03-28 ENCOUNTER — OUTPATIENT (OUTPATIENT)
Dept: OUTPATIENT SERVICES | Facility: HOSPITAL | Age: 83
LOS: 1 days | End: 2023-03-28
Payer: MEDICARE

## 2023-03-28 ENCOUNTER — APPOINTMENT (OUTPATIENT)
Dept: MEDICATION MANAGEMENT | Facility: CLINIC | Age: 83
End: 2023-03-28

## 2023-03-28 DIAGNOSIS — I48.91 UNSPECIFIED ATRIAL FIBRILLATION: ICD-10-CM

## 2023-03-28 DIAGNOSIS — Z79.01 LONG TERM (CURRENT) USE OF ANTICOAGULANTS: ICD-10-CM

## 2023-03-28 DIAGNOSIS — Z95.2 PRESENCE OF PROSTHETIC HEART VALVE: Chronic | ICD-10-CM

## 2023-03-28 LAB
INR PPP: 2.3 RATIO
POCT-PROTHROMBIN TIME: 27.5 SECS
QUALITY CONTROL: YES

## 2023-03-28 PROCEDURE — 85610 PROTHROMBIN TIME: CPT

## 2023-03-28 PROCEDURE — 99211 OFF/OP EST MAY X REQ PHY/QHP: CPT

## 2023-03-28 PROCEDURE — 36416 COLLJ CAPILLARY BLOOD SPEC: CPT

## 2023-03-29 DIAGNOSIS — Z79.01 LONG TERM (CURRENT) USE OF ANTICOAGULANTS: ICD-10-CM

## 2023-03-29 DIAGNOSIS — I48.91 UNSPECIFIED ATRIAL FIBRILLATION: ICD-10-CM

## 2023-04-18 ENCOUNTER — OUTPATIENT (OUTPATIENT)
Dept: OUTPATIENT SERVICES | Facility: HOSPITAL | Age: 83
LOS: 1 days | End: 2023-04-18
Payer: MEDICARE

## 2023-04-18 ENCOUNTER — APPOINTMENT (OUTPATIENT)
Dept: MEDICATION MANAGEMENT | Facility: CLINIC | Age: 83
End: 2023-04-18

## 2023-04-18 DIAGNOSIS — Z79.01 LONG TERM (CURRENT) USE OF ANTICOAGULANTS: ICD-10-CM

## 2023-04-18 DIAGNOSIS — I48.91 UNSPECIFIED ATRIAL FIBRILLATION: ICD-10-CM

## 2023-04-18 LAB
INR PPP: 2.9 RATIO
POCT-PROTHROMBIN TIME: 35 SECS
QUALITY CONTROL: YES

## 2023-04-18 PROCEDURE — 85610 PROTHROMBIN TIME: CPT

## 2023-04-18 PROCEDURE — 36416 COLLJ CAPILLARY BLOOD SPEC: CPT

## 2023-04-18 PROCEDURE — 99211 OFF/OP EST MAY X REQ PHY/QHP: CPT

## 2023-04-19 DIAGNOSIS — Z79.01 LONG TERM (CURRENT) USE OF ANTICOAGULANTS: ICD-10-CM

## 2023-04-19 DIAGNOSIS — I48.91 UNSPECIFIED ATRIAL FIBRILLATION: ICD-10-CM

## 2023-05-10 ENCOUNTER — OUTPATIENT (OUTPATIENT)
Dept: OUTPATIENT SERVICES | Facility: HOSPITAL | Age: 83
LOS: 1 days | End: 2023-05-10
Payer: MEDICARE

## 2023-05-10 ENCOUNTER — APPOINTMENT (OUTPATIENT)
Dept: OPHTHALMOLOGY | Facility: CLINIC | Age: 83
End: 2023-05-10
Payer: MEDICARE

## 2023-05-10 DIAGNOSIS — Z95.2 PRESENCE OF PROSTHETIC HEART VALVE: Chronic | ICD-10-CM

## 2023-05-10 DIAGNOSIS — H25.89 OTHER AGE-RELATED CATARACT: ICD-10-CM

## 2023-05-10 PROCEDURE — 92136 OPHTHALMIC BIOMETRY: CPT | Mod: 26

## 2023-05-10 PROCEDURE — 92136 OPHTHALMIC BIOMETRY: CPT | Mod: 52,RT

## 2023-05-16 ENCOUNTER — OUTPATIENT (OUTPATIENT)
Dept: OUTPATIENT SERVICES | Facility: HOSPITAL | Age: 83
LOS: 1 days | End: 2023-05-16
Payer: MEDICARE

## 2023-05-16 ENCOUNTER — APPOINTMENT (OUTPATIENT)
Dept: MEDICATION MANAGEMENT | Facility: CLINIC | Age: 83
End: 2023-05-16

## 2023-05-16 DIAGNOSIS — Z95.2 PRESENCE OF PROSTHETIC HEART VALVE: Chronic | ICD-10-CM

## 2023-05-16 DIAGNOSIS — Z79.01 LONG TERM (CURRENT) USE OF ANTICOAGULANTS: ICD-10-CM

## 2023-05-16 LAB
INR PPP: 2.49
PT BLD: 29.1

## 2023-05-16 PROCEDURE — 99211 OFF/OP EST MAY X REQ PHY/QHP: CPT | Mod: 95

## 2023-05-17 DIAGNOSIS — Z79.01 LONG TERM (CURRENT) USE OF ANTICOAGULANTS: ICD-10-CM

## 2023-05-17 DIAGNOSIS — I48.91 UNSPECIFIED ATRIAL FIBRILLATION: ICD-10-CM

## 2023-05-24 DIAGNOSIS — H25.13 AGE-RELATED NUCLEAR CATARACT, BILATERAL: ICD-10-CM

## 2023-06-01 NOTE — CARDIOLOGY SUMMARY
[___] : [unfilled] [LVEF ___%] : LVEF [unfilled]% [___] : [unfilled] [Date of PFT___] : Date of last PFT [unfilled] Opzelura Counseling:  I discussed with the patient the risks of Opzelura including but not limited to nasopharngitis, bronchitis, ear infection, eosinophila, hives, diarrhea, folliculitis, tonsillitis, and rhinorrhea.  Taken orally, this medication has been linked to serious infections; higher rate of mortality; malignancy and lymphoproliferative disorders; major adverse cardiovascular events; thrombosis; thrombocytopenia, anemia, and neutropenia; and lipid elevations.

## 2023-06-02 ENCOUNTER — APPOINTMENT (OUTPATIENT)
Dept: CARDIOLOGY | Facility: CLINIC | Age: 83
End: 2023-06-02
Payer: MEDICARE

## 2023-06-02 VITALS — TEMPERATURE: 97.6 F | HEIGHT: 70 IN | WEIGHT: 121 LBS | BODY MASS INDEX: 17.32 KG/M2

## 2023-06-02 VITALS — SYSTOLIC BLOOD PRESSURE: 156 MMHG | DIASTOLIC BLOOD PRESSURE: 80 MMHG

## 2023-06-02 VITALS — SYSTOLIC BLOOD PRESSURE: 160 MMHG | DIASTOLIC BLOOD PRESSURE: 94 MMHG | HEART RATE: 58 BPM

## 2023-06-02 DIAGNOSIS — G45.9 TRANSIENT CEREBRAL ISCHEMIC ATTACK, UNSPECIFIED: ICD-10-CM

## 2023-06-02 DIAGNOSIS — I71.9 AORTIC ANEURYSM OF UNSPECIFIED SITE, W/OUT RUPTURE: ICD-10-CM

## 2023-06-02 PROCEDURE — 99214 OFFICE O/P EST MOD 30 MIN: CPT

## 2023-06-02 PROCEDURE — 93000 ELECTROCARDIOGRAM COMPLETE: CPT

## 2023-06-02 RX ORDER — DULOXETINE HYDROCHLORIDE 20 MG/1
20 CAPSULE, DELAYED RELEASE PELLETS ORAL
Qty: 60 | Refills: 0 | Status: COMPLETED | COMMUNITY
Start: 2022-07-02 | End: 2023-06-02

## 2023-06-02 RX ORDER — FINASTERIDE 5 MG/1
5 TABLET, FILM COATED ORAL DAILY
Qty: 90 | Refills: 3 | Status: COMPLETED | COMMUNITY
Start: 2020-11-10 | End: 2023-06-02

## 2023-06-02 NOTE — ASSESSMENT
[FreeTextEntry1] : The patient has multiple medical issues . He has had an AVR PAF TIA , cardiomyopathy and known AAA  which is followed by vascular . The patient is going for cataract surgery . The patient should be considered an intermediate to high cardiac risk undergoing a minor riso procedure . He is on A/C for an mech anical AVR and PAF . Would not stop this Couamdin unless there is an unacceptable bleeding risk . If there is an unacceptable bleeding risk he would have to be bridged with LMWH

## 2023-06-02 NOTE — HISTORY OF PRESENT ILLNESS
[FreeTextEntry1] : The patient is going for cataracr surgery . He had fallen again after missing the last step of a staricase . He has nothad chest pain or SOB

## 2023-06-02 NOTE — CARDIOLOGY SUMMARY
[de-identified] : NSR IVDD LAD \par 12- Sinus Bradycardia IVCD LAD LVH NS T wave change. \par 5-  Sinus bradycardia IVCD LAD NS  twave change. \par 6- Sinus bradycardaiaIVCD LAD \par 6-2-2023 Sinus bradycardia LAD . LAD . \par 1- SB IVCD LAD NS T wave change \par 9-6-2022 Sinus bradycardia IVCD LAD  [de-identified] : 9- EF 42% mild MR Mod TR Mechanical MVR SIDDHARTH 1.1 with mild paravalvular AI . LVSI was 23cc/m2 \par 2-2022 EF 49% AVR junction is adequate . Paravalvular AI \par 2- EF 44% mild to moderate MR mechanical AVR SIDDHARTH 1.7 cm2 mild paravalvular AI  [___] : [unfilled]

## 2023-06-06 ENCOUNTER — TRANSCRIPTION ENCOUNTER (OUTPATIENT)
Age: 83
End: 2023-06-06

## 2023-06-06 ENCOUNTER — INPATIENT (INPATIENT)
Facility: HOSPITAL | Age: 83
LOS: 7 days | Discharge: HOME HEALTH | DRG: 350 | End: 2023-06-14
Attending: SURGERY | Admitting: SURGERY
Payer: MEDICARE

## 2023-06-06 ENCOUNTER — APPOINTMENT (OUTPATIENT)
Dept: MEDICATION MANAGEMENT | Facility: CLINIC | Age: 83
End: 2023-06-06

## 2023-06-06 ENCOUNTER — OUTPATIENT (OUTPATIENT)
Dept: OUTPATIENT SERVICES | Facility: HOSPITAL | Age: 83
LOS: 1 days | End: 2023-06-06
Payer: MEDICARE

## 2023-06-06 ENCOUNTER — RESULT REVIEW (OUTPATIENT)
Age: 83
End: 2023-06-06

## 2023-06-06 VITALS
HEART RATE: 61 BPM | WEIGHT: 119.93 LBS | OXYGEN SATURATION: 97 % | TEMPERATURE: 98 F | SYSTOLIC BLOOD PRESSURE: 175 MMHG | DIASTOLIC BLOOD PRESSURE: 78 MMHG | HEIGHT: 66 IN | RESPIRATION RATE: 16 BRPM

## 2023-06-06 DIAGNOSIS — I48.91 UNSPECIFIED ATRIAL FIBRILLATION: ICD-10-CM

## 2023-06-06 DIAGNOSIS — K40.30 UNILATERAL INGUINAL HERNIA, WITH OBSTRUCTION, WITHOUT GANGRENE, NOT SPECIFIED AS RECURRENT: ICD-10-CM

## 2023-06-06 DIAGNOSIS — Z95.2 PRESENCE OF PROSTHETIC HEART VALVE: Chronic | ICD-10-CM

## 2023-06-06 DIAGNOSIS — Z79.01 LONG TERM (CURRENT) USE OF ANTICOAGULANTS: ICD-10-CM

## 2023-06-06 LAB
ALBUMIN SERPL ELPH-MCNC: 4.6 G/DL — SIGNIFICANT CHANGE UP (ref 3.5–5.2)
ALP SERPL-CCNC: 59 U/L — SIGNIFICANT CHANGE UP (ref 30–115)
ALT FLD-CCNC: 6 U/L — SIGNIFICANT CHANGE UP (ref 0–41)
ANION GAP SERPL CALC-SCNC: 8 MMOL/L — SIGNIFICANT CHANGE UP (ref 7–14)
APPEARANCE UR: CLEAR — SIGNIFICANT CHANGE UP
APTT BLD: 45.1 SEC — HIGH (ref 27–39.2)
AST SERPL-CCNC: 13 U/L — SIGNIFICANT CHANGE UP (ref 0–41)
BACTERIA # UR AUTO: NEGATIVE — SIGNIFICANT CHANGE UP
BASOPHILS # BLD AUTO: 0.04 K/UL — SIGNIFICANT CHANGE UP (ref 0–0.2)
BASOPHILS NFR BLD AUTO: 0.6 % — SIGNIFICANT CHANGE UP (ref 0–1)
BILIRUB SERPL-MCNC: 1.2 MG/DL — SIGNIFICANT CHANGE UP (ref 0.2–1.2)
BILIRUB UR-MCNC: NEGATIVE — SIGNIFICANT CHANGE UP
BLD GP AB SCN SERPL QL: SIGNIFICANT CHANGE UP
BUN SERPL-MCNC: 13 MG/DL — SIGNIFICANT CHANGE UP (ref 10–20)
CALCIUM SERPL-MCNC: 9.8 MG/DL — SIGNIFICANT CHANGE UP (ref 8.4–10.5)
CHLORIDE SERPL-SCNC: 102 MMOL/L — SIGNIFICANT CHANGE UP (ref 98–110)
CO2 SERPL-SCNC: 30 MMOL/L — SIGNIFICANT CHANGE UP (ref 17–32)
COLOR SPEC: YELLOW — SIGNIFICANT CHANGE UP
CREAT SERPL-MCNC: 0.8 MG/DL — SIGNIFICANT CHANGE UP (ref 0.7–1.5)
DIFF PNL FLD: NEGATIVE — SIGNIFICANT CHANGE UP
EGFR: 88 ML/MIN/1.73M2 — SIGNIFICANT CHANGE UP
EOSINOPHIL # BLD AUTO: 0.29 K/UL — SIGNIFICANT CHANGE UP (ref 0–0.7)
EOSINOPHIL NFR BLD AUTO: 4.3 % — SIGNIFICANT CHANGE UP (ref 0–8)
EPI CELLS # UR: 1 /HPF — SIGNIFICANT CHANGE UP (ref 0–5)
GLUCOSE SERPL-MCNC: 90 MG/DL — SIGNIFICANT CHANGE UP (ref 70–99)
GLUCOSE UR QL: NEGATIVE — SIGNIFICANT CHANGE UP
HCT VFR BLD CALC: 37.5 % — LOW (ref 42–52)
HGB BLD-MCNC: 12.6 G/DL — LOW (ref 14–18)
HYALINE CASTS # UR AUTO: 1 /LPF — SIGNIFICANT CHANGE UP (ref 0–7)
IMM GRANULOCYTES NFR BLD AUTO: 0.3 % — SIGNIFICANT CHANGE UP (ref 0.1–0.3)
INR BLD: 5.82 RATIO — CRITICAL HIGH (ref 0.65–1.3)
INR PPP: 4.05
KETONES UR-MCNC: NEGATIVE — SIGNIFICANT CHANGE UP
LACTATE SERPL-SCNC: 1.5 MMOL/L — SIGNIFICANT CHANGE UP (ref 0.7–2)
LEUKOCYTE ESTERASE UR-ACNC: ABNORMAL
LIDOCAIN IGE QN: 25 U/L — SIGNIFICANT CHANGE UP (ref 7–60)
LYMPHOCYTES # BLD AUTO: 0.65 K/UL — LOW (ref 1.2–3.4)
LYMPHOCYTES # BLD AUTO: 9.7 % — LOW (ref 20.5–51.1)
MCHC RBC-ENTMCNC: 33.6 G/DL — SIGNIFICANT CHANGE UP (ref 32–37)
MCHC RBC-ENTMCNC: 34.5 PG — HIGH (ref 27–31)
MCV RBC AUTO: 102.7 FL — HIGH (ref 80–94)
MONOCYTES # BLD AUTO: 0.54 K/UL — SIGNIFICANT CHANGE UP (ref 0.1–0.6)
MONOCYTES NFR BLD AUTO: 8 % — SIGNIFICANT CHANGE UP (ref 1.7–9.3)
NEUTROPHILS # BLD AUTO: 5.19 K/UL — SIGNIFICANT CHANGE UP (ref 1.4–6.5)
NEUTROPHILS NFR BLD AUTO: 77.1 % — HIGH (ref 42.2–75.2)
NITRITE UR-MCNC: NEGATIVE — SIGNIFICANT CHANGE UP
NRBC # BLD: 0 /100 WBCS — SIGNIFICANT CHANGE UP (ref 0–0)
PH UR: 6 — SIGNIFICANT CHANGE UP (ref 5–8)
PLATELET # BLD AUTO: 116 K/UL — LOW (ref 130–400)
PMV BLD: 9.2 FL — SIGNIFICANT CHANGE UP (ref 7.4–10.4)
POTASSIUM SERPL-MCNC: 4.8 MMOL/L — SIGNIFICANT CHANGE UP (ref 3.5–5)
POTASSIUM SERPL-SCNC: 4.8 MMOL/L — SIGNIFICANT CHANGE UP (ref 3.5–5)
PROT SERPL-MCNC: 6.5 G/DL — SIGNIFICANT CHANGE UP (ref 6–8)
PROT UR-MCNC: SIGNIFICANT CHANGE UP
PROTHROM AB SERPL-ACNC: >40 SEC — HIGH (ref 9.95–12.87)
PT BLD: 47.1
RBC # BLD: 3.65 M/UL — LOW (ref 4.7–6.1)
RBC # FLD: 13.7 % — SIGNIFICANT CHANGE UP (ref 11.5–14.5)
RBC CASTS # UR COMP ASSIST: 4 /HPF — SIGNIFICANT CHANGE UP (ref 0–4)
SODIUM SERPL-SCNC: 140 MMOL/L — SIGNIFICANT CHANGE UP (ref 135–146)
SP GR SPEC: 1.02 — SIGNIFICANT CHANGE UP (ref 1.01–1.03)
UROBILINOGEN FLD QL: SIGNIFICANT CHANGE UP
WBC # BLD: 6.73 K/UL — SIGNIFICANT CHANGE UP (ref 4.8–10.8)
WBC # FLD AUTO: 6.73 K/UL — SIGNIFICANT CHANGE UP (ref 4.8–10.8)
WBC UR QL: 4 /HPF — SIGNIFICANT CHANGE UP (ref 0–5)

## 2023-06-06 PROCEDURE — 87635 SARS-COV-2 COVID-19 AMP PRB: CPT

## 2023-06-06 PROCEDURE — 97162 PT EVAL MOD COMPLEX 30 MIN: CPT | Mod: GP

## 2023-06-06 PROCEDURE — 80076 HEPATIC FUNCTION PANEL: CPT

## 2023-06-06 PROCEDURE — 97110 THERAPEUTIC EXERCISES: CPT | Mod: GP

## 2023-06-06 PROCEDURE — 99211 OFF/OP EST MAY X REQ PHY/QHP: CPT | Mod: 95

## 2023-06-06 PROCEDURE — 82962 GLUCOSE BLOOD TEST: CPT

## 2023-06-06 PROCEDURE — 83735 ASSAY OF MAGNESIUM: CPT

## 2023-06-06 PROCEDURE — 80048 BASIC METABOLIC PNL TOTAL CA: CPT

## 2023-06-06 PROCEDURE — C9113: CPT

## 2023-06-06 PROCEDURE — 99285 EMERGENCY DEPT VISIT HI MDM: CPT

## 2023-06-06 PROCEDURE — C9399: CPT

## 2023-06-06 PROCEDURE — 85027 COMPLETE CBC AUTOMATED: CPT

## 2023-06-06 PROCEDURE — 71045 X-RAY EXAM CHEST 1 VIEW: CPT

## 2023-06-06 PROCEDURE — 84100 ASSAY OF PHOSPHORUS: CPT

## 2023-06-06 PROCEDURE — 84484 ASSAY OF TROPONIN QUANT: CPT

## 2023-06-06 PROCEDURE — 88302 TISSUE EXAM BY PATHOLOGIST: CPT

## 2023-06-06 PROCEDURE — 74177 CT ABD & PELVIS W/CONTRAST: CPT | Mod: 26,MA

## 2023-06-06 PROCEDURE — 85025 COMPLETE CBC W/AUTO DIFF WBC: CPT

## 2023-06-06 PROCEDURE — 99223 1ST HOSP IP/OBS HIGH 75: CPT | Mod: 57

## 2023-06-06 PROCEDURE — 36415 COLL VENOUS BLD VENIPUNCTURE: CPT

## 2023-06-06 PROCEDURE — 97530 THERAPEUTIC ACTIVITIES: CPT | Mod: GP

## 2023-06-06 PROCEDURE — 93005 ELECTROCARDIOGRAM TRACING: CPT

## 2023-06-06 PROCEDURE — 85730 THROMBOPLASTIN TIME PARTIAL: CPT

## 2023-06-06 PROCEDURE — C1781: CPT

## 2023-06-06 PROCEDURE — 97116 GAIT TRAINING THERAPY: CPT | Mod: GP

## 2023-06-06 PROCEDURE — 49521 REREPAIR ING HERNIA BLOCKED: CPT | Mod: RT

## 2023-06-06 PROCEDURE — 85610 PROTHROMBIN TIME: CPT

## 2023-06-06 PROCEDURE — C1889: CPT

## 2023-06-06 RX ORDER — CHLORHEXIDINE GLUCONATE 213 G/1000ML
1 SOLUTION TOPICAL
Refills: 0 | Status: DISCONTINUED | OUTPATIENT
Start: 2023-06-06 | End: 2023-06-06

## 2023-06-06 RX ORDER — PHYTONADIONE (VIT K1) 5 MG
10 TABLET ORAL ONCE
Refills: 0 | Status: COMPLETED | OUTPATIENT
Start: 2023-06-06 | End: 2023-06-06

## 2023-06-06 RX ORDER — MORPHINE SULFATE 50 MG/1
4 CAPSULE, EXTENDED RELEASE ORAL ONCE
Refills: 0 | Status: DISCONTINUED | OUTPATIENT
Start: 2023-06-06 | End: 2023-06-06

## 2023-06-06 RX ORDER — SODIUM CHLORIDE 9 MG/ML
1000 INJECTION, SOLUTION INTRAVENOUS
Refills: 0 | Status: DISCONTINUED | OUTPATIENT
Start: 2023-06-06 | End: 2023-06-06

## 2023-06-06 RX ORDER — DIATRIZOATE MEGLUMINE 180 MG/ML
30 INJECTION, SOLUTION INTRAVESICAL ONCE
Refills: 0 | Status: COMPLETED | OUTPATIENT
Start: 2023-06-06 | End: 2023-06-06

## 2023-06-06 RX ORDER — PROTHROMBIN COMPLEX CONCENTRATE (HUMAN) 25.5; 16.5; 24; 22; 22; 26 [IU]/ML; [IU]/ML; [IU]/ML; [IU]/ML; [IU]/ML; [IU]/ML
1500 POWDER, FOR SOLUTION INTRAVENOUS ONCE
Refills: 0 | Status: DISCONTINUED | OUTPATIENT
Start: 2023-06-06 | End: 2023-06-06

## 2023-06-06 RX ORDER — PROTHROMBIN COMPLEX CONCENTRATE (HUMAN) 25.5; 16.5; 24; 22; 22; 26 [IU]/ML; [IU]/ML; [IU]/ML; [IU]/ML; [IU]/ML; [IU]/ML
2000 POWDER, FOR SOLUTION INTRAVENOUS ONCE
Refills: 0 | Status: COMPLETED | OUTPATIENT
Start: 2023-06-06 | End: 2023-06-06

## 2023-06-06 RX ORDER — PHYTONADIONE (VIT K1) 5 MG
10 TABLET ORAL ONCE
Refills: 0 | Status: DISCONTINUED | OUTPATIENT
Start: 2023-06-06 | End: 2023-06-06

## 2023-06-06 RX ORDER — ONDANSETRON 8 MG/1
4 TABLET, FILM COATED ORAL EVERY 6 HOURS
Refills: 0 | Status: DISCONTINUED | OUTPATIENT
Start: 2023-06-06 | End: 2023-06-06

## 2023-06-06 RX ORDER — SODIUM CHLORIDE 9 MG/ML
1000 INJECTION INTRAMUSCULAR; INTRAVENOUS; SUBCUTANEOUS ONCE
Refills: 0 | Status: COMPLETED | OUTPATIENT
Start: 2023-06-06 | End: 2023-06-06

## 2023-06-06 RX ADMIN — MORPHINE SULFATE 4 MILLIGRAM(S): 50 CAPSULE, EXTENDED RELEASE ORAL at 19:17

## 2023-06-06 RX ADMIN — SODIUM CHLORIDE 1000 MILLILITER(S): 9 INJECTION INTRAMUSCULAR; INTRAVENOUS; SUBCUTANEOUS at 12:23

## 2023-06-06 RX ADMIN — PROTHROMBIN COMPLEX CONCENTRATE (HUMAN) 400 INTERNATIONAL UNIT(S): 25.5; 16.5; 24; 22; 22; 26 POWDER, FOR SOLUTION INTRAVENOUS at 22:24

## 2023-06-06 RX ADMIN — Medication 102 MILLIGRAM(S): at 22:30

## 2023-06-06 RX ADMIN — DIATRIZOATE MEGLUMINE 30 MILLILITER(S): 180 INJECTION, SOLUTION INTRAVESICAL at 12:24

## 2023-06-06 NOTE — ED PROVIDER NOTE - PROGRESS NOTE DETAILS
Authored by Dr. Neri: Signed out to Dr. Merino - f/u CT and dispo CT appreciated and d/w surgery, will eval

## 2023-06-06 NOTE — ED ADULT TRIAGE NOTE - HEIGHT IN INCHES
Patient is a 64 Y/O Male  with PMH of ICH sp trach/PEG, HTN/HLD, CAD, hx of c.diff who was brought in by EMS from [facility] for evaluation of elevated Cr to 4.2 (baseline 0.9) and BUN to 120 (18 in 2022). Pt was in acute urinary retention and is now sp teixeira catheter with >1.3L urine output    #MICHELLE, likely post-obstructive: RESOLVED  #Urinary retention sp teixeira catheter  - Cont monitoring BMP, and replete electrolytes for post-obstructive diuresis  - patient to be d/c to SNF with teixeira with outpatient  f/up for further outpatient work up         #SIRS (Low grade temp, tachycardia, leukocytosis noted on 10/6); Not POA  Differentials: Suspected UTI (UA 58 WBC, Large LE, Urine & Blood Cxs Neg) vs Left base PNA   CT findin. Partially imaged left pleural effusion with left lower lobe   collapse/atelectasis.  3. Areas of bowel wall thickening throughout the colon, most pronounced   at the cecum and rectum. Findings may reflect proctocolitis in   appropriate clinical setting. Trace abdominopelvic ascites.    - D/cony ceftriaxone per ID on 10/6 to watch off ABx. Procalc 0.33.  10/9: 100.8, WBC high. Started CEfepime 1gm Q12 per ID's last note.   -10/13- increased Cefepime dose to 2 grams IV every 12 hours till 10/15.   - pending Trach cultures (10/11)         #2.4 cm nodular soft tissue density along the right posterior bladder wall (CT A/P Dec 2021):  Per Urology repeat CT A/P with IV contrast. (since MICHELLE resolved now) =  1. Under distended urinary bladder limits evaluation. Circumferential   bladder wall thickening with irregular posterior bladder wall masslike   thickening.    OUTPATIENT F/U PER UROLOGY.    repeat PSA levels 2.2  Cont. Flomax   Outpatient Urodynamic testing per uro.    #Right testis swelling:   US scrotum noted. nothing to do.    #Full thickness decubital ulcer, present on admission  -nonpurulent, no discharge  -daily wound care as per burn/wound team rec.    #hx of hypotension  -continue  midodrine 5 q8hrs at home with holding parameters      #T2DM  -continue lispro before meals and at bedtime  -monitor fingersticks     #HLD  -continue atorvastatin 80 daily     #Posterior fossa hemorrhage in  complicated by interventricular hemorrhage and hydrocephalus  Pt sp trach/peg  - Follow Pulm for vent management    DVT PPX, heparin  Code Status: DNR    #Progress Note Handoff: start d/c planning , d/c covid swab in am, continue IV abx ,f/up cxs.   Family discussion: yes, medical team Disposition: SNF possibly tomorrow    Total time spent to complete patient's bedside assessment, review medical chart, discuss medical plan of care with covering medical team was more than 35 minutes 63YOM with PMH of ICH sp trach/PEG, HTN/HLD, CAD, hx of c.diff who was brought in by EMS from [facility] for evaluation of elevated Cr to 4.2 (baseline 0.9) and BUN to 120 (18 in 01/2022). Pt was in acute urinary retention and is now sp teixeira catheter with >1.3L urine output    #MICHELLE, likely post-obstructive  #Urinary retention sp teixeira catheter  #Suspected UTI  #SIRS (Low grade temp, tachycardia, leukocytosis noted on 10/6); Not POA  UA 58 WBC, Large LE  - Cont monitoring BMP, and replete electrolytes for post-obstructive diuresis  - f/u UCX,BCx  - D/cony ceftriaxone per ID on 10/6 to watch off ABx.   Cr 4.2 (o/a) > 3.9 > 3.2 >1.9 . Change to 1/2 NS @ 100 (For hypernatremia)    #Why RLE precautions bracelet? :   Check LE duplex. Check with patient's NH.     #Hypokalemia: Replete. monitor.     #Full thickness decubital ulcer, present on admission  -nonpurulent, no discharge  -afebrile  -no elevation in WBC  -burn/wound team eval    #hx of hypotension  -BP on exam 130/70  -patient takes midodrine 5 q8hrs at home with holding parameters  -monitor BP    #T2DM  -continue lispro before meals and at bedtime  -monitor fingersticks     #HLD  -continue atorvastatin 80 daily     #Posterior fossa hemorrhage in 2021 complicated by interventricular hemorrhage and hydrocephalus  Pt sp trach/peg  - Follow Pulm for vent management    DVT PPX, heparin    #Progress Note Handoff  Pending (specify): Monitor renal function and electrolytes, UCx  Eventual return to [facility] (long term resident?) . 63YOM with PMH of ICH sp trach/PEG, HTN/HLD, CAD, hx of c.diff who was brought in by EMS from [facility] for evaluation of elevated Cr to 4.2 (baseline 0.9) and BUN to 120 (18 in 2022). Pt was in acute urinary retention and is now sp teixeira catheter with >1.3L urine output    #MICHELLE, likely post-obstructive: RESOLVED  #Urinary retention sp teixeira catheter  - Cont monitoring BMP, and replete electrolytes for post-obstructive diuresis  Cr 4.2 (o/a) > 3.9 > 3.2 >1.9 > 1.1 .   D/cony IVFs.       #SIRS (Low grade temp, tachycardia, leukocytosis noted on 10/6); Not POA  Differentials: Suspected UTI (UA 58 WBC, Large LE, Urine & Blood Cxs Neg) vs Left base PNA   CT findin. Partially imaged left pleural effusion with left lower lobe   collapse/atelectasis.  3. Areas of bowel wall thickening throughout the colon, most pronounced   at the cecum and rectum. Findings may reflect proctocolitis in   appropriate clinical setting. Trace abdominopelvic ascites.    - D/cony ceftriaxone per ID on 10/6 to watch off ABx. Procalc 0.33.  10/9: 100.8, WBC high. Started CEfepime 1gm Q12 per ID's last note.   Send Trach cultures (10/11)   f/u ID on duration     #Hypernatremia:  resolved with D5W@. Off IVFs now.   increased PEG Free water flushes.    #2.4 cm nodular soft tissue density along the right posterior bladder wall (CT A/P Dec 2021):  Per Urology repeat CT A/P with IV contrast. (since MICHELLE resolved now) =  1. Under distended urinary bladder limits evaluation. Circumferential   bladder wall thickening with irregular posterior bladder wall masslike   thickening.    OUTPATIENT F/U PER UROLOGY.    repeat PSA levels 2.2  Cont. Flomax   Outpatient Urodynamic testing per uro.    #Right testis swelling:   US scrotum noted. nothing to do.    #Why RLE precautions bracelet? :   LE duplex neg. Checked with patient's NH. They were not aware either. D/c RLE precautions.    #Hypokalemia: Replete. monitor.     #Full thickness decubital ulcer, present on admission  -nonpurulent, no discharge  -afebrile  -no elevation in WBC  -burn/wound team eval    #hx of hypotension  -BP on exam 130/70  -patient takes midodrine 5 q8hrs at home with holding parameters  -monitor BP    #T2DM  -continue lispro before meals and at bedtime  -monitor fingersticks     #HLD  -continue atorvastatin 80 daily     #Posterior fossa hemorrhage in  complicated by interventricular hemorrhage and hydrocephalus  Pt sp trach/peg  - Follow Pulm for vent management    DVT PPX, heparin    #Progress Note Handoff  Eventual return to [facility] (long term resident?) .   f/u trach cultures. f/u ID on duration of Abx 63YOM with PMH of ICH sp trach/PEG, HTN/HLD, CAD, hx of c.diff who was brought in by EMS from [facility] for evaluation of elevated Cr to 4.2 (baseline 0.9) and BUN to 120 (18 in 01/2022). Pt was in acute urinary retention and is now sp teixeira catheter with >1.3L urine output    #MICHELLE, likely post-obstructive  #Urinary retention sp teixeira catheter  #Suspected UTI  UA 58 WBC, Large LE  - Cont monitoring BMP, and replete electrolytes for post-obstructive diuresis  - f/u UCX,BCx  - Cont ceftriaxone 1g q24h  Cr 4.2 (o/a) > 3.9 > 3.2. c/w LR @ 100.     #Hypokalemia: Replete. monitor.     #Full thickness decubital ulcer, present on admission  -nonpurulent, no discharge  -afebrile  -no elevation in WBC  -burn/wound team eval    #hx of hypotension  -BP on exam 130/70  -patient takes midodrine 5 q8hrs at home with holding parameters  -monitor BP    #T2DM  -continue lispro before meals and at bedtime  -monitor fingersticks     #HLD  -continue atorvastatin 80 daily     #Posterior fossa hemorrhage in 2021 complicated by interventricular hemorrhage and hydrocephalus  Pt sp trach/peg  - Follow Pulm for vent management    DVT PPX, heparin    #Progress Note Handoff  Pending (specify): Monitor renal function and electrolytes, UCx  Family discussion: Team d/w fam  Eventual return to [facility] (long term resident?) 63YOM with PMH of ICH sp trach/PEG, HTN/HLD, CAD, hx of c.diff who was brought in by EMS from [facility] for evaluation of elevated Cr to 4.2 (baseline 0.9) and BUN to 120 (18 in 01/2022). Pt was in acute urinary retention and is now sp teixeira catheter with >1.3L urine output. Nephro eval for MICHELLE.    # MICHELLE obstructive in nature / UTI / anemia acute on chronic / VDRF/ can not rule out ATN/ hypernatremia  keep teixeira/ keep on LR at 100 cc per hour   replete K to > 3.5   MG noted ok   on antibx follow cx / appreciate ID  will follow . 63YOM with PMH of ICH sp trach/PEG, HTN/HLD, CAD, hx of c.diff who was brought in by EMS from [facility] for evaluation of elevated Cr to 4.2 (baseline 0.9) and BUN to 120 (18 in 01/2022). Pt was in acute urinary retention and is now sp teixeira catheter with >1.3L urine output    #MICHELLE, likely post-obstructive  #Urinary retention sp teixeira catheter  #Suspected UTI  #SIRS (Low grade temp, tachycardia, leukocytosis noted on 10/6); Not POA  UA 58 WBC, Large LE  - Cont monitoring BMP, and replete electrolytes for post-obstructive diuresis  - f/u UCX,BCx  - D/cony ceftriaxone per ID on 10/6 to watch off ABx. Procalc 0.33. f/u ID.   Cr 4.2 (o/a) > 3.9 > 3.2 >1.9 > 1.1 . Changed to D5W@75 now (For hypernatremia)    #2.4 cm nodular soft tissue density along the right posterior bladder wall (CT A/P Dec 2021):  Per Urology repeat CT A/P with IV contrast. (since MICHELLE resolved now)   Consider repeat PSA levels   Cont. Flomax   Outpatient Urodynamic testing vs inpatient TOV?? - will confirm with Urology.       #Why RLE precautions bracelet? :   LE duplex neg. Check with patient's NH.     #Hypokalemia: Replete. monitor.     #Full thickness decubital ulcer, present on admission  -nonpurulent, no discharge  -afebrile  -no elevation in WBC  -burn/wound team eval    #hx of hypotension  -BP on exam 130/70  -patient takes midodrine 5 q8hrs at home with holding parameters  -monitor BP    #T2DM  -continue lispro before meals and at bedtime  -monitor fingersticks     #HLD  -continue atorvastatin 80 daily     #Posterior fossa hemorrhage in 2021 complicated by interventricular hemorrhage and hydrocephalus  Pt sp trach/peg  - Follow Pulm for vent management    DVT PPX, heparin    #Progress Note Handoff  Pending (specify): Monitor renal function and electrolytes, UCx  Eventual return to [facility] (long term resident?) . likely Monday if clinically improved. if no more temp or WBC 63YOM with PMH of ICH sp trach/PEG, HTN/HLD, CAD, hx of c.diff who was brought in by EMS from [facility] for evaluation of elevated Cr to 4.2 (baseline 0.9) and BUN to 120 (18 in 2022). Pt was in acute urinary retention and is now sp teixeira catheter with >1.3L urine output    #MICHELLE, likely post-obstructive: RESOLVED  #Urinary retention sp teixeira catheter  - Cont monitoring BMP, and replete electrolytes for post-obstructive diuresis  Cr 4.2 (o/a) > 3.9 > 3.2 >1.9 > 1.1 .   D/cony IVFs.       #SIRS (Low grade temp, tachycardia, leukocytosis noted on 10/6); Not POA  Differentials: Suspected UTI (UA 58 WBC, Large LE, Urine & Blood Cxs Neg) vs Left base PNA   CT findin. Partially imaged left pleural effusion with left lower lobe   collapse/atelectasis.  3. Areas of bowel wall thickening throughout the colon, most pronounced   at the cecum and rectum. Findings may reflect proctocolitis in   appropriate clinical setting. Trace abdominopelvic ascites.    - D/cony ceftriaxone per ID on 10/6 to watch off ABx. Procalc 0.33.  10/9: 100.8, WBC high. Started CEfepime 1gm Q12 per ID's last note.   Send Trach cultures (10/10)   f/u ID on duration & whether to Consider Left pleural tap??     #Hypernatremia:  resolved with D5W@. Off IVFs now.   increased PEG Free water flushes.    #2.4 cm nodular soft tissue density along the right posterior bladder wall (CT A/P Dec 2021):  Per Urology repeat CT A/P with IV contrast. (since MICHELLE resolved now) =  1. Under distended urinary bladder limits evaluation. Circumferential   bladder wall thickening with irregular posterior bladder wall masslike   thickening.    OUTPATIENT F/U PER UROLOGY.    repeat PSA levels 2.2  Cont. Flomax   Outpatient Urodynamic testing per uro.    #Right testis swelling:   f/u US scrotum    #Why RLE precautions bracelet? :   LE duplex neg. Check with patient's NH.     #Hypokalemia: Replete. monitor.     #Full thickness decubital ulcer, present on admission  -nonpurulent, no discharge  -afebrile  -no elevation in WBC  -burn/wound team eval    #hx of hypotension  -BP on exam 130/70  -patient takes midodrine 5 q8hrs at home with holding parameters  -monitor BP    #T2DM  -continue lispro before meals and at bedtime  -monitor fingersticks     #HLD  -continue atorvastatin 80 daily     #Posterior fossa hemorrhage in  complicated by interventricular hemorrhage and hydrocephalus  Pt sp trach/peg  - Follow Pulm for vent management    DVT PPX, heparin    #Progress Note Handoff  Eventual return to [facility] (long term resident?) .   f/u ID,   US Scrotum 6

## 2023-06-06 NOTE — ED PROVIDER NOTE - PHYSICAL EXAMINATION
VITAL SIGNS: I have reviewed nursing notes and confirm.  CONSTITUTIONAL: No respiratory distress, elderly male thin  SKIN: no rash, no petechiae.  EYES: Pink conjunctiva, anicteric  ENT: MMM  NECK: Supple;  CARD: RRR, no murmurs, equal radial pulses bilaterally 2+  RESP: CTAB, no respiratory distress  ABD: Soft, non-tender, non-distended, no peritoneal signs, no HSM, no CVA tenderness   exam: Obvious right inguinal hernia not reducible mild tenderness no skin changes  EXT: Normal ROM x4. No edema. No calves tenderness  NEURO: Alert, oriented. CN2-12 intact, equal strength bilaterally, nl gait.  PSYCH: Cooperative, appropriate.

## 2023-06-06 NOTE — H&P ADULT - NSHPLABSRESULTS_GEN_ALL_CORE
LAB/STUDIES:                        12.6   6.73  )-----------( 116      ( 2023 12:30 )             37.5     06-    140  |  102  |  13  ----------------------------<  90  4.8   |  30  |  0.8    Ca    9.8      2023 12:30    TPro  6.5  /  Alb  4.6  /  TBili  1.2  /  DBili  x   /  AST  13  /  ALT  6   /  AlkPhos  59  06-06      LIVER FUNCTIONS - ( 2023 12:30 )  Alb: 4.6 g/dL / Pro: 6.5 g/dL / ALK PHOS: 59 U/L / ALT: 6 U/L / AST: 13 U/L / GGT: x           Urinalysis Basic - ( 2023 14:56 )    Color: Yellow / Appearance: Clear / S.024 / pH: x  Gluc: x / Ketone: Negative  / Bili: Negative / Urobili: <2 mg/dL   Blood: x / Protein: Trace / Nitrite: Negative   Leuk Esterase: Moderate / RBC: 4 /HPF / WBC 4 /HPF   Sq Epi: x / Non Sq Epi: x / Bacteria: Negative      IMAGING:  < from: CT Abdomen and Pelvis w/ Oral Cont and w/ IV Cont (23 @ 15:22) >  IMPRESSION:    Large right inguinal hernia containing inflamed, thick-walled inflamed   terminal ileum and proximal colon. Oral contrast is dilute in the hernia   and has not traversed past this point. Large amount of ascites/fluid   within the hernia. Findings are suspicious for strangulated hernia in the   appropriate clinical setting.    Additional findings in the body of the report.    Communication: The summary of above findings were discussed with readback   confirmation with Dr. Merino by resident Berna Avalos MD on 2023 at   5:32 PM.    --- End of Report ---  < end of copied text >

## 2023-06-06 NOTE — H&P ADULT - ATTENDING COMMENTS
patient seen. 83 with a mechanical aortic valve on coumadin (INR 5.8) and cardiac stent. has a long standing RIH that started to hurt 4 days aog, not reducible. not passing gas or having BMs either. CT with evidece of obstruction and possible strangulation. to OR emergently for RIH repair and possible exploratory laparotomy, bowel resction. INR reversed. patient and family understood the risks and potential complications.

## 2023-06-06 NOTE — H&P ADULT - NSHPPHYSICALEXAM_GEN_ALL_CORE
VITALS:  T(F): 97.6 (06-06-23 @ 15:55), Max: 97.6 (06-06-23 @ 11:12)  HR: 60 (06-06-23 @ 15:55) (60 - 61)  BP: 214/91 (06-06-23 @ 15:55) (175/78 - 214/91)  RR: 18 (06-06-23 @ 15:55) (16 - 18)  SpO2: 97% (06-06-23 @ 11:12) (97% - 97%)    PHYSICAL EXAM:  General: NAD, AAOx3, calm and cooperative  HEENT: NCAT, CLIVE, EOMI, Trachea ML, Neck supple  Cardiac: RRR S1, S2, no Murmurs, rubs or gallops  Respiratory: CTAB, normal respiratory effort on RA, breath sounds equal BL, no wheeze, rhonchi or crackles  Abdomen: Soft, non-distended, non-tender, no rebound, no guarding.   Groin: Right inguinal hernia appreciated, non-reducible. Bowel in scrotum. No overlying skin changes.  Musculoskeletal: Mobile, compartments soft  Neuro: Sensation grossly intact and equal throughout, no focal deficits  Vascular: Extremities well perfused  Skin: Warm/dry, normal color, no jaundice  Incision/wound: healing well, dressings in place, clean, dry and intact English

## 2023-06-06 NOTE — H&P ADULT - ASSESSMENT
Assessment  83yM w/ PMH of Afib on Coumadin (last dose 6/5), CAD s/p stent placement (2004), BPH, HLD, leukemia & lymphoma s/p chemo & radiation, and aortic valve stenosis s/p replacement (2009) presented to the ED for right inguinal pain.    Plan:  - OR for right inguinal hernia exploration and repair, possible mesh, possible exploratory laparotomy, possible bowel resection, possible ostomy, and all indicated procedure  - Consent obtained  - NPO, IVF  - Follow up stat INR; Correct as needed  - T&S    Trauma/ACS  [id number]    Consults  [phone] Assessment  83yM w/ PMH of Afib on Coumadin (last dose 6/5), CAD s/p stent placement (2004), BPH, HLD, leukemia & lymphoma s/p chemo & radiation, and aortic valve stenosis s/p replacement (2009) presented to the ED for right inguinal pain.    Plan:  - OR for right inguinal hernia exploration and repair, possible mesh, possible exploratory laparotomy, possible bowel resection, possible ostomy, and all indicated procedure  - Consent obtained  - NPO, IVF  - Follow up stat INR; Correct as needed  - T&S    Trauma/ACS  [id number]    Consults  [phone]    Chief Resident Addendum:  Patient seen and examined. 83M with past medical hx of Lymphoma s/p Chemo/XRT, CAD s/p Stent placement in '04 (no longer on DAPT) as well as Afib and Mechanical Aortic valve (On Coumadin), hx of Severe BPH with previous chronic cisneros, now s/p green light laser therapy ablation at [facility] presents with Incarcerated Right Inguinoscrotal hernia. Patient is obstipated for about 3d with increasing pain prompting ED visit. He has presented to ED with similar complaints last year, however, obstruction resolved during that time and due to patients medical comorbidities and also patient refusal, repair not performed at that time. Compared with last years CT, the hernia now includes Colon and there is new fluid within the hernia sac. With picture of painful incarcerated hernia with obstruction and surrounding fluid, Emergent Repair was offered to the patient. Patient was educated on perioperative risks including, but not limited to, CVA, perioperative MI, possible future mesh complications if one were to be placed, bowel resection, possible stoma, wound complications such as infection or dehiscence, DVT/PE, prolonged hospital stay and agreed to proceed with Emergent Repair. He states that he has been holding his coumadin since Monday due to supratherapeutic INR (4). Will repeat INR preoperatively with intent to reverse with vitamin K/K Centra if elevated. Post operatively, patient will likely need bridging with LMWH given mechanical aortic valve. Patients cardiologist is Dr. Torres.    Most recent TTE is documented in ambulatory cardiology as 2- EF 44% mild to moderate MR mechanical AVR SIDDHARTH 1.7 cm2 mild paravalvular AI

## 2023-06-06 NOTE — ED PROVIDER NOTE - CLINICAL SUMMARY MEDICAL DECISION MAKING FREE TEXT BOX
Seen by surgery, attempted manual reduction of the hernia without success. Plan is for admission for OR.

## 2023-06-06 NOTE — H&P ADULT - HISTORY OF PRESENT ILLNESS
83yM w/ PMH of Afib on Coumadin (last dose 6/5), CAD s/p stent placement (2004), BPH, HLD, leukemia & lymphoma s/p chemo & radiation, and aortic valve stenosis s/p replacement (2009) presented to the ED for right inguinal pain. He states that he has been having 4-5 days of inguinal pain. He does not recall any inciting incident but states that the size for the hernia increased at this point. Additionally, he states that he has not had a bowel movement in the past 1 to 2 weeks with minor flatus. He denies nausea and emesis and states that he has still been able to eat. On further conversation, patient and wife admitted that patient had one bowel movement today. Patient describes his pain as 7/10 dull with intermittent sharp character.     Patient has a known right inguinal hernia that he saw Dr. Ponce for 3 years ago. At that time he was informed that it would need to be fixed but he did not follow up.     Patient is on Coumadin and stated that his most recent INR was 4. Stat labs drawn.

## 2023-06-06 NOTE — ED ADULT TRIAGE NOTE - PATIENT ON (OXYGEN DELIVERY METHOD)
room air Star Wedge Flap Text: The defect edges were debeveled with a #15 scalpel blade.  Given the location of the defect, shape of the defect and the proximity to free margins a star wedge flap was deemed most appropriate.  Using a sterile surgical marker, an appropriate rotation flap was drawn incorporating the defect and placing the expected incisions within the relaxed skin tension lines where possible. The area thus outlined was incised deep to adipose tissue with a #15 scalpel blade.  The skin margins were undermined to an appropriate distance in all directions utilizing iris scissors.

## 2023-06-06 NOTE — ED PROVIDER NOTE - OBJECTIVE STATEMENT
83-year-old male history of A-fib on Coumadin, CAD, BPH, high cholesterol, known history of right inguinal hernia follow-up with Dr. Ponce last time seen 2 years ago told that he needs a surgery did not follow-up, now presents with groin pain over the last 2 weeks getting worse call Dr. Ponce who told him to make an appointment for the end of the summer.  No vomiting no fever.

## 2023-06-07 DIAGNOSIS — Z79.01 LONG TERM (CURRENT) USE OF ANTICOAGULANTS: ICD-10-CM

## 2023-06-07 DIAGNOSIS — I48.91 UNSPECIFIED ATRIAL FIBRILLATION: ICD-10-CM

## 2023-06-07 LAB
ALBUMIN SERPL ELPH-MCNC: 3.9 G/DL — SIGNIFICANT CHANGE UP (ref 3.5–5.2)
ALBUMIN SERPL ELPH-MCNC: 4 G/DL — SIGNIFICANT CHANGE UP (ref 3.5–5.2)
ALP SERPL-CCNC: 51 U/L — SIGNIFICANT CHANGE UP (ref 30–115)
ALP SERPL-CCNC: 52 U/L — SIGNIFICANT CHANGE UP (ref 30–115)
ALT FLD-CCNC: 5 U/L — SIGNIFICANT CHANGE UP (ref 0–41)
ALT FLD-CCNC: <5 U/L — SIGNIFICANT CHANGE UP (ref 0–41)
ANION GAP SERPL CALC-SCNC: 13 MMOL/L — SIGNIFICANT CHANGE UP (ref 7–14)
ANION GAP SERPL CALC-SCNC: 14 MMOL/L — SIGNIFICANT CHANGE UP (ref 7–14)
APTT BLD: 23.9 SEC — CRITICAL LOW (ref 27–39.2)
APTT BLD: 42.7 SEC — HIGH (ref 27–39.2)
APTT BLD: 66.8 SEC — HIGH (ref 27–39.2)
AST SERPL-CCNC: 12 U/L — SIGNIFICANT CHANGE UP (ref 0–41)
AST SERPL-CCNC: 18 U/L — SIGNIFICANT CHANGE UP (ref 0–41)
BASOPHILS # BLD AUTO: 0.02 K/UL — SIGNIFICANT CHANGE UP (ref 0–0.2)
BASOPHILS NFR BLD AUTO: 0.2 % — SIGNIFICANT CHANGE UP (ref 0–1)
BILIRUB DIRECT SERPL-MCNC: 0.4 MG/DL — HIGH (ref 0–0.3)
BILIRUB DIRECT SERPL-MCNC: 0.7 MG/DL — HIGH (ref 0–0.3)
BILIRUB INDIRECT FLD-MCNC: 0.5 MG/DL — SIGNIFICANT CHANGE UP (ref 0.2–1.2)
BILIRUB INDIRECT FLD-MCNC: 0.8 MG/DL — SIGNIFICANT CHANGE UP (ref 0.2–1.2)
BILIRUB SERPL-MCNC: 0.9 MG/DL — SIGNIFICANT CHANGE UP (ref 0.2–1.2)
BILIRUB SERPL-MCNC: 1.5 MG/DL — HIGH (ref 0.2–1.2)
BUN SERPL-MCNC: 10 MG/DL — SIGNIFICANT CHANGE UP (ref 10–20)
BUN SERPL-MCNC: 8 MG/DL — LOW (ref 10–20)
CALCIUM SERPL-MCNC: 8.9 MG/DL — SIGNIFICANT CHANGE UP (ref 8.4–10.5)
CALCIUM SERPL-MCNC: 9 MG/DL — SIGNIFICANT CHANGE UP (ref 8.4–10.5)
CHLORIDE SERPL-SCNC: 97 MMOL/L — LOW (ref 98–110)
CHLORIDE SERPL-SCNC: 99 MMOL/L — SIGNIFICANT CHANGE UP (ref 98–110)
CO2 SERPL-SCNC: 24 MMOL/L — SIGNIFICANT CHANGE UP (ref 17–32)
CO2 SERPL-SCNC: 26 MMOL/L — SIGNIFICANT CHANGE UP (ref 17–32)
CREAT SERPL-MCNC: 0.5 MG/DL — LOW (ref 0.7–1.5)
CREAT SERPL-MCNC: 0.6 MG/DL — LOW (ref 0.7–1.5)
EGFR: 101 ML/MIN/1.73M2 — SIGNIFICANT CHANGE UP
EGFR: 96 ML/MIN/1.73M2 — SIGNIFICANT CHANGE UP
EOSINOPHIL # BLD AUTO: 0.08 K/UL — SIGNIFICANT CHANGE UP (ref 0–0.7)
EOSINOPHIL NFR BLD AUTO: 0.7 % — SIGNIFICANT CHANGE UP (ref 0–8)
GLUCOSE BLDC GLUCOMTR-MCNC: 109 MG/DL — HIGH (ref 70–99)
GLUCOSE BLDC GLUCOMTR-MCNC: 110 MG/DL — HIGH (ref 70–99)
GLUCOSE BLDC GLUCOMTR-MCNC: 112 MG/DL — HIGH (ref 70–99)
GLUCOSE BLDC GLUCOMTR-MCNC: 124 MG/DL — HIGH (ref 70–99)
GLUCOSE SERPL-MCNC: 146 MG/DL — HIGH (ref 70–99)
GLUCOSE SERPL-MCNC: 97 MG/DL — SIGNIFICANT CHANGE UP (ref 70–99)
HCT VFR BLD CALC: 33 % — LOW (ref 42–52)
HCT VFR BLD CALC: 33.1 % — LOW (ref 42–52)
HGB BLD-MCNC: 11.3 G/DL — LOW (ref 14–18)
HGB BLD-MCNC: 11.5 G/DL — LOW (ref 14–18)
IMM GRANULOCYTES NFR BLD AUTO: 0.5 % — HIGH (ref 0.1–0.3)
INR BLD: 1.2 RATIO — SIGNIFICANT CHANGE UP (ref 0.65–1.3)
INR BLD: 1.21 RATIO — SIGNIFICANT CHANGE UP (ref 0.65–1.3)
INR BLD: 1.3 RATIO — SIGNIFICANT CHANGE UP (ref 0.65–1.3)
LYMPHOCYTES # BLD AUTO: 0.46 K/UL — LOW (ref 1.2–3.4)
LYMPHOCYTES # BLD AUTO: 4.2 % — LOW (ref 20.5–51.1)
MAGNESIUM SERPL-MCNC: 1.6 MG/DL — LOW (ref 1.8–2.4)
MAGNESIUM SERPL-MCNC: 2 MG/DL — SIGNIFICANT CHANGE UP (ref 1.8–2.4)
MCHC RBC-ENTMCNC: 34.2 G/DL — SIGNIFICANT CHANGE UP (ref 32–37)
MCHC RBC-ENTMCNC: 34.7 G/DL — SIGNIFICANT CHANGE UP (ref 32–37)
MCHC RBC-ENTMCNC: 35 PG — HIGH (ref 27–31)
MCHC RBC-ENTMCNC: 35.2 PG — HIGH (ref 27–31)
MCV RBC AUTO: 101.2 FL — HIGH (ref 80–94)
MCV RBC AUTO: 102.2 FL — HIGH (ref 80–94)
MONOCYTES # BLD AUTO: 0.98 K/UL — HIGH (ref 0.1–0.6)
MONOCYTES NFR BLD AUTO: 8.9 % — SIGNIFICANT CHANGE UP (ref 1.7–9.3)
NEUTROPHILS # BLD AUTO: 9.4 K/UL — HIGH (ref 1.4–6.5)
NEUTROPHILS NFR BLD AUTO: 85.5 % — HIGH (ref 42.2–75.2)
NRBC # BLD: 0 /100 WBCS — SIGNIFICANT CHANGE UP (ref 0–0)
NRBC # BLD: 0 /100 WBCS — SIGNIFICANT CHANGE UP (ref 0–0)
PHOSPHATE SERPL-MCNC: 2.3 MG/DL — SIGNIFICANT CHANGE UP (ref 2.1–4.9)
PHOSPHATE SERPL-MCNC: 3 MG/DL — SIGNIFICANT CHANGE UP (ref 2.1–4.9)
PLATELET # BLD AUTO: 109 K/UL — LOW (ref 130–400)
PLATELET # BLD AUTO: 114 K/UL — LOW (ref 130–400)
PMV BLD: 10.1 FL — SIGNIFICANT CHANGE UP (ref 7.4–10.4)
PMV BLD: 9.3 FL — SIGNIFICANT CHANGE UP (ref 7.4–10.4)
POTASSIUM SERPL-MCNC: 3.6 MMOL/L — SIGNIFICANT CHANGE UP (ref 3.5–5)
POTASSIUM SERPL-MCNC: 4.4 MMOL/L — SIGNIFICANT CHANGE UP (ref 3.5–5)
POTASSIUM SERPL-SCNC: 3.6 MMOL/L — SIGNIFICANT CHANGE UP (ref 3.5–5)
POTASSIUM SERPL-SCNC: 4.4 MMOL/L — SIGNIFICANT CHANGE UP (ref 3.5–5)
PROT SERPL-MCNC: 5.4 G/DL — LOW (ref 6–8)
PROT SERPL-MCNC: 5.4 G/DL — LOW (ref 6–8)
PROTHROM AB SERPL-ACNC: 13.8 SEC — HIGH (ref 9.95–12.87)
PROTHROM AB SERPL-ACNC: 13.9 SEC — HIGH (ref 9.95–12.87)
PROTHROM AB SERPL-ACNC: 14.9 SEC — HIGH (ref 9.95–12.87)
RBC # BLD: 3.23 M/UL — LOW (ref 4.7–6.1)
RBC # BLD: 3.27 M/UL — LOW (ref 4.7–6.1)
RBC # FLD: 13.6 % — SIGNIFICANT CHANGE UP (ref 11.5–14.5)
RBC # FLD: 13.8 % — SIGNIFICANT CHANGE UP (ref 11.5–14.5)
SODIUM SERPL-SCNC: 136 MMOL/L — SIGNIFICANT CHANGE UP (ref 135–146)
SODIUM SERPL-SCNC: 137 MMOL/L — SIGNIFICANT CHANGE UP (ref 135–146)
TROPONIN T SERPL-MCNC: <0.01 NG/ML — SIGNIFICANT CHANGE UP
WBC # BLD: 11 K/UL — HIGH (ref 4.8–10.8)
WBC # BLD: 7.92 K/UL — SIGNIFICANT CHANGE UP (ref 4.8–10.8)
WBC # FLD AUTO: 11 K/UL — HIGH (ref 4.8–10.8)
WBC # FLD AUTO: 7.92 K/UL — SIGNIFICANT CHANGE UP (ref 4.8–10.8)

## 2023-06-07 PROCEDURE — 99222 1ST HOSP IP/OBS MODERATE 55: CPT

## 2023-06-07 PROCEDURE — 71045 X-RAY EXAM CHEST 1 VIEW: CPT | Mod: 26

## 2023-06-07 PROCEDURE — 93010 ELECTROCARDIOGRAM REPORT: CPT

## 2023-06-07 PROCEDURE — 88302 TISSUE EXAM BY PATHOLOGIST: CPT | Mod: 26

## 2023-06-07 PROCEDURE — 93010 ELECTROCARDIOGRAM REPORT: CPT | Mod: 77

## 2023-06-07 RX ORDER — HYDROMORPHONE HYDROCHLORIDE 2 MG/ML
0.25 INJECTION INTRAMUSCULAR; INTRAVENOUS; SUBCUTANEOUS
Refills: 0 | Status: DISCONTINUED | OUTPATIENT
Start: 2023-06-07 | End: 2023-06-07

## 2023-06-07 RX ORDER — SODIUM CHLORIDE 9 MG/ML
1000 INJECTION, SOLUTION INTRAVENOUS
Refills: 0 | Status: DISCONTINUED | OUTPATIENT
Start: 2023-06-07 | End: 2023-06-08

## 2023-06-07 RX ORDER — MAGNESIUM SULFATE 500 MG/ML
2 VIAL (ML) INJECTION ONCE
Refills: 0 | Status: COMPLETED | OUTPATIENT
Start: 2023-06-07 | End: 2023-06-07

## 2023-06-07 RX ORDER — HEPARIN SODIUM 5000 [USP'U]/ML
2000 INJECTION INTRAVENOUS; SUBCUTANEOUS EVERY 6 HOURS
Refills: 0 | Status: DISCONTINUED | OUTPATIENT
Start: 2023-06-07 | End: 2023-06-07

## 2023-06-07 RX ORDER — ONDANSETRON 8 MG/1
4 TABLET, FILM COATED ORAL EVERY 6 HOURS
Refills: 0 | Status: DISCONTINUED | OUTPATIENT
Start: 2023-06-07 | End: 2023-06-14

## 2023-06-07 RX ORDER — ACETAMINOPHEN 500 MG
650 TABLET ORAL EVERY 6 HOURS
Refills: 0 | Status: DISCONTINUED | OUTPATIENT
Start: 2023-06-07 | End: 2023-06-14

## 2023-06-07 RX ORDER — METOPROLOL TARTRATE 50 MG
5 TABLET ORAL EVERY 6 HOURS
Refills: 0 | Status: DISCONTINUED | OUTPATIENT
Start: 2023-06-07 | End: 2023-06-07

## 2023-06-07 RX ORDER — METOPROLOL TARTRATE 50 MG
25 TABLET ORAL EVERY 12 HOURS
Refills: 0 | Status: DISCONTINUED | OUTPATIENT
Start: 2023-06-07 | End: 2023-06-14

## 2023-06-07 RX ORDER — SODIUM CHLORIDE 9 MG/ML
1000 INJECTION, SOLUTION INTRAVENOUS
Refills: 0 | Status: DISCONTINUED | OUTPATIENT
Start: 2023-06-07 | End: 2023-06-07

## 2023-06-07 RX ORDER — HYDRALAZINE HCL 50 MG
10 TABLET ORAL ONCE
Refills: 0 | Status: COMPLETED | OUTPATIENT
Start: 2023-06-07 | End: 2023-06-07

## 2023-06-07 RX ORDER — CEFAZOLIN SODIUM 1 G
2000 VIAL (EA) INJECTION EVERY 8 HOURS
Refills: 0 | Status: COMPLETED | OUTPATIENT
Start: 2023-06-07 | End: 2023-06-07

## 2023-06-07 RX ORDER — SIMVASTATIN 20 MG/1
10 TABLET, FILM COATED ORAL AT BEDTIME
Refills: 0 | Status: DISCONTINUED | OUTPATIENT
Start: 2023-06-07 | End: 2023-06-14

## 2023-06-07 RX ORDER — METHOCARBAMOL 500 MG/1
500 TABLET, FILM COATED ORAL
Refills: 0 | Status: DISCONTINUED | OUTPATIENT
Start: 2023-06-07 | End: 2023-06-07

## 2023-06-07 RX ORDER — HEPARIN SODIUM 5000 [USP'U]/ML
4000 INJECTION INTRAVENOUS; SUBCUTANEOUS EVERY 6 HOURS
Refills: 0 | Status: DISCONTINUED | OUTPATIENT
Start: 2023-06-07 | End: 2023-06-07

## 2023-06-07 RX ORDER — HEPARIN SODIUM 5000 [USP'U]/ML
INJECTION INTRAVENOUS; SUBCUTANEOUS
Qty: 25000 | Refills: 0 | Status: DISCONTINUED | OUTPATIENT
Start: 2023-06-07 | End: 2023-06-08

## 2023-06-07 RX ORDER — LISINOPRIL 2.5 MG/1
10 TABLET ORAL ONCE
Refills: 0 | Status: COMPLETED | OUTPATIENT
Start: 2023-06-07 | End: 2023-06-07

## 2023-06-07 RX ORDER — HEPARIN SODIUM 5000 [USP'U]/ML
INJECTION INTRAVENOUS; SUBCUTANEOUS
Qty: 25000 | Refills: 0 | Status: DISCONTINUED | OUTPATIENT
Start: 2023-06-07 | End: 2023-06-07

## 2023-06-07 RX ORDER — HEPARIN SODIUM 5000 [USP'U]/ML
4000 INJECTION INTRAVENOUS; SUBCUTANEOUS ONCE
Refills: 0 | Status: DISCONTINUED | OUTPATIENT
Start: 2023-06-07 | End: 2023-06-07

## 2023-06-07 RX ORDER — KETOROLAC TROMETHAMINE 30 MG/ML
15 SYRINGE (ML) INJECTION EVERY 6 HOURS
Refills: 0 | Status: DISCONTINUED | OUTPATIENT
Start: 2023-06-07 | End: 2023-06-07

## 2023-06-07 RX ORDER — CHLORHEXIDINE GLUCONATE 213 G/1000ML
1 SOLUTION TOPICAL
Refills: 0 | Status: DISCONTINUED | OUTPATIENT
Start: 2023-06-07 | End: 2023-06-14

## 2023-06-07 RX ORDER — FINASTERIDE 5 MG/1
5 TABLET, FILM COATED ORAL DAILY
Refills: 0 | Status: DISCONTINUED | OUTPATIENT
Start: 2023-06-07 | End: 2023-06-14

## 2023-06-07 RX ORDER — POTASSIUM PHOSPHATE, MONOBASIC POTASSIUM PHOSPHATE, DIBASIC 236; 224 MG/ML; MG/ML
30 INJECTION, SOLUTION INTRAVENOUS ONCE
Refills: 0 | Status: COMPLETED | OUTPATIENT
Start: 2023-06-07 | End: 2023-06-07

## 2023-06-07 RX ORDER — ONDANSETRON 8 MG/1
4 TABLET, FILM COATED ORAL ONCE
Refills: 0 | Status: DISCONTINUED | OUTPATIENT
Start: 2023-06-07 | End: 2023-06-07

## 2023-06-07 RX ORDER — LISINOPRIL 2.5 MG/1
10 TABLET ORAL DAILY
Refills: 0 | Status: DISCONTINUED | OUTPATIENT
Start: 2023-06-07 | End: 2023-06-07

## 2023-06-07 RX ORDER — LISINOPRIL 2.5 MG/1
20 TABLET ORAL DAILY
Refills: 0 | Status: DISCONTINUED | OUTPATIENT
Start: 2023-06-08 | End: 2023-06-14

## 2023-06-07 RX ORDER — HYDROMORPHONE HYDROCHLORIDE 2 MG/ML
0.5 INJECTION INTRAMUSCULAR; INTRAVENOUS; SUBCUTANEOUS EVERY 6 HOURS
Refills: 0 | Status: DISCONTINUED | OUTPATIENT
Start: 2023-06-07 | End: 2023-06-11

## 2023-06-07 RX ORDER — KETOROLAC TROMETHAMINE 30 MG/ML
15 SYRINGE (ML) INJECTION EVERY 6 HOURS
Refills: 0 | Status: DISCONTINUED | OUTPATIENT
Start: 2023-06-07 | End: 2023-06-09

## 2023-06-07 RX ORDER — OXYCODONE HYDROCHLORIDE 5 MG/1
5 TABLET ORAL EVERY 6 HOURS
Refills: 0 | Status: DISCONTINUED | OUTPATIENT
Start: 2023-06-07 | End: 2023-06-13

## 2023-06-07 RX ORDER — PANTOPRAZOLE SODIUM 20 MG/1
40 TABLET, DELAYED RELEASE ORAL DAILY
Refills: 0 | Status: DISCONTINUED | OUTPATIENT
Start: 2023-06-07 | End: 2023-06-07

## 2023-06-07 RX ORDER — ENOXAPARIN SODIUM 100 MG/ML
40 INJECTION SUBCUTANEOUS EVERY 24 HOURS
Refills: 0 | Status: DISCONTINUED | OUTPATIENT
Start: 2023-06-07 | End: 2023-06-07

## 2023-06-07 RX ORDER — GABAPENTIN 400 MG/1
100 CAPSULE ORAL THREE TIMES A DAY
Refills: 0 | Status: DISCONTINUED | OUTPATIENT
Start: 2023-06-07 | End: 2023-06-14

## 2023-06-07 RX ADMIN — GABAPENTIN 100 MILLIGRAM(S): 400 CAPSULE ORAL at 13:03

## 2023-06-07 RX ADMIN — Medication 25 MILLIGRAM(S): at 17:18

## 2023-06-07 RX ADMIN — CHLORHEXIDINE GLUCONATE 1 APPLICATION(S): 213 SOLUTION TOPICAL at 11:27

## 2023-06-07 RX ADMIN — Medication 650 MILLIGRAM(S): at 12:00

## 2023-06-07 RX ADMIN — Medication 25 GRAM(S): at 04:22

## 2023-06-07 RX ADMIN — Medication 10 MILLIGRAM(S): at 11:51

## 2023-06-07 RX ADMIN — PANTOPRAZOLE SODIUM 40 MILLIGRAM(S): 20 TABLET, DELAYED RELEASE ORAL at 11:26

## 2023-06-07 RX ADMIN — SIMVASTATIN 10 MILLIGRAM(S): 20 TABLET, FILM COATED ORAL at 21:14

## 2023-06-07 RX ADMIN — FINASTERIDE 5 MILLIGRAM(S): 5 TABLET, FILM COATED ORAL at 11:25

## 2023-06-07 RX ADMIN — POTASSIUM PHOSPHATE, MONOBASIC POTASSIUM PHOSPHATE, DIBASIC 83.33 MILLIMOLE(S): 236; 224 INJECTION, SOLUTION INTRAVENOUS at 05:46

## 2023-06-07 RX ADMIN — Medication 650 MILLIGRAM(S): at 06:37

## 2023-06-07 RX ADMIN — Medication 1.25 MILLIGRAM(S): at 08:00

## 2023-06-07 RX ADMIN — Medication 1.25 MILLIGRAM(S): at 10:37

## 2023-06-07 RX ADMIN — GABAPENTIN 100 MILLIGRAM(S): 400 CAPSULE ORAL at 21:14

## 2023-06-07 RX ADMIN — SODIUM CHLORIDE 95 MILLILITER(S): 9 INJECTION, SOLUTION INTRAVENOUS at 10:37

## 2023-06-07 RX ADMIN — HEPARIN SODIUM 1000 UNIT(S)/HR: 5000 INJECTION INTRAVENOUS; SUBCUTANEOUS at 17:18

## 2023-06-07 RX ADMIN — Medication 100 MILLIGRAM(S): at 06:13

## 2023-06-07 RX ADMIN — Medication 25 MILLIGRAM(S): at 09:24

## 2023-06-07 RX ADMIN — Medication 650 MILLIGRAM(S): at 17:51

## 2023-06-07 RX ADMIN — Medication 5 MILLIGRAM(S): at 06:03

## 2023-06-07 RX ADMIN — LISINOPRIL 10 MILLIGRAM(S): 2.5 TABLET ORAL at 09:24

## 2023-06-07 RX ADMIN — Medication 650 MILLIGRAM(S): at 11:25

## 2023-06-07 RX ADMIN — SODIUM CHLORIDE 95 MILLILITER(S): 9 INJECTION, SOLUTION INTRAVENOUS at 03:26

## 2023-06-07 RX ADMIN — LISINOPRIL 10 MILLIGRAM(S): 2.5 TABLET ORAL at 16:04

## 2023-06-07 RX ADMIN — ENOXAPARIN SODIUM 40 MILLIGRAM(S): 100 INJECTION SUBCUTANEOUS at 07:59

## 2023-06-07 RX ADMIN — Medication 650 MILLIGRAM(S): at 17:17

## 2023-06-07 RX ADMIN — GABAPENTIN 100 MILLIGRAM(S): 400 CAPSULE ORAL at 06:37

## 2023-06-07 RX ADMIN — Medication 100 MILLIGRAM(S): at 13:03

## 2023-06-07 NOTE — CONSULT NOTE ADULT - ASSESSMENT
#CAD s/p PCI 2004  # Severe AS s/p mechanical AVR on Coumadin  #Paroxysmal Afib - now in NSR    - supratherapeutic on admission (5.8) s/p vit K and K-centra, now 1.3    - Hemdoyanmically stable. Denies angina, dyspnea, or palpitations.  - Restart Coumadin at home dose. Outpt follow up with Coumadin clinic and Dr. Torres  - C/w ASA/statin/Toprol/Lisinopril  - Cardiology team to sign off   #CAD s/p PCI 2004  # Severe AS s/p mechanical AVR on Coumadin  #Paroxysmal Afib - now in NSR    - supratherapeutic on admission (5.8) s/p vit K and K-centra, now 1.3    - Hemodynamically stable. Denies angina, dyspnea, or palpitations.  - INR goal is 2.5 -3. Restart coumadin and use heparin/lovenox to get to goal if deemed safe by primary team. Follow up with coumadin clinic and Dr. Torres.  - C/w ASA/statin/Toprol/Lisinopril  - Cardiology team to sign off

## 2023-06-07 NOTE — CONSULT NOTE ADULT - REASON FOR ADMISSION
Incarcerated right inguinal hernia

## 2023-06-07 NOTE — BRIEF OPERATIVE NOTE - OPERATION/FINDINGS
Open repair of right inguinal hernia with mesh. Diagnostic Laparoscopy to evaluate bowel viability. Both small and large intestine was inspected and viable.    Medium BARD keyhole mesh used, LOT # [id number]

## 2023-06-07 NOTE — PHYSICAL THERAPY INITIAL EVALUATION ADULT - GENERAL OBSERVATIONS, REHAB EVAL
11:00-11:30 Pt encxountered in bed inNAD, +tele, IVs locked, family present.  He tolerated bed mobility, transfers, and ambulation very weell.  Pt would benefit from Home PT and will need a rolling walker upon DC.

## 2023-06-07 NOTE — CONSULT NOTE ADULT - ASSESSMENT
IMPRESSION: Rehab of gait dysfunction / s/p Diagnostic laparoscopy with repair of inguinal hernia / Afib, BPH, HLD, leukemia & lymphoma s/p chemo & radiation, and aortic valve stenosis s/p AVR    PRECAUTIONS: [   ] Cardiac  [   ] Respiratory  [   ] Seizures [   ] Contact Isolation  [   ] Droplet Isolation  [   ] Other    Weight Bearing Status:     RECOMMENDATION:    Out of Bed to Chair     DVT/Decubiti Prophylaxis    REHAB PLAN:     [  x  ] Bedside P/T 3-5 times a week   [    ]   Bedside O/T  2-3 times a week             [    ] Speech Therapy               [    ]  No Rehab Therapy Indicated   Conditioning/ROM                                    ADL  Bed Mobility                                               Conditioning/ROM  Transfers                                                     Bed Mobility  Sitting /Standing Balance                         Transfers                                        Gait Training                                               Sitting/Standing Balance  Stair Training [   ]Applicable                    Home equipment Eval                                                                        Splinting  [   ] Only      GOALS:   ADL   [    ]   Independent                    Transfers  [   x ] Independent                          Ambulation  [ x   ] Independent     [ x    ] With device                            [    ]  CG                                                         [    ]  CG                                                                  [    ] CG                            [    ] Min A                                                   [    ] Min A                                                              [    ] Min  A          DISCHARGE PLAN:   [    ]  Good candidate for Intensive Rehabilitation/Hospital based                                             Will tolerate 3hrs Intensive Rehab Daily                                       [     ]  Short Term Rehab in Skilled Nursing Facility                                       [  x   ]  Home with Outpatient or  services                                         [     ]  Possible Candidate for Intensive Hospital based Rehab

## 2023-06-07 NOTE — CONSULT NOTE ADULT - SUBJECTIVE AND OBJECTIVE BOX
Outpt cardiologist:    HPI:  83yM w/ PMH of Afib on Coumadin (last dose 6/5), CAD s/p stent placement (2004), BPH, HLD, leukemia & lymphoma s/p chemo & radiation, and aortic valve stenosis s/p replacement (2009) presented to the ED for right inguinal pain. He states that he has been having 4-5 days of inguinal pain. He does not recall any inciting incident but states that the size for the hernia increased at this point. Additionally, he states that he has not had a bowel movement in the past 1 to 2 weeks with minor flatus. He denies nausea and emesis and states that he has still been able to eat. On further conversation, patient and wife admitted that patient had one bowel movement today. Patient describes his pain as 7/10 dull with intermittent sharp character.     Patient has a known right inguinal hernia that he saw Dr. Ponce for 3 years ago. At that time he was informed that it would need to be fixed but he did not follow up.     Patient is on Coumadin and stated that his most recent INR was 4. Stat labs drawn.  (06 Jun 2023 20:43)      ---  Cardiology Fellow notes:    Pt with hx of CAD s/p PCI LAD 04', severe AS s/p Mechanical AVR, Afib (Coumadin) admittde for inguinal hernia fixation.  Found to have elevated INR s/p vit K. Now INR 1.3  Cardiology consulted for coumadin management?    PAST MEDICAL & SURGICAL HISTORY  CAD (coronary artery disease)  2004 1 stent    Aortic valve stenosis  s/p replacement 2009    Lymphoma  1996, s/p chemo& radiation    BPH (benign prostatic hyperplasia)    Afib    Leukemia, lymphoid    H/O aortic valve replacement        FAMILY HISTORY:  FAMILY HISTORY:      SOCIAL HISTORY:  Social History:      ALLERGIES:  No Known Allergies      MEDICATIONS:  acetaminophen     Tablet .. 650 milliGRAM(s) Oral every 6 hours  ceFAZolin   IVPB 2000 milliGRAM(s) IV Intermittent every 8 hours  enoxaparin Injectable 40 milliGRAM(s) SubCutaneous every 24 hours  gabapentin 100 milliGRAM(s) Oral three times a day  lactated ringers. 1000 milliLiter(s) (95 mL/Hr) IV Continuous <Continuous>  metoprolol tartrate Injectable 5 milliGRAM(s) IV Push every 6 hours  pantoprazole  Injectable 40 milliGRAM(s) IV Push daily  simvastatin 10 milliGRAM(s) Oral at bedtime    PRN:  HYDROmorphone  Injectable 0.5 milliGRAM(s) IV Push every 6 hours PRN  ketorolac   Injectable 15 milliGRAM(s) IV Push every 6 hours PRN  ondansetron Injectable 4 milliGRAM(s) IV Push every 6 hours PRN  oxyCODONE    IR 5 milliGRAM(s) Oral every 6 hours PRN      HOME MEDICATIONS:  Home Medications:  Coumadin 3 mg oral tablet: 1 tab(s) orally once a day (23 May 2022 09:23)  dutasteride 0.5 mg oral capsule: 1 cap(s) orally once a day (23 May 2022 09:23)  lisinopril 20 mg oral tablet: 0.5 tab(s) orally once a day (at bedtime) (23 May 2022 09:23)  simvastatin 10 mg oral tablet: 1 tab(s) orally once a day (at bedtime) (23 May 2022 09:23)  Tylenol:  (23 May 2022 09:23)      VITALS:   T(F): 96.4 (06-07 @ 08:00), Max: 98.2 (06-07 @ 02:50)  HR: 66 (06-07 @ 08:30) (60 - 89)  BP: 167/75 (06-07 @ 08:30) (155/68 - 214/91)  BP(mean): 108 (06-07 @ 08:30) (106 - 108)  RR: 23 (06-07 @ 08:30) (15 - 33)  SpO2: 97% (06-07 @ 08:30) (97% - 100%)    I&O's Summary    06 Jun 2023 07:01 - 07 Jun 2023 07:00  --------------------------------------------------------  IN: 380 mL / OUT: 230 mL / NET: 150 mL    07 Jun 2023 07:01  -  07 Jun 2023 08:51  --------------------------------------------------------  IN: 95 mL / OUT: 200 mL / NET: -105 mL        REVIEW OF SYSTEMS:  CONSTITUTIONAL: No weakness, fevers or chills  HEENT: No visual changes, neck/ear pain  RESPIRATORY: No cough, sob  CARDIOVASCULAR: See HPI  GASTROINTESTINAL: No abdominal pain. No nausea, vomiting, diarrhea   GENITOURINARY: No dysuria, frequency or hematuria  NEUROLOGICAL: No new focal deficits  SKIN: No new rashes    PHYSICAL EXAM:  General: Not in distress.  Non-toxic appearing.   HEENT: EOMI  Cardio: regular, S1, S2, no murmur  Pulm: B/L BS.  No wheezing / crackles / rales  Abdomen: Soft, non-tender, non-distended. Normoactive bowel sounds  Extremities: No edema b/l le  Neuro: A&O x3. No focal deficits    LABS:                        11.5   7.92  )-----------( 109      ( 07 Jun 2023 03:15 )             33.1     06-07    137  |  99  |  10  ----------------------------<  97  3.6   |  24  |  0.6<L>    Ca    8.9      07 Jun 2023 03:15  Phos  2.3     06-07  Mg     1.6     06-07    TPro  5.4<L>  /  Alb  4.0  /  TBili  1.5<H>  /  DBili  0.7<H>  /  AST  12  /  ALT  5   /  AlkPhos  52  06-07    PT/INR - ( 07 Jun 2023 01:25 )   PT: 14.90 sec;   INR: 1.30 ratio         PTT - ( 07 Jun 2023 01:25 )  PTT:23.9 sec  Troponin T, Serum: <0.01 ng/mL (06-07-23 @ 03:15)  Lactate, Blood: 1.5 mmol/L (06-06-23 @ 12:30)    CARDIAC MARKERS ( 07 Jun 2023 03:15 )  x     / <0.01 ng/mL / x     / x     / x            Troponin trend:            RADIOLOGY:  -CXR:  -TTE:  TTE 2/2023  EF 44%. mechanical AVR SIDDHARTH 1.7. Mild paravalvular leak  -CCTA:  -STRESS TEST:  -CATHETERIZATION:  -OTHER:  ECG: NSR w/ PVC's       TELEMETRY EVENTS:   Outpt cardiologist:    HPI:  83yM w/ PMH of Afib on Coumadin (last dose 6/5), CAD s/p stent placement (2004), BPH, HLD, leukemia & lymphoma s/p chemo & radiation, and aortic valve stenosis s/p replacement (2009) presented to the ED for right inguinal pain. He states that he has been having 4-5 days of inguinal pain. He does not recall any inciting incident but states that the size for the hernia increased at this point. Additionally, he states that he has not had a bowel movement in the past 1 to 2 weeks with minor flatus. He denies nausea and emesis and states that he has still been able to eat. On further conversation, patient and wife admitted that patient had one bowel movement today. Patient describes his pain as 7/10 dull with intermittent sharp character.     Patient has a known right inguinal hernia that he saw Dr. Ponce for 3 years ago. At that time he was informed that it would need to be fixed but he did not follow up.     Patient is on Coumadin and stated that his most recent INR was 4. Stat labs drawn.  (06 Jun 2023 20:43)      ---  Cardiology Fellow notes:    Pt with hx of CAD s/p PCI LAD 04', severe AS s/p Mechanical AVR, Afib (Coumadin) admitted for inguinal hernia fixation.  Found to have elevated INR s/p vit K. Now INR 1.3  Cardiology consulted for coumadin management?    PAST MEDICAL & SURGICAL HISTORY  CAD (coronary artery disease)  2004 1 stent    Aortic valve stenosis  s/p replacement 2009    Lymphoma  1996, s/p chemo& radiation    BPH (benign prostatic hyperplasia)    Afib    Leukemia, lymphoid    H/O aortic valve replacement        FAMILY HISTORY:  FAMILY HISTORY: no family history of premature CAD or SCD      SOCIAL HISTORY:  Social History: no toxic habits       ALLERGIES:  No Known Allergies      MEDICATIONS:  acetaminophen     Tablet .. 650 milliGRAM(s) Oral every 6 hours  ceFAZolin   IVPB 2000 milliGRAM(s) IV Intermittent every 8 hours  enoxaparin Injectable 40 milliGRAM(s) SubCutaneous every 24 hours  gabapentin 100 milliGRAM(s) Oral three times a day  lactated ringers. 1000 milliLiter(s) (95 mL/Hr) IV Continuous <Continuous>  metoprolol tartrate Injectable 5 milliGRAM(s) IV Push every 6 hours  pantoprazole  Injectable 40 milliGRAM(s) IV Push daily  simvastatin 10 milliGRAM(s) Oral at bedtime    PRN:  HYDROmorphone  Injectable 0.5 milliGRAM(s) IV Push every 6 hours PRN  ketorolac   Injectable 15 milliGRAM(s) IV Push every 6 hours PRN  ondansetron Injectable 4 milliGRAM(s) IV Push every 6 hours PRN  oxyCODONE    IR 5 milliGRAM(s) Oral every 6 hours PRN      HOME MEDICATIONS:  Home Medications:  Coumadin 3 mg oral tablet: 1 tab(s) orally once a day (23 May 2022 09:23)  dutasteride 0.5 mg oral capsule: 1 cap(s) orally once a day (23 May 2022 09:23)  lisinopril 20 mg oral tablet: 0.5 tab(s) orally once a day (at bedtime) (23 May 2022 09:23)  simvastatin 10 mg oral tablet: 1 tab(s) orally once a day (at bedtime) (23 May 2022 09:23)  Tylenol:  (23 May 2022 09:23)      VITALS:   T(F): 96.4 (06-07 @ 08:00), Max: 98.2 (06-07 @ 02:50)  HR: 66 (06-07 @ 08:30) (60 - 89)  BP: 167/75 (06-07 @ 08:30) (155/68 - 214/91)  BP(mean): 108 (06-07 @ 08:30) (106 - 108)  RR: 23 (06-07 @ 08:30) (15 - 33)  SpO2: 97% (06-07 @ 08:30) (97% - 100%)    I&O's Summary    06 Jun 2023 07:01  -  07 Jun 2023 07:00  --------------------------------------------------------  IN: 380 mL / OUT: 230 mL / NET: 150 mL    07 Jun 2023 07:01  -  07 Jun 2023 08:51  --------------------------------------------------------  IN: 95 mL / OUT: 200 mL / NET: -105 mL        REVIEW OF SYSTEMS:  CONSTITUTIONAL: No weakness, fevers or chills  HEENT: No visual changes, neck/ear pain  RESPIRATORY: No cough, sob  CARDIOVASCULAR: See HPI  GASTROINTESTINAL: No abdominal pain. No nausea, vomiting, diarrhea   GENITOURINARY: No dysuria, frequency or hematuria  NEUROLOGICAL: No new focal deficits  SKIN: No new rashes  10 point ROS completed; negative except as stated in HPI    PHYSICAL EXAM:  General: Not in distress.  Non-toxic appearing.   HEENT: EOMI, PERRLA  Neck: supple, NO JVD  Cardio: regular, S1, S2, mechanical heart valve sounds  Pulm: B/L BS.  No wheezing / crackles / rales  Abdomen: Soft, non-tender, non-distended. Normoactive bowel sounds  Extremities: No edema b/l le  Neuro: A&O x3. No focal deficits    LABS:                        11.5   7.92  )-----------( 109      ( 07 Jun 2023 03:15 )             33.1     06-07    137  |  99  |  10  ----------------------------<  97  3.6   |  24  |  0.6<L>    Ca    8.9      07 Jun 2023 03:15  Phos  2.3     06-07  Mg     1.6     06-07    TPro  5.4<L>  /  Alb  4.0  /  TBili  1.5<H>  /  DBili  0.7<H>  /  AST  12  /  ALT  5   /  AlkPhos  52  06-07    PT/INR - ( 07 Jun 2023 01:25 )   PT: 14.90 sec;   INR: 1.30 ratio         PTT - ( 07 Jun 2023 01:25 )  PTT:23.9 sec  Troponin T, Serum: <0.01 ng/mL (06-07-23 @ 03:15)  Lactate, Blood: 1.5 mmol/L (06-06-23 @ 12:30)    CARDIAC MARKERS ( 07 Jun 2023 03:15 )  x     / <0.01 ng/mL / x     / x     / x            Troponin trend:            RADIOLOGY:  -CXR:  -TTE:  TTE 2/2023  EF 44%. mechanical AVR SIDDHARTH 1.7. Mild paravalvular leak  -CCTA:  -STRESS TEST:  -CATHETERIZATION:  -OTHER:  ECG: NSR w/ PVC's       TELEMETRY EVENTS:

## 2023-06-07 NOTE — CONSULT NOTE ADULT - SUBJECTIVE AND OBJECTIVE BOX
TERRELL ATKINSON   [id number]   [birth year]  83yM    Admit Date: 06-06-23  Indication for SDU/SICU:  OR #1-        ============================  HPI   83yM w/ PMH of Afib on Coumadin (last dose 6/5), CAD s/p stent placement (2004), BPH, HLD, leukemia & lymphoma s/p chemo & radiation, and aortic valve stenosis s/p AVR (2009) presented to the ED for right inguinal pain. He states that he has been having 4-5 days of inguinal pain. He does not recall any inciting incident but states that the size for the hernia increased at this point. Additionally, he states that he has not had a bowel movement in the past 1 to 2 weeks with minor flatus. He denies nausea and emesis and states that he has still been able to eat. On further conversation, patient and wife admitted that patient had one bowel movement today. Patient describes his pain as 7/10 dull with intermittent sharp character.     Patient has a known right inguinal hernia that he saw Dr. Ponce for 3 years ago. At that time he was informed that it would need to be fixed but he did not follow up.     Patient is on Coumadin and stated that his most recent INR was 4. Stat labs drawn.     SICU Consult for postoperative hemodynamic monitoring for HTN in setting of extensive cardiac history. Patient noted to have elevated INR to 5.82 and was given 2000u K-centra and 10u Vitamin K. Intra-op repeat INR was 1.3. Patient also noted to be hypertensive preoperatively to SBP 200s and required intra-op pushes of Phenylephrine to maintain elevated blood pressure    Pertinent Imaging  < from: CT Abdomen and Pelvis w/ Oral Cont and w/ IV Cont (06.06.23 @ 15:22) >  IMPRESSION:  Large right inguinal hernia containing inflamed, thick-walled inflamed   terminal ileum and proximal colon. Oral contrast is dilute in the hernia   and has not traversed past this point. Large amount of ascites/fluid   within the hernia. Findings are suspicious for strangulated hernia in the   appropriate clinical setting.  --- End of Report ---    OR Stats  OR Time: 3hr          IV Fluids: 2200                 EBL:  10                   Blood Products: None                  UOP:  400   Findings- Open repair of right inguinal hernia with mesh. Diagnostic Laparoscopy to evaluate bowel viability. Both small and large intestine was inspected and viable.  Medium BARD keyhole mesh used, LOT # [id number]     24 Hour Events  -Admission under SICU service    [X] A ten-point review of systems was otherwise negative except as noted above.  [  ] Due to altered mental status/intubation, subjective information was not attained from the patient. History was obtained, to the extent possible, from review of the chart and collateral sources of information.    =========================================================================================================================================  =========================================================================================================================================    PMH  PAST MEDICAL & SURGICAL HISTORY:  CAD (coronary artery disease)  2004 1 stent  Aortic valve stenosis  s/p replacement 2009  Lymphoma  1996, s/p chemo& radiation  BPH (benign prostatic hyperplasia)  Afib  Leukemia, lymphoid  H/O aortic valve replacement    Home Meds:  Home Medications:  Coumadin 3 mg oral tablet: 1 tab(s) orally once a day (23 May 2022 09:23)  dutasteride 0.5 mg oral capsule: 1 cap(s) orally once a day (23 May 2022 09:23)  lisinopril 20 mg oral tablet: 0.5 tab(s) orally once a day (at bedtime) (23 May 2022 09:23)  simvastatin 10 mg oral tablet: 1 tab(s) orally once a day (at bedtime) (23 May 2022 09:23)  Tylenol:  (23 May 2022 09:23)    Allergies  No Known Allergies    Intolerances    Current Medications:  acetaminophen     Tablet .. 650 milliGRAM(s) Oral every 6 hours  gabapentin 100 milliGRAM(s) Oral three times a day  HYDROmorphone  Injectable 0.25 milliGRAM(s) IV Push every 10 minutes PRN Moderate Pain (4 - 6)  HYDROmorphone  Injectable 0.5 milliGRAM(s) IV Push every 6 hours PRN Severe Pain (7 - 10)  ketorolac   Injectable 15 milliGRAM(s) IV Push every 6 hours  lactated ringers. 1000 milliLiter(s) (75 mL/Hr) IV Continuous <Continuous>  lactated ringers. 1000 milliLiter(s) (95 mL/Hr) IV Continuous <Continuous>  magnesium sulfate  IVPB 2 Gram(s) IV Intermittent once  methocarbamol 500 milliGRAM(s) Oral four times a day  metoprolol tartrate Injectable 5 milliGRAM(s) IV Push every 6 hours  ondansetron Injectable 4 milliGRAM(s) IV Push once PRN Nausea and/or Vomiting  ondansetron Injectable 4 milliGRAM(s) IV Push every 6 hours PRN Nausea and/or Vomiting  oxyCODONE    IR 5 milliGRAM(s) Oral every 6 hours PRN Moderate Pain (4 - 6)  pantoprazole  Injectable 40 milliGRAM(s) IV Push daily  simvastatin 10 milliGRAM(s) Oral at bedtime    Vital Sings, Intake/Output (Last 24Hours)  ICU Vital Signs Last 24 Hrs  T(C): 36.8 (07 Jun 2023 02:50), Max: 36.8 (07 Jun 2023 02:50)  T(F): 98.2 (07 Jun 2023 02:50), Max: 98.2 (07 Jun 2023 02:50)  HR: 74 (07 Jun 2023 03:30) (60 - 89)  BP: 169/75 (07 Jun 2023 03:30) (169/75 - 214/91)  BP(mean): --  ABP: --  ABP(mean): --  RR: 20 (07 Jun 2023 03:30) (15 - 21)  SpO2: 98% (07 Jun 2023 03:30) (97% - 98%)    O2 Parameters below as of 07 Jun 2023 03:30  Patient On (Oxygen Delivery Method): nasal cannula    I&O's Summary    Physical Exam:  ----------------------------------------------------------------------------------------------------------  GCS: 15       Exam: A&Ox3, no focal deficits    RESPIRATORY:  Normal expansion/effort  B/L breath sounds  On NC     CARDIOVASCULAR:  S1/S2    GASTROINTESTINAL:  Abdomen soft, mild hay-incisional tenderness, non-distended  Abdominal dressing and right groin dressing c/d/i   NGT in place     MUSCULOSKELETAL:  Extremities warm, pink, well-perfused.    DERM:  No skin breakdown     :   Exam: Hernandez catheter in place.     Tubes/Lines/Drains   ----------------------------------------------------------------------------------------------------------  [x] Peripheral IV  [x] Urinary Catheter Hernandez                                               [ ] Central Venous Line                   [ ] Arterial Line

## 2023-06-07 NOTE — PATIENT PROFILE ADULT - FALL HARM RISK - RISK INTERVENTIONS
Assistance OOB with selected safe patient handling equipment/Assistance with ambulation/Communicate Fall Risk and Risk Factors to all staff, patient, and family/Discuss with provider need for PT consult/Monitor gait and stability/Provide patient with walking aids - walker, cane, crutches/Reinforce activity limits and safety measures with patient and family/Sit up slowly, dangle for a short time, stand at bedside before walking/Use of alarms - bed, chair and/or voice tab/Visual Cue: Yellow wristband/Bed in lowest position, wheels locked, appropriate side rails in place/Call bell, personal items and telephone in reach/Instruct patient to call for assistance before getting out of bed or chair/Non-slip footwear when patient is out of bed/[place] to call system/Physically safe environment - no spills, clutter or unnecessary equipment/Purposeful Proactive Rounding/Room/bathroom lighting operational, light cord in reach

## 2023-06-07 NOTE — BRIEF OPERATIVE NOTE - NSICDXBRIEFPROCEDURE_GEN_ALL_CORE_FT
PROCEDURES:  Diagnostic laparoscopy with repair of inguinal hernia 07-Jun-2023 03:08:40 OPEN REPAIR OF RIGHT INGUINAL HERNIA Herber Fox

## 2023-06-07 NOTE — PHYSICAL THERAPY INITIAL EVALUATION ADULT - PERTINENT HX OF CURRENT PROBLEM, REHAB EVAL
83yM w/ PMH of Afib on Coumadin (last dose 6/5), CAD s/p stent placement (2004), BPH, HLD, leukemia & lymphoma s/p chemo & radiation, and aortic valve stenosis s/p replacement (2009) presented to the ED for right inguinal pain. Hx of LBP.

## 2023-06-07 NOTE — CONSULT NOTE ADULT - SUBJECTIVE AND OBJECTIVE BOX
HPI:  83yM w/ PMH of Afib on Coumadin (last dose ), CAD s/p stent placement (), BPH, HLD, leukemia & lymphoma s/p chemo & radiation, and aortic valve stenosis s/p replacement () presented to the ED for right inguinal pain. He states that he has been having 4-5 days of inguinal pain. He does not recall any inciting incident but states that the size for the hernia increased at this point. Additionally, he states that he has not had a bowel movement in the past 1 to 2 weeks with minor flatus. He denies nausea and emesis and states that he has still been able to eat. On further conversation, patient and wife admitted that patient had one bowel movement today. Patient describes his pain as 7/10 dull with intermittent sharp character.     Patient has a known right inguinal hernia that he saw Dr. Ponce for 3 years ago. At that time he was informed that it would need to be fixed but he did not follow up.     Patient is on Coumadin and stated that his most recent INR was 4. Stat labs drawn.      < from: CT Abdomen and Pelvis w/ Oral Cont and w/ IV Cont (23 @ 15:22) >  IMPRESSION:  Large right inguinal hernia containing inflamed, thick-walled inflamed   terminal ileum and proximal colon. Oral contrast is dilute in the hernia   and has not traversed past this point. Large amount of ascites/fluid   within the hernia. Findings are suspicious for strangulated hernia in the   appropriate clinical setting.  --- End of Report ---    s/p right inguinal hernia repair with mesh on 23    Medical charts / labs / imaging studies reviewed       PAST MEDICAL & SURGICAL HISTORY:  CAD (coronary artery disease)   1 stent      Aortic valve stenosis  s/p replacement       Lymphoma  1996, s/p chemo& radiation      BPH (benign prostatic hyperplasia)      Afib      Leukemia, lymphoid      H/O aortic valve replacement          Hospital Course:    TODAY'S SUBJECTIVE & REVIEW OF SYMPTOMS:     Constitutional WNL   Cardio WNL   Resp WNL   GI abd pain   Heme WNL  Endo WNL  Skin WNL  MSK WNL  Neuro WNL  Cognitive WNL  Psych WNL      MEDICATIONS  (STANDING):  acetaminophen     Tablet .. 650 milliGRAM(s) Oral every 6 hours  chlorhexidine 2% Cloths 1 Application(s) Topical <User Schedule>  finasteride 5 milliGRAM(s) Oral daily  gabapentin 100 milliGRAM(s) Oral three times a day  heparin  Infusion.  Unit(s)/Hr (10 mL/Hr) IV Continuous <Continuous>  lactated ringers. 1000 milliLiter(s) (95 mL/Hr) IV Continuous <Continuous>  metoprolol tartrate 25 milliGRAM(s) Oral every 12 hours  simvastatin 10 milliGRAM(s) Oral at bedtime    MEDICATIONS  (PRN):  HYDROmorphone  Injectable 0.5 milliGRAM(s) IV Push every 6 hours PRN Severe Pain (7 - 10)  ketorolac   Injectable 15 milliGRAM(s) IV Push every 6 hours PRN Moderate Pain (4 - 6)  ondansetron Injectable 4 milliGRAM(s) IV Push every 6 hours PRN Nausea and/or Vomiting  oxyCODONE    IR 5 milliGRAM(s) Oral every 6 hours PRN Moderate Pain (4 - 6)      FAMILY HISTORY:      Allergies    No Known Allergies    Intolerances        SOCIAL HISTORY:    [  ] Etoh  [  ] Smoking  [  ] Substance abuse     Home Environment:  [   ] Home Alone  [x   ] Lives with Family  [   ] Home Health Aid    Dwelling:  [   ] Apartment  [  x ] Private House  [   ] Adult Home  [   ] Skilled Nursing Facility      [   ] Short Term  [   ] Long Term  [  x ] Stairs       Elevator [   ]    FUNCTIONAL STATUS PTA: (Check all that apply)  Ambulation: [  x  ]Independent    [   ] Dependent     [   ] Non-Ambulatory  Assistive Device: [   ] SA Cane  [   ]  Q Cane  [   ] Walker  [   ]  Wheelchair  ADL : [ x  ] Independent  [    ]  Dependent       Vital Signs Last 24 Hrs  T(C): 35.6 (2023 12:00), Max: 36.8 (2023 02:50)  T(F): 96 (2023 12:00), Max: 98.2 (2023 02:50)  HR: 67 (2023 12:00) (56 - 89)  BP: 117/58 (2023 12:24) (117/58 - 214/91)  BP(mean): 132 (2023 11:00) (106 - 132)  RR: 23 (2023 12:00) (15 - 33)  SpO2: 98% (2023 12:00) (97% - 100%)    Parameters below as of 2023 12:00  Patient On (Oxygen Delivery Method): room air          PHYSICAL EXAM: Awake & Alert  GENERAL: NAD  HEAD:  Normocephalic  CHEST/LUNG: Clear   HEART: S1S2+  ABDOMEN: Soft   EXTREMITIES:  no calf tenderness    NERVOUS SYSTEM:  Cranial Nerves 2-12 intact [   ] Abnormal  [   ]  ROM: WFL all extremities [ x  ]  Abnormal [   ]  Motor Strength: WFL all extremities  [x   ]  Abnormal [   ]  Sensation: intact to light touch [  x ] Abnormal [   ]    FUNCTIONAL STATUS:  Bed Mobility: Independent [   ]  Supervision [   ]  Needs Assistance [  x]  N/A [   ]  Transfers: Independent [   ]  Supervision [   ]  Needs Assistance [ x  ]  N/A [   ]   Ambulation: Independent [   ]  Supervision [   ]  Needs Assistance [ x ]  N/A [   ]  ADL: Independent [   ] Requires Assistance [   ] N/A [   ]      LABS:                        11.5   7.92  )-----------( 109      ( 2023 03:15 )             33.1     06-07    137  |  99  |  10  ----------------------------<  97  3.6   |  24  |  0.6<L>    Ca    8.9      2023 03:15  Phos  2.3     06-07  Mg     1.6     06-07    TPro  5.4<L>  /  Alb  4.0  /  TBili  1.5<H>  /  DBili  0.7<H>  /  AST  12  /  ALT  5   /  AlkPhos  52  06-07    PT/INR - ( 2023 10:31 )   PT: 13.80 sec;   INR: 1.20 ratio         PTT - ( 2023 10:31 )  PTT:42.7 sec  Urinalysis Basic - ( 2023 14:56 )    Color: Yellow / Appearance: Clear / S.024 / pH: x  Gluc: x / Ketone: Negative  / Bili: Negative / Urobili: <2 mg/dL   Blood: x / Protein: Trace / Nitrite: Negative   Leuk Esterase: Moderate / RBC: 4 /HPF / WBC 4 /HPF   Sq Epi: x / Non Sq Epi: x / Bacteria: Negative        RADIOLOGY & ADDITIONAL STUDIES:

## 2023-06-07 NOTE — CONSULT NOTE ADULT - ATTENDING COMMENTS
Briefly, 83 year old man with a mechanical aortic valve, unclear which type exactly. His INR goal is 2.5-3. Would restart anticoagulation ASAP when deemed safe by primary team. He should follow up with coumadin clinic on d/c.

## 2023-06-07 NOTE — CONSULT NOTE ADULT - ASSESSMENT
Assessment & Plan  83y Male s/p open right inguinal hernia repair with mesh, diagnostic laparoscopy     NEURO:  #Acute pain    -Acetaminophen ATC, Gabapentin 100mg q8, Toradol 15 prn, Dilaudid 0.25 prn      RESP:   #Oxygenation    -wean off NC to RA as tolerated  #Activity    -increase as tolerated    CARDS:   #CAD s/p stent (2004, no longer on DAPT)  #Afib, AV stenosis s/p Mechanical Aortic Valve replacement (2009) on Coumadin    -Elevated INR on admission 5.82 s/p 2000 Kcentra & 10u Vit K --> INR 1.3     -Hold home Coumadin   #HTN    -Holding home PO metoprolol 25 BID, Lisinopril 20mg while NPO    -Metoprolol 5mg IV q6h   #HLD    -Holding home Simvastatin 10mg qD while NPO   Imaging:     -Post-op EKG (6/7): Wide QRS rhythm (QTc 493) --> 2g Mg given   Labs:     -Post-op Trop: 0.01 x1     GI/NUTR:   #Diet    -NPO     -NGT in place, at 60cm     -aspiration precautions, HOB 30  #GI Prophylaxis    -Protonix   #Bowel regimen    -Holding for now     /RENAL:   #urine output in critically ill    -cisneros in place (placed 6/6)  #H/o BPH   #Euvolemia    -LR @ 75    Labs:          BUN/Cr- 13/0.8  -->,  10/0.6  -->          Electrolytes-Na 137 // K 3.6 // Mg 1.6 //  Phos 2.3 (06-07 @ 03:15)    HEME/ONC:   #DVT prophylaxis    -Lovenox, SCD    -Holding Xarelto  #h/o Lymphoma s/p Chemo/XRT     Labs: Hb/Hct:  12.6/37.5  -->,  11.5/33.1  -->                      Plts:  116  -->,  109  -->                 PTT/INR:  45.1/5.82  --->,  23.9/1.30  --->     T&S Expires: 6/9/23  Blood Consent-obtain if acute anemia, q6 CBC    ID:  #leukocytosis   WBC- 6.73  --->>,  7.92  --->>  Temp trend- 24hrs T(F): 98.2 (06-07 @ 02:50), Max: 98.2 (06-07 @ 02:50)  Antibiotics    - Ancef 2000g x2 doses   Cultures:    -None     ENDO:    -No active issues      -FSG q6 if NPO    -Glucose goal 140-180. if above 180 start ISS    MSK:  #Activity    -Ambulate as tolerated     LINES/DRAINS:  PIV, Cisneros    ADVANCED DIRECTIVES:  Full Code    HCP/Emergency Contact- [facility]: [phone]     INDICATION FOR SICU/SDU: Unilateral inguinal hernia with obstruction and without gangrene    DISPO:   SICU     Case discussed with attending Dr. Duenas Assessment & Plan  83y Male s/p open right inguinal hernia repair with mesh, diagnostic laparoscopy     NEURO:  #Acute pain    -Acetaminophen ATC, Gabapentin 100mg q8, Toradol 15 prn, Dilaudid 0.25 prn      RESP:   #Oxygenation    -wean off NC to RA as tolerated  #Activity    -increase as tolerated    CARDS:   #CAD s/p stent (2004, no longer on DAPT)  #Afib, AV stenosis s/p Mechanical Aortic Valve replacement (2009) on Coumadin    -Elevated INR on admission 5.82 s/p 2000 Kcentra & 10u Vit K --> INR 1.3     -Hold home Coumadin   #HTN    -Holding home PO metoprolol 25 BID, Lisinopril 20mg while NPO    -Metoprolol 5mg IV q6h   #HLD    -Holding home Simvastatin 10mg qD while NPO   Imaging:     -Post-op EKG (6/7): Wide QRS rhythm (QTc 493) --> 2g Mg given     -Echo (2/21/23): EF 44%, mild-moderate MR, G1DD   Labs:     -Post-op Trop: 0.01 x1     GI/NUTR:   #Diet    -NPO     -NGT in place, at 60cm     -aspiration precautions, HOB 30  #GI Prophylaxis    -Protonix   #Bowel regimen    -Holding for now     /RENAL:   #urine output in critically ill    -cisneros in place (placed 6/6)  #H/o BPH   #Euvolemia    -LR @ 75    Labs:          BUN/Cr- 13/0.8  -->,  10/0.6  -->          Electrolytes-Na 137 // K 3.6 // Mg 1.6 //  Phos 2.3 (06-07 @ 03:15)    HEME/ONC:   #DVT prophylaxis    -Lovenox, SCD    -Holding Xarelto  #h/o Lymphoma s/p Chemo/XRT     Labs: Hb/Hct:  12.6/37.5  -->,  11.5/33.1  -->                      Plts:  116  -->,  109  -->                 PTT/INR:  45.1/5.82  --->,  23.9/1.30  --->     T&S Expires: 6/9/23  Blood Consent-obtain if acute anemia, q6 CBC    ID:  #leukocytosis   WBC- 6.73  --->>,  7.92  --->>  Temp trend- 24hrs T(F): 98.2 (06-07 @ 02:50), Max: 98.2 (06-07 @ 02:50)  Antibiotics    - Ancef 2000g x2 doses   Cultures:    -None     ENDO:    -No active issues      -FSG q6 if NPO    -Glucose goal 140-180. if above 180 start ISS    MSK:  #Activity    -Ambulate as tolerated     LINES/DRAINS:  David PITTS    ADVANCED DIRECTIVES:  Full Code    HCP/Emergency Contact- [facility]: [phone]     INDICATION FOR SICU/SDU: Unilateral inguinal hernia with obstruction and without gangrene    DISPO:   SICU     Case discussed with attending Dr. Duenas Assessment & Plan  83y Male s/p open right inguinal hernia repair with mesh, diagnostic laparoscopy     NEURO:  #Acute pain    -Acetaminophen ATC, Gabapentin 100mg q8, Toradol 15 prn, Dilaudid 0.25 prn      RESP:   #Oxygenation    -wean off NC to RA as tolerated  #Activity    -increase as tolerated    CARDS:   #CAD s/p stent (2004, no longer on DAPT)  #Afib, AV stenosis s/p Mechanical Aortic Valve replacement (2009) on Coumadin    -Elevated INR on admission 5.82 s/p 2000 Kcentra & 10u Vit K --> INR 1.3     -Hold home Coumadin   #HTN    -Holding home PO metoprolol 25 BID, Lisinopril 20mg while NPO    -Metoprolol 5mg IV q6h   #HLD    -Holding home Simvastatin 10mg qD while NPO   Imaging:     -Post-op EKG (6/7): Wide QRS rhythm (QTc 493) --> 2g Mg given     -Echo (2/21/23): EF 44%, mild-moderate MR, G1DD   Labs:     -Post-op Trop: 0.01 x1     GI/NUTR:   #s/p right inguinal hernia repair with mesh (Medium BARD keyhole mesh)  #Diet    -NPO     -NGT in place, at 60cm     -aspiration precautions, HOB 30  #GI Prophylaxis    -Protonix   #Bowel regimen    -Holding for now     /RENAL:   #urine output in critically ill    -cisneros in place (placed 6/6)  #H/o BPH   #Euvolemia    -LR @ 75    Labs:          BUN/Cr- 13/0.8  -->,  10/0.6  -->          Electrolytes-Na 137 // K 3.6 // Mg 1.6 //  Phos 2.3 (06-07 @ 03:15)    HEME/ONC:   #DVT prophylaxis    -Lovenox, SCD    -Holding Xarelto  #h/o Lymphoma s/p Chemo/XRT     Labs: Hb/Hct:  12.6/37.5  -->,  11.5/33.1  -->                      Plts:  116  -->,  109  -->                 PTT/INR:  45.1/5.82  --->,  23.9/1.30  --->     T&S Expires: 6/9/23  Blood Consent-obtain if acute anemia, q6 CBC    ID:  #leukocytosis   WBC- 6.73  --->>,  7.92  --->>  Temp trend- 24hrs T(F): 98.2 (06-07 @ 02:50), Max: 98.2 (06-07 @ 02:50)  Antibiotics    - Ancef 2000g x2 doses   Cultures:    -None     ENDO:    -No active issues      -FSG q6 if NPO    -Glucose goal 140-180. if above 180 start ISS    MSK:  #Activity    -Ambulate as tolerated     LINES/DRAINS:  David PITTS    ADVANCED DIRECTIVES:  Full Code    HCP/Emergency Contact- [facility]: [phone]     INDICATION FOR SICU/SDU: Unilateral inguinal hernia with obstruction and without gangrene    DISPO:   SICU     Case discussed with attending Dr. Duenas Assessment & Plan  83y Male s/p open right inguinal hernia repair with mesh, diagnostic laparoscopy     NEURO:  #Acute pain    -Acetaminophen ATC, Gabapentin 100mg q8, Toradol 15 prn, Dilaudid 0.25 prn      RESP:   #Oxygenation    -wean off NC to RA as tolerated  #Activity    -increase as tolerated    CARDS:   #CAD s/p stent (2004, no longer on DAPT)  #Afib, AV stenosis s/p Mechanical Aortic Valve replacement (2009) on Coumadin    -Elevated INR on admission 5.82 s/p 2000 Kcentra & 10u Vit K --> INR 1.3     -Hold home Coumadin   #HTN    -Holding home PO metoprolol 25 BID, Lisinopril 20mg while NPO    -Metoprolol 5mg IV q6h   #HLD    -Holding home Simvastatin 10mg qD while NPO   Imaging:     -Post-op EKG (6/7): Wide QRS rhythm (QTc 493) --> 2g Mg given     -Echo (2/21/23): EF 44%, mild-moderate MR, G1DD   Labs:     -Post-op Trop: 0.01 x1     GI/NUTR:   #s/p right inguinal hernia repair with mesh (Medium BARD keyhole mesh)  #Diet    -NPO     -NGT in place, at 60cm     -aspiration precautions, HOB 30  #GI Prophylaxis    -Protonix   #Bowel regimen    -Holding for now     /RENAL:   #h/o BPH s/p green light laser therapy (at [place])   #urine output in critically ill    -cisneros in place (placed 6/6)  #Euvolemia    -LR @ 75    Labs:          BUN/Cr- 13/0.8  -->,  10/0.6  -->          Electrolytes-Na 137 // K 3.6 // Mg 1.6 //  Phos 2.3 (06-07 @ 03:15)    HEME/ONC:   #DVT prophylaxis    -Lovenox, SCD    -Holding Xarelto  #h/o Lymphoma s/p Chemo/XRT     Labs: Hb/Hct:  12.6/37.5  -->,  11.5/33.1  -->                      Plts:  116  -->,  109  -->                 PTT/INR:  45.1/5.82  --->,  23.9/1.30  --->     T&S Expires: 6/9/23  Blood Consent-obtain if acute anemia, q6 CBC    ID:  #leukocytosis   WBC- 6.73  --->>,  7.92  --->>  Temp trend- 24hrs T(F): 98.2 (06-07 @ 02:50), Max: 98.2 (06-07 @ 02:50)  Antibiotics    - Ancef 2000g x2 doses   Cultures:    -None     ENDO:    -No active issues      -FSG q6 if NPO    -Glucose goal 140-180. if above 180 start ISS    MSK:  #Activity    -Ambulate as tolerated     LINES/DRAINS:  David PITTS    ADVANCED DIRECTIVES:  Full Code    HCP/Emergency Contact- [facility]: [phone]     INDICATION FOR SICU/SDU: Unilateral inguinal hernia with obstruction and without gangrene    DISPO:   SICU     Case discussed with attending Dr. Duenas Assessment & Plan  83y Male s/p open right inguinal hernia repair with mesh, diagnostic laparoscopy     NEURO:  #Acute pain    -Acetaminophen ATC, Gabapentin 100mg q8, Toradol 15 prn, Dilaudid 0.25 prn      RESP:   #Oxygenation    -wean off NC to RA as tolerated  #Activity    -increase as tolerated    CARDS:   #CAD s/p stent (2004, no longer on DAPT)  #Afib, AV stenosis s/p Mechanical Aortic Valve replacement (2009) on Coumadin    -Elevated INR on admission 5.82 s/p 2000 Kcentra & 10u Vit K --> INR 1.3     -Hold home Coumadin   #HTN    -Holding home PO metoprolol 25 BID, Lisinopril 20mg while NPO    -Metoprolol 5mg IV q6h   #HLD    -Holding home Simvastatin 10mg qD while NPO   Imaging:     -Post-op EKG (6/7): Wide QRS rhythm (QTc 493) --> 2g Mg given    -Echo (2/21/23): EF 44%, mild-moderate MR, G1DD   Labs:     -Post-op Trop: 0.01 x1     GI/NUTR:   #s/p right inguinal hernia repair with mesh (Medium BARD keyhole mesh)  #Diet    -NPO     -NGT in place, at 60cm     -aspiration precautions, HOB 30  #GI Prophylaxis    -Protonix   #Bowel regimen    -Holding for now     /RENAL:   #h/o BPH s/p green light laser therapy (at [place])   #urine output in critically ill    -cisneros in place (placed 6/6)  #Euvolemia    -LR @ 75    Labs:          BUN/Cr- 13/0.8  -->,  10/0.6  -->          Electrolytes-Na 137 // K 3.6 // Mg 1.6 //  Phos 2.3 (06-07 @ 03:15)    HEME/ONC:   #DVT prophylaxis    -Lovenox, SCD    -Holding Xarelto  #h/o Lymphoma s/p Chemo/XRT (1996)     Labs: Hb/Hct:  12.6/37.5  -->,  11.5/33.1  -->                      Plts:  116  -->,  109  -->                 PTT/INR:  45.1/5.82  --->,  23.9/1.30  --->     T&S Expires: 6/9/23  Blood Consent-obtain if acute anemia, q6 CBC    ID:  #leukocytosis   WBC- 6.73  --->>,  7.92  --->>  Temp trend- 24hrs T(F): 98.2 (06-07 @ 02:50), Max: 98.2 (06-07 @ 02:50)  Antibiotics    - Ancef 2000g x2 doses   Cultures:    -None     ENDO:    -No active issues      -FSG q6 if NPO    -Glucose goal 140-180. if above 180 start ISS    MSK:  #Activity    -Ambulate as tolerated     LINES/DRAINS:  David PITTS    ADVANCED DIRECTIVES:  Full Code    HCP/Emergency Contact- Valentina [facility]: [phone]     INDICATION FOR SICU/SDU: Unilateral inguinal hernia with obstruction and without gangrene    DISPO:   SICU  Adult

## 2023-06-07 NOTE — CONSULT NOTE ADULT - NS ATTEST RISKLEV GEN_ALL_CORE
Subjective    Ms. Andrew is 75 y.o. female    Chief Complaint: Recurrent UTI    History of Present Illness     75-year-old female established patient with history of chronic cystitis.  Patient recently seen within the past 2 weeks.  Patient was asymptomatic and had completed the full 3-month course of nitrofurantoin and had decided she would hold off on any further prophylactic treatment however patient states approximately 1 week ago patient started experiencing worsening urinary urgency and became concerned about a possible urinary tract infection so decided she better be seen.    At present urinalysis is clear of nitrites and leukocytes only trace blood noted.  No blood, bacteria, or white blood cells seen under the microscope.     The following portions of the patient's history were reviewed and updated as appropriate: allergies, current medications, past family history, past medical history, past social history, past surgical history and problem list.    Review of Systems   Constitutional: Negative for chills and fever.   Gastrointestinal: Negative for abdominal pain, anal bleeding, blood in stool, nausea and vomiting.   Genitourinary: Positive for urgency. Negative for difficulty urinating, dysuria, flank pain, frequency, hematuria, pelvic pain and vaginal pain.         Current Outpatient Medications:   •  Aspirin Buf,CaCarb-MgCarb-MgO, 81 MG tablet, Take 81 mg by mouth., Disp: , Rfl:   •  atenolol (TENORMIN) 25 MG tablet, 25 mg. daily, Disp: , Rfl:   •  calcium carbonate (OS-SAMANTA) 600 MG tablet, Take  by mouth., Disp: , Rfl:   •  cholecalciferol (VITAMIN D3) 25 MCG (1000 UT) tablet, Take 1,000 Units by mouth Daily., Disp: , Rfl:   •  clobetasol (TEMOVATE) 0.05 % cream, Apply  topically to the appropriate area as directed 2 (Two) Times a Day., Disp: 45 g, Rfl: 1  •  estradiol (ESTRACE) 0.1 MG/GM vaginal cream, Insert 2 g into the vagina 2 (Two) Times a Week., Disp: 42.5 g, Rfl: 12  •  indomethacin (INDOCIN) 50  MG capsule, Take 50 mg by mouth., Disp: , Rfl:   •  meclizine 25 MG chewable tablet chewable tablet, Chew 25 mg., Disp: , Rfl:   •  Mirabegron ER (Myrbetriq) 25 MG tablet sustained-release 24 hour 24 hr tablet, Take 1 tablet by mouth Daily., Disp: 90 tablet, Rfl: 3  •  Multiple Vitamins-Minerals (MULTIVITAMIN AND MINERALS) liquid liquid, Take  by mouth Daily., Disp: , Rfl:   •  nitrofurantoin (MACRODANTIN) 50 MG capsule, Take 1 capsule by mouth Every Night., Disp: 90 capsule, Rfl: 0  •  phenazopyridine (PYRIDIUM) 100 MG tablet, Take 1 tablet by mouth 3 (Three) Times a Day As Needed for Bladder Spasms., Disp: 20 tablet, Rfl: 0  •  senna-docusate (PERICOLACE) 8.6-50 MG per tablet, Take  by mouth., Disp: , Rfl:   •  simvastatin (ZOCOR) 20 MG tablet, Take 20 mg by mouth., Disp: , Rfl:   •  triamterene-hydrochlorothiazide (DYAZIDE) 37.5-25 MG per capsule, Take  by mouth., Disp: , Rfl:     Past Medical History:   Diagnosis Date   • Arthritis    • Cancer (HCC)     skin ca   • Constipation    • Gout    • Hyperlipidemia    • Hypertension    • Neoplasm of uncertain behavior     lower lip   • Plantar fasciitis    • Sleep apnea     cpap at hs       Past Surgical History:   Procedure Laterality Date   • BACK SURGERY     • BLADDER SUSPENSION     • CERVICAL CONE BIOPSY     • COLONOSCOPY N/A 12/09/2021    Procedure: COLONOSCOPY WITH ANESTHESIA;  Surgeon: Dale Joyce DO;  Location: [facility] ENDOSCOPY;  Service: Gastroenterology;  Laterality: N/A;  pre screen  post diverticulosis  Francisco Barrios MD   • EYE SURGERY Left     basal cell carcinoma    • GANGLION CYST EXCISION     • HEMORROIDECTOMY     • HYSTERECTOMY      Abdominal hysterectomy bilateral salpingectomy-pt voices she only has 1 ovary but doesnt know which one       Social History     Socioeconomic History   • Marital status:    Tobacco Use   • Smoking status: Never   • Smokeless tobacco: Never   Vaping Use   • Vaping Use: Never used   Substance and Sexual  "Activity   • Alcohol use: Yes     Comment: rare   • Drug use: Never   • Sexual activity: Never       Family History   Problem Relation Age of Onset   • Diabetes Father    • Cancer Father    • Prostate cancer Father    • Diabetes Brother    • Diabetes Maternal Grandmother    • Breast cancer Niece    • Colon cancer Neg Hx    • Colon polyps Neg Hx    • Esophageal cancer Neg Hx        Objective    Temp 97.2 °F (36.2 °C)   Ht 157.5 cm (62\")   Wt 72.1 kg (159 lb)   BMI 29.08 kg/m²     Physical Exam  Nursing note reviewed.   Constitutional:       General: She is not in acute distress.     Appearance: Normal appearance. She is not ill-appearing.   HENT:      Nose: No congestion.   Abdominal:      Tenderness: There is no right CVA tenderness or left CVA tenderness.      Hernia: No hernia is present.   Skin:     General: Skin is warm and dry.   Neurological:      Mental Status: She is alert and oriented to person, place, and time.   Psychiatric:         Mood and Affect: Mood normal.         Behavior: Behavior normal.             Results for orders placed or performed in visit on 11/17/22   POC Urinalysis Dipstick, Multipro    Specimen: Urine   Result Value Ref Range    Color Yellow Yellow, Straw, Dark Yellow, Kelly    Clarity, UA Clear Clear    Glucose, UA Negative Negative mg/dL    Bilirubin Negative Negative    Ketones, UA Negative Negative    Specific Gravity  1.020 1.005 - 1.030    Blood, UA Trace (A) Negative    pH, Urine 7.0 5.0 - 8.0    Protein, POC Negative Negative mg/dL    Urobilinogen, UA 0.2 E.U./dL Normal, 0.2 E.U./dL    Nitrite, UA Negative Negative    Leukocytes Negative Negative     Estimation of residual urine via abdominal ultrasound  Residual Urine: 0 ml  Indication: Urgency  Position: Supine  Examination: Incremental scanning of the suprapubic area using 3 MHz transducer using copious amounts of acoustic gel.   Findings: An anechoic area was demonstrated which represented the bladder, with measurement " of residual urine as noted. I inspected this myself. In that the residual urine was stable or insignificant, no treatment will be necessary at this time.           Assessment and Plan    Diagnoses and all orders for this visit:    1. Recurrent UTI (Primary)  -     POC Urinalysis Dipstick, Multipro  -     nitrofurantoin (MACRODANTIN) 50 MG capsule; Take 1 capsule by mouth Every Night.  Dispense: 90 capsule; Refill: 0      75-year-old female established patient with history of chronic cystitis.     Noted increased urine urgency x1 week    Urinalysis is free of nitrites and leukocytes.  Trace blood noted.  No blood, white blood cells, or bacteria visualized on the microscope.    Patient reports she has now changed her mind and would like to go ahead and start back on the prophylactic nitrofurantoin.  I will go ahead and send this into patient's pharmacy now.    Continue Myrbetriq 25 mg daily for OAB.  I have again offered to increase Myrbetriq to 50 mg however patient reports that she would like to stay at the 25    Patient follow-up in 3 months for reevaluation       High

## 2023-06-08 LAB
ANION GAP SERPL CALC-SCNC: 6 MMOL/L — LOW (ref 7–14)
APTT BLD: 131.7 SEC — CRITICAL HIGH (ref 27–39.2)
APTT BLD: 135.4 SEC — CRITICAL HIGH (ref 27–39.2)
APTT BLD: 29.9 SEC — SIGNIFICANT CHANGE UP (ref 27–39.2)
APTT BLD: 33.2 SEC — SIGNIFICANT CHANGE UP (ref 27–39.2)
BASOPHILS # BLD AUTO: 0.02 K/UL — SIGNIFICANT CHANGE UP (ref 0–0.2)
BASOPHILS # BLD AUTO: 0.03 K/UL — SIGNIFICANT CHANGE UP (ref 0–0.2)
BASOPHILS NFR BLD AUTO: 0.2 % — SIGNIFICANT CHANGE UP (ref 0–1)
BASOPHILS NFR BLD AUTO: 0.4 % — SIGNIFICANT CHANGE UP (ref 0–1)
BUN SERPL-MCNC: 9 MG/DL — LOW (ref 10–20)
CALCIUM SERPL-MCNC: 8.6 MG/DL — SIGNIFICANT CHANGE UP (ref 8.4–10.5)
CHLORIDE SERPL-SCNC: 100 MMOL/L — SIGNIFICANT CHANGE UP (ref 98–110)
CO2 SERPL-SCNC: 29 MMOL/L — SIGNIFICANT CHANGE UP (ref 17–32)
CREAT SERPL-MCNC: 0.7 MG/DL — SIGNIFICANT CHANGE UP (ref 0.7–1.5)
EGFR: 91 ML/MIN/1.73M2 — SIGNIFICANT CHANGE UP
EOSINOPHIL # BLD AUTO: 0.21 K/UL — SIGNIFICANT CHANGE UP (ref 0–0.7)
EOSINOPHIL # BLD AUTO: 0.26 K/UL — SIGNIFICANT CHANGE UP (ref 0–0.7)
EOSINOPHIL NFR BLD AUTO: 2.6 % — SIGNIFICANT CHANGE UP (ref 0–8)
EOSINOPHIL NFR BLD AUTO: 3.2 % — SIGNIFICANT CHANGE UP (ref 0–8)
GLUCOSE BLDC GLUCOMTR-MCNC: 102 MG/DL — HIGH (ref 70–99)
GLUCOSE BLDC GLUCOMTR-MCNC: 106 MG/DL — HIGH (ref 70–99)
GLUCOSE BLDC GLUCOMTR-MCNC: 108 MG/DL — HIGH (ref 70–99)
GLUCOSE BLDC GLUCOMTR-MCNC: 98 MG/DL — SIGNIFICANT CHANGE UP (ref 70–99)
GLUCOSE SERPL-MCNC: 85 MG/DL — SIGNIFICANT CHANGE UP (ref 70–99)
HCT VFR BLD CALC: 28.7 % — LOW (ref 42–52)
HCT VFR BLD CALC: 29.6 % — LOW (ref 42–52)
HCT VFR BLD CALC: 30.7 % — LOW (ref 42–52)
HGB BLD-MCNC: 10 G/DL — LOW (ref 14–18)
HGB BLD-MCNC: 10.6 G/DL — LOW (ref 14–18)
HGB BLD-MCNC: 9.6 G/DL — LOW (ref 14–18)
IMM GRANULOCYTES NFR BLD AUTO: 0.4 % — HIGH (ref 0.1–0.3)
IMM GRANULOCYTES NFR BLD AUTO: 0.4 % — HIGH (ref 0.1–0.3)
INR BLD: 1.19 RATIO — SIGNIFICANT CHANGE UP (ref 0.65–1.3)
LYMPHOCYTES # BLD AUTO: 0.4 K/UL — LOW (ref 1.2–3.4)
LYMPHOCYTES # BLD AUTO: 0.43 K/UL — LOW (ref 1.2–3.4)
LYMPHOCYTES # BLD AUTO: 4.9 % — LOW (ref 20.5–51.1)
LYMPHOCYTES # BLD AUTO: 5.3 % — LOW (ref 20.5–51.1)
MAGNESIUM SERPL-MCNC: 1.8 MG/DL — SIGNIFICANT CHANGE UP (ref 1.8–2.4)
MCHC RBC-ENTMCNC: 33.4 G/DL — SIGNIFICANT CHANGE UP (ref 32–37)
MCHC RBC-ENTMCNC: 33.8 G/DL — SIGNIFICANT CHANGE UP (ref 32–37)
MCHC RBC-ENTMCNC: 34.5 G/DL — SIGNIFICANT CHANGE UP (ref 32–37)
MCHC RBC-ENTMCNC: 34.5 PG — HIGH (ref 27–31)
MCHC RBC-ENTMCNC: 35 PG — HIGH (ref 27–31)
MCHC RBC-ENTMCNC: 35.2 PG — HIGH (ref 27–31)
MCV RBC AUTO: 101.3 FL — HIGH (ref 80–94)
MCV RBC AUTO: 103.2 FL — HIGH (ref 80–94)
MCV RBC AUTO: 104.2 FL — HIGH (ref 80–94)
MONOCYTES # BLD AUTO: 0.74 K/UL — HIGH (ref 0.1–0.6)
MONOCYTES # BLD AUTO: 0.87 K/UL — HIGH (ref 0.1–0.6)
MONOCYTES NFR BLD AUTO: 10.7 % — HIGH (ref 1.7–9.3)
MONOCYTES NFR BLD AUTO: 9.1 % — SIGNIFICANT CHANGE UP (ref 1.7–9.3)
NEUTROPHILS # BLD AUTO: 6.62 K/UL — HIGH (ref 1.4–6.5)
NEUTROPHILS # BLD AUTO: 6.65 K/UL — HIGH (ref 1.4–6.5)
NEUTROPHILS NFR BLD AUTO: 81.2 % — HIGH (ref 42.2–75.2)
NEUTROPHILS NFR BLD AUTO: 81.6 % — HIGH (ref 42.2–75.2)
NRBC # BLD: 0 /100 WBCS — SIGNIFICANT CHANGE UP (ref 0–0)
PHOSPHATE SERPL-MCNC: 1.9 MG/DL — LOW (ref 2.1–4.9)
PLATELET # BLD AUTO: 102 K/UL — LOW (ref 130–400)
PLATELET # BLD AUTO: 102 K/UL — LOW (ref 130–400)
PLATELET # BLD AUTO: 98 K/UL — LOW (ref 130–400)
PMV BLD: 10.2 FL — SIGNIFICANT CHANGE UP (ref 7.4–10.4)
PMV BLD: 9.8 FL — SIGNIFICANT CHANGE UP (ref 7.4–10.4)
PMV BLD: 9.8 FL — SIGNIFICANT CHANGE UP (ref 7.4–10.4)
POTASSIUM SERPL-MCNC: 4.7 MMOL/L — SIGNIFICANT CHANGE UP (ref 3.5–5)
POTASSIUM SERPL-SCNC: 4.7 MMOL/L — SIGNIFICANT CHANGE UP (ref 3.5–5)
PROTHROM AB SERPL-ACNC: 13.6 SEC — HIGH (ref 9.95–12.87)
RBC # BLD: 2.78 M/UL — LOW (ref 4.7–6.1)
RBC # BLD: 2.84 M/UL — LOW (ref 4.7–6.1)
RBC # BLD: 3.03 M/UL — LOW (ref 4.7–6.1)
RBC # FLD: 13.4 % — SIGNIFICANT CHANGE UP (ref 11.5–14.5)
RBC # FLD: 13.6 % — SIGNIFICANT CHANGE UP (ref 11.5–14.5)
RBC # FLD: 13.6 % — SIGNIFICANT CHANGE UP (ref 11.5–14.5)
SODIUM SERPL-SCNC: 135 MMOL/L — SIGNIFICANT CHANGE UP (ref 135–146)
SURGICAL PATHOLOGY STUDY: SIGNIFICANT CHANGE UP
WBC # BLD: 10.32 K/UL — SIGNIFICANT CHANGE UP (ref 4.8–10.8)
WBC # BLD: 8.14 K/UL — SIGNIFICANT CHANGE UP (ref 4.8–10.8)
WBC # BLD: 8.15 K/UL — SIGNIFICANT CHANGE UP (ref 4.8–10.8)
WBC # FLD AUTO: 10.32 K/UL — SIGNIFICANT CHANGE UP (ref 4.8–10.8)
WBC # FLD AUTO: 8.14 K/UL — SIGNIFICANT CHANGE UP (ref 4.8–10.8)
WBC # FLD AUTO: 8.15 K/UL — SIGNIFICANT CHANGE UP (ref 4.8–10.8)

## 2023-06-08 RX ORDER — SODIUM CHLORIDE 9 MG/ML
1000 INJECTION, SOLUTION INTRAVENOUS
Refills: 0 | Status: DISCONTINUED | OUTPATIENT
Start: 2023-06-08 | End: 2023-06-08

## 2023-06-08 RX ADMIN — Medication 650 MILLIGRAM(S): at 23:26

## 2023-06-08 RX ADMIN — Medication 650 MILLIGRAM(S): at 05:10

## 2023-06-08 RX ADMIN — GABAPENTIN 100 MILLIGRAM(S): 400 CAPSULE ORAL at 15:18

## 2023-06-08 RX ADMIN — LISINOPRIL 20 MILLIGRAM(S): 2.5 TABLET ORAL at 05:10

## 2023-06-08 RX ADMIN — SODIUM CHLORIDE 75 MILLILITER(S): 9 INJECTION, SOLUTION INTRAVENOUS at 05:09

## 2023-06-08 RX ADMIN — Medication 25 MILLIGRAM(S): at 17:53

## 2023-06-08 RX ADMIN — HEPARIN SODIUM 0 UNIT(S)/HR: 5000 INJECTION INTRAVENOUS; SUBCUTANEOUS at 07:11

## 2023-06-08 RX ADMIN — GABAPENTIN 100 MILLIGRAM(S): 400 CAPSULE ORAL at 21:11

## 2023-06-08 RX ADMIN — Medication 25 MILLIGRAM(S): at 05:10

## 2023-06-08 RX ADMIN — CHLORHEXIDINE GLUCONATE 1 APPLICATION(S): 213 SOLUTION TOPICAL at 05:10

## 2023-06-08 RX ADMIN — GABAPENTIN 100 MILLIGRAM(S): 400 CAPSULE ORAL at 05:10

## 2023-06-08 RX ADMIN — Medication 650 MILLIGRAM(S): at 05:40

## 2023-06-08 RX ADMIN — FINASTERIDE 5 MILLIGRAM(S): 5 TABLET, FILM COATED ORAL at 11:30

## 2023-06-08 RX ADMIN — SIMVASTATIN 10 MILLIGRAM(S): 20 TABLET, FILM COATED ORAL at 21:11

## 2023-06-08 NOTE — DIETITIAN INITIAL EVALUATION ADULT - ADD RECOMMEND
1. Diet Modification: continue Low Fiber    2. Commercial Beverage: trial Ensure Plus High Protein three times daily (350 kcal, 20 gm protein, 40 gm CHO per svg)   3. Nutrition-Related Education: Educated on current low fiber diet. Provided with printed educational handout re: fiber restricted MNT from [facility] for reference while inpatient. Emphasized importance of adequate nutrition. Encouraged PO intake and supplement acceptance, pt agreeable to trial.     Monitor Diet order, PO intake, supplement acceptance, swallowing function, GI function, biochemical data, weight trends, NFPF, skin integrity    Pt is at high nutrition risk, RD to f/u in 3 days   RD to remain available: Patrice Almanzar, spectra x [phone] or TEAMS

## 2023-06-08 NOTE — DIETITIAN INITIAL EVALUATION ADULT - PERTINENT LABORATORY DATA
06-07    136  |  97<L>  |  8<L>  ----------------------------<  146<H>  4.4   |  26  |  0.5<L>    Ca    9.0      07 Jun 2023 20:30  Phos  3.0     06-07  Mg     2.0     06-07    TPro  5.4<L>  /  Alb  3.9  /  TBili  0.9  /  DBili  0.4<H>  /  AST  18  /  ALT  <5  /  AlkPhos  51  06-07  POCT Blood Glucose.: 108 mg/dL (06-08-23 @ 11:51)

## 2023-06-08 NOTE — DIETITIAN INITIAL EVALUATION ADULT - NSFNSGIIOFT_GEN_A_CORE
^Weight above documented as stated weight on 6/6 (admission). Reports weighing ~180# about 2-3x years ago and most recently weighing ~120#. This would suggest weight loss of 20 lbs/ 11.1% over 2-3x year timeframe however not clinically significant.     Past Documented Weights (kg): 54.4 (06-06-23 @ 21:23), 59 (05-23-22 @ 09:59), 59 (05-09-22 @ 17:49), 61.19 (05-05-21 @ 10:06), 59.4 (05-01-21 @ 15:28), 64 (11-24-20 @ 12:46), 68 (10-13-20 @ 21:13)    IBW: 75.5 kg (166 lbs)

## 2023-06-08 NOTE — DIETITIAN INITIAL EVALUATION ADULT - OTHER INFO
2021    TELEHEALTH EVALUATION -- Audio/Visual (During [id number] public health emergency)    HPI:Visit conducted with pt at home and provider Dotty Jin CNP in office     Eliecer Constantino (:  1972) has requested an audio/video evaluation for the following concern(s):    URI Symptoms    HPI:      Symptoms have been present for 5 day(s). Symptoms are unchanged since they initially started. Fever? Yes  Runny nose or congestion? Yes   Cough? Yes - dry cough  Sore throat? No  Headache, fatigue, joint pains, muscle aches? No  Shortness of breath/Wheezing? No  Nausea/Vomiting/Diarrhea? No  Double Sickening? No  Sick contacts? No    Patient has tried sudafed with some improvement. Has a history of sinus problems has had sinus surgery in the past, this feels like her normal sinus problems, right ear with pressure and feels full, no fever no sick contacts had covid vaccines. Review of Systems   Constitutional: Negative for chills, fatigue and fever. HENT: Positive for congestion, ear pain, rhinorrhea, sinus pressure and sinus pain. Negative for ear discharge, sneezing, sore throat, tinnitus and voice change. Respiratory: Positive for cough. Negative for choking, chest tightness, shortness of breath and wheezing. Cardiovascular: Negative. Musculoskeletal: Negative for arthralgias and myalgias. Skin: Negative. Neurological: Negative for dizziness, facial asymmetry and headaches. Prior to Visit Medications    Medication Sig Taking?  Authorizing Provider   azithromycin (ZITHROMAX) 250 MG tablet Take 1 tablet by mouth See Admin Instructions for 5 days 500mg on day 1 followed by 250mg on days 2 - 5 Yes BLAIR Mcnulty CNP   guaiFENesin (MUCINEX) 600 MG extended release tablet Take 1 tablet by mouth 2 times daily for 15 days Yes BLAIR Mcnulty CNP   pseudoephedrine (SUDAFED) 30 MG tablet Take 30 mg by mouth every 4 hours as needed for Congestion  Historical Provider, MD   fluticasone (FLONASE) 50 MCG/ACT nasal spray 1 spray by Each Nostril route daily  Veronika French, APRN - CNP   Multiple Vitamins-Minerals (THERAPEUTIC MULTIVITAMIN-MINERALS) tablet Take 1 tablet by mouth daily  Historical Provider, MD       Social History     Tobacco Use    Smoking status: Never Smoker    Smokeless tobacco: Never Used   Substance Use Topics    Alcohol use: No    Drug use: No        Allergies   Allergen Reactions    Seasonal    ,   Past Medical History:   Diagnosis Date    Headache    ,   Past Surgical History:   Procedure Laterality Date    BRAIN SURGERY      tumor removed 1975     SECTION      x1    HYSTERECTOMY      OTHER SURGICAL HISTORY      growth removed under arm    [place] REPAIR OF NASAL SEPTUM Bilateral 2018    IGS ENDOSCOPIC POSSIBLE FRONTAL BILATERAL, BILATERAL MAXILLARY ANTROSTOMY, BILATERAL ETHMOIDECTOMIES, SEPTOPLASTY, SUBMUCOUS RESECT TTURBINATES (SMR) PARTIAL performed by Flor Us MD at [facility]      brain   ,   Social History     Tobacco Use    Smoking status: Never Smoker    Smokeless tobacco: Never Used   Substance Use Topics    Alcohol use: No    Drug use: No   ,   Family History   Problem Relation Age of Onset    Bipolar Disorder Mother     Hypertension Mother    Mitra Haque Schizophrenia Mother     Mental Illness Mother     High Blood Pressure Mother     High Cholesterol Mother     Heart Disease Father     Asthma Neg Hx     Birth Defects Neg Hx     Depression Neg Hx     Diabetes Neg Hx     Early Death Neg Hx     Hearing Loss Neg Hx    ,   Immunization History   Administered Date(s) Administered    COVID-19, Pfizer, PF, 30mcg/0.3mL 2021, 2021    Influenza Virus Vaccine 12/10/2010, 2015, 10/04/2017, 10/02/2018, 10/22/2019, 10/19/2020    Influenza, High Dose (Fluzone 65 yrs and older) 10/14/2011    Tdap (Boostrix, Adacel) 2021   ,   Health Maintenance   Topic Date Due    Cervical cancer screen physical exam findings-     ASSESSMENT/PLAN:  1. Acute non-recurrent frontal sinusitis    - azithromycin (ZITHROMAX) 250 MG tablet; Take 1 tablet by mouth See Admin Instructions for 5 days 500mg on day 1 followed by 250mg on days 2 - 5  Dispense: 6 tablet; Refill: 0    2. Acute effusion of right ear    - azithromycin (ZITHROMAX) 250 MG tablet; Take 1 tablet by mouth See Admin Instructions for 5 days 500mg on day 1 followed by 250mg on days 2 - 5  Dispense: 6 tablet; Refill: 0    3. Cough    - azithromyin increase fluids  Call with any new or worsening symptoms pt in agreeement with plan    (ZITHROMAX) 250 MG tablet; Take 1 tablet by mouth See Admin Instructions for 5 days 500mg on day 1 followed by 250mg on days 2 - 5  Dispense: 6 tablet; Refill: 0      Return if symptoms worsen or fail to improve. Deangelo Dose, was evaluated through a synchronous (real-time) audio-video encounter. The patient (or guardian if applicable) is aware that this is a billable service. Verbal consent to proceed has been obtained within the past 12 months. The visit was conducted pursuant to the emergency declaration under the [facility], [address] [place] [address] authority and the [employer] General Act. Patient identification was verified, and a caregiver was present when appropriate. The patient was located in a state where the provider was credentialed to provide care. Total time spent on this encounter: Not billed by time    --BLAIR Medrano CNP on 7/28/2021 at 11:33 AM    An electronic signature was used to authenticate this note. Pertinent Medical Information: 84 y/o male with PMHX of Afib on Coumadin, CAD s/p stent placement (2004), BPH, HLD, leukemia & lymphoma s/p chemo & radiation, and aortic valve stenosis s/p replacement (2009) presented to the ED for right inguinal pain. Now s/p R inguinal hernia repair on 6/7.

## 2023-06-08 NOTE — DIETITIAN NUTRITION RISK NOTIFICATION - TREATMENT: THE FOLLOWING DIET HAS BEEN RECOMMENDED
Diet, Low Fiber:   Supplement Feeding Modality:  Oral  Ensure Plus High Protein Cans or Servings Per Day:  1       Frequency:  Three Times a day (06-08-23 @ 12:23) [Pending Verification By Attending]  Diet, Low Fiber (06-08-23 @ 09:30) [Active]

## 2023-06-08 NOTE — DIETITIAN INITIAL EVALUATION ADULT - OTHER CALCULATIONS
Estimated Energy Needs: 4719-4701 kcal/day (30-35 kcal/kg)   Estimated Protein Needs: 65-82 gm/day (1.2-1.5 gm/kg) - Monitor GFR for need to adjust   Estimated Fluid Needs: 1360 mL/day (25 mL/kg)   Needs based on ABW 54.4 kg with consideration for age, weight, BMI, PCM

## 2023-06-08 NOTE — DIETITIAN INITIAL EVALUATION ADULT - PERTINENT MEDS FT
MEDICATIONS  (STANDING):  acetaminophen     Tablet .. 650 milliGRAM(s) Oral every 6 hours  chlorhexidine 2% Cloths 1 Application(s) Topical <User Schedule>  dextrose 5% + sodium chloride 0.45%. 1000 milliLiter(s) (75 mL/Hr) IV Continuous <Continuous>  finasteride 5 milliGRAM(s) Oral daily  gabapentin 100 milliGRAM(s) Oral three times a day  lisinopril 20 milliGRAM(s) Oral daily  metoprolol tartrate 25 milliGRAM(s) Oral every 12 hours  simvastatin 10 milliGRAM(s) Oral at bedtime    MEDICATIONS  (PRN):  HYDROmorphone  Injectable 0.5 milliGRAM(s) IV Push every 6 hours PRN Severe Pain (7 - 10)  ketorolac   Injectable 15 milliGRAM(s) IV Push every 6 hours PRN Moderate Pain (4 - 6)  ondansetron Injectable 4 milliGRAM(s) IV Push every 6 hours PRN Nausea and/or Vomiting  oxyCODONE    IR 5 milliGRAM(s) Oral every 6 hours PRN Moderate Pain (4 - 6)

## 2023-06-08 NOTE — DIETITIAN INITIAL EVALUATION ADULT - NS FNS DIET ORDER
Diet, Low Fiber (06-08-23 @ 09:30)    %PO Intake: pt reports receiving clear liquid tray for breakfast this morning and tolerated with good intake. Diet now advanced to Low Fiber, first meal pending for lunch this afternoon.

## 2023-06-08 NOTE — DIETITIAN INITIAL EVALUATION ADULT - ORAL INTAKE PTA/DIET HISTORY
Pt reports following regular diet PTA with usual fair to good appetite/ PO intake. Denies use of oral nutrition supplements. Denies food allergies or intolerances. Denies Jew or cultural food preferences.

## 2023-06-09 LAB
APTT BLD: 37.7 SEC — SIGNIFICANT CHANGE UP (ref 27–39.2)
BASOPHILS # BLD AUTO: 0.03 K/UL — SIGNIFICANT CHANGE UP (ref 0–0.2)
BASOPHILS NFR BLD AUTO: 0.4 % — SIGNIFICANT CHANGE UP (ref 0–1)
EOSINOPHIL # BLD AUTO: 0.31 K/UL — SIGNIFICANT CHANGE UP (ref 0–0.7)
EOSINOPHIL NFR BLD AUTO: 4.5 % — SIGNIFICANT CHANGE UP (ref 0–8)
GLUCOSE BLDC GLUCOMTR-MCNC: 118 MG/DL — HIGH (ref 70–99)
GLUCOSE BLDC GLUCOMTR-MCNC: 137 MG/DL — HIGH (ref 70–99)
GLUCOSE BLDC GLUCOMTR-MCNC: 83 MG/DL — SIGNIFICANT CHANGE UP (ref 70–99)
HCT VFR BLD CALC: 26.7 % — LOW (ref 42–52)
HCT VFR BLD CALC: 26.9 % — LOW (ref 42–52)
HGB BLD-MCNC: 9.1 G/DL — LOW (ref 14–18)
HGB BLD-MCNC: 9.2 G/DL — LOW (ref 14–18)
IMM GRANULOCYTES NFR BLD AUTO: 0.4 % — HIGH (ref 0.1–0.3)
INR BLD: 1.08 RATIO — SIGNIFICANT CHANGE UP (ref 0.65–1.3)
LYMPHOCYTES # BLD AUTO: 0.47 K/UL — LOW (ref 1.2–3.4)
LYMPHOCYTES # BLD AUTO: 6.8 % — LOW (ref 20.5–51.1)
MCHC RBC-ENTMCNC: 34.1 G/DL — SIGNIFICANT CHANGE UP (ref 32–37)
MCHC RBC-ENTMCNC: 34.2 G/DL — SIGNIFICANT CHANGE UP (ref 32–37)
MCHC RBC-ENTMCNC: 35.1 PG — HIGH (ref 27–31)
MCHC RBC-ENTMCNC: 35.4 PG — HIGH (ref 27–31)
MCV RBC AUTO: 103.1 FL — HIGH (ref 80–94)
MCV RBC AUTO: 103.5 FL — HIGH (ref 80–94)
MONOCYTES # BLD AUTO: 0.59 K/UL — SIGNIFICANT CHANGE UP (ref 0.1–0.6)
MONOCYTES NFR BLD AUTO: 8.5 % — SIGNIFICANT CHANGE UP (ref 1.7–9.3)
NEUTROPHILS # BLD AUTO: 5.48 K/UL — SIGNIFICANT CHANGE UP (ref 1.4–6.5)
NEUTROPHILS NFR BLD AUTO: 79.4 % — HIGH (ref 42.2–75.2)
NRBC # BLD: 0 /100 WBCS — SIGNIFICANT CHANGE UP (ref 0–0)
NRBC # BLD: 0 /100 WBCS — SIGNIFICANT CHANGE UP (ref 0–0)
PLATELET # BLD AUTO: 109 K/UL — LOW (ref 130–400)
PLATELET # BLD AUTO: 86 K/UL — LOW (ref 130–400)
PMV BLD: 10.3 FL — SIGNIFICANT CHANGE UP (ref 7.4–10.4)
PMV BLD: 9.9 FL — SIGNIFICANT CHANGE UP (ref 7.4–10.4)
PROTHROM AB SERPL-ACNC: 12.4 SEC — SIGNIFICANT CHANGE UP (ref 9.95–12.87)
RBC # BLD: 2.59 M/UL — LOW (ref 4.7–6.1)
RBC # BLD: 2.6 M/UL — LOW (ref 4.7–6.1)
RBC # FLD: 13.4 % — SIGNIFICANT CHANGE UP (ref 11.5–14.5)
RBC # FLD: 13.5 % — SIGNIFICANT CHANGE UP (ref 11.5–14.5)
WBC # BLD: 6.86 K/UL — SIGNIFICANT CHANGE UP (ref 4.8–10.8)
WBC # BLD: 6.91 K/UL — SIGNIFICANT CHANGE UP (ref 4.8–10.8)
WBC # FLD AUTO: 6.86 K/UL — SIGNIFICANT CHANGE UP (ref 4.8–10.8)
WBC # FLD AUTO: 6.91 K/UL — SIGNIFICANT CHANGE UP (ref 4.8–10.8)

## 2023-06-09 RX ORDER — MAGNESIUM SULFATE 500 MG/ML
2 VIAL (ML) INJECTION ONCE
Refills: 0 | Status: COMPLETED | OUTPATIENT
Start: 2023-06-09 | End: 2023-06-09

## 2023-06-09 RX ORDER — ASPIRIN/CALCIUM CARB/MAGNESIUM 324 MG
81 TABLET ORAL DAILY
Refills: 0 | Status: DISCONTINUED | OUTPATIENT
Start: 2023-06-09 | End: 2023-06-14

## 2023-06-09 RX ORDER — ENOXAPARIN SODIUM 100 MG/ML
30 INJECTION SUBCUTANEOUS ONCE
Refills: 0 | Status: COMPLETED | OUTPATIENT
Start: 2023-06-09 | End: 2023-06-09

## 2023-06-09 RX ORDER — HEPARIN SODIUM 5000 [USP'U]/ML
650 INJECTION INTRAVENOUS; SUBCUTANEOUS
Qty: 25000 | Refills: 0 | Status: DISCONTINUED | OUTPATIENT
Start: 2023-06-09 | End: 2023-06-11

## 2023-06-09 RX ADMIN — LISINOPRIL 20 MILLIGRAM(S): 2.5 TABLET ORAL at 05:02

## 2023-06-09 RX ADMIN — SIMVASTATIN 10 MILLIGRAM(S): 20 TABLET, FILM COATED ORAL at 21:11

## 2023-06-09 RX ADMIN — Medication 63.75 MILLIMOLE(S): at 01:24

## 2023-06-09 RX ADMIN — Medication 650 MILLIGRAM(S): at 11:55

## 2023-06-09 RX ADMIN — Medication 25 GRAM(S): at 01:24

## 2023-06-09 RX ADMIN — Medication 25 MILLIGRAM(S): at 05:02

## 2023-06-09 RX ADMIN — HEPARIN SODIUM 6.5 UNIT(S)/HR: 5000 INJECTION INTRAVENOUS; SUBCUTANEOUS at 17:58

## 2023-06-09 RX ADMIN — Medication 25 MILLIGRAM(S): at 17:57

## 2023-06-09 RX ADMIN — GABAPENTIN 100 MILLIGRAM(S): 400 CAPSULE ORAL at 05:02

## 2023-06-09 RX ADMIN — Medication 650 MILLIGRAM(S): at 05:02

## 2023-06-09 RX ADMIN — Medication 650 MILLIGRAM(S): at 00:21

## 2023-06-09 RX ADMIN — CHLORHEXIDINE GLUCONATE 1 APPLICATION(S): 213 SOLUTION TOPICAL at 05:03

## 2023-06-09 RX ADMIN — Medication 81 MILLIGRAM(S): at 11:55

## 2023-06-09 RX ADMIN — GABAPENTIN 100 MILLIGRAM(S): 400 CAPSULE ORAL at 13:31

## 2023-06-09 RX ADMIN — ENOXAPARIN SODIUM 30 MILLIGRAM(S): 100 INJECTION SUBCUTANEOUS at 01:24

## 2023-06-09 RX ADMIN — GABAPENTIN 100 MILLIGRAM(S): 400 CAPSULE ORAL at 21:12

## 2023-06-09 RX ADMIN — FINASTERIDE 5 MILLIGRAM(S): 5 TABLET, FILM COATED ORAL at 11:54

## 2023-06-09 RX ADMIN — Medication 650 MILLIGRAM(S): at 23:06

## 2023-06-09 RX ADMIN — Medication 650 MILLIGRAM(S): at 17:58

## 2023-06-10 LAB
ANION GAP SERPL CALC-SCNC: 10 MMOL/L — SIGNIFICANT CHANGE UP (ref 7–14)
ANION GAP SERPL CALC-SCNC: 10 MMOL/L — SIGNIFICANT CHANGE UP (ref 7–14)
APTT BLD: 33.3 SEC — SIGNIFICANT CHANGE UP (ref 27–39.2)
APTT BLD: 51 SEC — HIGH (ref 27–39.2)
APTT BLD: 52.6 SEC — HIGH (ref 27–39.2)
BASOPHILS # BLD AUTO: 0.03 K/UL — SIGNIFICANT CHANGE UP (ref 0–0.2)
BASOPHILS NFR BLD AUTO: 0.5 % — SIGNIFICANT CHANGE UP (ref 0–1)
BUN SERPL-MCNC: 12 MG/DL — SIGNIFICANT CHANGE UP (ref 10–20)
BUN SERPL-MCNC: 17 MG/DL — SIGNIFICANT CHANGE UP (ref 10–20)
CALCIUM SERPL-MCNC: 8.2 MG/DL — LOW (ref 8.4–10.5)
CALCIUM SERPL-MCNC: 8.4 MG/DL — SIGNIFICANT CHANGE UP (ref 8.4–10.5)
CHLORIDE SERPL-SCNC: 101 MMOL/L — SIGNIFICANT CHANGE UP (ref 98–110)
CHLORIDE SERPL-SCNC: 103 MMOL/L — SIGNIFICANT CHANGE UP (ref 98–110)
CO2 SERPL-SCNC: 28 MMOL/L — SIGNIFICANT CHANGE UP (ref 17–32)
CO2 SERPL-SCNC: 30 MMOL/L — SIGNIFICANT CHANGE UP (ref 17–32)
CREAT SERPL-MCNC: 0.6 MG/DL — LOW (ref 0.7–1.5)
CREAT SERPL-MCNC: 0.9 MG/DL — SIGNIFICANT CHANGE UP (ref 0.7–1.5)
EGFR: 85 ML/MIN/1.73M2 — SIGNIFICANT CHANGE UP
EGFR: 96 ML/MIN/1.73M2 — SIGNIFICANT CHANGE UP
EOSINOPHIL # BLD AUTO: 0.33 K/UL — SIGNIFICANT CHANGE UP (ref 0–0.7)
EOSINOPHIL NFR BLD AUTO: 5.5 % — SIGNIFICANT CHANGE UP (ref 0–8)
GLUCOSE BLDC GLUCOMTR-MCNC: 107 MG/DL — HIGH (ref 70–99)
GLUCOSE BLDC GLUCOMTR-MCNC: 123 MG/DL — HIGH (ref 70–99)
GLUCOSE BLDC GLUCOMTR-MCNC: 137 MG/DL — HIGH (ref 70–99)
GLUCOSE BLDC GLUCOMTR-MCNC: 140 MG/DL — HIGH (ref 70–99)
GLUCOSE SERPL-MCNC: 126 MG/DL — HIGH (ref 70–99)
GLUCOSE SERPL-MCNC: 85 MG/DL — SIGNIFICANT CHANGE UP (ref 70–99)
HCT VFR BLD CALC: 27.2 % — LOW (ref 42–52)
HGB BLD-MCNC: 8.9 G/DL — LOW (ref 14–18)
IMM GRANULOCYTES NFR BLD AUTO: 0.2 % — SIGNIFICANT CHANGE UP (ref 0.1–0.3)
INR BLD: 1.08 RATIO — SIGNIFICANT CHANGE UP (ref 0.65–1.3)
LYMPHOCYTES # BLD AUTO: 0.46 K/UL — LOW (ref 1.2–3.4)
LYMPHOCYTES # BLD AUTO: 7.7 % — LOW (ref 20.5–51.1)
MAGNESIUM SERPL-MCNC: 1.9 MG/DL — SIGNIFICANT CHANGE UP (ref 1.8–2.4)
MAGNESIUM SERPL-MCNC: 2.1 MG/DL — SIGNIFICANT CHANGE UP (ref 1.8–2.4)
MCHC RBC-ENTMCNC: 32.7 G/DL — SIGNIFICANT CHANGE UP (ref 32–37)
MCHC RBC-ENTMCNC: 34.4 PG — HIGH (ref 27–31)
MCV RBC AUTO: 105 FL — HIGH (ref 80–94)
MONOCYTES # BLD AUTO: 0.57 K/UL — SIGNIFICANT CHANGE UP (ref 0.1–0.6)
MONOCYTES NFR BLD AUTO: 9.6 % — HIGH (ref 1.7–9.3)
NEUTROPHILS # BLD AUTO: 4.56 K/UL — SIGNIFICANT CHANGE UP (ref 1.4–6.5)
NEUTROPHILS NFR BLD AUTO: 76.5 % — HIGH (ref 42.2–75.2)
NRBC # BLD: 0 /100 WBCS — SIGNIFICANT CHANGE UP (ref 0–0)
PHOSPHATE SERPL-MCNC: 2 MG/DL — LOW (ref 2.1–4.9)
PHOSPHATE SERPL-MCNC: 2.2 MG/DL — SIGNIFICANT CHANGE UP (ref 2.1–4.9)
PLATELET # BLD AUTO: 124 K/UL — LOW (ref 130–400)
PMV BLD: 10.5 FL — HIGH (ref 7.4–10.4)
POTASSIUM SERPL-MCNC: 4 MMOL/L — SIGNIFICANT CHANGE UP (ref 3.5–5)
POTASSIUM SERPL-MCNC: 4.4 MMOL/L — SIGNIFICANT CHANGE UP (ref 3.5–5)
POTASSIUM SERPL-SCNC: 4 MMOL/L — SIGNIFICANT CHANGE UP (ref 3.5–5)
POTASSIUM SERPL-SCNC: 4.4 MMOL/L — SIGNIFICANT CHANGE UP (ref 3.5–5)
PROTHROM AB SERPL-ACNC: 12.4 SEC — SIGNIFICANT CHANGE UP (ref 9.95–12.87)
RBC # BLD: 2.59 M/UL — LOW (ref 4.7–6.1)
RBC # FLD: 13.2 % — SIGNIFICANT CHANGE UP (ref 11.5–14.5)
SARS-COV-2 RNA SPEC QL NAA+PROBE: SIGNIFICANT CHANGE UP
SODIUM SERPL-SCNC: 141 MMOL/L — SIGNIFICANT CHANGE UP (ref 135–146)
SODIUM SERPL-SCNC: 141 MMOL/L — SIGNIFICANT CHANGE UP (ref 135–146)
WBC # BLD: 5.96 K/UL — SIGNIFICANT CHANGE UP (ref 4.8–10.8)
WBC # FLD AUTO: 5.96 K/UL — SIGNIFICANT CHANGE UP (ref 4.8–10.8)

## 2023-06-10 RX ADMIN — SIMVASTATIN 10 MILLIGRAM(S): 20 TABLET, FILM COATED ORAL at 22:03

## 2023-06-10 RX ADMIN — Medication 81 MILLIGRAM(S): at 11:26

## 2023-06-10 RX ADMIN — GABAPENTIN 100 MILLIGRAM(S): 400 CAPSULE ORAL at 05:34

## 2023-06-10 RX ADMIN — CHLORHEXIDINE GLUCONATE 1 APPLICATION(S): 213 SOLUTION TOPICAL at 05:35

## 2023-06-10 RX ADMIN — Medication 63.75 MILLIMOLE(S): at 02:01

## 2023-06-10 RX ADMIN — Medication 650 MILLIGRAM(S): at 05:34

## 2023-06-10 RX ADMIN — Medication 650 MILLIGRAM(S): at 11:26

## 2023-06-10 RX ADMIN — Medication 25 MILLIGRAM(S): at 17:44

## 2023-06-10 RX ADMIN — Medication 25 MILLIGRAM(S): at 05:34

## 2023-06-10 RX ADMIN — HEPARIN SODIUM 7.5 UNIT(S)/HR: 5000 INJECTION INTRAVENOUS; SUBCUTANEOUS at 00:57

## 2023-06-10 RX ADMIN — LISINOPRIL 20 MILLIGRAM(S): 2.5 TABLET ORAL at 05:34

## 2023-06-10 RX ADMIN — HEPARIN SODIUM 8.5 UNIT(S)/HR: 5000 INJECTION INTRAVENOUS; SUBCUTANEOUS at 00:19

## 2023-06-10 RX ADMIN — GABAPENTIN 100 MILLIGRAM(S): 400 CAPSULE ORAL at 22:03

## 2023-06-10 RX ADMIN — GABAPENTIN 100 MILLIGRAM(S): 400 CAPSULE ORAL at 13:54

## 2023-06-10 RX ADMIN — FINASTERIDE 5 MILLIGRAM(S): 5 TABLET, FILM COATED ORAL at 11:26

## 2023-06-10 RX ADMIN — Medication 650 MILLIGRAM(S): at 17:44

## 2023-06-11 LAB
APTT BLD: 47.3 SEC — HIGH (ref 27–39.2)
GLUCOSE BLDC GLUCOMTR-MCNC: 104 MG/DL — HIGH (ref 70–99)
GLUCOSE BLDC GLUCOMTR-MCNC: 154 MG/DL — HIGH (ref 70–99)
INR BLD: 1.07 RATIO — SIGNIFICANT CHANGE UP (ref 0.65–1.3)
INR BLD: 1.08 RATIO — SIGNIFICANT CHANGE UP (ref 0.65–1.3)
PROTHROM AB SERPL-ACNC: 12.2 SEC — SIGNIFICANT CHANGE UP (ref 9.95–12.87)
PROTHROM AB SERPL-ACNC: 12.3 SEC — SIGNIFICANT CHANGE UP (ref 9.95–12.87)
SARS-COV-2 RNA SPEC QL NAA+PROBE: SIGNIFICANT CHANGE UP

## 2023-06-11 PROCEDURE — 99232 SBSQ HOSP IP/OBS MODERATE 35: CPT | Mod: 24

## 2023-06-11 RX ORDER — HEPARIN SODIUM 5000 [USP'U]/ML
900 INJECTION INTRAVENOUS; SUBCUTANEOUS
Qty: 25000 | Refills: 0 | Status: DISCONTINUED | OUTPATIENT
Start: 2023-06-11 | End: 2023-06-12

## 2023-06-11 RX ORDER — WARFARIN SODIUM 2.5 MG/1
3 TABLET ORAL AT BEDTIME
Refills: 0 | Status: COMPLETED | OUTPATIENT
Start: 2023-06-11 | End: 2023-06-11

## 2023-06-11 RX ADMIN — GABAPENTIN 100 MILLIGRAM(S): 400 CAPSULE ORAL at 21:38

## 2023-06-11 RX ADMIN — WARFARIN SODIUM 3 MILLIGRAM(S): 2.5 TABLET ORAL at 21:38

## 2023-06-11 RX ADMIN — Medication 81 MILLIGRAM(S): at 11:31

## 2023-06-11 RX ADMIN — FINASTERIDE 5 MILLIGRAM(S): 5 TABLET, FILM COATED ORAL at 11:31

## 2023-06-11 RX ADMIN — Medication 650 MILLIGRAM(S): at 05:37

## 2023-06-11 RX ADMIN — HEPARIN SODIUM 900 UNIT(S)/HR: 5000 INJECTION INTRAVENOUS; SUBCUTANEOUS at 22:43

## 2023-06-11 RX ADMIN — CHLORHEXIDINE GLUCONATE 1 APPLICATION(S): 213 SOLUTION TOPICAL at 06:50

## 2023-06-11 RX ADMIN — LISINOPRIL 20 MILLIGRAM(S): 2.5 TABLET ORAL at 05:37

## 2023-06-11 RX ADMIN — GABAPENTIN 100 MILLIGRAM(S): 400 CAPSULE ORAL at 05:37

## 2023-06-11 RX ADMIN — SIMVASTATIN 10 MILLIGRAM(S): 20 TABLET, FILM COATED ORAL at 21:38

## 2023-06-11 RX ADMIN — Medication 650 MILLIGRAM(S): at 11:31

## 2023-06-11 RX ADMIN — HEPARIN SODIUM 8.5 UNIT(S)/HR: 5000 INJECTION INTRAVENOUS; SUBCUTANEOUS at 06:33

## 2023-06-11 RX ADMIN — HYDROMORPHONE HYDROCHLORIDE 0.5 MILLIGRAM(S): 2 INJECTION INTRAMUSCULAR; INTRAVENOUS; SUBCUTANEOUS at 06:50

## 2023-06-11 RX ADMIN — Medication 25 MILLIGRAM(S): at 05:37

## 2023-06-11 RX ADMIN — HYDROMORPHONE HYDROCHLORIDE 0.5 MILLIGRAM(S): 2 INJECTION INTRAMUSCULAR; INTRAVENOUS; SUBCUTANEOUS at 05:36

## 2023-06-11 RX ADMIN — Medication 650 MILLIGRAM(S): at 17:59

## 2023-06-11 RX ADMIN — Medication 650 MILLIGRAM(S): at 06:50

## 2023-06-11 RX ADMIN — Medication 25 MILLIGRAM(S): at 17:59

## 2023-06-11 RX ADMIN — GABAPENTIN 100 MILLIGRAM(S): 400 CAPSULE ORAL at 11:31

## 2023-06-11 RX ADMIN — Medication 650 MILLIGRAM(S): at 23:57

## 2023-06-12 LAB
ANION GAP SERPL CALC-SCNC: 7 MMOL/L — SIGNIFICANT CHANGE UP (ref 7–14)
ANION GAP SERPL CALC-SCNC: 9 MMOL/L — SIGNIFICANT CHANGE UP (ref 7–14)
APTT BLD: 139.4 SEC — CRITICAL HIGH (ref 27–39.2)
APTT BLD: 42 SEC — HIGH (ref 27–39.2)
APTT BLD: 60.6 SEC — HIGH (ref 27–39.2)
BASOPHILS # BLD AUTO: 0.04 K/UL — SIGNIFICANT CHANGE UP (ref 0–0.2)
BASOPHILS # BLD AUTO: 0.05 K/UL — SIGNIFICANT CHANGE UP (ref 0–0.2)
BASOPHILS NFR BLD AUTO: 0.6 % — SIGNIFICANT CHANGE UP (ref 0–1)
BASOPHILS NFR BLD AUTO: 0.7 % — SIGNIFICANT CHANGE UP (ref 0–1)
BUN SERPL-MCNC: 19 MG/DL — SIGNIFICANT CHANGE UP (ref 10–20)
BUN SERPL-MCNC: 22 MG/DL — HIGH (ref 10–20)
CALCIUM SERPL-MCNC: 8.6 MG/DL — SIGNIFICANT CHANGE UP (ref 8.4–10.4)
CALCIUM SERPL-MCNC: 8.6 MG/DL — SIGNIFICANT CHANGE UP (ref 8.4–10.5)
CHLORIDE SERPL-SCNC: 102 MMOL/L — SIGNIFICANT CHANGE UP (ref 98–110)
CHLORIDE SERPL-SCNC: 103 MMOL/L — SIGNIFICANT CHANGE UP (ref 98–110)
CO2 SERPL-SCNC: 28 MMOL/L — SIGNIFICANT CHANGE UP (ref 17–32)
CO2 SERPL-SCNC: 28 MMOL/L — SIGNIFICANT CHANGE UP (ref 17–32)
CREAT SERPL-MCNC: 0.6 MG/DL — LOW (ref 0.7–1.5)
CREAT SERPL-MCNC: 0.6 MG/DL — LOW (ref 0.7–1.5)
EGFR: 96 ML/MIN/1.73M2 — SIGNIFICANT CHANGE UP
EGFR: 96 ML/MIN/1.73M2 — SIGNIFICANT CHANGE UP
EOSINOPHIL # BLD AUTO: 0.37 K/UL — SIGNIFICANT CHANGE UP (ref 0–0.7)
EOSINOPHIL # BLD AUTO: 0.38 K/UL — SIGNIFICANT CHANGE UP (ref 0–0.7)
EOSINOPHIL NFR BLD AUTO: 5.5 % — SIGNIFICANT CHANGE UP (ref 0–8)
EOSINOPHIL NFR BLD AUTO: 5.7 % — SIGNIFICANT CHANGE UP (ref 0–8)
GLUCOSE BLDC GLUCOMTR-MCNC: 85 MG/DL — SIGNIFICANT CHANGE UP (ref 70–99)
GLUCOSE BLDC GLUCOMTR-MCNC: 91 MG/DL — SIGNIFICANT CHANGE UP (ref 70–99)
GLUCOSE SERPL-MCNC: 102 MG/DL — HIGH (ref 70–99)
GLUCOSE SERPL-MCNC: 112 MG/DL — HIGH (ref 70–99)
HCT VFR BLD CALC: 26.3 % — LOW (ref 42–52)
HCT VFR BLD CALC: 26.5 % — LOW (ref 42–52)
HGB BLD-MCNC: 8.6 G/DL — LOW (ref 14–18)
HGB BLD-MCNC: 8.7 G/DL — LOW (ref 14–18)
IMM GRANULOCYTES NFR BLD AUTO: 0.3 % — SIGNIFICANT CHANGE UP (ref 0.1–0.3)
IMM GRANULOCYTES NFR BLD AUTO: 0.4 % — HIGH (ref 0.1–0.3)
INR BLD: 1.08 RATIO — SIGNIFICANT CHANGE UP (ref 0.65–1.3)
INR BLD: 1.08 RATIO — SIGNIFICANT CHANGE UP (ref 0.65–1.3)
LYMPHOCYTES # BLD AUTO: 0.45 K/UL — LOW (ref 1.2–3.4)
LYMPHOCYTES # BLD AUTO: 0.61 K/UL — LOW (ref 1.2–3.4)
LYMPHOCYTES # BLD AUTO: 6.5 % — LOW (ref 20.5–51.1)
LYMPHOCYTES # BLD AUTO: 9.4 % — LOW (ref 20.5–51.1)
MAGNESIUM SERPL-MCNC: 1.7 MG/DL — LOW (ref 1.8–2.4)
MAGNESIUM SERPL-MCNC: 1.8 MG/DL — SIGNIFICANT CHANGE UP (ref 1.8–2.4)
MCHC RBC-ENTMCNC: 32.5 G/DL — SIGNIFICANT CHANGE UP (ref 32–37)
MCHC RBC-ENTMCNC: 33.1 G/DL — SIGNIFICANT CHANGE UP (ref 32–37)
MCHC RBC-ENTMCNC: 34.4 PG — HIGH (ref 27–31)
MCHC RBC-ENTMCNC: 34.8 PG — HIGH (ref 27–31)
MCV RBC AUTO: 105.2 FL — HIGH (ref 80–94)
MCV RBC AUTO: 106 FL — HIGH (ref 80–94)
MONOCYTES # BLD AUTO: 0.76 K/UL — HIGH (ref 0.1–0.6)
MONOCYTES # BLD AUTO: 0.83 K/UL — HIGH (ref 0.1–0.6)
MONOCYTES NFR BLD AUTO: 11.7 % — HIGH (ref 1.7–9.3)
MONOCYTES NFR BLD AUTO: 12 % — HIGH (ref 1.7–9.3)
NEUTROPHILS # BLD AUTO: 4.71 K/UL — SIGNIFICANT CHANGE UP (ref 1.4–6.5)
NEUTROPHILS # BLD AUTO: 5.19 K/UL — SIGNIFICANT CHANGE UP (ref 1.4–6.5)
NEUTROPHILS NFR BLD AUTO: 72.3 % — SIGNIFICANT CHANGE UP (ref 42.2–75.2)
NEUTROPHILS NFR BLD AUTO: 74.9 % — SIGNIFICANT CHANGE UP (ref 42.2–75.2)
NRBC # BLD: 0 /100 WBCS — SIGNIFICANT CHANGE UP (ref 0–0)
NRBC # BLD: 0 /100 WBCS — SIGNIFICANT CHANGE UP (ref 0–0)
PHOSPHATE SERPL-MCNC: 2.1 MG/DL — SIGNIFICANT CHANGE UP (ref 2.1–4.9)
PHOSPHATE SERPL-MCNC: 2.2 MG/DL — SIGNIFICANT CHANGE UP (ref 2.1–4.9)
PLATELET # BLD AUTO: 133 K/UL — SIGNIFICANT CHANGE UP (ref 130–400)
PLATELET # BLD AUTO: 152 K/UL — SIGNIFICANT CHANGE UP (ref 130–400)
PMV BLD: 10 FL — SIGNIFICANT CHANGE UP (ref 7.4–10.4)
PMV BLD: 10 FL — SIGNIFICANT CHANGE UP (ref 7.4–10.4)
POTASSIUM SERPL-MCNC: 4.3 MMOL/L — SIGNIFICANT CHANGE UP (ref 3.5–5)
POTASSIUM SERPL-MCNC: 4.6 MMOL/L — SIGNIFICANT CHANGE UP (ref 3.5–5)
POTASSIUM SERPL-SCNC: 4.3 MMOL/L — SIGNIFICANT CHANGE UP (ref 3.5–5)
POTASSIUM SERPL-SCNC: 4.6 MMOL/L — SIGNIFICANT CHANGE UP (ref 3.5–5)
PROTHROM AB SERPL-ACNC: 12.3 SEC — SIGNIFICANT CHANGE UP (ref 9.95–12.87)
PROTHROM AB SERPL-ACNC: 12.4 SEC — SIGNIFICANT CHANGE UP (ref 9.95–12.87)
RBC # BLD: 2.5 M/UL — LOW (ref 4.7–6.1)
RBC # BLD: 2.5 M/UL — LOW (ref 4.7–6.1)
RBC # FLD: 13.2 % — SIGNIFICANT CHANGE UP (ref 11.5–14.5)
RBC # FLD: 13.4 % — SIGNIFICANT CHANGE UP (ref 11.5–14.5)
SODIUM SERPL-SCNC: 138 MMOL/L — SIGNIFICANT CHANGE UP (ref 135–146)
SODIUM SERPL-SCNC: 139 MMOL/L — SIGNIFICANT CHANGE UP (ref 135–146)
WBC # BLD: 6.51 K/UL — SIGNIFICANT CHANGE UP (ref 4.8–10.8)
WBC # BLD: 6.93 K/UL — SIGNIFICANT CHANGE UP (ref 4.8–10.8)
WBC # FLD AUTO: 6.51 K/UL — SIGNIFICANT CHANGE UP (ref 4.8–10.8)
WBC # FLD AUTO: 6.93 K/UL — SIGNIFICANT CHANGE UP (ref 4.8–10.8)

## 2023-06-12 RX ORDER — WARFARIN SODIUM 2.5 MG/1
3 TABLET ORAL ONCE
Refills: 0 | Status: COMPLETED | OUTPATIENT
Start: 2023-06-12 | End: 2023-06-12

## 2023-06-12 RX ORDER — HEPARIN SODIUM 5000 [USP'U]/ML
700 INJECTION INTRAVENOUS; SUBCUTANEOUS
Qty: 25000 | Refills: 0 | Status: DISCONTINUED | OUTPATIENT
Start: 2023-06-12 | End: 2023-06-14

## 2023-06-12 RX ORDER — MAGNESIUM SULFATE 500 MG/ML
2 VIAL (ML) INJECTION ONCE
Refills: 0 | Status: COMPLETED | OUTPATIENT
Start: 2023-06-12 | End: 2023-06-12

## 2023-06-12 RX ADMIN — Medication 81 MILLIGRAM(S): at 11:37

## 2023-06-12 RX ADMIN — Medication 650 MILLIGRAM(S): at 18:00

## 2023-06-12 RX ADMIN — Medication 25 MILLIGRAM(S): at 17:14

## 2023-06-12 RX ADMIN — GABAPENTIN 100 MILLIGRAM(S): 400 CAPSULE ORAL at 21:11

## 2023-06-12 RX ADMIN — GABAPENTIN 100 MILLIGRAM(S): 400 CAPSULE ORAL at 05:17

## 2023-06-12 RX ADMIN — HEPARIN SODIUM 7 UNIT(S)/HR: 5000 INJECTION INTRAVENOUS; SUBCUTANEOUS at 08:11

## 2023-06-12 RX ADMIN — WARFARIN SODIUM 3 MILLIGRAM(S): 2.5 TABLET ORAL at 21:11

## 2023-06-12 RX ADMIN — Medication 650 MILLIGRAM(S): at 23:57

## 2023-06-12 RX ADMIN — Medication 650 MILLIGRAM(S): at 05:16

## 2023-06-12 RX ADMIN — FINASTERIDE 5 MILLIGRAM(S): 5 TABLET, FILM COATED ORAL at 11:37

## 2023-06-12 RX ADMIN — LISINOPRIL 20 MILLIGRAM(S): 2.5 TABLET ORAL at 05:17

## 2023-06-12 RX ADMIN — Medication 25 MILLIGRAM(S): at 05:17

## 2023-06-12 RX ADMIN — CHLORHEXIDINE GLUCONATE 1 APPLICATION(S): 213 SOLUTION TOPICAL at 05:18

## 2023-06-12 RX ADMIN — Medication 650 MILLIGRAM(S): at 11:36

## 2023-06-12 RX ADMIN — Medication 25 GRAM(S): at 04:18

## 2023-06-12 RX ADMIN — SIMVASTATIN 10 MILLIGRAM(S): 20 TABLET, FILM COATED ORAL at 21:10

## 2023-06-12 RX ADMIN — Medication 650 MILLIGRAM(S): at 12:15

## 2023-06-12 RX ADMIN — GABAPENTIN 100 MILLIGRAM(S): 400 CAPSULE ORAL at 13:16

## 2023-06-12 RX ADMIN — Medication 650 MILLIGRAM(S): at 17:14

## 2023-06-12 NOTE — CHART NOTE - NSCHARTNOTEFT_GEN_A_CORE
Registered Dietitian Follow-Up    Patient Profile Reviewed                           Yes [x]   No []  Nutrition History Previously Obtained        Yes [x]  No []      Pertinent Medical Interventions: 82 y/o male with PMHX of Afib on Coumadin, CAD s/p stent placement (), BPH, HLD, leukemia & lymphoma s/p chemo & radiation, and aortic valve stenosis s/p replacement () presented to the ED for right inguinal pain. Now s/p R inguinal hernia repair on .    Nutrition Interval History: Met with pt at bedside, observed breakfast tray 100% consumed + ensure supplement 100% consumed. Pt offers no nutrition-related complaints at this time.   Nutrient Intake: Patient meeting >/=75% of estimated energy needs in-house     Diet order: Diet, Low Fiber:   Supplement Feeding Modality:  Oral  Ensure Plus High Protein Cans or Servings Per Day:  1       Frequency:  Three Times a day (23 @ 12:23) [Active]    Anthropometrics:  Height (cm): 177.8 (23 @ 22:00)  Weight (kg): 54.4 (23 @ 21:23) - dosing weight (stated)   BMI (kg/m2): 17.2 (23 @ 22:00) - based on dosing weight   IBW: 75.5 kg (166 lbs)    Daily Weight in k (-10)  % Weight Change: CBW would indicate 6.6 kg / 12% weight gain from stated weight however doubt true weight gain. Trend weights.     MEDICATIONS  (STANDING):  acetaminophen     Tablet .. 650 milliGRAM(s) Oral every 6 hours  aspirin enteric coated 81 milliGRAM(s) Oral daily  chlorhexidine 2% Cloths 1 Application(s) Topical <User Schedule>  finasteride 5 milliGRAM(s) Oral daily  gabapentin 100 milliGRAM(s) Oral three times a day  heparin  Infusion 700 Unit(s)/Hr (7 mL/Hr) IV Continuous <Continuous>  lisinopril 20 milliGRAM(s) Oral daily  metoprolol tartrate 25 milliGRAM(s) Oral every 12 hours  simvastatin 10 milliGRAM(s) Oral at bedtime  warfarin 3 milliGRAM(s) Oral once    MEDICATIONS  (PRN):  HYDROmorphone  Injectable 0.5 milliGRAM(s) IV Push every 6 hours PRN Severe Pain (7 - 10)  ondansetron Injectable 4 milliGRAM(s) IV Push every 6 hours PRN Nausea and/or Vomiting  oxyCODONE    IR 5 milliGRAM(s) Oral every 6 hours PRN Moderate Pain (4 - 6)    Pertinent Labs:  @ 00:37: Na 139, BUN 22<H>, Cr 0.6<L>, <H>, K+ 4.3, Phos 2.1, Mg 1.7<L>, Alk Phos --, ALT/SGPT --, AST/SGOT --, HbA1c --    Finger Sticks:  POCT Blood Glucose.: 91 mg/dL ( @ 08:31)  POCT Blood Glucose.: 154 mg/dL ( @ 11:19)      Physical Findings:  - Cognition: AAOx4   - GI function: pt denies nausea/ vomiting; last documented BM    - Tubes: no nutritionally pertinent lines, drains, tubes noted  - Oral/Mouth cavity: denies difficulty chewing or swallowing   - Skin: surgical incision to abdomen per flowsheets   - Edema: no edema noted per flowsheets      Nutrition Requirements:   Weight Used: ABW 54.4 kg with consideration for age, weight, BMI, PCM     Estimated Energy Needs    Continue [x]  Adjust []  7746-6919 kcal/day (30-35 kcal/kg)     Estimated Protein Needs    Continue [x]  Adjust []  65-82 gm/day (1.2-1.5 gm/kg) - Monitor GFR for need to adjust     Estimated Fluid Needs        Continue [x]  Adjust []  1360 mL/day (25 mL/kg)     [x] Previous Nutrition Diagnosis: Severe Malnutrition related to chronic illness as evidenced by severe muscle and fat depletions            [x] Ongoing          [] Resolved    Goal/Expected Outcome: Pt to meet >/=75% of estimated nutrient needs within 5-7 days     Nutrition Intervention:   1. Diet Modification: continue Low Fiber  > advance to regular as tolerated   2. Commercial Beverage: continue Ensure Plus High Protein three times daily (350 kcal, 20 gm protein, 40 gm CHO per svg)     Indicator/Monitoring: Diet order, PO intake, supplement acceptance, swallowing function, GI function, biochemical data, weight trends, NFPF, skin integrity     Pt is at moderate nutrition risk, RD to f/u in 5-7 days  RD to remain available: Patrice Almanzar, spectra x [phone] or TEAMS
Dictation ID: [id number]
PACU ANESTHESIA ADMISSION NOTE      Procedure: Right inguinal hernia repair  Post op diagnosis:  Incarcerated Right inguinal hernia    ____  Intubated  TV:______       Rate: ______      FiO2: ______    _x___  Patent Airway    _x___  Full return of protective reflexes    _x___  Full recovery from anesthesia / back to baseline status    Vitals:  T(C): 98.2  HR: 86  BP: 187/79  RR: 20  SpO2: 99%    Mental Status:  _x___ Awake   _____ Alert   _____ Drowsy   _____ Sedated    Nausea/Vomiting:  _x___  NO       ______Yes,   See Post - Op Orders         Pain Scale (0-10):  __0___    Treatment: _x___ None    ____ See Post - Op/PCA Orders    Post - Operative Fluids:   __x__ Oral   ____ See Post - Op Orders    Plan: Discharge:   _x___Home       _____Floor     _____Critical Care    _____  Other:_________________    Comments: Reported given to KARSTEN HIGGINS [Apoorva]. All questions answered to satisfaction.   No anesthesia issues or complications noted.  Discharge when criteria met.
SICU Transfer Note    TERRELL ATKINSON  83y (1940)  [id number]      Transfer from: SICU  Transfer to: Surgery-      SICU COURSE:  83y Male HD# 1d  s/p Diagnostic laparoscopy with repair of inguinal hernia    HPI   83yM w/ PMH of Afib on Coumadin (last dose ), CAD s/p stent placement (), BPH, HLD, leukemia & lymphoma s/p chemo & radiation, and aortic valve stenosis s/p AVR () presented to the ED for right inguinal pain. He states that he has been having 4-5 days of inguinal pain. He does not recall any inciting incident but states that the size for the hernia increased at this point. Additionally, he states that he has not had a bowel movement in the past 1 to 2 weeks with minor flatus. He denies nausea and emesis and states that he has still been able to eat. On further conversation, patient and wife admitted that patient had one bowel movement today. Patient describes his pain as 7/10 dull with intermittent sharp character.     Patient has a known right inguinal hernia that he saw Dr. Ponce for 3 years ago. At that time he was informed that it would need to be fixed but he did not follow up.     Patient is on Coumadin and stated that his most recent INR was 4. Stat labs drawn.     SICU Consult for postoperative hemodynamic monitoring for HTN in setting of extensive cardiac history. Patient noted to have elevated INR to 5.82 and was given 2000u K-centra and 10u Vitamin K. Intra-op repeat INR was 1.3. Patient also noted to be hypertensive preoperatively to SBP 200s and required intra-op pushes of Phenylephrine to maintain elevated blood pressure    Pertinent Imaging  < from: CT Abdomen and Pelvis w/ Oral Cont and w/ IV Cont (23 @ 15:22) >  IMPRESSION:  Large right inguinal hernia containing inflamed, thick-walled inflamed   terminal ileum and proximal colon. Oral contrast is dilute in the hernia   and has not traversed past this point. Large amount of ascites/fluid   within the hernia. Findings are suspicious for strangulated hernia in the   appropriate clinical setting.  --- End of Report ---    OR Stats  OR Time: 3hr          IV Fluids: 2200                 EBL:  10                   Blood Products: None                  UOP:  400   Findings- Open repair of right inguinal hernia with mesh. Diagnostic Laparoscopy to evaluate bowel viability. Both small and large intestine was inspected and viable.    Post operatively, patient was extubated and brought to PACU. Upon arrival to SICU, patient was hypertensive and received vasotec and hydralazine IVP. BP responded. Patient seen on AM rounds with SICU attending. Stable for downgrade to surgical floor.       PAST MEDICAL & SURGICAL HISTORY:  CAD (coronary artery disease)   1 stent  Aortic valve stenosis s/p replacement   Lymphoma 1996, s/p chemo& radiation  BPH (benign prostatic hyperplasia)  Afib  Leukemia, lymphoid  H/O aortic valve replacement    Allergies  No Known Allergies  Intolerances      MEDICATIONS  (STANDING):  acetaminophen     Tablet .. 650 milliGRAM(s) Oral every 6 hours  chlorhexidine 2% Cloths 1 Application(s) Topical <User Schedule>  finasteride 5 milliGRAM(s) Oral daily  gabapentin 100 milliGRAM(s) Oral three times a day  heparin  Infusion.  Unit(s)/Hr (10 mL/Hr) IV Continuous <Continuous>  lactated ringers. 1000 milliLiter(s) (95 mL/Hr) IV Continuous <Continuous>  metoprolol tartrate 25 milliGRAM(s) Oral every 12 hours  pantoprazole  Injectable 40 milliGRAM(s) IV Push daily  simvastatin 10 milliGRAM(s) Oral at bedtime    MEDICATIONS  (PRN):  HYDROmorphone  Injectable 0.5 milliGRAM(s) IV Push every 6 hours PRN Severe Pain (7 - 10)  ketorolac   Injectable 15 milliGRAM(s) IV Push every 6 hours PRN Moderate Pain (4 - 6)  ondansetron Injectable 4 milliGRAM(s) IV Push every 6 hours PRN Nausea and/or Vomiting  oxyCODONE    IR 5 milliGRAM(s) Oral every 6 hours PRN Moderate Pain (4 - 6)      Vital Signs Last 24 Hrs  T(C): 35.6 (2023 12:00), Max: 36.8 (2023 02:50)  T(F): 96 (2023 12:00), Max: 98.2 (2023 02:50)  HR: 67 (2023 12:00) (56 - 89)  BP: 117/58 (2023 12:24) (117/58 - 214/91)  BP(mean): 132 (2023 11:00) (106 - 132)  RR: 23 (2023 12:00) (15 - 33)  SpO2: 98% (2023 12:00) (97% - 100%)    Parameters below as of 2023 12:00  Patient On (Oxygen Delivery Method): room air      I&O's Summary    2023 07:01  -  2023 07:00  --------------------------------------------------------  IN: 380 mL / OUT: 230 mL / NET: 150 mL    2023 07:01  -  2023 13:19  --------------------------------------------------------  IN: 380 mL / OUT: 575 mL / NET: -195 mL      LABS:                        11.5   7.92  )-----------( 109      ( 2023 03:15 )             33.1       06-07    137  |  99  |  10  ----------------------------<  97  3.6   |  24  |  0.6<L>    Ca    8.9      2023 03:15  Phos  2.3     06-07  Mg     1.6     06-07    TPro  5.4<L>  /  Alb  4.0  /  TBili  1.5<H>  /  DBili  0.7<H>  /  AST  12  /  ALT  5   /  AlkPhos  52  06-07      PT/INR - ( 2023 10:31 )   PT: 13.80 sec;   INR: 1.20 ratio         PTT - ( 2023 10:31 )  PTT:42.7 sec  CARDIAC MARKERS ( 2023 03:15 )  x     / <0.01 ng/mL / x     / x     / x          Urinalysis Basic - ( 2023 14:56 )    Color: Yellow / Appearance: Clear / S.024 / pH: x  Gluc: x / Ketone: Negative  / Bili: Negative / Urobili: <2 mg/dL   Blood: x / Protein: Trace / Nitrite: Negative   Leuk Esterase: Moderate / RBC: 4 /HPF / WBC 4 /HPF   Sq Epi: x / Non Sq Epi: x / Bacteria: Negative        Assessment & Plan:  83y Male s/p open right inguinal hernia repair with mesh, diagnostic laparoscopy     NEURO:  #Acute pain    -Acetaminophen ATC, Gabapentin 100mg q8, Toradol 15 prn, oxy5 prn, Dilaudid 0.25 prn      RESP:   #Oxygenation    -wean off NC to RA as tolerated  #Activity    -increase as tolerated    CARDS:   #CAD s/p stent (, no longer on DAPT)  #Afib, AV stenosis s/p Mechanical Aortic Valve replacement () on Coumadin    -Elevated INR on admission 5.82 s/p 2000 Kcentra & 10u Vit K --> INR 1.3     -Hold home Coumadin   - per cardiology, restart coumadin for valve   - Starting hep gtt at 17:00  #HTN    -Continue home PO metoprolol 25 BID, Lisinopril 20mg (increased lisinopril from 10mg qD)  #HLD    -Continue home Simvastatin 10mg qD   Imaging:     -Post-op EKG (): Wide QRS rhythm (QTc 493) --> 2g Mg given    -Echo (23): EF 44%, mild-moderate MR, G1DD   Labs:     -Post-op Trop: 0.01 x1     GI/NUTR:   #s/p right inguinal hernia repair with mesh (Medium BARD keyhole mesh)  #Diet    - advanced to CLD     -NGT removed     -aspiration precautions, HOB 30  #GI Prophylaxis    - discontinued protonix   #Bowel regimen    -Holding for now     /RENAL:   #h/o BPH s/p green light laser therapy (at [facility])   #urine output in critically ill    -cisneros removed  >voiding   #Euvolemia    -LR @ 75    Labs:          BUN/Cr- 13/0.8  -->,  10/0.6  -->          Electrolytes-Na 137 // K 3.6 // Mg 1.6 //  Phos 2.3 ( @ 03:15)    HEME/ONC:   #DVT prophylaxis    -Lovenox, SCD    -Holding Coumadin  - starting heparin gtt at 17:00   #h/o Lymphoma s/p Chemo/XRT ()     Labs: Hb/Hct:  12.6/37.5  -->,  11.5/33.1  -->                      Plts:  116  -->,  109  -->                 PTT/INR:  45.1/5.82  --->,  23.9/1.30  --->     T&S Expires: 23  Blood Consent-obtain if acute anemia, q6 CBC    ID:  #leukocytosis   WBC- 6.73  --->>,  7.92  --->>  Temp trend- 24hrs T(F): 98.2 ( @ 02:50), Max: 98.2 ( @ 02:50)  Antibiotics    - Ancef 2000g x2 doses   Cultures:    -None     ENDO:    -No active issues      -FSG q6 if NPO    -Glucose goal 140-180. if above 180 start ISS    MSK:  #Activity    -Ambulate as tolerated     LINES/DRAINS:  PIV    ADVANCED DIRECTIVES:  Full Code    HCP/Emergency Contact- Valentina Looneyuster: [phone]     INDICATION FOR SICU/SDU: Unilateral inguinal hernia with obstruction and without gangrene    DISPO: Surgical floor       Follow Up:  -  labs  - heparin gtt starting at 17:00 (serial PTTs)      Signed out to: Dr. Perales  Date: 23  Time: 13:40

## 2023-06-13 LAB
APTT BLD: 45 SEC — HIGH (ref 27–39.2)
APTT BLD: 45.4 SEC — HIGH (ref 27–39.2)
INR BLD: 1.12 RATIO — SIGNIFICANT CHANGE UP (ref 0.65–1.3)
INR BLD: 1.13 RATIO — SIGNIFICANT CHANGE UP (ref 0.65–1.3)
PROTHROM AB SERPL-ACNC: 12.8 SEC — SIGNIFICANT CHANGE UP (ref 9.95–12.87)
PROTHROM AB SERPL-ACNC: 12.9 SEC — HIGH (ref 9.95–12.87)

## 2023-06-13 RX ORDER — ENOXAPARIN SODIUM 100 MG/ML
55 INJECTION SUBCUTANEOUS EVERY 12 HOURS
Refills: 0 | Status: DISCONTINUED | OUTPATIENT
Start: 2023-06-13 | End: 2023-06-13

## 2023-06-13 RX ORDER — WARFARIN SODIUM 2.5 MG/1
4 TABLET ORAL ONCE
Refills: 0 | Status: COMPLETED | OUTPATIENT
Start: 2023-06-13 | End: 2023-06-13

## 2023-06-13 RX ORDER — MAGNESIUM SULFATE 500 MG/ML
2 VIAL (ML) INJECTION ONCE
Refills: 0 | Status: COMPLETED | OUTPATIENT
Start: 2023-06-13 | End: 2023-06-13

## 2023-06-13 RX ORDER — ENOXAPARIN SODIUM 100 MG/ML
50 INJECTION SUBCUTANEOUS
Refills: 0 | Status: DISCONTINUED | OUTPATIENT
Start: 2023-06-13 | End: 2023-06-14

## 2023-06-13 RX ADMIN — Medication 81 MILLIGRAM(S): at 11:12

## 2023-06-13 RX ADMIN — FINASTERIDE 5 MILLIGRAM(S): 5 TABLET, FILM COATED ORAL at 11:12

## 2023-06-13 RX ADMIN — OXYCODONE HYDROCHLORIDE 5 MILLIGRAM(S): 5 TABLET ORAL at 14:52

## 2023-06-13 RX ADMIN — Medication 25 MILLIGRAM(S): at 05:03

## 2023-06-13 RX ADMIN — LISINOPRIL 20 MILLIGRAM(S): 2.5 TABLET ORAL at 05:03

## 2023-06-13 RX ADMIN — Medication 25 GRAM(S): at 01:40

## 2023-06-13 RX ADMIN — Medication 650 MILLIGRAM(S): at 05:03

## 2023-06-13 RX ADMIN — Medication 63.75 MILLIMOLE(S): at 02:21

## 2023-06-13 RX ADMIN — WARFARIN SODIUM 4 MILLIGRAM(S): 2.5 TABLET ORAL at 21:08

## 2023-06-13 RX ADMIN — GABAPENTIN 100 MILLIGRAM(S): 400 CAPSULE ORAL at 14:21

## 2023-06-13 RX ADMIN — GABAPENTIN 100 MILLIGRAM(S): 400 CAPSULE ORAL at 21:08

## 2023-06-13 RX ADMIN — Medication 25 MILLIGRAM(S): at 17:11

## 2023-06-13 RX ADMIN — SIMVASTATIN 10 MILLIGRAM(S): 20 TABLET, FILM COATED ORAL at 21:08

## 2023-06-13 RX ADMIN — Medication 650 MILLIGRAM(S): at 00:27

## 2023-06-13 RX ADMIN — CHLORHEXIDINE GLUCONATE 1 APPLICATION(S): 213 SOLUTION TOPICAL at 05:03

## 2023-06-13 RX ADMIN — Medication 650 MILLIGRAM(S): at 05:33

## 2023-06-13 RX ADMIN — OXYCODONE HYDROCHLORIDE 5 MILLIGRAM(S): 5 TABLET ORAL at 14:22

## 2023-06-13 RX ADMIN — Medication 650 MILLIGRAM(S): at 17:11

## 2023-06-13 RX ADMIN — Medication 650 MILLIGRAM(S): at 11:13

## 2023-06-13 RX ADMIN — GABAPENTIN 100 MILLIGRAM(S): 400 CAPSULE ORAL at 05:03

## 2023-06-13 RX ADMIN — Medication 650 MILLIGRAM(S): at 23:34

## 2023-06-13 RX ADMIN — Medication 650 MILLIGRAM(S): at 23:04

## 2023-06-13 RX ADMIN — ENOXAPARIN SODIUM 50 MILLIGRAM(S): 100 INJECTION SUBCUTANEOUS at 20:38

## 2023-06-13 NOTE — PROVIDER CONTACT NOTE (OTHER) - DATE AND TIME:
12-Jun-2023 18:43
13-Jun-2023 12:57
13-Jun-2023 20:00
13-Jun-2023 13:00
13-Jun-2023 02:04
13-Jun-2023 07:38
13-Jun-2023 17:54

## 2023-06-13 NOTE — PROVIDER CONTACT NOTE (OTHER) - SITUATION
pt's BP is slightly elevated at 151/85 HR 74 pt denies pain
Patients aPTT is 45. Heparin running at 7mL/hr.
pt is on a patient specific Heparin drip as per policy Appt is to be checked q6 hours, last Appt is from 1am , next scheduled on is for 11am. pt's Appt results have not been checked every 6 hours is
Patients first dose of lovenox given and heparin drip is stopped as per MD orders.
patient is on a patient specific heparin drip, aptt result as 45.4, do you want to change rate or keep it, current rate is 7cc/hr
pt is on Heparin drip as per policy appt is to be drawn q6 hours last aptt at 11am do you want another appt drawn?
Patient PTT level 42.0

## 2023-06-13 NOTE — PROVIDER CONTACT NOTE (OTHER) - ACTION/TREATMENT ORDERED:
provider stated to keep rate this si goal aptt provider stated to keep rate this si goal aptt. pt being placed on Lovenox tonight

## 2023-06-13 NOTE — PROGRESS NOTE ADULT - NUTRITIONAL ASSESSMENT
This patient has been assessed with a concern for Malnutrition and has been determined to have a diagnosis/diagnoses of Severe protein-calorie malnutrition and Underweight (BMI < 19).    This patient is being managed with:   Diet Low Fiber-  Supplement Feeding Modality:  Oral  Ensure Plus High Protein Cans or Servings Per Day:  1       Frequency:  Three Times a day  Entered: Jun 8 2023 12:23PM  

## 2023-06-14 ENCOUNTER — TRANSCRIPTION ENCOUNTER (OUTPATIENT)
Age: 83
End: 2023-06-14

## 2023-06-14 VITALS
DIASTOLIC BLOOD PRESSURE: 62 MMHG | HEART RATE: 83 BPM | OXYGEN SATURATION: 99 % | SYSTOLIC BLOOD PRESSURE: 123 MMHG | RESPIRATION RATE: 18 BRPM | TEMPERATURE: 97 F

## 2023-06-14 LAB
APTT BLD: 33.8 SEC — SIGNIFICANT CHANGE UP (ref 27–39.2)
INR BLD: 1.26 RATIO — SIGNIFICANT CHANGE UP (ref 0.65–1.3)
PROTHROM AB SERPL-ACNC: 14.5 SEC — HIGH (ref 9.95–12.87)

## 2023-06-14 RX ORDER — ASPIRIN/CALCIUM CARB/MAGNESIUM 324 MG
1 TABLET ORAL
Qty: 0 | Refills: 0 | DISCHARGE
Start: 2023-06-14

## 2023-06-14 RX ORDER — OXYCODONE HYDROCHLORIDE 5 MG/1
1 TABLET ORAL
Qty: 5 | Refills: 0
Start: 2023-06-14 | End: 2023-06-16

## 2023-06-14 RX ORDER — WARFARIN SODIUM 2.5 MG/1
3 TABLET ORAL ONCE
Refills: 0 | Status: DISCONTINUED | OUTPATIENT
Start: 2023-06-14 | End: 2023-06-14

## 2023-06-14 RX ORDER — ACETAMINOPHEN 500 MG
0 TABLET ORAL
Qty: 0 | Refills: 0 | DISCHARGE

## 2023-06-14 RX ORDER — ENOXAPARIN SODIUM 100 MG/ML
40 INJECTION SUBCUTANEOUS
Qty: 28 | Refills: 0
Start: 2023-06-14 | End: 2023-06-27

## 2023-06-14 RX ADMIN — GABAPENTIN 100 MILLIGRAM(S): 400 CAPSULE ORAL at 05:19

## 2023-06-14 RX ADMIN — FINASTERIDE 5 MILLIGRAM(S): 5 TABLET, FILM COATED ORAL at 11:04

## 2023-06-14 RX ADMIN — Medication 650 MILLIGRAM(S): at 12:00

## 2023-06-14 RX ADMIN — LISINOPRIL 20 MILLIGRAM(S): 2.5 TABLET ORAL at 05:19

## 2023-06-14 RX ADMIN — Medication 650 MILLIGRAM(S): at 11:05

## 2023-06-14 RX ADMIN — CHLORHEXIDINE GLUCONATE 1 APPLICATION(S): 213 SOLUTION TOPICAL at 05:19

## 2023-06-14 RX ADMIN — Medication 25 MILLIGRAM(S): at 05:19

## 2023-06-14 RX ADMIN — ENOXAPARIN SODIUM 50 MILLIGRAM(S): 100 INJECTION SUBCUTANEOUS at 13:40

## 2023-06-14 RX ADMIN — GABAPENTIN 100 MILLIGRAM(S): 400 CAPSULE ORAL at 13:39

## 2023-06-14 RX ADMIN — Medication 650 MILLIGRAM(S): at 05:19

## 2023-06-14 RX ADMIN — Medication 81 MILLIGRAM(S): at 11:05

## 2023-06-14 RX ADMIN — Medication 650 MILLIGRAM(S): at 05:49

## 2023-06-14 NOTE — PROGRESS NOTE ADULT - REASON FOR ADMISSION
Incarcerated right inguinal hernia

## 2023-06-14 NOTE — PROGRESS NOTE ADULT - SUBJECTIVE AND OBJECTIVE BOX
SURGERY PROGRESS NOTE     Patient: TERRELL ATKINSON , 83y ([birth year])Male   MRN: [id number]  Location: [address]  Visit: 06-06-23 Inpatient  Date: 06-10-23 @ 02:37       Events of past 24 hours:  Patient seen resting comfortably in bed. No acute events overnight. Hemodynamically stable. Anticoagulation restarted. Surgical site hematoma stable with pressure dressing overtop.     PAST MEDICAL & SURGICAL HISTORY:  CAD (coronary artery disease)  2004 1 stent      Aortic valve stenosis  s/p replacement 2009      Lymphoma  1996, s/p chemo& radiation      BPH (benign prostatic hyperplasia)      Afib      Leukemia, lymphoid      H/O aortic valve replacement           Vitals:   T(F): 97.1 (06-10-23 @ 00:36), Max: 97.9 (06-09-23 @ 20:44)  HR: 80 (06-10-23 @ 00:36)  BP: 164/79 (06-10-23 @ 00:36)  RR: 18 (06-10-23 @ 00:36)  SpO2: 97% (06-10-23 @ 00:36)      Diet, Low Fiber:   Supplement Feeding Modality:  Oral  Ensure Plus High Protein Cans or Servings Per Day:  1       Frequency:  Three Times a day      Fluids:     I & O's:    06-08-23 @ 07:01  -  06-09-23 @ 07:00  --------------------------------------------------------  IN:  Total IN: 0 mL    OUT:    Voided (mL): 1550 mL  Total OUT: 1550 mL    Total NET: -1550 mL           PHYSICAL EXAM:   General: NAD, AAOx3, calm and cooperative  Cardiac: RRR S1, S2  Respiratory: CTAB, normal respiratory effort  Abdomen: Soft, non-distended, non-tender, no rebound, no guarding. +BS.  Vascular: Pulses 2+ throughout, extremities well perfused  Skin: Warm/dry, normal color, no jaundice  Incision/wound: healing well, dressings in place, clean, dry and intact. Hematoma stable.    MEDICATIONS  (STANDING):  acetaminophen     Tablet .. 650 milliGRAM(s) Oral every 6 hours  aspirin enteric coated 81 milliGRAM(s) Oral daily  chlorhexidine 2% Cloths 1 Application(s) Topical <User Schedule>  finasteride 5 milliGRAM(s) Oral daily  gabapentin 100 milliGRAM(s) Oral three times a day  heparin  Infusion 650 Unit(s)/Hr (7.5 mL/Hr) IV Continuous <Continuous>  lisinopril 20 milliGRAM(s) Oral daily  metoprolol tartrate 25 milliGRAM(s) Oral every 12 hours  simvastatin 10 milliGRAM(s) Oral at bedtime    MEDICATIONS  (PRN):  HYDROmorphone  Injectable 0.5 milliGRAM(s) IV Push every 6 hours PRN Severe Pain (7 - 10)  ondansetron Injectable 4 milliGRAM(s) IV Push every 6 hours PRN Nausea and/or Vomiting  oxyCODONE    IR 5 milliGRAM(s) Oral every 6 hours PRN Moderate Pain (4 - 6)      DVT PROPHYLAXIS: heparin  Infusion 650 Unit(s)/Hr IV Continuous <Continuous>    GI PROPHYLAXIS:   ANTICOAGULATION:   ANTIBIOTICS:            LAB/STUDIES:  Labs:  CAPILLARY BLOOD GLUCOSE      POCT Blood Glucose.: 137 mg/dL (09 Jun 2023 20:59)  POCT Blood Glucose.: 118 mg/dL (09 Jun 2023 11:12)  POCT Blood Glucose.: 83 mg/dL (09 Jun 2023 07:43)                          9.2    6.91  )-----------( 109      ( 09 Jun 2023 21:21 )             26.9       Auto Neutrophil %: 79.4 % (06-09-23 @ 21:21)  Auto Immature Granulocyte %: 0.4 % (06-09-23 @ 21:21)    06-09    141  |  103  |  12  ----------------------------<  126<H>  4.0   |  28  |  0.6<L>      Calcium, Total Serum: 8.4 mg/dL (06-09-23 @ 21:21)      LFTs:         Coags:     12.40  ----< 1.08    ( 09 Jun 2023 21:21 )     37.7                    
GENERAL SURGERY PROGRESS NOTE    Patient: TERRELL ATKINSON , 83y (40)Male   MRN: [id number]  Location: [facility] ED Hold 029 A  Visit: 23 Inpatient  Date: 23 @ 07:15    Hospital Day #: 2  Post-Op Day #: 1    Procedure/Dx/Injuries: incarcerated right inguinal heria     Events of past 24 hours: s/p hernia repair    PAST MEDICAL & SURGICAL HISTORY:  CAD (coronary artery disease)   1 stent      Aortic valve stenosis  s/p replacement       Lymphoma  1996, s/p chemo& radiation      BPH (benign prostatic hyperplasia)      Afib      Leukemia, lymphoid      H/O aortic valve replacement          Vitals:   T(F): 97.7 (23 @ 06:00), Max: 98.2 (23 @ 02:50)  HR: 65 (23 @ 07:00)  BP: 172/74 (23 @ 07:00)  RR: 18 (23 @ 07:00)  SpO2: 100% (23 @ 07:00)      Diet, NPO:   Except Medications      Fluids: lactated ringers.: Solution, 1000 milliLiter(s) infuse at 95 mL/Hr      I & O's:    23 @ 07:01  -  23 @ 07:00  --------------------------------------------------------  IN:    Lactated Ringers: 380 mL  Total IN: 380 mL    OUT:    Indwelling Catheter - Urethral (mL): 130 mL    Nasogastric/Oral tube (mL): 100 mL  Total OUT: 230 mL    Total NET: 150 mL        Bowel Movement: : [] YES [X] NO  Flatus: : [] YES [X] NO    PHYSICAL EXAM:  General: NAD, AAOx3, calm and cooperative  HEENT: NCAT, CLIVE, EOMI, Trachea ML, NGT in place to sxn   Cardiac: RRR S1, S2, no Murmurs, rubs or gallops  Respiratory: CTAB, normal respiratory effort,  Abdomen: Soft, non-distended, non-tender, RLQ incision clean and intact   Vascular: Pulses 2+ throughout, extremities well perfused  Skin: Warm/dry, normal color, no jaundice  Incision/wound: healing well, dressings in place, clean, dry and intact    MEDICATIONS  (STANDING):  acetaminophen     Tablet .. 650 milliGRAM(s) Oral every 6 hours  ceFAZolin   IVPB 2000 milliGRAM(s) IV Intermittent every 8 hours  enoxaparin Injectable 40 milliGRAM(s) SubCutaneous every 24 hours  gabapentin 100 milliGRAM(s) Oral three times a day  lactated ringers. 1000 milliLiter(s) (95 mL/Hr) IV Continuous <Continuous>  metoprolol tartrate Injectable 5 milliGRAM(s) IV Push every 6 hours  pantoprazole  Injectable 40 milliGRAM(s) IV Push daily  simvastatin 10 milliGRAM(s) Oral at bedtime    MEDICATIONS  (PRN):  HYDROmorphone  Injectable 0.5 milliGRAM(s) IV Push every 6 hours PRN Severe Pain (7 - 10)  ketorolac   Injectable 15 milliGRAM(s) IV Push every 6 hours PRN Moderate Pain (4 - 6)  ondansetron Injectable 4 milliGRAM(s) IV Push every 6 hours PRN Nausea and/or Vomiting  oxyCODONE    IR 5 milliGRAM(s) Oral every 6 hours PRN Moderate Pain (4 - 6)      DVT PROPHYLAXIS: enoxaparin Injectable 40 milliGRAM(s) SubCutaneous every 24 hours    GI PROPHYLAXIS: pantoprazole  Injectable 40 milliGRAM(s) IV Push daily    ANTICOAGULATION:   ANTIBIOTICS:  ceFAZolin   IVPB 2000 milliGRAM(s)      LAB/STUDIES:  Labs:  CAPILLARY BLOOD GLUCOSE      POCT Blood Glucose.: 110 mg/dL (2023 05:59)                          11.5   7.92  )-----------( 109      ( 2023 03:15 )             33.1       Auto Neutrophil %: 77.1 % (23 @ 12:30)  Auto Immature Granulocyte %: 0.3 % (23 @ 12:30)        137  |  99  |  10  ----------------------------<  97  3.6   |  24  |  0.6<L>      Calcium, Total Serum: 8.9 mg/dL (06-07-23 @ 03:15)      LFTs:             5.4  | 1.5  | 12       ------------------[52      ( 2023 03:15 )  4.0  | 0.7  | 5           Lipase:x      Amylase:x         Lactate, Blood: 1.5 mmol/L (23 @ 12:30)      Coags:     14.90  ----< 1.30    ( 2023 01:25 )     23.9        CARDIAC MARKERS ( 2023 03:15 )  x     / <0.01 ng/mL / x     / x     / x          Urinalysis Basic - ( 2023 14:56 )    Color: Yellow / Appearance: Clear / S.024 / pH: x  Gluc: x / Ketone: Negative  / Bili: Negative / Urobili: <2 mg/dL   Blood: x / Protein: Trace / Nitrite: Negative   Leuk Esterase: Moderate / RBC: 4 /HPF / WBC 4 /HPF   Sq Epi: x / Non Sq Epi: x / Bacteria: Negative    IMAGING:  < from: CT Abdomen and Pelvis w/ Oral Cont and w/ IV Cont (23 @ 15:22) >  Large right inguinal hernia containing inflamed, thick-walled inflamed   terminal ileum and proximal colon. Oral contrast is dilute in the hernia   and has not traversed past this point. Large amount of ascites/fluid   within the hernia. Findings are suspicious for strangulated hernia in the   appropriate clinical setting.    < end of copied text >      ACCESS/ DEVICES:  [X ] Peripheral IV  [ ] Central Venous Line	[ ] R	[ ] L	[ ] IJ	[ ] Fem	[ ] SC	Placed:   [X ] Arterial Line		[ ] R	[ ] L	[ ] Fem	[ ] Rad	[ ] Ax	Placed:   [ ] PICC:					[ ] Mediport  [X ] Urinary Catheter,  Date Placed:   [ ] Chest tube: [ ] Right, [ ] Left  [ ] SHANNAN/Ady Drains      
GENERAL SURGERY PROGRESS NOTE    Patient: TERRELL ATKINSON , 83y ([birth year])Male   MRN: [id number]  Location: [address]  Visit: 06-06-23 Inpatient  Date: 06-11-23 @ 08:53    Hospital Day #: 6  Post-Op Day #: 4    Hematoma non-expanding, hemodynamically stable, afebrile. Pain is well controlled. Patient denies any acute events. He is tolerating diet, voiding spontaneously, and denies nausea, vomiting, fever, and chills.     PAST MEDICAL & SURGICAL HISTORY:  CAD (coronary artery disease)  2004 1 stent  Aortic valve stenosis  s/p replacement 2009  Lymphoma  1996, s/p chemo& radiation  BPH (benign prostatic hyperplasia)  Afib  Leukemia, lymphoid  H/O aortic valve replacement    Vitals:   T(F): 97.2 (06-11-23 @ 08:21), Max: 98.6 (06-10-23 @ 11:53)  HR: 62 (06-11-23 @ 08:21)  BP: 136/70 (06-11-23 @ 08:21)  RR: 18 (06-11-23 @ 08:21)  SpO2: 98% (06-11-23 @ 08:21)    Diet, Low Fiber:   Supplement Feeding Modality:  Oral  Ensure Plus High Protein Cans or Servings Per Day:  1       Frequency:  Three Times a day    Fluids:     I & O's:    06-10-23 @ 07:01  -  06-11-23 @ 07:00  --------------------------------------------------------  IN:  Total IN: 0 mL    OUT:    Voided (mL): 700 mL  Total OUT: 700 mL    Total NET: -700 mL    PHYSICAL EXAM:  General: NAD, AAOx3, calm and cooperative  HEENT: NCAT  Cardiac: No peripheral cyanosis or pallor, extremities well perfused   Respiratory: Non-labored breathing, equal chest rise bilaterally   Abdomen: Soft, non-distended, non-tender, no rebound, no guarding  Musculoskeletal: Strength 5/5 BL UE/LE, ROM intact, compartments soft  Neuro: Sensation grossly intact and equal throughout, no focal deficits  Vascular: Pulses 2+ throughout, extremities well perfused  Skin: Warm/dry, normal color, no jaundice  Incision/wound: healing well, dressings in place, clean, dry and intact, compression dressing in place, hematoma stable, ecchymosis seen descending down to scrotum and penis    MEDICATIONS  (STANDING):  acetaminophen     Tablet .. 650 milliGRAM(s) Oral every 6 hours  aspirin enteric coated 81 milliGRAM(s) Oral daily  chlorhexidine 2% Cloths 1 Application(s) Topical <User Schedule>  finasteride 5 milliGRAM(s) Oral daily  gabapentin 100 milliGRAM(s) Oral three times a day  heparin  Infusion 650 Unit(s)/Hr (8.5 mL/Hr) IV Continuous <Continuous>  lisinopril 20 milliGRAM(s) Oral daily  metoprolol tartrate 25 milliGRAM(s) Oral every 12 hours  simvastatin 10 milliGRAM(s) Oral at bedtime    MEDICATIONS  (PRN):  HYDROmorphone  Injectable 0.5 milliGRAM(s) IV Push every 6 hours PRN Severe Pain (7 - 10)  ondansetron Injectable 4 milliGRAM(s) IV Push every 6 hours PRN Nausea and/or Vomiting  oxyCODONE    IR 5 milliGRAM(s) Oral every 6 hours PRN Moderate Pain (4 - 6)    DVT PROPHYLAXIS: heparin  Infusion 650 Unit(s)/Hr IV Continuous <Continuous>    GI PROPHYLAXIS:   ANTICOAGULATION:   ANTIBIOTICS:      LAB/STUDIES:  Labs:  CAPILLARY BLOOD GLUCOSE    POCT Blood Glucose.: 104 mg/dL (11 Jun 2023 07:44)  POCT Blood Glucose.: 123 mg/dL (10 Philip 2023 21:19)  POCT Blood Glucose.: 140 mg/dL (10 Philip 2023 16:28)  POCT Blood Glucose.: 137 mg/dL (10 Philip 2023 11:38)                    8.9    5.96  )-----------( 124      ( 10 Philip 2023 20:30 )             27.2       Auto Neutrophil %: 76.5 % (06-10-23 @ 20:30)  Auto Immature Granulocyte %: 0.2 % (06-10-23 @ 20:30)    06-10    141  |  101  |  17  ----------------------------<  85  4.4   |  30  |  0.9    Calcium, Total Serum: 8.2 mg/dL (06-10-23 @ 20:30)    LFTs:    Coags:     x      ----< x       ( 10 Philip 2023 20:30 )     33.3     IMAGING:    ACCESS/ DEVICES:  [x] Peripheral IV  [ ] Central Venous Line	[ ] R	[ ] L	[ ] IJ	[ ] Fem	[ ] SC	Placed:   [ ] Arterial Line		[ ] R	[ ] L	[ ] Fem	[ ] Rad	[ ] Ax	Placed:   [ ] PICC:					[ ] Mediport  [ ] Urinary Catheter,  Date Placed:   [ ] Chest tube: [ ] Right, [ ] Left  [ ] SHANNAN/Ady Drains  
GENERAL SURGERY PROGRESS NOTE    Patient: TERRELL ATKINSON , 83y (40)Male   MRN: [id number]  Location: [address]  Visit: 23 Inpatient  Date: 23 @ 09:46      Procedure/Dx/Injuries: R inguinal hernia s/p repair    Events of past 24 hours: DG to floor, CLD started, DC cisneros, +TOV, started heparin drip PTT 66-> 135, stop heparin drip    PAST MEDICAL & SURGICAL HISTORY:  CAD (coronary artery disease)   1 stent      Aortic valve stenosis  s/p replacement       Lymphoma  1996, s/p chemo& radiation      BPH (benign prostatic hyperplasia)      Afib      Leukemia, lymphoid      H/O aortic valve replacement          Vitals:   T(F): 96.7 (23 @ 08:27), Max: 97.8 (23 @ 04:46)  HR: 70 (23 @ 08:27)  BP: 127/60 (23 @ 08:27)  RR: 18 (23 @ 08:27)  SpO2: 97% (23 @ 08:27)      Diet, Low Fiber      Fluids: dextrose 5% + sodium chloride 0.45%.: Solution, 1000 milliLiter(s) infuse at 75 mL/Hr      I & O's:    23 @ 07:01  -  23 @ 07:00  --------------------------------------------------------  IN:    Lactated Ringers: 855 mL    Oral Fluid: 120 mL  Total IN: 975 mL    OUT:    Indwelling Catheter - Urethral (mL): 275 mL    Voided (mL): 2350 mL  Total OUT: 2625 mL    Total NET: -1650 mL          PHYSICAL EXAM:  PHYSICAL EXAM:  GENERAL: No acute distress, well-developed  CHEST/LUNG: CTAB  HEART: Regular rate and rhythm.   ABDOMEN: Soft, non-tender, non-distended; post surgical wound clean w/o secretions, hematoma noted in R groin wound  EXTREMITIES:  2+ peripheral pulses b/l, No clubbing, cyanosis, or edema  NEUROLOGY: A&O x 3, no focal deficits  SKIN: No rashes or lesions    MEDICATIONS  (STANDING):  acetaminophen     Tablet .. 650 milliGRAM(s) Oral every 6 hours  chlorhexidine 2% Cloths 1 Application(s) Topical <User Schedule>  dextrose 5% + sodium chloride 0.45%. 1000 milliLiter(s) (75 mL/Hr) IV Continuous <Continuous>  finasteride 5 milliGRAM(s) Oral daily  gabapentin 100 milliGRAM(s) Oral three times a day  lisinopril 20 milliGRAM(s) Oral daily  metoprolol tartrate 25 milliGRAM(s) Oral every 12 hours  simvastatin 10 milliGRAM(s) Oral at bedtime    MEDICATIONS  (PRN):  HYDROmorphone  Injectable 0.5 milliGRAM(s) IV Push every 6 hours PRN Severe Pain (7 - 10)  ketorolac   Injectable 15 milliGRAM(s) IV Push every 6 hours PRN Moderate Pain (4 - 6)  ondansetron Injectable 4 milliGRAM(s) IV Push every 6 hours PRN Nausea and/or Vomiting  oxyCODONE    IR 5 milliGRAM(s) Oral every 6 hours PRN Moderate Pain (4 - 6)      DVT PROPHYLAXIS:   GI PROPHYLAXIS:   ANTICOAGULATION:   ANTIBIOTICS:            LAB/STUDIES:  Labs:  CAPILLARY BLOOD GLUCOSE      POCT Blood Glucose.: 102 mg/dL (2023 07:29)  POCT Blood Glucose.: 124 mg/dL (2023 21:07)  POCT Blood Glucose.: 112 mg/dL (2023 16:43)  POCT Blood Glucose.: 109 mg/dL (2023 11:46)                          10.6   10.32 )-----------( 102      ( 2023 05:49 )             30.7       Auto Neutrophil %: 85.5 % (23 @ 20:30)  Auto Immature Granulocyte %: 0.5 % (23 @ 20:30)        136  |  97<L>  |  8<L>  ----------------------------<  146<H>  4.4   |  26  |  0.5<L>      Calcium, Total Serum: 9.0 mg/dL (23 @ 20:30)      LFTs:             5.4  | 0.9  | 18       ------------------[51      ( 2023 20:30 )  3.9  | 0.4  | <5          Lipase:x      Amylase:x         Lactate, Blood: 1.5 mmol/L (23 @ 12:30)      Coags:     13.60  ----< 1.19    ( 2023 05:49 )     135.4       CARDIAC MARKERS ( 2023 03:15 )  x     / <0.01 ng/mL / x     / x     / x              Urinalysis Basic - ( 2023 14:56 )    Color: Yellow / Appearance: Clear / S.024 / pH: x  Gluc: x / Ketone: Negative  / Bili: Negative / Urobili: <2 mg/dL   Blood: x / Protein: Trace / Nitrite: Negative   Leuk Esterase: Moderate / RBC: 4 /HPF / WBC 4 /HPF   Sq Epi: x / Non Sq Epi: x / Bacteria: Negative                IMAGING:      ACCESS/ DEVICES:  [x ] Peripheral IV  [ ] Central Venous Line	[ ] R	[ ] L	[ ] IJ	[ ] Fem	[ ] SC	Placed:   [ ] Arterial Line		[ ] R	[ ] L	[ ] Fem	[ ] Rad	[ ] Ax	Placed:   [ ] PICC:					[ ] Mediport  [ ] Urinary Catheter,  Date Placed:   [ ] Chest tube: [ ] Right, [ ] Left  [ ] SHANNAN/Ady Drains      
GENERAL SURGERY PROGRESS NOTE    Patient: TERRELL ATKINSON , 83y ([birth year])Male   MRN: [id number]  Location: [address]  Visit: 06-06-23 Inpatient  Date: 06-14-23 @ 01:18    PAST MEDICAL & SURGICAL HISTORY:  CAD (coronary artery disease)  2004 1 stent  Aortic valve stenosis  s/p replacement 2009  Lymphoma  1996, s/p chemo& radiation  BPH (benign prostatic hyperplasia)  Afib  Leukemia, lymphoid  H/O aortic valve replacement    Vitals:   T(F): 96.7 (06-14-23 @ 00:05), Max: 98.1 (06-13-23 @ 04:43)  HR: 76 (06-14-23 @ 00:05)  BP: 127/64 (06-14-23 @ 00:05)  RR: 18 (06-14-23 @ 00:05)  SpO2: 97% (06-14-23 @ 00:05)    Diet, Low Fiber:   Supplement Feeding Modality:  Oral  Ensure Plus High Protein Cans or Servings Per Day:  1       Frequency:  Three Times a day    Fluids:     I & O's:    06-12-23 @ 07:01  -  06-13-23 @ 07:00  --------------------------------------------------------  IN:    Heparin: 168 mL  Total IN: 168 mL    OUT:    Voided (mL): 950 mL  Total OUT: 950 mL    Total NET: -782 mL    PHYSICAL EXAM:  General: NAD, AAOx3, calm and cooperative  Cardiac: RRR S1, S2  Respiratory: CTAB, normal respiratory effort  Abdomen: Soft, non-distended, non-tender, no rebound, no guarding. +BS.  Vascular: Pulses 2+ throughout, extremities well perfused  Skin: hematoma unchanged from previous day    MEDICATIONS  (STANDING):  acetaminophen     Tablet .. 650 milliGRAM(s) Oral every 6 hours  aspirin enteric coated 81 milliGRAM(s) Oral daily  chlorhexidine 2% Cloths 1 Application(s) Topical <User Schedule>  enoxaparin Injectable 50 milliGRAM(s) SubCutaneous <User Schedule>  finasteride 5 milliGRAM(s) Oral daily  gabapentin 100 milliGRAM(s) Oral three times a day  heparin  Infusion 700 Unit(s)/Hr (7 mL/Hr) IV Continuous <Continuous>  lisinopril 20 milliGRAM(s) Oral daily  metoprolol tartrate 25 milliGRAM(s) Oral every 12 hours  simvastatin 10 milliGRAM(s) Oral at bedtime    MEDICATIONS  (PRN):  HYDROmorphone  Injectable 0.5 milliGRAM(s) IV Push every 6 hours PRN Severe Pain (7 - 10)  ondansetron Injectable 4 milliGRAM(s) IV Push every 6 hours PRN Nausea and/or Vomiting  oxyCODONE    IR 5 milliGRAM(s) Oral every 6 hours PRN Moderate Pain (4 - 6)    DVT PROPHYLAXIS: enoxaparin Injectable 50 milliGRAM(s) SubCutaneous <User Schedule>  heparin  Infusion 700 Unit(s)/Hr IV Continuous <Continuous>    GI PROPHYLAXIS:   ANTICOAGULATION:   ANTIBIOTICS:      LAB/STUDIES:  Labs:  CAPILLARY BLOOD GLUCOSE                        8.6    6.93  )-----------( 152      ( 12 Jun 2023 18:14 )             26.5       06-12    138  |  103  |  19  ----------------------------<  102<H>  4.6   |  28  |  0.6<L>    LFTs:    Coags:     12.90  ----< 1.13    ( 13 Jun 2023 11:04 )     45.4          
GENERAL SURGERY PROGRESS NOTE    Patient: TERRELL ATKINSON , 83y ([birth year])Male   MRN: [id number]  Location: [address]  Visit: 06-06-23 Inpatient  Date: 06-12-23 @ 07:59      Procedure/Dx/Injuries: R fem incarcerated hernia    Events of past 24 hours: PTT > 139, heparin drip held and restarted at 7, started home coumadin    PAST MEDICAL & SURGICAL HISTORY:  CAD (coronary artery disease)  2004 1 stent      Aortic valve stenosis  s/p replacement 2009      Lymphoma  1996, s/p chemo& radiation      BPH (benign prostatic hyperplasia)      Afib      Leukemia, lymphoid      H/O aortic valve replacement          Vitals:   T(F): 96.9 (06-12-23 @ 04:23), Max: 97.4 (06-11-23 @ 16:43)  HR: 73 (06-12-23 @ 04:23)  BP: 131/69 (06-12-23 @ 04:23)  RR: 18 (06-12-23 @ 04:23)  SpO2: 97% (06-12-23 @ 04:23)      Diet, Low Fiber:   Supplement Feeding Modality:  Oral  Ensure Plus High Protein Cans or Servings Per Day:  1       Frequency:  Three Times a day      Fluids:     I & O's:    06-11-23 @ 07:01  -  06-12-23 @ 07:00  --------------------------------------------------------  IN:  Total IN: 0 mL    OUT:    Voided (mL): 400 mL  Total OUT: 400 mL    Total NET: -400 mL          PHYSICAL EXAM:  PHYSICAL EXAM:  GENERAL: No acute distress, well-developed  CHEST/LUNG: CTAB; No wheezes, rales, or rhonchi  HEART: Regular rate and rhythm.   ABDOMEN: Soft, non-tender, non-distended; clean post surgical wound, R groin wound w stable hematoma      MEDICATIONS  (STANDING):  acetaminophen     Tablet .. 650 milliGRAM(s) Oral every 6 hours  aspirin enteric coated 81 milliGRAM(s) Oral daily  chlorhexidine 2% Cloths 1 Application(s) Topical <User Schedule>  finasteride 5 milliGRAM(s) Oral daily  gabapentin 100 milliGRAM(s) Oral three times a day  heparin  Infusion 700 Unit(s)/Hr (7 mL/Hr) IV Continuous <Continuous>  lisinopril 20 milliGRAM(s) Oral daily  metoprolol tartrate 25 milliGRAM(s) Oral every 12 hours  simvastatin 10 milliGRAM(s) Oral at bedtime    MEDICATIONS  (PRN):  HYDROmorphone  Injectable 0.5 milliGRAM(s) IV Push every 6 hours PRN Severe Pain (7 - 10)  ondansetron Injectable 4 milliGRAM(s) IV Push every 6 hours PRN Nausea and/or Vomiting  oxyCODONE    IR 5 milliGRAM(s) Oral every 6 hours PRN Moderate Pain (4 - 6)      DVT PROPHYLAXIS: heparin  Infusion 700 Unit(s)/Hr IV Continuous <Continuous>    GI PROPHYLAXIS:   ANTICOAGULATION:   ANTIBIOTICS:            LAB/STUDIES:  Labs:  CAPILLARY BLOOD GLUCOSE      POCT Blood Glucose.: 154 mg/dL (11 Jun 2023 11:19)                          8.7    6.51  )-----------( 133      ( 12 Jun 2023 00:37 )             26.3       Auto Neutrophil %: 72.3 % (06-12-23 @ 00:37)  Auto Immature Granulocyte %: 0.3 % (06-12-23 @ 00:37)    06-12    139  |  102  |  22<H>  ----------------------------<  112<H>  4.3   |  28  |  0.6<L>      Calcium, Total Serum: 8.6 mg/dL (06-12-23 @ 00:37)      LFTs:         Coags:     x      ----< x       ( 12 Jun 2023 04:35 )     139.4                           IMAGING:      ACCESS/ DEVICES:  [x ] Peripheral IV  [ ] Central Venous Line	[ ] R	[ ] L	[ ] IJ	[ ] Fem	[ ] SC	Placed:   [ ] Arterial Line		[ ] R	[ ] L	[ ] Fem	[ ] Rad	[ ] Ax	Placed:   [ ] PICC:					[ ] Mediport  [ ] Urinary Catheter,  Date Placed:   [ ] Chest tube: [ ] Right, [ ] Left  [ ] SHANNAN/Ady Drains      
GENERAL SURGERY PROGRESS NOTE    Patient: TERRELL ATKINSON , 83y ([birth year])Male   MRN: [id number]  Location: [address]  Visit: 06-06-23 Inpatient  Date: 06-09-23 @ 07:58      Procedure/Dx/Injuries: R groin hernia s/p repair    Events of past 24 hours: stopped heparin drip, DVT ppx started, stable H&H, tolerating diet, +gas, +BM    PAST MEDICAL & SURGICAL HISTORY:  CAD (coronary artery disease)  2004 1 stent      Aortic valve stenosis  s/p replacement 2009      Lymphoma  1996, s/p chemo& radiation      BPH (benign prostatic hyperplasia)      Afib      Leukemia, lymphoid      H/O aortic valve replacement          Vitals:   T(F): 97.2 (06-09-23 @ 07:55), Max: 98.9 (06-08-23 @ 13:04)  HR: 59 (06-09-23 @ 07:55)  BP: 125/58 (06-09-23 @ 07:55)  RR: 18 (06-09-23 @ 07:55)  SpO2: 99% (06-09-23 @ 07:55)      Diet, Low Fiber:   Supplement Feeding Modality:  Oral  Ensure Plus High Protein Cans or Servings Per Day:  1       Frequency:  Three Times a day      Fluids:     I & O's:    06-08-23 @ 07:01  -  06-09-23 @ 07:00  --------------------------------------------------------  IN:  Total IN: 0 mL    OUT:    Voided (mL): 1550 mL  Total OUT: 1550 mL    Total NET: -1550 mL          PHYSICAL EXAM:  PHYSICAL EXAM:  GENERAL: No acute distress, well-developed  CHEST/LUNG: CTAB; No wheezes, rales, or rhonchi  HEART: Regular rate and rhythm.   ABDOMEN: Soft, non-tender, non-distended; R groin post surgical wound w/o erythema, hematoma noted stable  EXTREMITIES:  2+ peripheral pulses b/l, No clubbing, cyanosis, or edema  NEUROLOGY: A&O x 3, no focal deficits  SKIN: No rashes or lesions    MEDICATIONS  (STANDING):  acetaminophen     Tablet .. 650 milliGRAM(s) Oral every 6 hours  chlorhexidine 2% Cloths 1 Application(s) Topical <User Schedule>  finasteride 5 milliGRAM(s) Oral daily  gabapentin 100 milliGRAM(s) Oral three times a day  lisinopril 20 milliGRAM(s) Oral daily  metoprolol tartrate 25 milliGRAM(s) Oral every 12 hours  simvastatin 10 milliGRAM(s) Oral at bedtime    MEDICATIONS  (PRN):  HYDROmorphone  Injectable 0.5 milliGRAM(s) IV Push every 6 hours PRN Severe Pain (7 - 10)  ketorolac   Injectable 15 milliGRAM(s) IV Push every 6 hours PRN Moderate Pain (4 - 6)  ondansetron Injectable 4 milliGRAM(s) IV Push every 6 hours PRN Nausea and/or Vomiting  oxyCODONE    IR 5 milliGRAM(s) Oral every 6 hours PRN Moderate Pain (4 - 6)      DVT PROPHYLAXIS:   GI PROPHYLAXIS:   ANTICOAGULATION:   ANTIBIOTICS:            LAB/STUDIES:  Labs:  CAPILLARY BLOOD GLUCOSE      POCT Blood Glucose.: 83 mg/dL (09 Jun 2023 07:43)  POCT Blood Glucose.: 98 mg/dL (08 Jun 2023 21:12)  POCT Blood Glucose.: 106 mg/dL (08 Jun 2023 16:33)  POCT Blood Glucose.: 108 mg/dL (08 Jun 2023 11:51)                          9.1    6.86  )-----------( 86       ( 09 Jun 2023 05:50 )             26.7       Auto Neutrophil %: 81.6 % (06-08-23 @ 20:00)  Auto Immature Granulocyte %: 0.4 % (06-08-23 @ 20:00)  Auto Neutrophil %: 81.2 % (06-08-23 @ 11:44)  Auto Immature Granulocyte %: 0.4 % (06-08-23 @ 11:44)    06-08    135  |  100  |  9<L>  ----------------------------<  85  4.7   |  29  |  0.7      Calcium, Total Serum: 8.6 mg/dL (06-08-23 @ 20:00)      LFTs:             5.4  | 0.9  | 18       ------------------[51      ( 07 Jun 2023 20:30 )  3.9  | 0.4  | <5          Lipase:x      Amylase:x         Lactate, Blood: 1.5 mmol/L (06-06-23 @ 12:30)      Coags:     x      ----< x       ( 08 Jun 2023 21:57 )     29.9                            IMAGING:      ACCESS/ DEVICES:  [x ] Peripheral IV  [ ] Central Venous Line	[ ] R	[ ] L	[ ] IJ	[ ] Fem	[ ] SC	Placed:   [ ] Arterial Line		[ ] R	[ ] L	[ ] Fem	[ ] Rad	[ ] Ax	Placed:   [ ] PICC:					[ ] Mediport  [ ] Urinary Catheter,  Date Placed:   [ ] Chest tube: [ ] Right, [ ] Left  [ ] SHANNAN/Ady Drains      
Patient is a 83y old  Male who presents with a chief complaint of Incarcerated right inguinal hernia       HPI:  83yM w/ PMH of Afib on Coumadin (last dose 6/5), CAD s/p stent placement (2004), BPH, HLD, leukemia & lymphoma s/p chemo & radiation, and aortic valve stenosis s/p replacement (2009) presented to the ED for right inguinal pain. He states that he has been having 4-5 days of inguinal pain. He does not recall any inciting incident but states that the size for the hernia increased at this point. Additionally, he states that he has not had a bowel movement in the past 1 to 2 weeks with minor flatus. He denies nausea and emesis and states that he has still been able to eat. On further conversation, patient and wife admitted that patient had one bowel movement today. Patient describes his pain as 7/10 dull with intermittent sharp character.     Patient has a known right inguinal hernia that he saw Dr. Ponce for 3 years ago. At that time he was informed that it would need to be fixed but he did not follow up.     Patient is on Coumadin and stated that his most recent INR was 4. Stat labs drawn.     < from: CT Abdomen and Pelvis w/ Oral Cont and w/ IV Cont (06.06.23 @ 15:22) >  IMPRESSION:  Large right inguinal hernia containing inflamed, thick-walled inflamed   terminal ileum and proximal colon. Oral contrast is dilute in the hernia   and has not traversed past this point. Large amount of ascites/fluid   within the hernia. Findings are suspicious for strangulated hernia in the   appropriate clinical setting.  --- End of Report ---    s/p right inguinal hernia repair with mesh on 6/7/23        Medical charts / labs / imaging studies / PT notes reviewed         PHYSICAL EXAM    Vital Signs Last 24 Hrs  T(C): 36.2 (09 Jun 2023 11:51), Max: 36.9 (08 Jun 2023 20:54)  T(F): 97.2 (09 Jun 2023 11:51), Max: 98.5 (08 Jun 2023 20:54)  HR: 71 (09 Jun 2023 11:51) (59 - 79)  BP: 120/57 (09 Jun 2023 11:51) (117/61 - 156/84)  BP(mean): --  RR: 18 (09 Jun 2023 11:51) (18 - 18)  SpO2: 96% (09 Jun 2023 11:51) (96% - 99%)    Parameters below as of 08 Jun 2023 20:54  Patient On (Oxygen Delivery Method): room air        Constitutional - NAD  Chest - CTA  Cardiovascular - S1S2+  Abdomen -  Soft  Extremities -  No calf tenderness   Function : bed mobility and transfer min A                 ambulate 100'x2RW CG    acetaminophen     Tablet .. 650 milliGRAM(s) Oral every 6 hours  aspirin enteric coated 81 milliGRAM(s) Oral daily  chlorhexidine 2% Cloths 1 Application(s) Topical <User Schedule>  finasteride 5 milliGRAM(s) Oral daily  gabapentin 100 milliGRAM(s) Oral three times a day  HYDROmorphone  Injectable 0.5 milliGRAM(s) IV Push every 6 hours PRN  lisinopril 20 milliGRAM(s) Oral daily  metoprolol tartrate 25 milliGRAM(s) Oral every 12 hours  ondansetron Injectable 4 milliGRAM(s) IV Push every 6 hours PRN  oxyCODONE    IR 5 milliGRAM(s) Oral every 6 hours PRN  simvastatin 10 milliGRAM(s) Oral at bedtime      RECENT LABS/IMAGING                        9.1    6.86  )-----------( 86       ( 09 Jun 2023 05:50 )             26.7     06-08    135  |  100  |  9<L>  ----------------------------<  85  4.7   |  29  |  0.7    Ca    8.6      08 Jun 2023 20:00  Phos  1.9     06-08  Mg     1.8     06-08    TPro  5.4<L>  /  Alb  3.9  /  TBili  0.9  /  DBili  0.4<H>  /  AST  18  /  ALT  <5  /  AlkPhos  51  06-07    PT/INR - ( 08 Jun 2023 05:49 )   PT: 13.60 sec;   INR: 1.19 ratio         PTT - ( 08 Jun 2023 21:57 )  PTT:29.9 sec          
 SURGERY PROGRESS NOTE     Patient: TERRELL ATKINSON , 83y ([birth year])Male   MRN: [id number]  Location: [address]  Visit: 06-06-23 Inpatient  Date: 06-13-23 @ 01:22       Events of past 24 hours:  Patient seen resting comfortably in bed. No acute events overnight. Hemodynamically stable.     PAST MEDICAL & SURGICAL HISTORY:  CAD (coronary artery disease)  2004 1 stent      Aortic valve stenosis  s/p replacement 2009      Lymphoma  1996, s/p chemo& radiation      BPH (benign prostatic hyperplasia)      Afib      Leukemia, lymphoid      H/O aortic valve replacement           Vitals:   T(F): 97 (06-13-23 @ 00:00), Max: 98 (06-12-23 @ 20:54)  HR: 77 (06-13-23 @ 00:00)  BP: 117/72 (06-13-23 @ 00:00)  RR: 18 (06-13-23 @ 00:00)  SpO2: 99% (06-13-23 @ 00:00)      Diet, Low Fiber:   Supplement Feeding Modality:  Oral  Ensure Plus High Protein Cans or Servings Per Day:  1       Frequency:  Three Times a day      Fluids:     I & O's:    06-11-23 @ 07:01  -  06-12-23 @ 07:00  --------------------------------------------------------  IN:  Total IN: 0 mL    OUT:    Voided (mL): 400 mL  Total OUT: 400 mL    Total NET: -400 mL    PHYSICAL EXAM:   General: NAD, AAOx3, calm and cooperative  Cardiac: RRR S1, S2  Respiratory: CTAB, normal respiratory effort  Abdomen: Soft, non-distended, non-tender, no rebound, no guarding. +BS.  Vascular: Pulses 2+ throughout, extremities well perfused  Skin: hematoma unchanged from previous day    MEDICATIONS  (STANDING):  acetaminophen     Tablet .. 650 milliGRAM(s) Oral every 6 hours  aspirin enteric coated 81 milliGRAM(s) Oral daily  chlorhexidine 2% Cloths 1 Application(s) Topical <User Schedule>  finasteride 5 milliGRAM(s) Oral daily  gabapentin 100 milliGRAM(s) Oral three times a day  heparin  Infusion 700 Unit(s)/Hr (7 mL/Hr) IV Continuous <Continuous>  lisinopril 20 milliGRAM(s) Oral daily  magnesium sulfate  IVPB 2 Gram(s) IV Intermittent once  metoprolol tartrate 25 milliGRAM(s) Oral every 12 hours  simvastatin 10 milliGRAM(s) Oral at bedtime  sodium phosphate 15 milliMole(s)/250 mL IVPB 15 milliMole(s) IV Intermittent once    MEDICATIONS  (PRN):  HYDROmorphone  Injectable 0.5 milliGRAM(s) IV Push every 6 hours PRN Severe Pain (7 - 10)  ondansetron Injectable 4 milliGRAM(s) IV Push every 6 hours PRN Nausea and/or Vomiting  oxyCODONE    IR 5 milliGRAM(s) Oral every 6 hours PRN Moderate Pain (4 - 6)      DVT PROPHYLAXIS: heparin  Infusion 700 Unit(s)/Hr IV Continuous <Continuous>    GI PROPHYLAXIS:   ANTICOAGULATION:   ANTIBIOTICS:            LAB/STUDIES:  Labs:  CAPILLARY BLOOD GLUCOSE      POCT Blood Glucose.: 85 mg/dL (12 Jun 2023 11:18)  POCT Blood Glucose.: 91 mg/dL (12 Jun 2023 08:31)                          8.6    6.93  )-----------( 152      ( 12 Jun 2023 18:14 )             26.5       Auto Neutrophil %: 74.9 % (06-12-23 @ 18:14)  Auto Immature Granulocyte %: 0.4 % (06-12-23 @ 18:14)    06-12    138  |  103  |  19  ----------------------------<  102<H>  4.6   |  28  |  0.6<L>      Calcium, Total Serum: 8.6 mg/dL (06-12-23 @ 18:14)      LFTs:         Coags:     12.30  ----< 1.08    ( 12 Jun 2023 18:14 )     42.0

## 2023-06-14 NOTE — DISCHARGE NOTE PROVIDER - HOSPITAL COURSE
83yM w/ PMH of Afib on Coumadin (last dose 6/5), CAD s/p stent placement (2004), BPH, HLD, leukemia & lymphoma s/p chemo & radiation, and aortic valve stenosis s/p replacement (2009) presented to the ED for right inguinal pain. He states that he has been having 4-5 days of inguinal pain. He does not recall any inciting incident but states that the size for the hernia increased at this point. Additionally, he states that he has not had a bowel movement in the past 1 to 2 weeks with minor flatus. He denies nausea and emesis and states that he has still been able to eat. On further conversation, patient and wife admitted that patient had one bowel movement today. Patient describes his pain as 7/10 dull with intermittent sharp character.     Patient s/p OPEN REPAIR OF RIGHT INGUINAL HERNIA. Patient's postoperative course c/b hematoma formation underneath incision. Anticoagulation held. Hematoma stable. Anticoagulation restarted with heparin drip to bridge to coumadin. Prior to discharge, patient transitioned to lovenox. Will be discharged on therapeutic lovenox bridging to warfarin. Patient to follow up with coumadin clinic in 2 days.

## 2023-06-14 NOTE — DISCHARGE NOTE PROVIDER - DETAILS OF MALNUTRITION DIAGNOSIS/DIAGNOSES
This patient has been assessed with a concern for Malnutrition and was treated during this hospitalization for the following Nutrition diagnosis/diagnoses:     -  06/08/2023: Severe protein-calorie malnutrition   -  06/08/2023: Underweight (BMI < 19)

## 2023-06-14 NOTE — DISCHARGE NOTE PROVIDER - NSDCMRMEDTOKEN_GEN_ALL_CORE_FT
aspirin 81 mg oral delayed release tablet: 1 tab(s) orally once a day  Coumadin 3 mg oral tablet: 1 tab(s) orally once a day  dutasteride 0.5 mg oral capsule: 1 cap(s) orally once a day  enoxaparin 40 mg/0.4 mL injectable solution: 40 milligram(s) subcutaneously 2 times a day  lisinopril 20 mg oral tablet: 0.5 tab(s) orally once a day (at bedtime)  metoprolol tartrate 25 mg oral tablet: 1 tab(s) orally 2 times a day  oxyCODONE 5 mg oral tablet: 1 tab(s) orally every 6 hours as needed for  severe pain MDD: 3  simvastatin 10 mg oral tablet: 1 tab(s) orally once a day (at bedtime)

## 2023-06-14 NOTE — DISCHARGE NOTE PROVIDER - NSDCCPCAREPLAN_GEN_ALL_CORE_FT
PRINCIPAL DISCHARGE DIAGNOSIS  Diagnosis: Incarcerated inguinal hernia  Assessment and Plan of Treatment: SURGERY DISCHARGE INSTRUCTIONS  FOLLOW-UP - with Dr. Duenas in 1-2 weeks. Call the office to make an appointment or if you have any questions/concerns.  DIET - low fiber.   ACTIVITY- No heavy lifting for 4-6 wks over 10-20 lbs. Walking is encouraged. No running or swimming. No driving while taking pain medication.  WOUNDCARE - Some drainage from your incisions or drain sites is normal. If you have drainage from an open incision or drain site- cover loosely with sterile gauze and tape and change daily. If you have clear outer plastic dressing with gauze- remove 3 days after surgery and leave little white bandage strips underneath it on for 7 days. If you have no bandages but have purple glue over your incisions instead, this will come off with time. May shower 24 hours after surgery but no submerging wound under water for 2 weeks (tub bathing). Pat area dry when wet, keep clean and dry. Do not apply powders or lotion to wound area.   PAIN MEDS - Take prescription pain meds as instructed but only if needed as they can be addicting. Take over the counter extra strength tylenol 650mg and/or ibuprofen 400mg with food every 6 hours for pain instead of prescription pain meds if you do not need stronger pain control. Do not take tylenol in addition to your prescription pain med if your prescription pain med already has tylenol in it. No more than 4g of tylenol in 24hrs or 1g in 4 hrs. No mixing alcohol with prescription pain meds.   OTHER MEDS - Patient to take therapeutic lovenox along with 3mg warfarin for 2 more days and follow up with coumadin clinic June 16.   If you develop fever, dizziness, chest pain, trouble breathing, nausea, vomiting, increasing abdominal pain, inability to pass bowel movements, redness/pain/discharge from incisions. Please call the office or go to the emergency room immediately.

## 2023-06-14 NOTE — PROGRESS NOTE ADULT - ASSESSMENT
83yM w/ PMH of Afib on Coumadin (last dose 6/5), CAD s/p stent placement (2004), BPH, HLD, leukemia & lymphoma s/p chemo & radiation, and aortic valve stenosis s/p replacement (2009) presented to the ED for right inguinal pain.    Plan:   - CLD  - IVFs  - Pain control  - Stop heparin drip, supra therapeutic  - Pt/Rehab  - IS    spectra [phone]
83yM w/ PMH of Afib on Coumadin (last dose 6/5), CAD s/p stent placement (2004), BPH, HLD, leukemia & lymphoma s/p chemo & radiation, and aortic valve stenosis s/p replacement (2009) presented to the ED for right inguinal pain.    Plan:   - Regular diet  - Monitor hematoma in right groin post surgical wound  - Pain control  - Continue anticoagulation and follow up PT; bridging with heparin to home coumadin  - Target PTT 40-60, target INR 2.5-3.0  - PT/Rehab  - IS    Spectra [phone]  
83yM w/ PMH of Afib on Coumadin (last dose 6/5), CAD s/p stent placement (2004), BPH, HLD, leukemia & lymphoma s/p chemo & radiation, and aortic valve stenosis s/p replacement (2009) presented to the ED for right inguinal pain.    Plan:   - Regular diet  - Monitor hematoma in right groin post surgical wound, stable this morning  - Pain control  - Stop heparin drip, supra therapeutic  - Pt/Rehab  - IS  - Cards for  recs    spectra [phone]
ASSESSMENT:   83yM w/ PMH of Afib on Coumadin (last dose 6/5), CAD s/p stent placement (2004), BPH, HLD, leukemia & lymphoma s/p chemo & radiation, and aortic valve stenosis s/p replacement (2009) presented to the ED for right inguinal pain.    Plan:   - Regular diet  - Monitor hematoma in right groin post surgical wound, stable this morning  - Pain control  - Continue anticoagulation and follow up PT; bridging with heparin so that patient can begin home coumadin, PTT increased to 8.5 from 7.5 overnight  - Target PTT 40-60, target INR 2.5-3.0  - PT/Rehab  - IS    Spectra [phone]  
Imp: Rehab of gait dysfunction / s/p Diagnostic laparoscopy with repair of inguinal hernia / Afib, BPH, HLD, leukemia & lymphoma s/p chemo & radiation, and aortic valve stenosis s/p AVR  Plan: continue bedside therapy as tolerated           regular diet          DVT prophylaxis           agree with d/c plan to home with services 
83yM w/ PMH of Afib on Coumadin (last dose 6/5), CAD s/p stent placement (2004), BPH, HLD, leukemia & lymphoma s/p chemo & radiation, and aortic valve stenosis s/p replacement (2009) presented to the ED for right inguinal pain.    Plan:   - NPO, IVF  - wean pressors   - wean NC   - cisneros to come out   - monitor for ROBF  - Cards consult   - Monitor INR ( now 1/3 after K centra and Vit K)  - SICU management 
83yM w/ PMH of Afib on Coumadin (last dose 6/5), CAD s/p stent placement (2004), BPH, HLD, leukemia & lymphoma s/p chemo & radiation, and aortic valve stenosis s/p replacement (2009) presented to the ED for right inguinal pain.    Plan:   - Regular diet  - Monitor hematoma in right groin post surgical wound, stable this morning  - Pain control  - Continue anticoagulation and follow up PT; bridging with heparin to home coumadin  - Target PTT 40-60, target INR 2.5-3.0  - PT/Rehab  - IS    Spectra [phone]
83yM w/ PMH of Afib on Coumadin (last dose 6/5), CAD s/p stent placement (2004), BPH, HLD, leukemia & lymphoma s/p chemo & radiation, and aortic valve stenosis s/p replacement (2009) presented to the ED for right inguinal pain.    Plan:   - Regular diet  - Monitor hematoma in right groin post surgical wound, stable this morning  - Pain control  - Continue anticoagulation and follow up PT; bridging with heparin to home coumadin  - Target PTT 40-60, target INR 2.5-3.0  - PT/Rehab  - IS    Spectra [phone]
83yM w/ PMH of Afib on Coumadin (last dose 6/5), CAD s/p stent placement (2004), BPH, HLD, leukemia & lymphoma s/p chemo & radiation, and aortic valve stenosis s/p replacement (2009) presented to the ED for right inguinal pain.    Plan:   - Regular diet  - Monitor hematoma in right groin post surgical wound, stable this morning  - Pain control  - continue anticoagulation and follow up PT; bridging with heparin so that patient can begin home coumadin  - Pt/Rehab  - IS    spectra [phone]

## 2023-06-14 NOTE — DISCHARGE NOTE NURSING/CASE MANAGEMENT/SOCIAL WORK - PATIENT PORTAL LINK FT
You can access the FollowMyHealth Patient Portal offered by [facility] by registering at the following website: [email or web address]. By joining [email or web address]’s FollowMyHealth portal, you will also be able to view your health information using other applications (apps) compatible with our system.

## 2023-06-14 NOTE — PROGRESS NOTE ADULT - ATTENDING COMMENTS
doing well. passing gas and has BM. abdomen soft, groin hematoma stable. follow PTT.
feels ok. passing gas. vitals stable. clears po. discussed with his cardiologist regarding resuming anticoagulation later this evening (5 pm) he is ok with that. will order heparin gtt.
ACS Attending  Note Attestation    Patient is examined and evaluated at the bedside with the residents/PAs. Treatment plan discussed with the team, nurses, and consulting physicians and consulting teams. Medications, radiological studies and all other relevant studies reviewed.     TERRELL ATKINSON Patient is a 83y old  Male who presents with a chief complaint of Incarcerated right inguinal hernia S/P repair and diagnostic laparoscopy      Vital Signs Last 24 Hrs  T(C): 36.2 (11 Jun 2023 20:47), Max: 36.3 (11 Jun 2023 05:20)  T(F): 97.1 (11 Jun 2023 20:47), Max: 97.4 (11 Jun 2023 16:43)  HR: 80 (11 Jun 2023 20:47) (62 - 85)  BP: 106/59 (11 Jun 2023 20:47) (106/59 - 172/81)  BP(mean): --  RR: 18 (11 Jun 2023 20:47) (18 - 18)  SpO2: 98% (11 Jun 2023 20:47) (97% - 99%)    Parameters below as of 11 Jun 2023 05:20  Patient On (Oxygen Delivery Method): room air                        8.9    5.96  )-----------( 124      ( 10 Philip 2023 20:30 )             27.2     06-10    141  |  101  |  17  ----------------------------<  85  4.4   |  30  |  0.9    Ca    8.2<L>      10 Philip 2023 20:30  Phos  2.0     06-10  Mg     1.9     06-10      Diagnosis:  Incarcerated inguinal hernia    Plan:	  - start Coumadin  - supportive care  - GI/DVT prophylaxis  - pain management  - incentive spirometer    - follow up consults  - repeat studies as needed  - replace electrolytes  - case management evaluation     Ita Lyman MD, FACS  Trauma/ACS/Surgical Critical Care Attending
doing well. denies abdominal pain. groin hematoma stable. diet. continue anticoagulation.
doing well. eating and having BMs. groin site hematoma stable. anticoagulation. PT.
feels ok. no complaint. groin hematoma stable. wound intact. diet.
has no complaint. having BMs. no abdominal pain. abdomen soft, wound intact. groin hematoma stable. diet. coumadin. heparin gtt restarted after it was stopped for elevated PTT.
patient says he is doing well, passing gas and having BMs. vitals stable, a large swelling is noted in R groin. heparin gtt was stopped. an ace wrap was applied. will follow the PTT and hct.
You can access the FollowMyHealth Patient Portal offered by [facility] by registering at the following website: [email or web address]. By joining [employer]’s FollowMyHealth portal, you will also be able to view your health information using other applications (apps) compatible with our system.

## 2023-06-14 NOTE — DISCHARGE NOTE PROVIDER - NSDCFUSCHEDAPPT_GEN_ALL_CORE_FT
Ángela Loya  [facility] PreAdmits  Scheduled Appointment: 06/20/2023    [facility]  [facility]  Alex Guzman  Scheduled Appointment: 06/20/2023

## 2023-06-14 NOTE — DISCHARGE NOTE NURSING/CASE MANAGEMENT/SOCIAL WORK - NSDCPEPTCOWADR_GEN_ALL_CORE
Warfarin/Coumadin increases your risk for bleeding. Notify your doctor if you see any bleeding or any of the side effects listed in the Warfarin/Coumadin Booklet. Diet and medications can affect the PT/INR blood level. When Warfarin/Coumadin is taken with other medicines it can change the way other medicines work. Other medicines can also change the way Warfarin/Coumadin works. It is very important to tell your health care provider about all other medicines, including over-the-counter medications, herbs, diet supplements, or products containing vitamin K. Call your doctor before starting, stopping, or changing the dose of any prescription or over-the-counter medications. Any product containing aspirin lessens the blood's ability to form clots and adds to the effect of Warfarin/Coumadin. Never take aspirin without speaking with your health care provider. Estimated Blood Loss (Cc): minimal

## 2023-06-16 ENCOUNTER — APPOINTMENT (OUTPATIENT)
Dept: MEDICATION MANAGEMENT | Facility: CLINIC | Age: 83
End: 2023-06-16

## 2023-06-16 ENCOUNTER — OUTPATIENT (OUTPATIENT)
Dept: OUTPATIENT SERVICES | Facility: HOSPITAL | Age: 83
LOS: 1 days | End: 2023-06-16
Payer: MEDICARE

## 2023-06-16 DIAGNOSIS — I48.91 UNSPECIFIED ATRIAL FIBRILLATION: ICD-10-CM

## 2023-06-16 DIAGNOSIS — Z95.2 PRESENCE OF PROSTHETIC HEART VALVE: Chronic | ICD-10-CM

## 2023-06-16 DIAGNOSIS — Z79.01 LONG TERM (CURRENT) USE OF ANTICOAGULANTS: ICD-10-CM

## 2023-06-16 LAB
INR PPP: 1.4 RATIO
POCT-PROTHROMBIN TIME: 17.2 SECS
QUALITY CONTROL: YES

## 2023-06-16 PROCEDURE — 99211 OFF/OP EST MAY X REQ PHY/QHP: CPT

## 2023-06-16 PROCEDURE — 36416 COLLJ CAPILLARY BLOOD SPEC: CPT

## 2023-06-16 PROCEDURE — 85610 PROTHROMBIN TIME: CPT

## 2023-06-17 DIAGNOSIS — Z79.01 LONG TERM (CURRENT) USE OF ANTICOAGULANTS: ICD-10-CM

## 2023-06-17 DIAGNOSIS — I48.91 UNSPECIFIED ATRIAL FIBRILLATION: ICD-10-CM

## 2023-06-20 ENCOUNTER — OUTPATIENT (OUTPATIENT)
Dept: OUTPATIENT SERVICES | Facility: HOSPITAL | Age: 83
LOS: 1 days | End: 2023-06-20
Payer: MEDICARE

## 2023-06-20 ENCOUNTER — APPOINTMENT (OUTPATIENT)
Dept: MEDICATION MANAGEMENT | Facility: CLINIC | Age: 83
End: 2023-06-20

## 2023-06-20 DIAGNOSIS — I48.91 UNSPECIFIED ATRIAL FIBRILLATION: ICD-10-CM

## 2023-06-20 DIAGNOSIS — Z95.2 PRESENCE OF PROSTHETIC HEART VALVE: Chronic | ICD-10-CM

## 2023-06-20 DIAGNOSIS — Z79.01 LONG TERM (CURRENT) USE OF ANTICOAGULANTS: ICD-10-CM

## 2023-06-20 PROCEDURE — 99211 OFF/OP EST MAY X REQ PHY/QHP: CPT | Mod: 95

## 2023-06-21 DIAGNOSIS — Z79.01 LONG TERM (CURRENT) USE OF ANTICOAGULANTS: ICD-10-CM

## 2023-06-21 DIAGNOSIS — I48.91 UNSPECIFIED ATRIAL FIBRILLATION: ICD-10-CM

## 2023-06-23 DIAGNOSIS — I25.10 ATHEROSCLEROTIC HEART DISEASE OF NATIVE CORONARY ARTERY WITHOUT ANGINA PECTORIS: ICD-10-CM

## 2023-06-23 DIAGNOSIS — N40.0 BENIGN PROSTATIC HYPERPLASIA WITHOUT LOWER URINARY TRACT SYMPTOMS: ICD-10-CM

## 2023-06-23 DIAGNOSIS — Z92.3 PERSONAL HISTORY OF IRRADIATION: ICD-10-CM

## 2023-06-23 DIAGNOSIS — Z79.01 LONG TERM (CURRENT) USE OF ANTICOAGULANTS: ICD-10-CM

## 2023-06-23 DIAGNOSIS — Z95.2 PRESENCE OF PROSTHETIC HEART VALVE: ICD-10-CM
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
## 2023-06-23 DIAGNOSIS — E43 UNSPECIFIED SEVERE PROTEIN-CALORIE MALNUTRITION: ICD-10-CM

## 2023-06-23 DIAGNOSIS — R18.8 OTHER ASCITES: ICD-10-CM

## 2023-06-23 DIAGNOSIS — Z92.21 PERSONAL HISTORY OF ANTINEOPLASTIC CHEMOTHERAPY: ICD-10-CM

## 2023-06-23 DIAGNOSIS — Z87.891 PERSONAL HISTORY OF NICOTINE DEPENDENCE: ICD-10-CM

## 2023-06-23 DIAGNOSIS — E78.5 HYPERLIPIDEMIA, UNSPECIFIED: ICD-10-CM
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
## 2023-06-23 DIAGNOSIS — Z95.5 PRESENCE OF CORONARY ANGIOPLASTY IMPLANT AND GRAFT: ICD-10-CM

## 2023-06-23 DIAGNOSIS — D72.829 ELEVATED WHITE BLOOD CELL COUNT, UNSPECIFIED: ICD-10-CM

## 2023-06-23 DIAGNOSIS — I48.0 PAROXYSMAL ATRIAL FIBRILLATION: ICD-10-CM

## 2023-06-23 DIAGNOSIS — K40.30 UNILATERAL INGUINAL HERNIA, WITH OBSTRUCTION, WITHOUT GANGRENE, NOT SPECIFIED AS RECURRENT: ICD-10-CM

## 2023-06-23 DIAGNOSIS — Z85.72 PERSONAL HISTORY OF NON-HODGKIN LYMPHOMAS: ICD-10-CM

## 2023-06-23 DIAGNOSIS — L76.32 POSTPROCEDURAL HEMATOMA OF SKIN AND SUBCUTANEOUS TISSUE FOLLOWING OTHER PROCEDURE: ICD-10-CM

## 2023-06-27 ENCOUNTER — OUTPATIENT (OUTPATIENT)
Dept: OUTPATIENT SERVICES | Facility: HOSPITAL | Age: 83
LOS: 1 days | End: 2023-06-27
Payer: MEDICARE

## 2023-06-27 ENCOUNTER — APPOINTMENT (OUTPATIENT)
Dept: MEDICATION MANAGEMENT | Facility: CLINIC | Age: 83
End: 2023-06-27

## 2023-06-27 DIAGNOSIS — I48.91 UNSPECIFIED ATRIAL FIBRILLATION: ICD-10-CM

## 2023-06-27 DIAGNOSIS — Z95.2 PRESENCE OF PROSTHETIC HEART VALVE: Chronic | ICD-10-CM

## 2023-06-27 DIAGNOSIS — Z79.01 LONG TERM (CURRENT) USE OF ANTICOAGULANTS: ICD-10-CM

## 2023-06-27 LAB
INR PPP: 1.92
PT BLD: 22.6

## 2023-06-27 PROCEDURE — 99211 OFF/OP EST MAY X REQ PHY/QHP: CPT | Mod: 95

## 2023-06-28 ENCOUNTER — APPOINTMENT (OUTPATIENT)
Dept: SURGERY | Facility: CLINIC | Age: 83
End: 2023-06-28
Payer: MEDICARE

## 2023-06-28 VITALS
SYSTOLIC BLOOD PRESSURE: 132 MMHG | BODY MASS INDEX: 17.32 KG/M2 | DIASTOLIC BLOOD PRESSURE: 70 MMHG | WEIGHT: 121 LBS | TEMPERATURE: 97.6 F | HEIGHT: 70 IN

## 2023-06-28 DIAGNOSIS — Z79.01 LONG TERM (CURRENT) USE OF ANTICOAGULANTS: ICD-10-CM

## 2023-06-28 DIAGNOSIS — I48.91 UNSPECIFIED ATRIAL FIBRILLATION: ICD-10-CM

## 2023-06-28 PROCEDURE — 99024 POSTOP FOLLOW-UP VISIT: CPT

## 2023-07-06 ENCOUNTER — OUTPATIENT (OUTPATIENT)
Dept: OUTPATIENT SERVICES | Facility: HOSPITAL | Age: 83
LOS: 1 days | End: 2023-07-06
Payer: MEDICARE

## 2023-07-06 ENCOUNTER — APPOINTMENT (OUTPATIENT)
Dept: MEDICATION MANAGEMENT | Facility: CLINIC | Age: 83
End: 2023-07-06

## 2023-07-06 DIAGNOSIS — Z95.2 PRESENCE OF PROSTHETIC HEART VALVE: Chronic | ICD-10-CM

## 2023-07-06 DIAGNOSIS — Z79.01 LONG TERM (CURRENT) USE OF ANTICOAGULANTS: ICD-10-CM

## 2023-07-06 LAB
INR PPP: 4.14
PT BLD: 49.6

## 2023-07-06 PROCEDURE — 99211 OFF/OP EST MAY X REQ PHY/QHP: CPT | Mod: 95

## 2023-07-07 DIAGNOSIS — Z79.01 LONG TERM (CURRENT) USE OF ANTICOAGULANTS: ICD-10-CM

## 2023-07-12 ENCOUNTER — APPOINTMENT (OUTPATIENT)
Dept: SURGERY | Facility: CLINIC | Age: 83
End: 2023-07-12

## 2023-07-13 ENCOUNTER — OUTPATIENT (OUTPATIENT)
Dept: OUTPATIENT SERVICES | Facility: HOSPITAL | Age: 83
LOS: 1 days | End: 2023-07-13
Payer: MEDICARE

## 2023-07-13 ENCOUNTER — APPOINTMENT (OUTPATIENT)
Dept: MEDICATION MANAGEMENT | Facility: CLINIC | Age: 83
End: 2023-07-13

## 2023-07-13 DIAGNOSIS — I48.91 UNSPECIFIED ATRIAL FIBRILLATION: ICD-10-CM

## 2023-07-13 DIAGNOSIS — Z79.01 LONG TERM (CURRENT) USE OF ANTICOAGULANTS: ICD-10-CM

## 2023-07-13 LAB
INR PPP: 5.1
PT BLD: 61.3

## 2023-07-13 PROCEDURE — 99211 OFF/OP EST MAY X REQ PHY/QHP: CPT | Mod: 95

## 2023-07-14 DIAGNOSIS — Z79.01 LONG TERM (CURRENT) USE OF ANTICOAGULANTS: ICD-10-CM

## 2023-07-14 DIAGNOSIS — I48.91 UNSPECIFIED ATRIAL FIBRILLATION: ICD-10-CM

## 2023-07-20 ENCOUNTER — OUTPATIENT (OUTPATIENT)
Dept: OUTPATIENT SERVICES | Facility: HOSPITAL | Age: 83
LOS: 1 days | End: 2023-07-20
Payer: MEDICARE

## 2023-07-20 ENCOUNTER — APPOINTMENT (OUTPATIENT)
Dept: MEDICATION MANAGEMENT | Facility: CLINIC | Age: 83
End: 2023-07-20

## 2023-07-20 DIAGNOSIS — Z79.01 LONG TERM (CURRENT) USE OF ANTICOAGULANTS: ICD-10-CM

## 2023-07-20 DIAGNOSIS — Z95.2 PRESENCE OF PROSTHETIC HEART VALVE: Chronic | ICD-10-CM

## 2023-07-20 LAB
INR PPP: 3.59
PT BLD: 42.5

## 2023-07-20 PROCEDURE — 99211 OFF/OP EST MAY X REQ PHY/QHP: CPT | Mod: 95

## 2023-07-21 DIAGNOSIS — Z79.01 LONG TERM (CURRENT) USE OF ANTICOAGULANTS: ICD-10-CM

## 2023-07-27 ENCOUNTER — OUTPATIENT (OUTPATIENT)
Dept: OUTPATIENT SERVICES | Facility: HOSPITAL | Age: 83
LOS: 1 days | End: 2023-07-27
Payer: MEDICARE

## 2023-07-27 ENCOUNTER — APPOINTMENT (OUTPATIENT)
Dept: MEDICATION MANAGEMENT | Facility: CLINIC | Age: 83
End: 2023-07-27

## 2023-07-27 DIAGNOSIS — I48.91 UNSPECIFIED ATRIAL FIBRILLATION: ICD-10-CM

## 2023-07-27 DIAGNOSIS — Z79.01 LONG TERM (CURRENT) USE OF ANTICOAGULANTS: ICD-10-CM

## 2023-07-27 LAB
INR PPP: 2.44
PT BLD: 26.9

## 2023-07-27 PROCEDURE — 99211 OFF/OP EST MAY X REQ PHY/QHP: CPT | Mod: 95

## 2023-07-28 DIAGNOSIS — Z79.01 LONG TERM (CURRENT) USE OF ANTICOAGULANTS: ICD-10-CM

## 2023-07-28 DIAGNOSIS — I48.91 UNSPECIFIED ATRIAL FIBRILLATION: ICD-10-CM

## 2023-07-29 NOTE — H&P PST ADULT - ADMIT DATE
24-Nov-2020 SUMMARY: HPI:  64-year-old male with a PMHx of HLD, afib, CAD, s/p PCI, on ASA, DM, HTN, (+) large posterior fossa IPH w/ IVH/SAH/hydrocephalus, s/p EVD and SOC, [11/4/2021 @ [facility]], DSA (+) PICA aneurysm, s/p SOC PICA aneurysm resection, [11/11/2021], s/p right parietal  shunt [11/23/2021], s/p trach/PEG [11/19/2021], Recurrent C diff, ESRD on HMD weekly through Tesio cath who was brought in from nursing home initially for anemia was found also to be hypoglycemic at 10 and had 2 episodes of tonic clonic seizures in the ED.      Blood work was done at nursing home and found out that hemoglobin was 6.4. Patient was recently started on Glimepiride at the NH.  No sign recent fevers, bleeding per PEG, thickening of the sputum or hematochezia.    In the ED the patient was hemodynamically unstable and in status epilepticus.  Ativan, keppra and propofol were given and patient was started on Valproic acid as per NeuroCrit team  MAP less than 65 patient was started on Levophed after central line insertion  Labs were significant for mild leukocytosis, severe hypoglycemia and Hemoglobin 7,2    Patient admitted for status epilepticus secondary to hypoglycemia and possible sepsis (27 Jul 2023 01:00)      NCC consulted for seizures ?status epilepticus in the setting of hypoglycemia. Patient s/p Versed 4mg x1, Keppra 1gram IV x1, Ativan 4mg IV x2, with Propofol gtt initiated. Admitted to MICU      REVIEW OF SYSTEMS: Patient unable to participate in ROS due to neurologic status.     VITALS: [X] Reviewed    IMAGING/DATA: [X] Reviewed    IVF FLUIDS/MEDICATIONS: [X] Reviewed    ALLERGIES: Allergies    penicillin (Other)    Intolerances    EXAMINATION:  General: ill-appearing trached male   HEENT: Anicteric sclerae  Cardiac: [id number]  Lungs: Clear  Abdomen: Soft, non-tender, +BS  Extremities: LUE (+) 4 pitting edema and swelling   Neurologic: trached, sedated on propofol, no EO to voice or noxious stimuli, not following commands, Pupil 3mm/reactive b/l, dysconjugate gaze, no nystagmus, no gaze preference, contracted RUE, ?trace WD RUE/RLE, trace WD LUE, no movement to noxious stimuli LLE            ICU Vital Signs Last 24 Hrs  T(C): 36.5 (28 Jul 2023 20:00), Max: 36.6 (28 Jul 2023 04:00)  T(F): 97.7 (28 Jul 2023 20:00), Max: 97.9 (28 Jul 2023 04:00)  HR: 74 (28 Jul 2023 21:02) (67 - 85)  BP: 95/52 (28 Jul 2023 21:00) (79/46 - 113/66)  BP(mean): 68 (28 Jul 2023 21:00) (58 - 81)  ABP: --  ABP(mean): --  RR: 12 (28 Jul 2023 21:00) (11 - 26)  SpO2: 98% (28 Jul 2023 21:02) (98% - 100%)      07-27-23 @ 07:01  -  07-28-23 @ 07:00  --------------------------------------------------------  IN: 3594.6 mL / OUT: 0 mL / NET: 3594.6 mL    07-28-23 @ 07:01  -  07-28-23 @ 22:33  --------------------------------------------------------  IN: 1225.1 mL / OUT: 1000 mL / NET: 225.1 mL        Mode: AC/ CMV (Assist Control/ Continuous Mandatory Ventilation), RR (machine): 14, TV (machine): 450, FiO2: 40, PEEP: 8, ITime: 1, MAP: 12, PIP: 24    acetaminophen     Tablet .. 650 milliGRAM(s) Oral every 6 hours PRN  aluminum hydroxide/magnesium hydroxide/simethicone Suspension 30 milliLiter(s) Oral every 4 hours PRN  aMIOdarone    Tablet 200 milliGRAM(s) Oral daily  ascorbic acid 500 milliGRAM(s) Oral daily  atorvastatin 80 milliGRAM(s) Oral at bedtime  buMETAnide 1 milliGRAM(s) Oral daily  chlorhexidine 0.12% Liquid 15 milliLiter(s) Oral Mucosa every 12 hours  chlorhexidine 2% Cloths 1 Application(s) Topical <User Schedule>  collagenase Ointment 1 Application(s) Topical two times a day  doxazosin 4 milliGRAM(s) Oral at bedtime  epoetin guanakito-epbx (RETACRIT) Injectable 88796 Unit(s) SubCutaneous every 7 days  ergocalciferol Drops 4000 Unit(s) Oral every 24 hours  ferrous    sulfate Liquid 300 milliGRAM(s) Enteral Tube daily  folic acid 1 milliGRAM(s) Oral daily  glucagon  Injectable 1 milliGRAM(s) IV Push once  melatonin 3 milliGRAM(s) Oral at bedtime PRN  midodrine 5 milliGRAM(s) Oral every 8 hours  multivitamin 1 Tablet(s) Oral daily  norepinephrine Infusion 0.05 MICROgram(s)/kG/Min (8.28 mL/Hr) IV Continuous <Continuous>  pantoprazole  Injectable 40 milliGRAM(s) IV Push two times a day  polyethylene glycol 3350 17 Gram(s) Oral daily  propofol Infusion 40 MICROgram(s)/kG/Min (22.5 mL/Hr) IV Continuous <Continuous>  valproate sodium  IVPB 750 milliGRAM(s) IV Intermittent every 8 hours      LABS:  Na: 127 (07-28 @ 05:31), 131 (07-27 @ 05:33), 134 (07-26 @ 21:00)  K: 3.5 (07-28 @ 05:31), 3.3 (07-27 @ 05:33), 3.7 (07-26 @ 21:00)  Cl: 89 (07-28 @ 05:31), 92 (07-27 @ 05:33), 93 (07-26 @ 21:00)  CO2: 24 (07-28 @ 05:31), 28 (07-27 @ 05:33), 28 (07-26 @ 21:00)  BUN: 97 (07-28 @ 05:31), 105 (07-27 @ 05:33), 98 (07-26 @ 21:00)  Cr: 3.7 (07-28 @ 05:31), 3.6 (07-27 @ 05:33), 3.4 (07-26 @ 21:00)  Glu: 82(07-28 @ 05:31), 43(07-27 @ 05:33), 10(07-26 @ 21:00)    Hgb: 7.6 (07-28 @ 05:31), 7.1 (07-27 @ 05:33), 7.2 (07-26 @ 21:00)  Hct: 23.6 (07-28 @ 05:31), 22.3 (07-27 @ 05:33), 22.5 (07-26 @ 21:00)  WBC: 14.36 (07-28 @ 05:31), 11.37 (07-27 @ 05:33), 11.14 (07-26 @ 21:00)  Plt: 418 (07-28 @ 05:31), 395 (07-27 @ 05:33), 420 (07-26 @ 21:00)    INR: 0.99 07-26-23 @ 21:00  PTT: 33.1 07-26-23 @ 21:00          LIVER FUNCTIONS - ( 28 Jul 2023 05:31 )  Alb: 2.0 g/dL / Pro: 5.2 g/dL / ALK PHOS: 122 U/L / ALT: 11 U/L / AST: 16 U/L / GGT: x           ABG - ( 28 Jul 2023 02:55 )  pH, Arterial: 7.34  pH, Blood: x     /  pCO2: 50    /  pO2: 137   / HCO3: 27    / Base Excess: 0.5   /  SaO2: 99.5               SUMMARY: HPI:  64-year-old male with a PMHx of HLD, afib, CAD, s/p PCI, on ASA, DM, HTN, (+) large posterior fossa IPH w/ IVH/SAH/hydrocephalus, s/p EVD and SOC, [11/4/2021 @ [facility]], DSA (+) PICA aneurysm, s/p SOC PICA aneurysm resection, [11/11/2021], s/p right parietal  shunt [11/23/2021], s/p trach/PEG [11/19/2021], Recurrent C diff, ESRD on HMD weekly through Tesio cath who was brought in from nursing home initially for anemia was found also to be hypoglycemic at 10 and had 2 episodes of tonic clonic seizures in the ED.      Blood work was done at nursing home and found out that hemoglobin was 6.4. Patient was recently started on Glimepiride at the NH.  No sign recent fevers, bleeding per PEG, thickening of the sputum or hematochezia.    In the ED the patient was hemodynamically unstable and in status epilepticus.  Ativan, keppra and propofol were given and patient was started on Valproic acid as per NeuroCrit team  MAP less than 65 patient was started on Levophed after central line insertion  Labs were significant for mild leukocytosis, severe hypoglycemia and Hemoglobin 7,2    Patient admitted for status epilepticus secondary to hypoglycemia and possible sepsis (27 Jul 2023 01:00)      NCC consulted for seizures ?status epilepticus in the setting of hypoglycemia. Patient s/p Versed 4mg x1, Keppra 1gram IV x1, Ativan 4mg IV x2, with Propofol gtt initiated. Admitted to MICU      REVIEW OF SYSTEMS: Patient unable to participate in ROS due to neurologic status.     VITALS: [X] Reviewed    IMAGING/DATA: [X] Reviewed    IVF FLUIDS/MEDICATIONS: [X] Reviewed    ALLERGIES: Allergies    penicillin (Other)    Intolerances    EXAMINATION:  General: ill-appearing trached male   HEENT: Anicteric sclerae  Cardiac: [id number]  Lungs: Clear  Abdomen: Soft, non-tender, +BS  Extremities: LUE (+) 4 pitting edema and swelling   Neurologic: trached, no EO to voice or noxious stimuli, not following commands, Pupil 3mm/reactive b/l, roving eyes, no nystagmus, no gaze preference, contracted mild myoclonic jerks in LUE, ?trace WD RUE/RLE, trace WD LUE, no movement to noxious stimuli LLE        ICU Vital Signs Last 24 Hrs  T(C): 36.5 (28 Jul 2023 20:00), Max: 36.6 (28 Jul 2023 04:00)  T(F): 97.7 (28 Jul 2023 20:00), Max: 97.9 (28 Jul 2023 04:00)  HR: 74 (28 Jul 2023 21:02) (67 - 85)  BP: 95/52 (28 Jul 2023 21:00) (79/46 - 113/66)  BP(mean): 68 (28 Jul 2023 21:00) (58 - 81)  ABP: --  ABP(mean): --  RR: 12 (28 Jul 2023 21:00) (11 - 26)  SpO2: 98% (28 Jul 2023 21:02) (98% - 100%)      07-27-23 @ 07:01  -  07-28-23 @ 07:00  --------------------------------------------------------  IN: 3594.6 mL / OUT: 0 mL / NET: 3594.6 mL    07-28-23 @ 07:01  -  07-28-23 @ 22:33  --------------------------------------------------------  IN: 1225.1 mL / OUT: 1000 mL / NET: 225.1 mL        Mode: AC/ CMV (Assist Control/ Continuous Mandatory Ventilation), RR (machine): 14, TV (machine): 450, FiO2: 40, PEEP: 8, ITime: 1, MAP: 12, PIP: 24    acetaminophen     Tablet .. 650 milliGRAM(s) Oral every 6 hours PRN  aluminum hydroxide/magnesium hydroxide/simethicone Suspension 30 milliLiter(s) Oral every 4 hours PRN  aMIOdarone    Tablet 200 milliGRAM(s) Oral daily  ascorbic acid 500 milliGRAM(s) Oral daily  atorvastatin 80 milliGRAM(s) Oral at bedtime  buMETAnide 1 milliGRAM(s) Oral daily  chlorhexidine 0.12% Liquid 15 milliLiter(s) Oral Mucosa every 12 hours  chlorhexidine 2% Cloths 1 Application(s) Topical <User Schedule>  collagenase Ointment 1 Application(s) Topical two times a day  doxazosin 4 milliGRAM(s) Oral at bedtime  epoetin guanakito-epbx (RETACRIT) Injectable 56038 Unit(s) SubCutaneous every 7 days  ergocalciferol Drops 4000 Unit(s) Oral every 24 hours  ferrous    sulfate Liquid 300 milliGRAM(s) Enteral Tube daily  folic acid 1 milliGRAM(s) Oral daily  glucagon  Injectable 1 milliGRAM(s) IV Push once  melatonin 3 milliGRAM(s) Oral at bedtime PRN  midodrine 5 milliGRAM(s) Oral every 8 hours  multivitamin 1 Tablet(s) Oral daily  norepinephrine Infusion 0.05 MICROgram(s)/kG/Min (8.28 mL/Hr) IV Continuous <Continuous>  pantoprazole  Injectable 40 milliGRAM(s) IV Push two times a day  polyethylene glycol 3350 17 Gram(s) Oral daily  propofol Infusion 40 MICROgram(s)/kG/Min (22.5 mL/Hr) IV Continuous <Continuous>  valproate sodium  IVPB 750 milliGRAM(s) IV Intermittent every 8 hours      LABS:  Na: 127 (07-28 @ 05:31), 131 (07-27 @ 05:33), 134 (07-26 @ 21:00)  K: 3.5 (07-28 @ 05:31), 3.3 (07-27 @ 05:33), 3.7 (07-26 @ 21:00)  Cl: 89 (07-28 @ 05:31), 92 (07-27 @ 05:33), 93 (07-26 @ 21:00)  CO2: 24 (07-28 @ 05:31), 28 (07-27 @ 05:33), 28 (07-26 @ 21:00)  BUN: 97 (07-28 @ 05:31), 105 (07-27 @ 05:33), 98 (07-26 @ 21:00)  Cr: 3.7 (07-28 @ 05:31), 3.6 (07-27 @ 05:33), 3.4 (07-26 @ 21:00)  Glu: 82(07-28 @ 05:31), 43(07-27 @ 05:33), 10(07-26 @ 21:00)    Hgb: 7.6 (07-28 @ 05:31), 7.1 (07-27 @ 05:33), 7.2 (07-26 @ 21:00)  Hct: 23.6 (07-28 @ 05:31), 22.3 (07-27 @ 05:33), 22.5 (07-26 @ 21:00)  WBC: 14.36 (07-28 @ 05:31), 11.37 (07-27 @ 05:33), 11.14 (07-26 @ 21:00)  Plt: 418 (07-28 @ 05:31), 395 (07-27 @ 05:33), 420 (07-26 @ 21:00)    INR: 0.99 07-26-23 @ 21:00  PTT: 33.1 07-26-23 @ 21:00          LIVER FUNCTIONS - ( 28 Jul 2023 05:31 )  Alb: 2.0 g/dL / Pro: 5.2 g/dL / ALK PHOS: 122 U/L / ALT: 11 U/L / AST: 16 U/L / GGT: x           ABG - ( 28 Jul 2023 02:55 )  pH, Arterial: 7.34  pH, Blood: x     /  pCO2: 50    /  pO2: 137   / HCO3: 27    / Base Excess: 0.5   /  SaO2: 99.5

## 2023-08-10 ENCOUNTER — OUTPATIENT (OUTPATIENT)
Dept: OUTPATIENT SERVICES | Facility: HOSPITAL | Age: 83
LOS: 1 days | End: 2023-08-10
Payer: MEDICARE

## 2023-08-10 ENCOUNTER — APPOINTMENT (OUTPATIENT)
Dept: MEDICATION MANAGEMENT | Facility: CLINIC | Age: 83
End: 2023-08-10

## 2023-08-10 DIAGNOSIS — I48.91 UNSPECIFIED ATRIAL FIBRILLATION: ICD-10-CM

## 2023-08-10 DIAGNOSIS — Z79.01 LONG TERM (CURRENT) USE OF ANTICOAGULANTS: ICD-10-CM

## 2023-08-10 PROCEDURE — 99211 OFF/OP EST MAY X REQ PHY/QHP: CPT | Mod: 95

## 2023-08-11 DIAGNOSIS — I48.91 UNSPECIFIED ATRIAL FIBRILLATION: ICD-10-CM

## 2023-08-11 DIAGNOSIS — Z79.01 LONG TERM (CURRENT) USE OF ANTICOAGULANTS: ICD-10-CM

## 2023-08-15 ENCOUNTER — RESULT CHARGE (OUTPATIENT)
Age: 83
End: 2023-08-15

## 2023-08-15 ENCOUNTER — APPOINTMENT (OUTPATIENT)
Dept: CARDIOLOGY | Facility: CLINIC | Age: 83
End: 2023-08-15
Payer: MEDICARE

## 2023-08-15 VITALS
HEART RATE: 49 BPM | WEIGHT: 123 LBS | HEIGHT: 70 IN | BODY MASS INDEX: 17.61 KG/M2 | SYSTOLIC BLOOD PRESSURE: 138 MMHG | DIASTOLIC BLOOD PRESSURE: 72 MMHG

## 2023-08-15 DIAGNOSIS — K40.90 UNILATERAL INGUINAL HERNIA, W/OUT OBSTRUCTION OR GANGRENE, NOT SPECIFIED AS RECURRENT: ICD-10-CM

## 2023-08-15 DIAGNOSIS — E03.9 HYPOTHYROIDISM, UNSPECIFIED: ICD-10-CM

## 2023-08-15 DIAGNOSIS — I72.3 ANEURYSM OF ILIAC ARTERY: ICD-10-CM

## 2023-08-15 DIAGNOSIS — E78.5 HYPERLIPIDEMIA, UNSPECIFIED: ICD-10-CM

## 2023-08-15 PROCEDURE — 93000 ELECTROCARDIOGRAM COMPLETE: CPT

## 2023-08-15 PROCEDURE — 99214 OFFICE O/P EST MOD 30 MIN: CPT

## 2023-08-15 NOTE — HISTORY OF PRESENT ILLNESS
[FreeTextEntry1] : The patient had an incarcerated right inguinal hernia . Surgery was complicated by a hematoma and issues with his anticoagulation . The patient has had no CP or OSB . He has low back pain whichhas been chronic . the patietn has a history of a mechanical AVR .which function has been adequate on serial Echos. the patient's EF is 44% . the patient has a AAA 3.1 cm and bilateral iliac artery aneurysms and renal artery aneurysms as well and had been followed inthe past by Dr. Velazquez. the patient is now going for cataract surgery

## 2023-08-15 NOTE — CARDIOLOGY SUMMARY
[___] : [unfilled] [de-identified] : NSR IVDD LAD  12- Sinus Bradycardia IVCD LAD LVH NS T wave change.  5-  Sinus bradycardia IVCD LAD NS  twave change.  8- Sinus bradycardia NS T wave change possible inferi=olateral ischemia  6- Sinus bradycardaiaIVCD LAD  6-2-2023 Sinus bradycardia LAD . LAD .  1- SB IVCD LAD NS T wave change  9-6-2022 Sinus bradycardia IVCD LAD  [de-identified] : 9- EF 42% mild MR Mod TR Mechanical MVR SIDDHARTH 1.1 with mild paravalvular AI . LVSI was 23cc/m2 \par  2-2022 EF 49% AVR junction is adequate . Paravalvular AI \par  2- EF 44% mild to moderate MR mechanical AVR SIDDHARTH 1.7 cm2 mild paravalvular AI

## 2023-08-15 NOTE — DISCUSSION/SUMMARY
[___ Month(s)] : in [unfilled] month(s) [FreeTextEntry1] : check blood work  Management of A/C as above . Would follow up wiht Coumadin cetern t make sure is INR is therapeutic prior to catarac surgery  Antibiotic prophylaxs where appropriate  Follow up in 2-3  months

## 2023-08-15 NOTE — ASSESSMENT
[FreeTextEntry1] : The patient has had an incarcerated left inguinal hernia repair . The patienthad surgery which was complicated by hematoma secpndary to his A/C . Now healed . He now needs cataract surgery . The patietn has more than 4 METS exercise tolerance and is at least an intermediate cardiac risk undergoing a minor risk procedure . The patient is on Coumadin for a mechanical AVR . If there is no contraindication would conitnue Couamadin without interuption. If coumadin needs to be interupted then he would need LMWH bridging . The patient has a AAA 3.1 cm bilateral iliac artery aneurysms and renal artery aneurysm.

## 2023-09-08 ENCOUNTER — OUTPATIENT (OUTPATIENT)
Dept: OUTPATIENT SERVICES | Facility: HOSPITAL | Age: 83
LOS: 1 days | End: 2023-09-08
Payer: MEDICARE

## 2023-09-08 ENCOUNTER — APPOINTMENT (OUTPATIENT)
Dept: MEDICATION MANAGEMENT | Facility: CLINIC | Age: 83
End: 2023-09-08

## 2023-09-08 DIAGNOSIS — Z95.2 PRESENCE OF PROSTHETIC HEART VALVE: Chronic | ICD-10-CM

## 2023-09-08 DIAGNOSIS — I48.91 UNSPECIFIED ATRIAL FIBRILLATION: ICD-10-CM

## 2023-09-08 DIAGNOSIS — Z79.01 LONG TERM (CURRENT) USE OF ANTICOAGULANTS: ICD-10-CM

## 2023-09-08 LAB
INR PPP: 2.4
PT BLD: 26.4

## 2023-09-08 PROCEDURE — G0463: CPT

## 2023-09-09 DIAGNOSIS — I48.91 UNSPECIFIED ATRIAL FIBRILLATION: ICD-10-CM

## 2023-09-09 DIAGNOSIS — Z79.01 LONG TERM (CURRENT) USE OF ANTICOAGULANTS: ICD-10-CM

## 2023-09-12 ENCOUNTER — APPOINTMENT (OUTPATIENT)
Dept: CARDIOLOGY | Facility: CLINIC | Age: 83
End: 2023-09-12
Payer: MEDICARE

## 2023-09-12 ENCOUNTER — APPOINTMENT (OUTPATIENT)
Dept: VASCULAR SURGERY | Facility: CLINIC | Age: 83
End: 2023-09-12
Payer: MEDICARE

## 2023-09-12 VITALS
SYSTOLIC BLOOD PRESSURE: 129 MMHG | BODY MASS INDEX: 17.18 KG/M2 | HEART RATE: 69 BPM | WEIGHT: 120 LBS | DIASTOLIC BLOOD PRESSURE: 76 MMHG | HEIGHT: 70 IN

## 2023-09-12 VITALS
SYSTOLIC BLOOD PRESSURE: 120 MMHG | HEIGHT: 70 IN | HEART RATE: 57 BPM | DIASTOLIC BLOOD PRESSURE: 72 MMHG | BODY MASS INDEX: 17.18 KG/M2 | WEIGHT: 120 LBS

## 2023-09-12 DIAGNOSIS — Z00.00 ENCOUNTER FOR GENERAL ADULT MEDICAL EXAMINATION W/OUT ABNORMAL FINDINGS: ICD-10-CM

## 2023-09-12 DIAGNOSIS — I25.10 ATHEROSCLEROTIC HEART DISEASE OF NATIVE CORONARY ARTERY W/OUT ANGINA PECTORIS: ICD-10-CM

## 2023-09-12 DIAGNOSIS — C91.10 CHRONIC LYMPHOCYTIC LEUKEMIA OF B-CELL TYPE NOT HAVING ACHIEVED REMISSION: ICD-10-CM

## 2023-09-12 DIAGNOSIS — I72.4 ANEURYSM OF ARTERY OF LOWER EXTREMITY: ICD-10-CM

## 2023-09-12 DIAGNOSIS — I71.40 ABDOMINAL AORTIC ANEURYSM, WITHOUT RUPTURE, UNSPECIFIED: ICD-10-CM

## 2023-09-12 PROCEDURE — 93000 ELECTROCARDIOGRAM COMPLETE: CPT

## 2023-09-12 PROCEDURE — 93978 VASCULAR STUDY: CPT

## 2023-09-12 PROCEDURE — 99214 OFFICE O/P EST MOD 30 MIN: CPT

## 2023-09-12 PROCEDURE — 99213 OFFICE O/P EST LOW 20 MIN: CPT

## 2023-09-12 PROCEDURE — 93926 LOWER EXTREMITY STUDY: CPT

## 2023-09-12 NOTE — BRIEF OPERATIVE NOTE - ANTIBIOTIC PROTOCOL
Clifford, I will be calling you to see how this injection worked for you. If it worked like it did last time, we will get you set up for the RFA. If you have questions/concerns, please call [phone].  
2g Ancef/Followed protocol

## 2023-09-18 ENCOUNTER — OUTPATIENT (OUTPATIENT)
Dept: OUTPATIENT SERVICES | Facility: HOSPITAL | Age: 83
LOS: 1 days | Discharge: HOME | End: 2023-09-18
Payer: MEDICARE

## 2023-09-18 ENCOUNTER — TRANSCRIPTION ENCOUNTER (OUTPATIENT)
Age: 83
End: 2023-09-18

## 2023-09-18 VITALS
WEIGHT: 119.93 LBS | OXYGEN SATURATION: 100 % | HEIGHT: 70 IN | DIASTOLIC BLOOD PRESSURE: 67 MMHG | RESPIRATION RATE: 18 BRPM | SYSTOLIC BLOOD PRESSURE: 141 MMHG | HEART RATE: 54 BPM | TEMPERATURE: 97 F

## 2023-09-18 VITALS — DIASTOLIC BLOOD PRESSURE: 79 MMHG | RESPIRATION RATE: 16 BRPM | SYSTOLIC BLOOD PRESSURE: 135 MMHG | HEART RATE: 52 BPM

## 2023-09-18 DIAGNOSIS — H25.042 POSTERIOR SUBCAPSULAR POLAR AGE-RELATED CATARACT, LEFT EYE: ICD-10-CM

## 2023-09-18 DIAGNOSIS — Z95.2 PRESENCE OF PROSTHETIC HEART VALVE: Chronic | ICD-10-CM

## 2023-09-18 DIAGNOSIS — Z98.890 OTHER SPECIFIED POSTPROCEDURAL STATES: Chronic | ICD-10-CM

## 2023-09-18 PROCEDURE — V2632: CPT

## 2023-09-18 NOTE — ASU PATIENT PROFILE, ADULT - FALL HARM RISK - HARM RISK INTERVENTIONS

## 2023-09-21 DIAGNOSIS — H26.8 OTHER SPECIFIED CATARACT: ICD-10-CM

## 2023-09-21 DIAGNOSIS — F17.290 NICOTINE DEPENDENCE, OTHER TOBACCO PRODUCT, UNCOMPLICATED: ICD-10-CM

## 2023-09-21 DIAGNOSIS — Z79.82 LONG TERM (CURRENT) USE OF ASPIRIN: ICD-10-CM

## 2023-10-04 ENCOUNTER — APPOINTMENT (OUTPATIENT)
Dept: MEDICATION MANAGEMENT | Facility: CLINIC | Age: 83
End: 2023-10-04

## 2023-10-04 ENCOUNTER — OUTPATIENT (OUTPATIENT)
Dept: OUTPATIENT SERVICES | Facility: HOSPITAL | Age: 83
LOS: 1 days | End: 2023-10-04
Payer: MEDICARE

## 2023-10-04 DIAGNOSIS — I48.91 UNSPECIFIED ATRIAL FIBRILLATION: ICD-10-CM

## 2023-10-04 DIAGNOSIS — Z79.01 LONG TERM (CURRENT) USE OF ANTICOAGULANTS: ICD-10-CM

## 2023-10-04 DIAGNOSIS — Z98.890 OTHER SPECIFIED POSTPROCEDURAL STATES: Chronic | ICD-10-CM

## 2023-10-04 LAB
INR PPP: 2.53
PT BLD: 27.8

## 2023-10-04 PROCEDURE — G0463: CPT

## 2023-10-05 DIAGNOSIS — I48.91 UNSPECIFIED ATRIAL FIBRILLATION: ICD-10-CM

## 2023-10-05 DIAGNOSIS — Z79.01 LONG TERM (CURRENT) USE OF ANTICOAGULANTS: ICD-10-CM

## 2023-10-31 ENCOUNTER — APPOINTMENT (OUTPATIENT)
Dept: MEDICATION MANAGEMENT | Facility: CLINIC | Age: 83
End: 2023-10-31

## 2023-11-02 ENCOUNTER — OUTPATIENT (OUTPATIENT)
Dept: OUTPATIENT SERVICES | Facility: HOSPITAL | Age: 83
LOS: 1 days | End: 2023-11-02
Payer: MEDICARE

## 2023-11-02 ENCOUNTER — APPOINTMENT (OUTPATIENT)
Dept: MEDICATION MANAGEMENT | Facility: CLINIC | Age: 83
End: 2023-11-02

## 2023-11-02 DIAGNOSIS — Z98.890 OTHER SPECIFIED POSTPROCEDURAL STATES: Chronic | ICD-10-CM

## 2023-11-02 DIAGNOSIS — Z95.2 PRESENCE OF PROSTHETIC HEART VALVE: Chronic | ICD-10-CM

## 2023-11-02 DIAGNOSIS — I48.91 UNSPECIFIED ATRIAL FIBRILLATION: ICD-10-CM

## 2023-11-02 DIAGNOSIS — Z79.01 LONG TERM (CURRENT) USE OF ANTICOAGULANTS: ICD-10-CM

## 2023-11-02 LAB
INR PPP: 3.11
PT BLD: 34

## 2023-11-02 PROCEDURE — 99211 OFF/OP EST MAY X REQ PHY/QHP: CPT | Mod: 95

## 2023-11-03 DIAGNOSIS — I48.91 UNSPECIFIED ATRIAL FIBRILLATION: ICD-10-CM

## 2023-11-03 DIAGNOSIS — Z79.01 LONG TERM (CURRENT) USE OF ANTICOAGULANTS: ICD-10-CM

## 2023-11-27 NOTE — ED PROVIDER NOTE - OBJECTIVE STATEMENT
electronic 78 y/o M PMH CAD, aortic valve on Coumadin, Afib with ablation, high cholesterol, BPH presenting for evaluation of dizziness. Pt states he was working outside when he suddenly felt dizzy, nauseous, and diaphoretic with mild chest discomfort. Chest discomfort resolved however pt was feeling off balance. No numbness, tinging, weakness, back pain. 80 y/o M PMH CAD, aortic valve stenosis on Coumadin, Afib with ablation, high cholesterol, BPH presenting for evaluation of dizziness. Pt states he was working outside when he suddenly felt dizzy, nauseous, and diaphoretic with mild chest discomfort. Chest discomfort resolved however pt was feeling off balance. No numbness, tinging, weakness, back pain.

## 2023-11-30 ENCOUNTER — APPOINTMENT (OUTPATIENT)
Dept: MEDICATION MANAGEMENT | Facility: CLINIC | Age: 83
End: 2023-11-30

## 2023-11-30 ENCOUNTER — OUTPATIENT (OUTPATIENT)
Dept: OUTPATIENT SERVICES | Facility: HOSPITAL | Age: 83
LOS: 1 days | End: 2023-11-30
Payer: MEDICARE

## 2023-11-30 DIAGNOSIS — I48.91 UNSPECIFIED ATRIAL FIBRILLATION: ICD-10-CM

## 2023-11-30 DIAGNOSIS — Z95.2 PRESENCE OF PROSTHETIC HEART VALVE: Chronic | ICD-10-CM

## 2023-11-30 DIAGNOSIS — Z79.01 LONG TERM (CURRENT) USE OF ANTICOAGULANTS: ICD-10-CM

## 2023-11-30 DIAGNOSIS — Z98.890 OTHER SPECIFIED POSTPROCEDURAL STATES: Chronic | ICD-10-CM

## 2023-11-30 LAB
INR PPP: 2.85
PT BLD: 31.2

## 2023-11-30 PROCEDURE — 99211 OFF/OP EST MAY X REQ PHY/QHP: CPT | Mod: 95

## 2023-12-01 DIAGNOSIS — I48.91 UNSPECIFIED ATRIAL FIBRILLATION: ICD-10-CM

## 2023-12-01 DIAGNOSIS — Z79.01 LONG TERM (CURRENT) USE OF ANTICOAGULANTS: ICD-10-CM

## 2023-12-08 NOTE — PROGRESS NOTE ADULT - ASSESSMENT
#P.Afib sp Cv/ablation in past - was planned for CONCHA/CV today but converted to NSR on IV amio  finish IV amio then switch to po 200mg q12 after to titrate down in Dr Sharif office  cont coumadin INR 2-3   monitor LFT and TFT  recent ischemic workup in July    #Cardiomyopathy - low EF on echo in Dr Sharif office - chronic systolic CHF - no decompensation   cont ACEI  likely arrythmia related   repeat echo as outpt    # Mechanical AVR - cont coumadin     #CAD - cont medical therapy - CE negative    #leukocytosis - CLL hx    #URI - atrovent for wheezing - +smoking hx and pt was a  - needs to see pulmonary as an outpt  check CXR  check flu rapid    possible dc later today 18

## 2023-12-15 NOTE — CONSULT NOTE ADULT - ASSESSMENT
-AF with rapid ventricular response. In the process of Amio load. . He had converted to NSR overnight . Has had recent AF and AFL ablation . Seen by EP   -Dilated CM EF 20% during period of AF with RVR . Most likely secondary to atrial arrythmias. Has had LAD stent in the past. Nuclear stress test 6 months ago showed no ischemia.   -CAD  Had PCI of LAD in 2004 . No ischemia on nuclear stress test 6 month ago. CE are negative .   -VASQUEZ - this seemed to have resolved after conversion to NSR  He is walking around the unit without symptoms.   -S/P AVR mechanical. Needs INR 2-3  with ASA      Plan:   continue full loading with Amio.   Change to Amio 200 mg BID once IV  loading has been completed.  Decrease Metoprolol to 25 mg BID   INR 2-3 with ASA 81 mg qd   Continue ACE I   Need to adjust Coumadin as Amio will increase INR   Will repeat echo as outpatient once in persistent NSR .   Ambulate .   Possible discharge in AM .   Discussed with medical staff . ICU Vital Signs Last 24 Hrs  T(C): 37.3 (15 Dec 2023 20:17), Max: 37.3 (15 Dec 2023 20:17)  T(F): 99.1 (15 Dec 2023 20:17), Max: 99.1 (15 Dec 2023 20:17)  HR: 94 (15 Dec 2023 21:56) (82 - 94)  BP: 138/71 (15 Dec 2023 22:10) (138/71 - 145/74)  BP(mean): --  ABP: --  ABP(mean): --  RR: 18 (15 Dec 2023 20:17) (18 - 18)  SpO2: --    Abdomen soft nontender  SVE: Closed/Long/Posterior   TAS: Baby A: Breech presentation, posterior placenta, MVP: 2.74 x 5.88  Baby B: Breech presentation, posterior placenta, MVP: 5.04 x 4.29 ICU Vital Signs Last 24 Hrs  T(C): 37.3 (15 Dec 2023 20:17), Max: 37.3 (15 Dec 2023 20:17)  T(F): 99.1 (15 Dec 2023 20:17), Max: 99.1 (15 Dec 2023 20:17)  HR: 94 (15 Dec 2023 21:56) (82 - 94)  BP: 138/71 (15 Dec 2023 22:10) (138/71 - 145/74)  BP(mean): --  ABP: --  ABP(mean): --  RR: 18 (15 Dec 2023 20:17) (18 - 18)  SpO2: --    Abdomen soft nontender  SVE: Closed/Long/Posterior   TAS: Baby A: Breech presentation, posterior placenta, MVP: 2.74 x 5.88  Baby B: Breech presentation, posterior placenta, MVP: 5.04 x 4.29  SVE: Closed/Long  Baby A: Category I/Reactive NST  Baby B: Category I/Reactive NST   Anna irregularly

## 2023-12-28 ENCOUNTER — APPOINTMENT (OUTPATIENT)
Dept: MEDICATION MANAGEMENT | Facility: CLINIC | Age: 83
End: 2023-12-28

## 2023-12-29 ENCOUNTER — OUTPATIENT (OUTPATIENT)
Dept: OUTPATIENT SERVICES | Facility: HOSPITAL | Age: 83
LOS: 1 days | End: 2023-12-29
Payer: MEDICARE

## 2023-12-29 ENCOUNTER — APPOINTMENT (OUTPATIENT)
Dept: MEDICATION MANAGEMENT | Facility: CLINIC | Age: 83
End: 2023-12-29

## 2023-12-29 DIAGNOSIS — Z98.890 OTHER SPECIFIED POSTPROCEDURAL STATES: Chronic | ICD-10-CM

## 2023-12-29 DIAGNOSIS — Z79.01 LONG TERM (CURRENT) USE OF ANTICOAGULANTS: ICD-10-CM

## 2023-12-29 DIAGNOSIS — Z95.2 PRESENCE OF PROSTHETIC HEART VALVE: Chronic | ICD-10-CM

## 2023-12-29 DIAGNOSIS — I48.91 UNSPECIFIED ATRIAL FIBRILLATION: ICD-10-CM

## 2023-12-29 LAB
INR PPP: 2.68
PT BLD: 29.4

## 2023-12-29 PROCEDURE — G0463: CPT

## 2023-12-30 DIAGNOSIS — I48.91 UNSPECIFIED ATRIAL FIBRILLATION: ICD-10-CM

## 2023-12-30 DIAGNOSIS — Z79.01 LONG TERM (CURRENT) USE OF ANTICOAGULANTS: ICD-10-CM

## 2024-01-02 ENCOUNTER — APPOINTMENT (OUTPATIENT)
Dept: CARDIOLOGY | Facility: CLINIC | Age: 84
End: 2024-01-02
Payer: MEDICARE

## 2024-01-02 VITALS
HEIGHT: 70 IN | HEART RATE: 48 BPM | WEIGHT: 122 LBS | BODY MASS INDEX: 17.47 KG/M2 | DIASTOLIC BLOOD PRESSURE: 70 MMHG | SYSTOLIC BLOOD PRESSURE: 130 MMHG

## 2024-01-02 PROCEDURE — 99214 OFFICE O/P EST MOD 30 MIN: CPT

## 2024-01-02 PROCEDURE — 93000 ELECTROCARDIOGRAM COMPLETE: CPT

## 2024-01-02 NOTE — ASSESSMENT
[FreeTextEntry1] : The patient has a mechanical AVR . The patient has a known CM with EF of 44% . He has not had chest pain or SOB . He is going for his second cataract surgery . He had a recent cataract surgery without issues . The patient has a mechanical  AVR and needs to be on A/C without interruption . It is preferred not to stop his Coumadin . If there is an unacceptable bleeding risk then he will need Briding with LMNWH by the Coumadin center. . The patietn has had chronic low back pain . He needs pain manamgement . He has a AAA and thi is followed by vascular

## 2024-01-02 NOTE — HISTORY OF PRESENT ILLNESS
[FreeTextEntry1] : The patient had an incarcerated right inguinal hernia . Surgery was complicated by a hematoma and issues with his anticoagulation . The patient has had no CP or OSB . He has low back pain whichhas been chronic . the patietn has a history of a mechanical AVR .which function has been adequate on serial Echos. the patient's EF is 44% . the patient has a AAA 3.1 cm and bilateral iliac artery aneurysms and renal artery aneurysms as well and had been followed inthe past by Dr. Velazquez. the patient is now going for cataract surgery  on the right eye . He had left eye done  recently . He has not had chest pain and he is not SOB . He has been c/o the same back issues

## 2024-01-02 NOTE — CARDIOLOGY SUMMARY
[de-identified] : NSR IVDD LAD  12- Sinus Bradycardia IVCD LAD LVH NS T wave change.  5-  Sinus bradycardia IVCD LAD NS  twave change.  9- SB IVCD LAD  8- Sinus bradycardia NS T wave change possible inferi=olateral ischemia  6- Sinus bradycardaiaIVCD LAD  6-2-2023 Sinus bradycardia LAD . LAD .  1- SB IVCD LAD NS T wave change  9-6-2022 Sinus bradycardia IVCD LAD  [de-identified] : 9- EF 42% mild MR Mod TR Mechanical MVR SIDDHARTH 1.1 with mild paravalvular AI . LVSI was 23cc/m2 \par  2-2022 EF 49% AVR junction is adequate . Paravalvular AI \par  2- EF 44% mild to moderate MR mechanical AVR SIDDHARTH 1.7 cm2 mild paravalvular AI  [___] : [unfilled]

## 2024-01-22 ENCOUNTER — OUTPATIENT (OUTPATIENT)
Dept: OUTPATIENT SERVICES | Facility: HOSPITAL | Age: 84
LOS: 1 days | Discharge: HOME | End: 2024-01-22
Payer: MEDICARE

## 2024-01-22 VITALS
WEIGHT: 125 LBS | RESPIRATION RATE: 17 BRPM | HEIGHT: 70 IN | SYSTOLIC BLOOD PRESSURE: 148 MMHG | HEART RATE: 46 BPM | DIASTOLIC BLOOD PRESSURE: 67 MMHG | TEMPERATURE: 97 F | OXYGEN SATURATION: 98 %

## 2024-01-22 VITALS — RESPIRATION RATE: 17 BRPM | DIASTOLIC BLOOD PRESSURE: 86 MMHG | HEART RATE: 55 BPM | SYSTOLIC BLOOD PRESSURE: 164 MMHG

## 2024-01-22 DIAGNOSIS — H25.21 AGE-RELATED CATARACT, MORGAGNIAN TYPE, RIGHT EYE: ICD-10-CM

## 2024-01-22 DIAGNOSIS — Z98.890 OTHER SPECIFIED POSTPROCEDURAL STATES: Chronic | ICD-10-CM

## 2024-01-22 DIAGNOSIS — Z95.2 PRESENCE OF PROSTHETIC HEART VALVE: Chronic | ICD-10-CM

## 2024-01-22 PROCEDURE — V2632: CPT

## 2024-01-22 RX ORDER — DUTASTERIDE 0.5 MG/1
1 CAPSULE, LIQUID FILLED ORAL
Qty: 0 | Refills: 0 | DISCHARGE

## 2024-01-22 RX ORDER — WARFARIN SODIUM 2.5 MG/1
1 TABLET ORAL
Qty: 0 | Refills: 0 | DISCHARGE

## 2024-01-22 RX ORDER — SIMVASTATIN 20 MG/1
1 TABLET, FILM COATED ORAL
Qty: 0 | Refills: 0 | DISCHARGE

## 2024-01-22 NOTE — ASU PATIENT PROFILE, ADULT - ABILITY TO HEAR (WITH HEARING AID OR HEARING APPLIANCE IF NORMALLY USED):
WEARING LEFT HEARING AID/Mildly to Moderately Impaired: difficulty hearing in some environments or speaker may need to increase volume or speak distinctly

## 2024-01-22 NOTE — ASU DISCHARGE PLAN (ADULT/PEDIATRIC) - NS MD DC FALL RISK RISK
For information on Fall & Injury Prevention, visit: [email or web address] OR  [email or web address] OR  https://www.cdc.gov/steadi/patient.html

## 2024-01-22 NOTE — ASU PATIENT PROFILE, ADULT - NSICDXPASTSURGICALHX_GEN_ALL_CORE_FT
PAST SURGICAL HISTORY:  H/O aortic valve replacement     H/O right inguinal hernia repair     History of surgery LEFT CATARACTEXTRACTION WITH LENS IMPLANT

## 2024-01-24 DIAGNOSIS — H25.11 AGE-RELATED NUCLEAR CATARACT, RIGHT EYE: ICD-10-CM

## 2024-01-24 DIAGNOSIS — Z79.01 LONG TERM (CURRENT) USE OF ANTICOAGULANTS: ICD-10-CM

## 2024-01-24 DIAGNOSIS — I25.10 ATHEROSCLEROTIC HEART DISEASE OF NATIVE CORONARY ARTERY WITHOUT ANGINA PECTORIS: ICD-10-CM

## 2024-01-24 DIAGNOSIS — I48.91 UNSPECIFIED ATRIAL FIBRILLATION: ICD-10-CM

## 2024-01-24 DIAGNOSIS — Z79.82 LONG TERM (CURRENT) USE OF ASPIRIN: ICD-10-CM

## 2024-01-25 ENCOUNTER — APPOINTMENT (OUTPATIENT)
Dept: MEDICATION MANAGEMENT | Facility: CLINIC | Age: 84
End: 2024-01-25

## 2024-01-25 ENCOUNTER — OUTPATIENT (OUTPATIENT)
Dept: OUTPATIENT SERVICES | Facility: HOSPITAL | Age: 84
LOS: 1 days | End: 2024-01-25
Payer: MEDICARE

## 2024-01-25 DIAGNOSIS — Z98.890 OTHER SPECIFIED POSTPROCEDURAL STATES: Chronic | ICD-10-CM

## 2024-01-25 DIAGNOSIS — I48.91 UNSPECIFIED ATRIAL FIBRILLATION: ICD-10-CM

## 2024-01-25 DIAGNOSIS — Z79.01 LONG TERM (CURRENT) USE OF ANTICOAGULANTS: ICD-10-CM

## 2024-01-25 DIAGNOSIS — Z95.2 PRESENCE OF PROSTHETIC HEART VALVE: Chronic | ICD-10-CM

## 2024-01-25 LAB
INR PPP: 2.89
PT BLD: 31.7

## 2024-01-25 PROCEDURE — G0463: CPT

## 2024-01-26 DIAGNOSIS — I48.91 UNSPECIFIED ATRIAL FIBRILLATION: ICD-10-CM

## 2024-01-26 DIAGNOSIS — Z79.01 LONG TERM (CURRENT) USE OF ANTICOAGULANTS: ICD-10-CM

## 2024-02-29 ENCOUNTER — APPOINTMENT (OUTPATIENT)
Dept: MEDICATION MANAGEMENT | Facility: CLINIC | Age: 84
End: 2024-02-29

## 2024-03-01 ENCOUNTER — OUTPATIENT (OUTPATIENT)
Dept: OUTPATIENT SERVICES | Facility: HOSPITAL | Age: 84
LOS: 1 days | End: 2024-03-01
Payer: MEDICARE

## 2024-03-01 ENCOUNTER — APPOINTMENT (OUTPATIENT)
Dept: MEDICATION MANAGEMENT | Facility: CLINIC | Age: 84
End: 2024-03-01

## 2024-03-01 DIAGNOSIS — Z98.890 OTHER SPECIFIED POSTPROCEDURAL STATES: Chronic | ICD-10-CM

## 2024-03-01 DIAGNOSIS — Z95.2 PRESENCE OF PROSTHETIC HEART VALVE: Chronic | ICD-10-CM

## 2024-03-01 DIAGNOSIS — I48.91 UNSPECIFIED ATRIAL FIBRILLATION: ICD-10-CM

## 2024-03-01 DIAGNOSIS — Z79.01 LONG TERM (CURRENT) USE OF ANTICOAGULANTS: ICD-10-CM

## 2024-03-01 LAB
INR PPP: 2.03
PT BLD: 22.7

## 2024-03-01 PROCEDURE — G0463: CPT

## 2024-03-02 DIAGNOSIS — Z79.01 LONG TERM (CURRENT) USE OF ANTICOAGULANTS: ICD-10-CM

## 2024-03-02 DIAGNOSIS — I48.91 UNSPECIFIED ATRIAL FIBRILLATION: ICD-10-CM

## 2024-03-28 ENCOUNTER — APPOINTMENT (OUTPATIENT)
Dept: MEDICATION MANAGEMENT | Facility: CLINIC | Age: 84
End: 2024-03-28

## 2024-03-28 ENCOUNTER — OUTPATIENT (OUTPATIENT)
Dept: OUTPATIENT SERVICES | Facility: HOSPITAL | Age: 84
LOS: 1 days | End: 2024-03-28
Payer: MEDICARE

## 2024-03-28 DIAGNOSIS — Z79.01 LONG TERM (CURRENT) USE OF ANTICOAGULANTS: ICD-10-CM

## 2024-03-28 DIAGNOSIS — Z95.2 PRESENCE OF PROSTHETIC HEART VALVE: Chronic | ICD-10-CM

## 2024-03-28 DIAGNOSIS — Z98.890 OTHER SPECIFIED POSTPROCEDURAL STATES: Chronic | ICD-10-CM

## 2024-03-28 DIAGNOSIS — I48.91 UNSPECIFIED ATRIAL FIBRILLATION: ICD-10-CM

## 2024-03-28 LAB
INR PPP: 2.54
PT BLD: 27.9

## 2024-03-28 PROCEDURE — G0463: CPT

## 2024-03-29 DIAGNOSIS — Z79.01 LONG TERM (CURRENT) USE OF ANTICOAGULANTS: ICD-10-CM

## 2024-03-29 DIAGNOSIS — I48.91 UNSPECIFIED ATRIAL FIBRILLATION: ICD-10-CM

## 2024-04-25 ENCOUNTER — OUTPATIENT (OUTPATIENT)
Dept: OUTPATIENT SERVICES | Facility: HOSPITAL | Age: 84
LOS: 1 days | End: 2024-04-25
Payer: MEDICARE

## 2024-04-25 ENCOUNTER — APPOINTMENT (OUTPATIENT)
Dept: MEDICATION MANAGEMENT | Facility: CLINIC | Age: 84
End: 2024-04-25

## 2024-04-25 DIAGNOSIS — Z79.01 LONG TERM (CURRENT) USE OF ANTICOAGULANTS: ICD-10-CM

## 2024-04-25 DIAGNOSIS — I48.91 UNSPECIFIED ATRIAL FIBRILLATION: ICD-10-CM

## 2024-04-25 DIAGNOSIS — Z98.890 OTHER SPECIFIED POSTPROCEDURAL STATES: Chronic | ICD-10-CM

## 2024-04-25 DIAGNOSIS — Z95.2 PRESENCE OF PROSTHETIC HEART VALVE: Chronic | ICD-10-CM

## 2024-04-25 LAB
INR PPP: 3.08
PT BLD: 33.7

## 2024-04-25 PROCEDURE — G0463: CPT

## 2024-04-26 DIAGNOSIS — Z79.01 LONG TERM (CURRENT) USE OF ANTICOAGULANTS: ICD-10-CM

## 2024-04-26 DIAGNOSIS — I48.91 UNSPECIFIED ATRIAL FIBRILLATION: ICD-10-CM

## 2024-04-29 ENCOUNTER — RX RENEWAL (OUTPATIENT)
Age: 84
End: 2024-04-29

## 2024-04-29 RX ORDER — WARFARIN 3 MG/1
3 TABLET ORAL
Qty: 90 | Refills: 3 | Status: ACTIVE | COMMUNITY
Start: 2020-12-24 | End: 1900-01-01

## 2024-05-02 ENCOUNTER — APPOINTMENT (OUTPATIENT)
Dept: CARDIOLOGY | Facility: CLINIC | Age: 84
End: 2024-05-02
Payer: MEDICARE

## 2024-05-02 DIAGNOSIS — I34.0 NONRHEUMATIC MITRAL (VALVE) INSUFFICIENCY: ICD-10-CM

## 2024-05-02 DIAGNOSIS — I42.9 CARDIOMYOPATHY, UNSPECIFIED: ICD-10-CM

## 2024-05-02 DIAGNOSIS — I48.0 PAROXYSMAL ATRIAL FIBRILLATION: ICD-10-CM

## 2024-05-02 DIAGNOSIS — Z95.2 PRESENCE OF PROSTHETIC HEART VALVE: ICD-10-CM

## 2024-05-02 PROCEDURE — 93306 TTE W/DOPPLER COMPLETE: CPT

## 2024-05-06 PROBLEM — I42.9 CARDIOMYOPATHY: Status: ACTIVE | Noted: 2019-03-07

## 2024-05-06 PROBLEM — I48.0 PAF (PAROXYSMAL ATRIAL FIBRILLATION): Status: ACTIVE | Noted: 2018-03-16

## 2024-05-06 PROBLEM — Z95.2 S/P AORTIC VALVE REPLACEMENT: Status: ACTIVE | Noted: 2024-05-06

## 2024-05-07 ENCOUNTER — RX RENEWAL (OUTPATIENT)
Age: 84
End: 2024-05-07

## 2024-05-07 RX ORDER — METOPROLOL TARTRATE 25 MG/1
25 TABLET, FILM COATED ORAL
Qty: 180 | Refills: 3 | Status: ACTIVE | COMMUNITY
Start: 2019-06-13 | End: 1900-01-01

## 2024-05-17 NOTE — ED ADULT TRIAGE NOTE - BP NONINVASIVE SYSTOLIC (MM HG)
[de-identified] : General Appearance / Station: Well developed, well nourished, in no acute distress Orientation: Oriented to person, place, and time Gait & Station: Ambulates without assistive device Neurologic: Normal leg sensation Cardiovascular: Warm extremity Lymphatics: No lymphedema Generalized Ligament Laxity: Normal Stiffness: Normal.  LUMBAR SPINE Nontender at lumbar spine Straight leg raise: Negative Motor: 5/5 motor L2-S1 Sensation Intact. No paresthesias L2-S1  SYMPTOMATIC RIGHT HIP Range of motion: Painless internal and external rotation of the hip. Strength: Within Normal Limits. Negative Trendelenburg sign                                                                      FADIR: Negative Stinchfield: Negative Hammad Test: Positive Palpation: Decreased tenderness at greater trochanter, Nontender SI joint                    196

## 2024-05-23 ENCOUNTER — OUTPATIENT (OUTPATIENT)
Dept: OUTPATIENT SERVICES | Facility: HOSPITAL | Age: 84
LOS: 1 days | End: 2024-05-23
Payer: MEDICARE

## 2024-05-23 ENCOUNTER — APPOINTMENT (OUTPATIENT)
Dept: MEDICATION MANAGEMENT | Facility: CLINIC | Age: 84
End: 2024-05-23

## 2024-05-23 DIAGNOSIS — Z79.01 LONG TERM (CURRENT) USE OF ANTICOAGULANTS: ICD-10-CM

## 2024-05-23 DIAGNOSIS — I48.91 UNSPECIFIED ATRIAL FIBRILLATION: ICD-10-CM

## 2024-05-23 DIAGNOSIS — Z98.890 OTHER SPECIFIED POSTPROCEDURAL STATES: Chronic | ICD-10-CM

## 2024-05-23 DIAGNOSIS — Z95.2 PRESENCE OF PROSTHETIC HEART VALVE: Chronic | ICD-10-CM

## 2024-05-23 PROCEDURE — G0463: CPT

## 2024-05-24 DIAGNOSIS — I48.91 UNSPECIFIED ATRIAL FIBRILLATION: ICD-10-CM

## 2024-05-24 DIAGNOSIS — Z79.01 LONG TERM (CURRENT) USE OF ANTICOAGULANTS: ICD-10-CM

## 2024-06-20 ENCOUNTER — OUTPATIENT (OUTPATIENT)
Dept: OUTPATIENT SERVICES | Facility: HOSPITAL | Age: 84
LOS: 1 days | End: 2024-06-20
Payer: MEDICARE

## 2024-06-20 ENCOUNTER — APPOINTMENT (OUTPATIENT)
Dept: MEDICATION MANAGEMENT | Facility: CLINIC | Age: 84
End: 2024-06-20

## 2024-06-20 DIAGNOSIS — I48.91 UNSPECIFIED ATRIAL FIBRILLATION: ICD-10-CM

## 2024-06-20 DIAGNOSIS — Z95.2 PRESENCE OF PROSTHETIC HEART VALVE: Chronic | ICD-10-CM

## 2024-06-20 DIAGNOSIS — Z98.890 OTHER SPECIFIED POSTPROCEDURAL STATES: Chronic | ICD-10-CM

## 2024-06-20 DIAGNOSIS — Z79.01 LONG TERM (CURRENT) USE OF ANTICOAGULANTS: ICD-10-CM

## 2024-06-20 LAB
INR PPP: 3.07
PT BLD: 33.6

## 2024-06-20 PROCEDURE — G0463: CPT

## 2024-06-21 DIAGNOSIS — I48.91 UNSPECIFIED ATRIAL FIBRILLATION: ICD-10-CM

## 2024-06-21 DIAGNOSIS — Z79.01 LONG TERM (CURRENT) USE OF ANTICOAGULANTS: ICD-10-CM

## 2024-07-16 ENCOUNTER — APPOINTMENT (OUTPATIENT)
Dept: CARDIOLOGY | Facility: CLINIC | Age: 84
End: 2024-07-16
Payer: MEDICARE

## 2024-07-16 VITALS
BODY MASS INDEX: 17.18 KG/M2 | SYSTOLIC BLOOD PRESSURE: 100 MMHG | DIASTOLIC BLOOD PRESSURE: 60 MMHG | HEART RATE: 55 BPM | WEIGHT: 120 LBS | HEIGHT: 70 IN

## 2024-07-16 DIAGNOSIS — I42.9 CARDIOMYOPATHY, UNSPECIFIED: ICD-10-CM

## 2024-07-16 DIAGNOSIS — I71.40 ABDOMINAL AORTIC ANEURYSM, WITHOUT RUPTURE, UNSPECIFIED: ICD-10-CM

## 2024-07-16 DIAGNOSIS — E03.9 HYPOTHYROIDISM, UNSPECIFIED: ICD-10-CM

## 2024-07-16 DIAGNOSIS — C91.10 CHRONIC LYMPHOCYTIC LEUKEMIA OF B-CELL TYPE NOT HAVING ACHIEVED REMISSION: ICD-10-CM

## 2024-07-16 DIAGNOSIS — I48.92 UNSPECIFIED ATRIAL FLUTTER: ICD-10-CM

## 2024-07-16 DIAGNOSIS — I35.1 NONRHEUMATIC AORTIC (VALVE) INSUFFICIENCY: ICD-10-CM

## 2024-07-16 DIAGNOSIS — I10 ESSENTIAL (PRIMARY) HYPERTENSION: ICD-10-CM

## 2024-07-16 DIAGNOSIS — Z95.2 PRESENCE OF PROSTHETIC HEART VALVE: ICD-10-CM

## 2024-07-16 DIAGNOSIS — I71.9 AORTIC ANEURYSM OF UNSPECIFIED SITE, W/OUT RUPTURE: ICD-10-CM

## 2024-07-16 DIAGNOSIS — I48.0 PAROXYSMAL ATRIAL FIBRILLATION: ICD-10-CM

## 2024-07-16 PROCEDURE — 93000 ELECTROCARDIOGRAM COMPLETE: CPT

## 2024-07-16 PROCEDURE — 99214 OFFICE O/P EST MOD 30 MIN: CPT

## 2024-07-18 ENCOUNTER — APPOINTMENT (OUTPATIENT)
Dept: MEDICATION MANAGEMENT | Facility: CLINIC | Age: 84
End: 2024-07-18

## 2024-07-19 ENCOUNTER — APPOINTMENT (OUTPATIENT)
Dept: MEDICATION MANAGEMENT | Facility: CLINIC | Age: 84
End: 2024-07-19

## 2024-07-19 ENCOUNTER — OUTPATIENT (OUTPATIENT)
Dept: OUTPATIENT SERVICES | Facility: HOSPITAL | Age: 84
LOS: 1 days | End: 2024-07-19

## 2024-07-19 DIAGNOSIS — Z95.2 PRESENCE OF PROSTHETIC HEART VALVE: Chronic | ICD-10-CM

## 2024-07-19 DIAGNOSIS — Z79.01 LONG TERM (CURRENT) USE OF ANTICOAGULANTS: ICD-10-CM

## 2024-07-19 DIAGNOSIS — I48.91 UNSPECIFIED ATRIAL FIBRILLATION: ICD-10-CM

## 2024-07-19 LAB
INR PPP: 2.53
PT BLD: 27.8

## 2024-07-19 PROCEDURE — G0463: CPT

## 2024-07-20 DIAGNOSIS — Z79.01 LONG TERM (CURRENT) USE OF ANTICOAGULANTS: ICD-10-CM

## 2024-07-20 DIAGNOSIS — I48.91 UNSPECIFIED ATRIAL FIBRILLATION: ICD-10-CM

## 2024-08-15 ENCOUNTER — APPOINTMENT (OUTPATIENT)
Dept: MEDICATION MANAGEMENT | Facility: CLINIC | Age: 84
End: 2024-08-15

## 2024-08-15 LAB
INR PPP: 2.64
PT BLD: 29.3

## 2024-09-12 ENCOUNTER — APPOINTMENT (OUTPATIENT)
Dept: MEDICATION MANAGEMENT | Facility: CLINIC | Age: 84
End: 2024-09-12

## 2024-09-12 ENCOUNTER — OUTPATIENT (OUTPATIENT)
Dept: OUTPATIENT SERVICES | Facility: HOSPITAL | Age: 84
LOS: 1 days | End: 2024-09-12
Payer: MEDICARE

## 2024-09-12 DIAGNOSIS — Z79.01 LONG TERM (CURRENT) USE OF ANTICOAGULANTS: ICD-10-CM

## 2024-09-12 DIAGNOSIS — Z95.2 PRESENCE OF PROSTHETIC HEART VALVE: Chronic | ICD-10-CM

## 2024-09-12 DIAGNOSIS — I48.91 UNSPECIFIED ATRIAL FIBRILLATION: ICD-10-CM

## 2024-09-12 DIAGNOSIS — Z98.890 OTHER SPECIFIED POSTPROCEDURAL STATES: Chronic | ICD-10-CM

## 2024-09-12 LAB
INR PPP: 2.54
PT BLD: 27.9

## 2024-09-12 PROCEDURE — G0463: CPT

## 2024-09-13 DIAGNOSIS — Z79.01 LONG TERM (CURRENT) USE OF ANTICOAGULANTS: ICD-10-CM

## 2024-09-13 DIAGNOSIS — I48.91 UNSPECIFIED ATRIAL FIBRILLATION: ICD-10-CM

## 2024-09-13 NOTE — ED ADULT TRIAGE NOTE - STATUS:
Labs normal start hydroxychloroquine  200 mg tabs  1 daily 90 tabs  Refer for eye exam    Let him know needs eye exam not urgent due to the med Intact

## 2024-09-26 ENCOUNTER — APPOINTMENT (OUTPATIENT)
Age: 84
End: 2024-09-26

## 2024-10-03 ENCOUNTER — APPOINTMENT (OUTPATIENT)
Dept: MEDICATION MANAGEMENT | Facility: CLINIC | Age: 84
End: 2024-10-03

## 2024-10-03 LAB
INR PPP: 3.54
PT BLD: 41.3

## 2024-10-17 ENCOUNTER — OUTPATIENT (OUTPATIENT)
Dept: OUTPATIENT SERVICES | Facility: HOSPITAL | Age: 84
LOS: 1 days | End: 2024-10-17
Payer: MEDICARE

## 2024-10-17 ENCOUNTER — APPOINTMENT (OUTPATIENT)
Dept: MEDICATION MANAGEMENT | Facility: CLINIC | Age: 84
End: 2024-10-17

## 2024-10-17 DIAGNOSIS — Z95.2 PRESENCE OF PROSTHETIC HEART VALVE: Chronic | ICD-10-CM

## 2024-10-17 DIAGNOSIS — Z98.890 OTHER SPECIFIED POSTPROCEDURAL STATES: Chronic | ICD-10-CM

## 2024-10-17 DIAGNOSIS — Z79.01 LONG TERM (CURRENT) USE OF ANTICOAGULANTS: ICD-10-CM

## 2024-10-17 DIAGNOSIS — I48.91 UNSPECIFIED ATRIAL FIBRILLATION: ICD-10-CM

## 2024-10-17 LAB
INR PPP: 1.94
PT BLD: 22.7

## 2024-10-17 PROCEDURE — G0463: CPT

## 2024-10-18 DIAGNOSIS — I48.91 UNSPECIFIED ATRIAL FIBRILLATION: ICD-10-CM

## 2024-10-18 DIAGNOSIS — Z79.01 LONG TERM (CURRENT) USE OF ANTICOAGULANTS: ICD-10-CM

## 2024-10-31 ENCOUNTER — OUTPATIENT (OUTPATIENT)
Dept: OUTPATIENT SERVICES | Facility: HOSPITAL | Age: 84
LOS: 1 days | End: 2024-10-31
Payer: MEDICARE

## 2024-10-31 ENCOUNTER — APPOINTMENT (OUTPATIENT)
Dept: MEDICATION MANAGEMENT | Facility: CLINIC | Age: 84
End: 2024-10-31

## 2024-10-31 DIAGNOSIS — I48.91 UNSPECIFIED ATRIAL FIBRILLATION: ICD-10-CM

## 2024-10-31 DIAGNOSIS — Z79.01 LONG TERM (CURRENT) USE OF ANTICOAGULANTS: ICD-10-CM

## 2024-10-31 DIAGNOSIS — Z98.890 OTHER SPECIFIED POSTPROCEDURAL STATES: Chronic | ICD-10-CM

## 2024-10-31 LAB
INR PPP: 2.48
PT BLD: 29.3

## 2024-10-31 PROCEDURE — G0463: CPT

## 2024-11-01 DIAGNOSIS — Z79.01 LONG TERM (CURRENT) USE OF ANTICOAGULANTS: ICD-10-CM

## 2024-11-01 DIAGNOSIS — I48.91 UNSPECIFIED ATRIAL FIBRILLATION: ICD-10-CM

## 2024-11-29 ENCOUNTER — APPOINTMENT (OUTPATIENT)
Dept: MEDICATION MANAGEMENT | Facility: CLINIC | Age: 84
End: 2024-11-29

## 2024-11-29 LAB
INR PPP: 2.56
PT BLD: 29.9

## 2025-01-28 NOTE — ASU PREOP CHECKLIST - BOWEL PREP
[facility] received a fax that requires your attention. The document has been indexed to the patient’s chart for your review.      Reason for sending: EXTERNAL MEDICAL RECORD NOTIFICATION     Documents Description: CARVEDILOL 90 DAY RIV-KTHZYPROV-4.28.25    Name of Sender: STANISLAW     Date Indexed: 1.28.25    
n/a

## 2025-04-23 NOTE — PRE-ANESTHESIA EVALUATION ADULT - NSANTHOSAYNRD_GEN_A_CORE
See stroke   No. CONTRERAS screening performed.  STOP BANG Legend: 0-2 = LOW Risk; 3-4 = INTERMEDIATE Risk; 5-8 = HIGH Risk